# Patient Record
Sex: FEMALE | Race: BLACK OR AFRICAN AMERICAN | NOT HISPANIC OR LATINO | Employment: OTHER | URBAN - METROPOLITAN AREA
[De-identification: names, ages, dates, MRNs, and addresses within clinical notes are randomized per-mention and may not be internally consistent; named-entity substitution may affect disease eponyms.]

---

## 2017-01-09 ENCOUNTER — TELEPHONE (OUTPATIENT)
Dept: INTERNAL MEDICINE | Facility: CLINIC | Age: 82
End: 2017-01-09

## 2017-01-09 NOTE — TELEPHONE ENCOUNTER
----- Message from Maggie Oleg sent at 1/6/2017  4:51 PM CST -----  Contact: Self  X   1st Request  _  2nd Request  _  3rd Request        Who: PATY SOMMER [[id number]]    Why: Pt states she has gone three to four day without a bowel movement and when she takes a laxative she constantly has a bowel movement. Pt would like to know if this is normal. Please call pt, thanks!    What Number to Call Back: [phone]    When to Expect a call back: (Before the end of the day)   -- if call after 3:00 call back will be tomorrow.

## 2017-01-09 NOTE — TELEPHONE ENCOUNTER
This is not normal and patient does need follow up with dr. figueroa to further discuss, she is overdue for her follow up   Please schedule with colorectal     Please schedule patient also for follow up visit with me

## 2017-01-10 NOTE — TELEPHONE ENCOUNTER
Called pt in reference to message we received. Pt states that her bowels may not move for three days and then she will take a laxative and they will move. Asked pt about fiber and water intake. Pt states that she doesn't drink a lot of water because she is on medication that causes her to urinate all day. States that Dr. Robles gave her info about fiber on her last visit but she does not know where the paperwork is.    Went over current meds that are ordered for stool softening as well as how these meds work with water consumption. Advised the pt that she should consume at least 8 cups of water daily as well as eat a diet high in fiber. Pt states that she has been trying to eat some spinach here and there. Advised pt that spinach is a good veggie to eat, however there are other options that are higher in fiber like lentils and broccoli. Went over foods for breakfast, lunch and dinner that the pt could eat.     Pt verbalized understanding. States that she will try these out and give our office a cb if she has any questions or concern. Asked that she have time to try this before she gets further consult.    Please advise?

## 2017-01-10 NOTE — TELEPHONE ENCOUNTER
Would recommend scheduled follow up visit with Dr. Beard, with her neurologist and with me in the upcoming 3-4 weeks

## 2017-01-13 RX ORDER — GABAPENTIN 800 MG/1
800 TABLET ORAL 3 TIMES DAILY
Qty: 270 TABLET | Refills: 0 | Status: SHIPPED | OUTPATIENT
Start: 2017-01-13 | End: 2017-01-25 | Stop reason: SDUPTHER

## 2017-01-25 RX ORDER — GABAPENTIN 800 MG/1
800 TABLET ORAL 3 TIMES DAILY
Qty: 270 TABLET | Refills: 0 | Status: SHIPPED | OUTPATIENT
Start: 2017-01-25 | End: 2017-05-11 | Stop reason: SDUPTHER

## 2017-01-31 ENCOUNTER — PATIENT OUTREACH (OUTPATIENT)
Dept: ADMINISTRATIVE | Facility: HOSPITAL | Age: 82
End: 2017-01-31

## 2017-02-06 ENCOUNTER — NURSE TRIAGE (OUTPATIENT)
Dept: ADMINISTRATIVE | Facility: CLINIC | Age: 82
End: 2017-02-06

## 2017-02-07 NOTE — TELEPHONE ENCOUNTER
Reason for Disposition   Caller has medication question only, adult not sick, and triager answers question    Protocols used: ST MEDICATION QUESTION CALL-A-    Patient had a question about her medication (Glycolax).

## 2017-02-14 RX ORDER — ATORVASTATIN CALCIUM 10 MG/1
10 TABLET, FILM COATED ORAL DAILY
Qty: 90 TABLET | Refills: 3 | Status: SHIPPED | OUTPATIENT
Start: 2017-02-14 | End: 2017-02-16 | Stop reason: SDUPTHER

## 2017-02-16 RX ORDER — ATORVASTATIN CALCIUM 10 MG/1
10 TABLET, FILM COATED ORAL DAILY
Qty: 90 TABLET | Refills: 3 | Status: SHIPPED | OUTPATIENT
Start: 2017-02-16 | End: 2018-04-28 | Stop reason: SDUPTHER

## 2017-03-03 ENCOUNTER — OFFICE VISIT (OUTPATIENT)
Dept: INTERNAL MEDICINE | Facility: CLINIC | Age: 82
End: 2017-03-03
Attending: FAMILY MEDICINE
Payer: MEDICARE

## 2017-03-03 ENCOUNTER — LAB VISIT (OUTPATIENT)
Dept: LAB | Facility: OTHER | Age: 82
End: 2017-03-03
Attending: FAMILY MEDICINE
Payer: MEDICARE

## 2017-03-03 VITALS
DIASTOLIC BLOOD PRESSURE: 70 MMHG | BODY MASS INDEX: 24.38 KG/M2 | SYSTOLIC BLOOD PRESSURE: 112 MMHG | WEIGHT: 151.69 LBS | OXYGEN SATURATION: 98 % | HEIGHT: 66 IN | HEART RATE: 77 BPM

## 2017-03-03 DIAGNOSIS — E53.8 B12 DEFICIENCY: ICD-10-CM

## 2017-03-03 DIAGNOSIS — E11.8 TYPE 2 DIABETES MELLITUS WITH COMPLICATION, UNSPECIFIED LONG TERM INSULIN USE STATUS: ICD-10-CM

## 2017-03-03 DIAGNOSIS — R53.81 DEBILITY: ICD-10-CM

## 2017-03-03 DIAGNOSIS — N95.9 POSTMENOPAUSAL SYMPTOMS: ICD-10-CM

## 2017-03-03 DIAGNOSIS — R15.9 INCONTINENCE OF FECES, UNSPECIFIED FECAL INCONTINENCE TYPE: ICD-10-CM

## 2017-03-03 DIAGNOSIS — R93.7 ABNORMAL MAGNETIC RESONANCE IMAGING OF LUMBAR SPINE: ICD-10-CM

## 2017-03-03 DIAGNOSIS — E55.9 VITAMIN D DEFICIENCY: ICD-10-CM

## 2017-03-03 DIAGNOSIS — B35.1 ONYCHOMYCOSIS: ICD-10-CM

## 2017-03-03 DIAGNOSIS — E11.8 TYPE 2 DIABETES MELLITUS WITH COMPLICATION, UNSPECIFIED LONG TERM INSULIN USE STATUS: Primary | ICD-10-CM

## 2017-03-03 DIAGNOSIS — R93.7 ABNORMAL SPINAL DIAGNOSTIC IMAGING: ICD-10-CM

## 2017-03-03 LAB
25(OH)D3+25(OH)D2 SERPL-MCNC: 12 NG/ML
ALBUMIN SERPL BCP-MCNC: 3.4 G/DL
ALP SERPL-CCNC: 51 U/L
ALT SERPL W/O P-5'-P-CCNC: 14 U/L
ANION GAP SERPL CALC-SCNC: 12 MMOL/L
AST SERPL-CCNC: 22 U/L
BASOPHILS # BLD AUTO: 0.02 K/UL
BASOPHILS NFR BLD: 0.3 %
BILIRUB SERPL-MCNC: 0.2 MG/DL
BUN SERPL-MCNC: 22 MG/DL
CALCIUM SERPL-MCNC: 8.9 MG/DL
CHLORIDE SERPL-SCNC: 107 MMOL/L
CHOLEST/HDLC SERPL: 3.6 {RATIO}
CO2 SERPL-SCNC: 22 MMOL/L
CREAT SERPL-MCNC: 1.3 MG/DL
CREAT UR-MCNC: 32 MG/DL
DIFFERENTIAL METHOD: ABNORMAL
EOSINOPHIL # BLD AUTO: 0.3 K/UL
EOSINOPHIL NFR BLD: 3.9 %
ERYTHROCYTE [DISTWIDTH] IN BLOOD BY AUTOMATED COUNT: 14.9 %
EST. GFR  (AFRICAN AMERICAN): 45.1 ML/MIN/1.73 M^2
EST. GFR  (NON AFRICAN AMERICAN): 39.1 ML/MIN/1.73 M^2
GLUCOSE SERPL-MCNC: 79 MG/DL
HCT VFR BLD AUTO: 37.3 %
HDL/CHOLESTEROL RATIO: 27.8 %
HDLC SERPL-MCNC: 162 MG/DL
HDLC SERPL-MCNC: 45 MG/DL
HGB BLD-MCNC: 12.2 G/DL
LDLC SERPL CALC-MCNC: 103.6 MG/DL
LYMPHOCYTES # BLD AUTO: 2.1 K/UL
LYMPHOCYTES NFR BLD: 27.7 %
MCH RBC QN AUTO: 31.4 PG
MCHC RBC AUTO-ENTMCNC: 32.7 %
MCV RBC AUTO: 96 FL
MICROALBUMIN UR DL<=1MG/L-MCNC: <2.5 UG/ML
MICROALBUMIN/CREATININE RATIO: NORMAL UG/MG
MONOCYTES # BLD AUTO: 0.2 K/UL
MONOCYTES NFR BLD: 2.8 %
NEUTROPHILS # BLD AUTO: 4.9 K/UL
NEUTROPHILS NFR BLD: 65.2 %
NONHDLC SERPL-MCNC: 117 MG/DL
PLATELET # BLD AUTO: 255 K/UL
PMV BLD AUTO: 11.2 FL
POTASSIUM SERPL-SCNC: 4.6 MMOL/L
PROT SERPL-MCNC: 7.6 G/DL
RBC # BLD AUTO: 3.89 M/UL
SODIUM SERPL-SCNC: 141 MMOL/L
T4 FREE SERPL-MCNC: 1.13 NG/DL
TRIGL SERPL-MCNC: 67 MG/DL
TSH SERPL DL<=0.005 MIU/L-ACNC: 0.42 UIU/ML
VIT B12 SERPL-MCNC: 884 PG/ML
WBC # BLD AUTO: 7.51 K/UL

## 2017-03-03 PROCEDURE — 80053 COMPREHEN METABOLIC PANEL: CPT

## 2017-03-03 PROCEDURE — 85025 COMPLETE CBC W/AUTO DIFF WBC: CPT

## 2017-03-03 PROCEDURE — 82607 VITAMIN B-12: CPT

## 2017-03-03 PROCEDURE — 84443 ASSAY THYROID STIM HORMONE: CPT

## 2017-03-03 PROCEDURE — 84439 ASSAY OF FREE THYROXINE: CPT

## 2017-03-03 PROCEDURE — 99214 OFFICE O/P EST MOD 30 MIN: CPT | Mod: S$PBB,,, | Performed by: FAMILY MEDICINE

## 2017-03-03 PROCEDURE — 80061 LIPID PANEL: CPT

## 2017-03-03 PROCEDURE — 82306 VITAMIN D 25 HYDROXY: CPT

## 2017-03-03 PROCEDURE — 99999 PR PBB SHADOW E&M-EST. PATIENT-LVL IV: CPT | Mod: PBBFAC,,, | Performed by: FAMILY MEDICINE

## 2017-03-03 PROCEDURE — 36415 COLL VENOUS BLD VENIPUNCTURE: CPT

## 2017-03-03 PROCEDURE — 83036 HEMOGLOBIN GLYCOSYLATED A1C: CPT

## 2017-03-03 NOTE — MR AVS SNAPSHOT
[facility]  [address]  [facility] [address]  Phone: [phone]  Fax: [phone]                  Jojo Ayoub   3/3/2017 10:00 AM   Office Visit    Description:  Female : 1935   Provider:  Darya Robles MD   Department:  [facility]           Reason for Visit     Diabetes           Diagnoses this Visit        Comments    Type 2 diabetes mellitus with complication, unspecified long term insulin use status    -  Primary     Postmenopausal symptoms         Abnormal magnetic resonance imaging of lumbar spine         Incontinence of feces, unspecified fecal incontinence type         Vitamin D deficiency         B12 deficiency         Debility         Onychomycosis         Abnormal spinal diagnostic imaging                To Do List           Future Appointments        Provider Department Dept Phone    3/3/2017 11:00 AM LAB, [facility] [phone]    3/24/2017 8:30 AM MD Norris Richter [place] - Neurology [phone]    3/24/2017 9:00 AM IZA Lopez [place] - Neurosurgery [facility] [phone]      Goals (5 Years of Data)     None      [facility]     [facility] -  Assistance  Registered nurses in the [facility] provide clinical advisement, health education, appointment booking, and other advisory services.  Call for this free service at [phone].             Medications           Message regarding Medications     Verify the changes and/or additions to your medication regime listed below are the same as discussed with your clinician today.  If any of these changes or additions are incorrect, please notify your healthcare provider.             Verify that the below list of medications is an accurate representation of the medications you are currently taking.  If none reported, the list may be blank. If incorrect, please contact your healthcare provider. Carry  "this list with you in case of emergency.           Current Medications     aspirin (HECTOR CHILDRENS ASPIRIN) 81 MG chewable tablet Take 81 mg by mouth. 1 Tablet Oral Every day    atorvastatin (LIPITOR) 10 MG tablet Take 1 tablet (10 mg total) by mouth once daily.    blood sugar diagnostic Strp Blood sugar test strips.    carvedilol (COREG) 25 MG tablet TAKE 1 TABLET TWICE DAILY    cyclobenzaprine (FLEXERIL) 5 MG tablet Take one to two tablets at night as needed for shoulder pain    diclofenac sodium (VOLTAREN) 1 % Gel Apply 2 g topically daily as needed.    docusate sodium (COLACE) 100 MG capsule Take 1 capsule (100 mg total) by mouth 2 (two) times daily.    gabapentin (NEURONTIN) 800 MG tablet Take 1 tablet (800 mg total) by mouth 3 (three) times daily.    lancets (ACCU-CHEK SOFTCLIX LANCETS) Misc Blood sugar lancets    lidocaine-prilocaine (EMLA) cream Apply topically as needed.    losartan-hydrochlorothiazide 100-25 mg (HYZAAR) 100-25 mg per tablet Take 1 tablet by mouth once daily.    metformin (FORTAMET) 1,000 mg 24 hr tablet Take 1 tablet (1,000 mg total) by mouth daily with dinner or evening meal. Take with evening meal.Take 1 tablet by mouth Every PM.    polyethylene glycol (GLYCOLAX) 17 gram PwPk Take 17 g by mouth daily as needed.           Clinical Reference Information           Your Vitals Were     BP Pulse Height Weight SpO2 BMI    112/70 77 5' 6" (1.676 m) 68.8 kg (151 lb 10.8 oz) 98% 24.48 kg/m2      Blood Pressure          Most Recent Value    BP  112/70      Allergies as of 3/3/2017     No Known Drug Allergies      Immunizations Administered on Date of Encounter - 3/3/2017     None      Orders Placed During Today's Visit      Normal Orders This Visit    Ambulatory consult to Neurology     Ambulatory consult to Neurosurgery     Ambulatory consult to Podiatry     Ambulatory Referral to Physical/Occupational Therapy     Microalbumin/creatinine urine ratio     Future Labs/Procedures Expected by Expires "    CBC auto differential  3/3/2017 3/3/2018    Comprehensive metabolic panel  3/3/2017 3/3/2018    Hemoglobin A1c  3/3/2017 3/3/2018    Lipid panel  3/3/2017 3/3/2018    T4, free  3/3/2017 5/2/2018    TSH  3/3/2017 3/3/2018    Vitamin B12  3/3/2017 3/3/2018    Vitamin D  3/3/2017 3/3/2018      Language Assistance Services     ATTENTION: Language assistance services are available, free of charge. Please call [phone].      ATENCIÓN: Si habla español, tiene a ernst disposición servicios gratuitos de asistencia lingüística. Llame al [phone].     CHÚ Ý: N?u b?n nói Ti?ng Vi?t, có các d?ch v? h? tr? ngôn ng? mi?n phí dành cho b?n. G?i s? [phone].         [facility] complies with applicable Federal civil rights laws and does not discriminate on the basis of race, color, national origin, age, disability, or sex.

## 2017-03-03 NOTE — PROGRESS NOTES
"Subjective:      Patient ID: Jojo Ayoub is a 81 y.o. female.    Chief Complaint: Diabetes (f/u)    HPI Comments: She is here for her follow up. She has not followed up with neurology or neurosurgery for further work up of her weakness and abnormal imaging. She reports she is making it to the bathroom and is aware of when she has to go to the bathroom.     Review of Systems   Constitutional: Negative.    Respiratory: Negative.    Cardiovascular: Negative.    Gastrointestinal: Negative for abdominal distention.   Genitourinary: Negative for dysuria.     I personally reviewed Past Medical History, Past Surgical history,  Past Social History and Family History    Objective:   /70  Pulse 77  Ht 5' 6" (1.676 m)  Wt 68.8 kg (151 lb 10.8 oz)  SpO2 98%  BMI 24.48 kg/m2    Physical Exam   Constitutional: She is oriented to person, place, and time. She appears well-developed and well-nourished. No distress.   HENT:   Head: Normocephalic and atraumatic.   Right Ear: External ear normal.   Left Ear: External ear normal.   Mouth/Throat: Oropharynx is clear and moist.   Eyes: Conjunctivae and EOM are normal. Pupils are equal, round, and reactive to light. Right eye exhibits no discharge. Left eye exhibits no discharge. No scleral icterus.   Neck: Normal range of motion. Neck supple.   Cardiovascular: Normal rate, regular rhythm, normal heart sounds and intact distal pulses.  Exam reveals no gallop.    No murmur heard.  Pulses:       Dorsalis pedis pulses are 2+ on the right side, and 2+ on the left side.        Posterior tibial pulses are 2+ on the right side, and 2+ on the left side.   Pulmonary/Chest: Effort normal and breath sounds normal. No respiratory distress. She has no wheezes. She has no rales. She exhibits no tenderness.   Abdominal: Soft. Bowel sounds are normal. She exhibits no distension and no mass. There is no tenderness. There is no rebound and no guarding.   Musculoskeletal: Normal range of " motion.        Right foot: There is normal range of motion and no deformity.        Left foot: There is normal range of motion and no deformity.   Feet:   Right Foot:   Protective Sensation: 6 sites tested. 6 sites sensed.   Skin Integrity: Negative for ulcer or blister.   Left Foot:   Protective Sensation: 6 sites tested. 6 sites sensed.   Skin Integrity: Negative for ulcer or blister.   Neurological: She is alert and oriented to person, place, and time.   Vitals reviewed.      Jojo was seen today for diabetes.    Diagnoses and all orders for this visit:    Type 2 diabetes mellitus with complication, unspecified long term insulin use status  -     Cont metformin   -     Hemoglobin A1c; Future      Postmenopausal symptoms  - declined dxa scan     Abnormal magnetic resonance imaging of lumbar spine  -     Ambulatory consult to Neurology  -     Ambulatory consult to Neurosurgery    Incontinence of feces, unspecified fecal incontinence type   -declined colorectal follow up       Vitamin D deficiency  -     Vitamin D; Future      B12 deficiency  -     Vitamin B12; Future    Debility  -     Ambulatory Referral to Physical/Occupational Therapy  -     Ambulatory consult to Podiatry  -     Ambulatory consult to Neurology  -     Ambulatory consult to Neurosurgery    Onychomycosis  -     Ambulatory consult to Podiatry    Abnormal spinal diagnostic imaging  -     Ambulatory consult to Neurology  -     Ambulatory consult to Neurosurgery    Other orders  -     Cancel: DXA Bone Density Spine And Hip_Axial Skeleton; Future  -     Cancel: Hemoglobin A1c; Future

## 2017-03-06 LAB
ESTIMATED AVG GLUCOSE: 140 MG/DL
HBA1C MFR BLD HPLC: 6.5 %

## 2017-03-08 ENCOUNTER — TELEPHONE (OUTPATIENT)
Dept: INTERNAL MEDICINE | Facility: CLINIC | Age: 82
End: 2017-03-08

## 2017-03-08 RX ORDER — ERGOCALCIFEROL 1.25 MG/1
50000 CAPSULE ORAL
Qty: 12 CAPSULE | Refills: 0 | Status: SHIPPED | OUTPATIENT
Start: 2017-03-08 | End: 2017-04-07

## 2017-03-09 NOTE — TELEPHONE ENCOUNTER
Pt advised of Dr. horta's   advice, verbalized understanding and has no further questions or concerns at this time.

## 2017-03-09 NOTE — TELEPHONE ENCOUNTER
Please inform that her urine study is normal   Your complete blood count is normal. You are not anemic.   Your liver, electrolytes, and kidney function are normal.   Her diabetes remains controlled.   Your b12 and folate are normal.   Her cholesterol remains controlled on lipitor   Your vitamin D is low. I will send a prescription to the pharmacy for a weekly supplement you will take once weekly for 12 weeks, then after the 12 weeks you will need to take a daily supplement available over the counter of 1000 IU

## 2017-03-24 RX ORDER — CARVEDILOL 25 MG/1
25 TABLET ORAL 2 TIMES DAILY
Qty: 180 TABLET | Refills: 1 | Status: SHIPPED | OUTPATIENT
Start: 2017-03-24 | End: 2017-10-19 | Stop reason: SDUPTHER

## 2017-03-25 RX ORDER — CYCLOBENZAPRINE HCL 5 MG
TABLET ORAL
Qty: 40 TABLET | Refills: 2 | Status: SHIPPED | OUTPATIENT
Start: 2017-03-25 | End: 2018-06-15

## 2017-03-31 RX ORDER — DICLOFENAC SODIUM 10 MG/G
2 GEL TOPICAL DAILY PRN
Qty: 100 G | Refills: 0 | Status: SHIPPED | OUTPATIENT
Start: 2017-03-31 | End: 2018-06-15

## 2017-04-07 NOTE — TELEPHONE ENCOUNTER
----- Message from Hafsa Galindo sent at 4/7/2017  4:00 PM CDT -----  Contact: pt   X_  1st Request  _  2nd Request  _  3rd Request    Who:PATY SOMMER [[id number]]    Why: Patient states she would like to get a refill on her medications patient would like to get a 90 day supply  losartan-hydrochlorothiazide 100-25 mg (HYZAAR) 100-25 mg per tablet, Amlodipine Besylate 2.5mg tablet called into [id number] - [place], [place] - [address]    What Number to Call Back: [phone]    When to Expect a call back: (Before the end of the day)   -- if call after 3:00 call back will be tomorrow.

## 2017-04-08 RX ORDER — LOSARTAN POTASSIUM AND HYDROCHLOROTHIAZIDE 25; 100 MG/1; MG/1
1 TABLET ORAL DAILY
Qty: 90 TABLET | Refills: 2 | Status: SHIPPED | OUTPATIENT
Start: 2017-04-08 | End: 2018-01-13 | Stop reason: SDUPTHER

## 2017-04-17 RX ORDER — AMLODIPINE BESYLATE 2.5 MG/1
2.5 TABLET ORAL DAILY
Qty: 30 TABLET | Refills: 0 | Status: SHIPPED | OUTPATIENT
Start: 2017-04-17 | End: 2017-04-20 | Stop reason: SDUPTHER

## 2017-04-17 NOTE — TELEPHONE ENCOUNTER
----- Message from Megan Calero sent at 4/17/2017  1:56 PM CDT -----  Contact: pt  _  1st Request  _  2nd Request  _  3rd Request    Please refill the medication(s) listed below. Please call the patient when the prescription(s) is ready for  at the phone number (___)(___-_____) .[phone]    Medication #1amlodipine    Medication #2      Preferred [facility] [address]

## 2017-04-20 RX ORDER — AMLODIPINE BESYLATE 2.5 MG/1
2.5 TABLET ORAL DAILY
Qty: 90 TABLET | Refills: 1 | Status: SHIPPED | OUTPATIENT
Start: 2017-04-20 | End: 2017-05-11 | Stop reason: SDUPTHER

## 2017-04-20 RX ORDER — METFORMIN HYDROCHLORIDE EXTENDED-RELEASE TABLETS 1000 MG/1
1000 TABLET, FILM COATED, EXTENDED RELEASE ORAL
Qty: 180 TABLET | Refills: 1 | Status: SHIPPED | OUTPATIENT
Start: 2017-04-20 | End: 2018-04-12

## 2017-04-20 NOTE — TELEPHONE ENCOUNTER
----- Message from Nellie Mohr sent at 4/20/2017  3:43 PM CDT -----  x_  1st Request  _  2nd Request  _  3rd Request    Please refill the medication(s) listed below. Please call the patient when the prescription(s) is ready for  at the phone number [phone] . Pt was supposed to get a 90 day supply and only got a 30 day supply     Medication #1amlodipine (NORVASC) 2.5 MG tablet 30 tablet     Medication #2      Preferred Pharmacy:

## 2017-05-11 RX ORDER — AMLODIPINE BESYLATE 2.5 MG/1
2.5 TABLET ORAL DAILY
Qty: 90 TABLET | Refills: 1 | Status: SHIPPED | OUTPATIENT
Start: 2017-05-11 | End: 2018-06-19

## 2017-05-11 RX ORDER — GABAPENTIN 800 MG/1
800 TABLET ORAL 3 TIMES DAILY
Qty: 270 TABLET | Refills: 0 | Status: SHIPPED | OUTPATIENT
Start: 2017-05-11 | End: 2017-10-29 | Stop reason: SDUPTHER

## 2017-06-19 ENCOUNTER — TELEPHONE (OUTPATIENT)
Dept: INTERNAL MEDICINE | Facility: CLINIC | Age: 82
End: 2017-06-19

## 2017-06-19 NOTE — TELEPHONE ENCOUNTER
Received refill request via fax for vit D50,000 units. Pt needs to take otc supplement as this was just a 12 week course. Left voicemail for pt to call back in regards to medication advise.

## 2017-06-20 NOTE — TELEPHONE ENCOUNTER
Informed pt of vit d advise. Pt demonstrated verbal understanding of information and had no further questions or concerns at this time.

## 2017-07-26 ENCOUNTER — NURSE TRIAGE (OUTPATIENT)
Dept: ADMINISTRATIVE | Facility: CLINIC | Age: 82
End: 2017-07-26

## 2017-07-26 NOTE — TELEPHONE ENCOUNTER
Reason for Disposition   Health Information question, no triage required and triager able to answer question    Protocols used: ST INFORMATION ONLY CALL-A-AH    Jojo called to say she has radically changed her diet, to include fresh fruits, vegetables, and more water.  Her brother is helping her to introduce green smoothies blended in her home .  She is questioning the looser stools now. She had 2 today. Not watery, just mushy.  Explained that her stools will be more bulky now that she is adding more water and more fiber.  She has a lot of questions.  Encouraged her to ask Darya Robles MD for referral to diabetic education so she can get clarification and additional information. Message to Darya Robles MD .  Please contact caller directly with any additional care advice.

## 2017-07-27 ENCOUNTER — TELEPHONE (OUTPATIENT)
Dept: INTERNAL MEDICINE | Facility: CLINIC | Age: 82
End: 2017-07-27

## 2017-08-08 ENCOUNTER — TELEPHONE (OUTPATIENT)
Dept: INTERNAL MEDICINE | Facility: CLINIC | Age: 82
End: 2017-08-08

## 2017-08-08 NOTE — TELEPHONE ENCOUNTER
Pt states she is going through something personal right now and will have to call back to schedule at a later time.

## 2017-09-22 DIAGNOSIS — E11.9 TYPE 2 DIABETES MELLITUS WITHOUT COMPLICATION: ICD-10-CM

## 2017-10-03 ENCOUNTER — NURSE TRIAGE (OUTPATIENT)
Dept: ADMINISTRATIVE | Facility: CLINIC | Age: 82
End: 2017-10-03

## 2017-10-03 NOTE — TELEPHONE ENCOUNTER
Reason for Disposition   Diabetes    Protocols used: ST EARWAX-A-OH    Pt c/o right sticky ear wax. She tried drops OTC but no relief. Care advice given. appt made.

## 2017-10-05 ENCOUNTER — OFFICE VISIT (OUTPATIENT)
Dept: INTERNAL MEDICINE | Facility: CLINIC | Age: 82
End: 2017-10-05
Payer: MEDICARE

## 2017-10-05 VITALS
HEIGHT: 64 IN | WEIGHT: 134.69 LBS | SYSTOLIC BLOOD PRESSURE: 112 MMHG | HEART RATE: 78 BPM | DIASTOLIC BLOOD PRESSURE: 72 MMHG | BODY MASS INDEX: 22.99 KG/M2 | OXYGEN SATURATION: 98 %

## 2017-10-05 DIAGNOSIS — H61.21 RIGHT EAR IMPACTED CERUMEN: Primary | ICD-10-CM

## 2017-10-05 PROCEDURE — G0008 ADMIN INFLUENZA VIRUS VAC: HCPCS | Mod: PBBFAC

## 2017-10-05 PROCEDURE — 99213 OFFICE O/P EST LOW 20 MIN: CPT | Mod: S$PBB,,, | Performed by: INTERNAL MEDICINE

## 2017-10-05 PROCEDURE — 99214 OFFICE O/P EST MOD 30 MIN: CPT | Mod: PBBFAC | Performed by: INTERNAL MEDICINE

## 2017-10-05 PROCEDURE — 99999 PR PBB SHADOW E&M-EST. PATIENT-LVL IV: CPT | Mod: PBBFAC,,, | Performed by: INTERNAL MEDICINE

## 2017-10-05 NOTE — PROGRESS NOTES
Patient given HD Influenza IM in the LD. Patient tolerated well and Band-Aid was applied. Lot#[id number] Exp:4/18/2018. Patient advised to wait in the lobby for 15 min to make sure no adverse reactions occur. Patient states verbal understanding and has no further questions. Patient given vaccine information sheet.

## 2017-10-05 NOTE — PROGRESS NOTES
Subjective:       Patient ID: Jojo Ayoub is a 82 y.o. female.    Chief Complaint: Ear Fullness    Pt c/o R ear fullness/pressure for 1 week. She said it started after a cold she had last week. Cold symptoms have improved but fullness in ear has persisted. No drainage. Some decrease in hearing. No reynaldo pain. She has used ear wax removal drop for 2 days with limited relief. She had similar problem last year and the ear wax drop helped.       Review of Systems   Constitutional: Negative for fever.   HENT: Negative for ear discharge, ear pain, rhinorrhea and sore throat.    Respiratory: Negative for cough and shortness of breath.    Cardiovascular: Negative for chest pain.       Objective:      Physical Exam   Constitutional: She is oriented to person, place, and time. She appears well-developed and well-nourished.   HENT:   Right Ear: External ear normal.   Left Ear: Tympanic membrane, external ear and ear canal normal.   Nose: No mucosal edema or rhinorrhea.   Mouth/Throat: No oropharyngeal exudate or posterior oropharyngeal erythema.   Cerumen impaction R ear   Neck: Neck supple. No thyromegaly present.   Cardiovascular: Normal rate, regular rhythm and normal heart sounds.    Pulmonary/Chest: Effort normal and breath sounds normal.   Lymphadenopathy:     She has no cervical adenopathy.   Neurological: She is alert and oriented to person, place, and time.   Psychiatric: She has a normal mood and affect.       Assessment:       1. Right ear impacted cerumen        Plan:       1. Continue with otc wax removal gtt for 3 more days; if still no relief then ENT referral  2. Pt assisted in making f/u appt with PCP

## 2017-10-10 ENCOUNTER — TELEPHONE (OUTPATIENT)
Dept: INTERNAL MEDICINE | Facility: CLINIC | Age: 82
End: 2017-10-10

## 2017-10-10 NOTE — TELEPHONE ENCOUNTER
----- Message from Megan Calero sent at 10/10/2017  2:30 PM CDT -----  Contact: pt  _  1st Request  _  2nd Request  _  3rd Request        Who: pt    Why: Requesting a call back in regards to how to get to ent doctor. Please call the pt    What Number to Call Back:[phone]    When to Expect a call back: (Within 24 hours)    Please return the call at earliest convenience. Thanks!      4:43 PM   Confirmed to the patient the address and phone number of the external ENT doctor referred by Dr. Lopez

## 2017-10-10 NOTE — TELEPHONE ENCOUNTER
----- Message from Megan Calero sent at 10/10/2017  2:30 PM CDT -----  Contact: pt  _  1st Request  _  2nd Request  _  3rd Request        Who: pt    Why: Requesting a call back in regards to how to get to ent doctor. Please call the pt    What Number to Call Back:[phone]    When to Expect a call back: (Within 24 hours)    Please return the call at earliest convenience. Thanks!

## 2017-10-19 RX ORDER — CARVEDILOL 25 MG/1
25 TABLET ORAL 2 TIMES DAILY
Qty: 180 TABLET | Refills: 0 | Status: SHIPPED | OUTPATIENT
Start: 2017-10-19 | End: 2018-02-11 | Stop reason: SDUPTHER

## 2017-10-26 ENCOUNTER — PATIENT OUTREACH (OUTPATIENT)
Dept: ADMINISTRATIVE | Facility: HOSPITAL | Age: 82
End: 2017-10-26

## 2017-10-26 DIAGNOSIS — E11.9 DIABETES MELLITUS WITHOUT COMPLICATION: Primary | ICD-10-CM

## 2017-10-26 NOTE — PROGRESS NOTES
Pre chart for visit with Dr. Robles on 11/8/17. Health maintenance reminder letter mailed to pt. [facility] is committed to your overall health.  To help you get the most out of each of your visits, we will review your information to make sure you are up to date on all of your recommended tests and/or procedures.      Dr. Robles has found that your chart shows you may be due for:    Health Maintenance Due   Topic Date Due    TETANUS VACCINE  05/22/1953    Zoster Vaccine  05/22/1997    Eye Exam  07/20/2017    Hemoglobin A1c  09/03/2017      If you have not had a diabetic eye exam within the last year, you may now do a retinal scan right here in the [facility]. This will be scheduled for you immediately following the visit with your PCP. The scan will only take about 10 additional minutes, and your pictures will be sent to an [facility] Ophthalmologist for evaluation. The results will then be reported back to your PCP. We now offer this as a simple way to screen for Diabetic Retinopathy annually, without having to make a separate appointment.    People with diabetes or pre-diabetes can have an eye disease called diabetic retinopathy. This is when high blood sugar levels cause damage to blood vessels in the retina. These blood vessels can swell and leak. Or they can close, stopping blood from passing through. Sometimes abnormal new blood vessels grow on the retina. All of these changes can steal your vision.    This is a very important step in your care, as early detection is the spear to prevention!      If you have had any of the above done at another facility, please bring the records or information with you so that your record at [facility] will be complete.  If you would like to schedule any of these, please contact me.    Medicare does not cover all immunizations to be given in the clinic.  Check your benefits to ensure that you do not need to receive your immunizations at the  pharmacy.      Franny Montes LPN  Clinic Care Coordinator  Internal Medicine  [facility]  [address]  [place] [address]  [phone]

## 2017-10-30 RX ORDER — GABAPENTIN 800 MG/1
800 TABLET ORAL 3 TIMES DAILY
Qty: 270 TABLET | Refills: 0 | Status: SHIPPED | OUTPATIENT
Start: 2017-10-30 | End: 2017-11-07 | Stop reason: SDUPTHER

## 2017-11-07 RX ORDER — GABAPENTIN 800 MG/1
800 TABLET ORAL 3 TIMES DAILY
Qty: 270 TABLET | Refills: 0 | Status: SHIPPED | OUTPATIENT
Start: 2017-11-07 | End: 2018-04-30 | Stop reason: SDUPTHER

## 2018-01-15 RX ORDER — LOSARTAN POTASSIUM AND HYDROCHLOROTHIAZIDE 25; 100 MG/1; MG/1
1 TABLET ORAL DAILY
Qty: 90 TABLET | Refills: 0 | Status: SHIPPED | OUTPATIENT
Start: 2018-01-15 | End: 2018-04-12 | Stop reason: SDUPTHER

## 2018-02-12 RX ORDER — CARVEDILOL 25 MG/1
25 TABLET ORAL 2 TIMES DAILY
Qty: 180 TABLET | Refills: 0 | Status: SHIPPED | OUTPATIENT
Start: 2018-02-12 | End: 2018-06-10 | Stop reason: SDUPTHER

## 2018-02-27 ENCOUNTER — NURSE TRIAGE (OUTPATIENT)
Dept: ADMINISTRATIVE | Facility: CLINIC | Age: 83
End: 2018-02-27

## 2018-02-27 NOTE — TELEPHONE ENCOUNTER
Caller stated that ate prunes yesterday and had several bowel movements resulting in diarrhea. Resolved yesterday evening and diarrhea has started again this morning. Advised per protocol and she verbalized understanding. Informed her that I would send a message to the provider's office.     Reason for Disposition   Age > 70 years    Protocols used:  DIARRHEA-A-OH

## 2018-03-03 ENCOUNTER — NURSE TRIAGE (OUTPATIENT)
Dept: ADMINISTRATIVE | Facility: CLINIC | Age: 83
End: 2018-03-03

## 2018-03-03 NOTE — TELEPHONE ENCOUNTER
Reason for Disposition   Abdomen is more swollen than usual    Protocols used: ST CONSTIPATION-A-OH

## 2018-03-04 ENCOUNTER — OFFICE VISIT (OUTPATIENT)
Dept: URGENT CARE | Facility: CLINIC | Age: 83
End: 2018-03-04
Payer: MEDICARE

## 2018-03-04 VITALS
HEART RATE: 84 BPM | DIASTOLIC BLOOD PRESSURE: 86 MMHG | TEMPERATURE: 98 F | BODY MASS INDEX: 27.6 KG/M2 | OXYGEN SATURATION: 99 % | WEIGHT: 150 LBS | HEIGHT: 62 IN | SYSTOLIC BLOOD PRESSURE: 149 MMHG

## 2018-03-04 DIAGNOSIS — K52.9 ACUTE GASTROENTERITIS: Primary | ICD-10-CM

## 2018-03-04 DIAGNOSIS — R30.0 DYSURIA: ICD-10-CM

## 2018-03-04 DIAGNOSIS — R11.2 NAUSEA AND VOMITING, INTRACTABILITY OF VOMITING NOT SPECIFIED, UNSPECIFIED VOMITING TYPE: ICD-10-CM

## 2018-03-04 DIAGNOSIS — R19.7 DIARRHEA, UNSPECIFIED TYPE: ICD-10-CM

## 2018-03-04 LAB — GLUCOSE SERPL-MCNC: 143 MG/DL (ref 70–110)

## 2018-03-04 PROCEDURE — 96372 THER/PROPH/DIAG INJ SC/IM: CPT | Mod: S$GLB,,, | Performed by: EMERGENCY MEDICINE

## 2018-03-04 PROCEDURE — 99214 OFFICE O/P EST MOD 30 MIN: CPT | Mod: 25,S$GLB,, | Performed by: NURSE PRACTITIONER

## 2018-03-04 PROCEDURE — 82948 REAGENT STRIP/BLOOD GLUCOSE: CPT | Mod: GZ,S$GLB,, | Performed by: NURSE PRACTITIONER

## 2018-03-04 RX ORDER — ONDANSETRON 2 MG/ML
4 INJECTION INTRAMUSCULAR; INTRAVENOUS
Status: COMPLETED | OUTPATIENT
Start: 2018-03-04 | End: 2018-03-04

## 2018-03-04 RX ORDER — ONDANSETRON 4 MG/1
TABLET, ORALLY DISINTEGRATING ORAL
Qty: 10 TABLET | Refills: 0 | Status: SHIPPED | OUTPATIENT
Start: 2018-03-04 | End: 2018-06-21

## 2018-03-04 RX ADMIN — ONDANSETRON 4 MG: 2 INJECTION INTRAMUSCULAR; INTRAVENOUS at 06:03

## 2018-03-04 NOTE — PROGRESS NOTES
"Subjective:       Patient ID: Jojo Ayoub is a 82 y.o. female.    Vitals:  height is 5' 2" (1.575 m) and weight is 68 kg (150 lb). Her oral temperature is 98.2 °F (36.8 °C). Her blood pressure is 149/86 (abnormal) and her pulse is 84. Her oxygen saturation is 99%.     Chief Complaint: N/V/D    Pt c/o diarrhea since last night, watery.  Denies blood, black, or mucous to her stool.  Associates nausea all day today with 1 episode of emesis.  Denies abdominal pain.  She does report some dysuria but no frequency.  Here with  and daughter.  Daughter states that she does sometimes use laxatives and Imodium and suspects that this is occurring again.  Pt admits to take Imodium today, unsure of last time she used a laxative.  No fever.        Nausea   This is a new problem. The current episode started today. The problem occurs constantly. Associated symptoms include a change in bowel habit, fatigue, nausea, urinary symptoms and vomiting. Pertinent negatives include no abdominal pain, arthralgias, chest pain, chills, congestion, coughing, diaphoresis, fever, headaches, myalgias, neck pain, sore throat, swollen glands or vertigo.   Diarrhea    This is a recurrent problem. The current episode started yesterday. The problem occurs 5 to 10 times per day. The problem has been unchanged. The stool consistency is described as watery. Associated symptoms include vomiting. Pertinent negatives include no abdominal pain, arthralgias, bloating, chills, coughing, fever, headaches, increased  flatus, myalgias, sweats, URI or weight loss. Nothing aggravates the symptoms. There are no known risk factors. She has tried anti-motility drug (Imodium) for the symptoms.   Dysuria    This is a new problem. The current episode started today. The problem occurs intermittently. The problem has been waxing and waning. The quality of the pain is described as burning. The pain is at a severity of 0/10. The patient is experiencing no pain. " There has been no fever. She is not sexually active. There is no history of pyelonephritis. Associated symptoms include nausea and vomiting. Pertinent negatives include no behavior changes, chills, discharge, flank pain, frequency, hematuria, sweats, weight loss or constipation. She has tried nothing for the symptoms. There is no history of recurrent UTIs.     Review of Systems   Constitution: Positive for decreased appetite and fatigue. Negative for chills, diaphoresis, fever and weight loss.   HENT: Negative for congestion and sore throat.    Cardiovascular: Negative for chest pain.   Respiratory: Negative for cough and shortness of breath.    Musculoskeletal: Negative for arthralgias, back pain, myalgias and neck pain.   Gastrointestinal: Positive for change in bowel habit, diarrhea, nausea and vomiting. Negative for bloating, abdominal pain, constipation, flatus, hematochezia and melena.   Genitourinary: Positive for dysuria. Negative for flank pain, frequency and hematuria.   Neurological: Negative for headaches and vertigo.       Objective:      Physical Exam   Constitutional: She is oriented to person, place, and time. She appears well-developed and well-nourished.  Non-toxic appearance. She does not have a sickly appearance. She does not appear ill. No distress.   HENT:   Head: Normocephalic and atraumatic.   Right Ear: External ear normal.   Left Ear: External ear normal.   Nose: Nose normal.   Mouth/Throat: Uvula is midline, oropharynx is clear and moist and mucous membranes are normal.   Eyes: Conjunctivae and lids are normal.   Neck: Trachea normal and full passive range of motion without pain. Neck supple.   Cardiovascular: Normal rate, regular rhythm and normal heart sounds.    Pulmonary/Chest: Effort normal and breath sounds normal. No respiratory distress.   Abdominal: Soft. Normal appearance and bowel sounds are normal. She exhibits no distension, no abdominal bruit, no pulsatile midline mass and no  "mass. There is no tenderness. There is no rigidity, no rebound, no guarding and no CVA tenderness.   Musculoskeletal: Normal range of motion. She exhibits no edema.   Neurological: She is alert and oriented to person, place, and time. She has normal strength.   Skin: Skin is warm, dry and intact. She is not diaphoretic. No pallor.   Psychiatric: She has a normal mood and affect. Her speech is normal and behavior is normal. Judgment and thought content normal. Cognition and memory are normal.   Nursing note and vitals reviewed.      A Abdominal X-Ray was ordered. My reading of this film is no acute impaction or obstruction. (No comparison films available: pending review by Radiologist.)    Results for orders placed or performed in visit on 03/04/18   POCT glucose   Result Value Ref Range    POC Glucose 143 (A) 70 - 110 mg/dL     Assessment:       1. Acute gastroenteritis    2. Diarrhea, unspecified type    3. Nausea and vomiting, intractability of vomiting not specified, unspecified vomiting type    4. Dysuria        Plan:       Pt unable to urinate in clinic.  States that she has "urinated it all out."  No bladder pressure or distension.  Now stating that she isn't really having burning when she urinates.  Advised patient and family that she will need to see her PCP for continued dysuria to have testing.  Pt also unable to stand for prolonged times chronically, not s/t weakness and can only lay down for xray.  Walker use at home.  Here in wheelchair because walker is in car.    Acute gastroenteritis  -     POCT glucose  -     Ambulatory Referral to Gastroenterology    Diarrhea, unspecified type  -     Cancel: X-Ray Abdomen Flat And Erect; Future; Expected date: 03/04/2018  -     POCT glucose  -     Ambulatory Referral to Gastroenterology    Nausea and vomiting, intractability of vomiting not specified, unspecified vomiting type  -     POCT Urinalysis, Dipstick, Automated, W/O Scope  -     Cancel: X-Ray Abdomen Flat " And Erect; Future; Expected date: 03/04/2018  -     POCT glucose  -     ondansetron injection 4 mg; Inject 4 mg into the muscle one time.  -     ondansetron (ZOFRAN-ODT) 4 MG TbDL; One tablet sublingual tid prn nausea  Dispense: 10 tablet; Refill: 0  -     Ambulatory Referral to Gastroenterology    Dysuria

## 2018-03-05 NOTE — PATIENT INSTRUCTIONS
Follow up with PCP for continued dysuria.    If you begin to run fever, have uncontrolled pain in your abdomen, uncontrolled N/V, increasing weakness, syncope, chest pain, shortness of breath, blood in your urine, bladder distension without being able to urinate... GO TO THE EMERGENCY ROOM  Stop the use of Imodium and laxatives without further advisement from your PCP or GI   Call [phone] to confirm appointment for referral to GI    Noninfectious Gastroenteritis (Ages 6 Years to Adult)    Gastroenteritis can cause nausea, vomiting, diarrhea, and abdominal cramping. This may occur as a result of food sensitivity, inflammation of your gastrointestinal tract, medicines, stress, or other causes not related to infection. Your symptoms will usually last from 1 to 3 days, but can last longer. Antibiotics are not effective, but simple home treatment will be helpful.  Home care  Medicine  · You may use acetaminophen or NSAID medicines like ibuprofen or naproxen to control fever, unless another medicine is prescribed. (Note: If you have chronic liver or kidney disease, or ever had a stomach ulcer or gastrointestinalI bleeding, talk with your healthcare provider before using these medicines.) Aspirin should never be used in anyone under 18 years of age who is ill with a fever. It may cause severe liver damage. Don't increase your NSAID medicines if you are already taking these medicines for another condition (like arthritis). Don't use NSAIDS if you are on aspirin (such as for heart disease, or after a stroke).  · If medicines for diarrhea or vomiting are prescribed, take only as directed.  General care and preventing spread of the illness  · If symptoms are severe, rest at home for the next 24 hours or until you feel better.  · Hand washing with soap and water is the best way to prevent the spread of infection. Wash your hands after touching anyone who is sick.  · Wash your hands after using the toilet and before meals.  Clean the toilet after each use.  · Caffeine, tobacco, and alcohol can make your diarrhea, cramping, and pain worse.  Diet  · Water and clear liquids are important so you do not get dehydrated. Drink a small amount at a time.  · Do not force yourself to eat, especially if you have cramps, vomiting, or diarrhea. When you finally decide to start eating, do not eat large amounts at a time, even if you are hungry.  · If you eat, avoid fatty, greasy, spicy, or fried foods.  · Do not eat dairy products if you have diarrhea; they can make the diarrhea worse.  During the first 24 hours (the first full day), follow the diet below:  · Beverages: Water, clear liquids, soft drinks without caffeine, like ginger ale; mineral water (plain or flavored); decaffeinated tea and coffee.  · Soups: Clear broth, consommé, and bouillon Sports drinks aren't a good choice because they have too much sugar and not enough electrolytes. In this case, commercially available products called oral rehydration solutions are best.  · Desserts: Plain gelatin, popsicles, and fruit juice bars.  During the next 24 hours (the second day), you may add the following to the above if you have improved. If not, continue what you did the first day:  · Hot cereal, plain toast, bread, rolls, crackers  · Plain noodles, rice, mashed potatoes, chicken noodle or rice soup  · Unsweetened canned fruit (avoid pineapple), bananas  · Limit caffeine and chocolate. No spices or seasonings except salt.  During the next 24 hours  · Gradually resume a normal diet, as you feel better and your symptoms improve.  · If at any time your symptoms start getting worse, go back to clear liquids until you feel better.  Food preparation  · If you have diarrhea, you should not prepare food for others. When you  prepare food for yourself, wash your hands before and after.  · Wash your hands after using cutting boards, countertops, and knives that have been in contact with raw food.  · Keep  uncooked meats away from cooked and ready-to-eat foods.  Follow-up care  Follow up with your healthcare provider if you are not improving over the next 2 to 3 days, or as advised. If a stool (diarrhea) sample was taken, call for the results as directed.  When to seek medical care  Call your healthcare provider right away if any of these occur:   · Increasing abdominal pain or constant lower right abdominal pain  · Continued vomiting (unable to keep liquids down)  · Frequent diarrhea (more than 5 times a day)  · Blood in vomit or stool (black or red color)  · Inability to tolerate solid food after a few days.  · Dark urine, reduced urine output  · Weakness, dizziness  · Drowsiness  · Fever of 100.4ºF (38.0ºC) or higher, or as directed by your healthcare provider  · New rash  Call 911  Call 911 if any of these occur:  · Trouble breathing  · Chest pain  · Confusion  · Severe drowsiness or trouble awakening  · Seizure  · Stiff neck  Date Last Reviewed: 11/16/2015 © 2000-2017 [employer]. [address] [place] [address]. All rights reserved. This information is not intended as a substitute for professional medical care. Always follow your healthcare professional's instructions.        Dysuria with Uncertain Cause (Adult)    The urethra is the tube that allows urine to pass out of the body. In a woman, the urethra is the opening above the vagina. In men, the urethra is the opening on the tip of the penis. Dysuria is the feeling of pain or burning in the urethra when passing urine.  Dysuria can be caused by anything that irritates or inflames the urethra. An infection or chemical irritation can cause this reaction. A bladder infection is the most common cause of dysuria in adults. A urine test can diagnose this. A bladder infection needs antibiotic treatment.  Soaps, lotions, colognes and feminine hygiene products can cause dysuria. So can birth control jellies, creams, and foams. It will go away 1  to 3 days after using these irritants.  Sexually transmitted diseases (STDs) such as chlamydia or gonorrhea can cause dysuria. Your healthcare provider may take a culture sample. Your provider may start you on antibiotic medicine before the culture test returns.  In women who have gone through menopause, dysuria can be from dryness in the lining of the urethra. This can be treated with hormones. Dysuria becomes long-term (chronic) when it lasts for weeks or months. You may need to see a specialist (urologist) to diagnose and treat chronic dysuria.  Home care  These home care tips may help:  · Don't use any chemicals or products that you think may be causing your symptoms.  · If you were given a prescription medicine, take as directed. Be sure to take it until it is all used up.  · If a culture was taken, don't have sex until you have been told that it is negative. This means you don't have an infection. Then follow your healthcare provider's advice to treat your condition.  If a culture was done and it is positive:  · Both you and your sexual partner may need to be treated. This is true even if your partner has no symptoms.  · Contact your healthcare provider or go to an urgent care clinic or the public health department to be looked at and treated.  · Don't have sex until both you and your partner(s) have finished all antibiotics and your healthcare provider says you are no longer contagious.  · Learn about and use safe sex practices. The safest sex is with a partner who has tested negative and only has sex with you. Condoms can prevent STDs from spreading, but they aren't a guarantee.  Follow-up care  Follow up with your healthcare provider, or as advised. If a culture was taken, you may call as directed for the results. If you have an STD, follow up with your provider or the public health department for a complete STD screening, including HIV testing. For more information, contact CDC-INFO at 575-061-3313.  When  to seek medical advice  Call your healthcare provider right away if any of these occur:  · You aren't better after 3 days of treatment  · Fever of 100.4ºF (38ºC) or higher, or as directed by your healthcare provider  · Back or belly pain that gets worse  · You can't urinate because of pain  · New discharge from the urethra, vagina, or penis  · Painful sores on the penis  · Rash or joint pain  · Painful lumps (lymph nodes) in the groin  · Testicle pain or swelling of the scrotum  Date Last Reviewed: 11/1/2016  © 3221-0211 [employer]. [address] [place] [address]. All rights reserved. This information is not intended as a substitute for professional medical care. Always follow your healthcare professional's instructions.

## 2018-03-17 ENCOUNTER — NURSE TRIAGE (OUTPATIENT)
Dept: ADMINISTRATIVE | Facility: CLINIC | Age: 83
End: 2018-03-17

## 2018-03-17 NOTE — TELEPHONE ENCOUNTER
"  Reason for Disposition   Unable to have a bowel movement (BM) without laxative or enema    Answer Assessment - Initial Assessment Questions  1. STOOL PATTERN OR FREQUENCY: "How often do you pass bowel movements (BMs)?"  (Normal range: tid to q 3 days)  "When was the last BM passed?"        Usually every 2-3 days.  Thursday, 2 days   2. STRAINING: "Do you have to strain to have a BM?"       no  3. RECTAL PAIN: "Does your rectum hurt when the stool comes out?" If so, ask: "Do you have hemorrhoids? How bad is the pain?"  (Scale 1-10; or mild, moderate, severe)      no  4. STOOL COMPOSITION: "Are the stools hard?"       no  5. BLOOD ON STOOLS: "Has there been any blood on the toilet tissue or on the surface of the BM?" If so, ask: "When was the last time?"       no  6. CHRONIC CONSTIPATION: "Is this a new problem for you?"  If no, ask: How long have you had this problem?" (days, weeks, months)       intermittent  7. CHANGES IN DIET: "Have there been any recent changes in your diet?"       Have not been eating fresh vegetables and fruit like usually  8. MEDICATIONS: "Have you been taking any new medications?"      No ne  9. LAXATIVES: "Have you been using any laxatives or enemas?"  If yes, ask "What, how often, and when was the last time?"      Took castor oil to relieve, but no new medication   10. CAUSE: "What do you think is causing the constipation?"         I need to eat more fruits and vegetable  11. OTHER SYMPTOMS: "Do you have any other symptoms?" (e.g., abdominal pain, fever, vomiting)        no  12. PREGNANCY: "Is there any chance you are pregnant?" "When was your last menstrual period?"        N/a    [facility]    Patient called for care advice for constipation. She admits to not drinking enough water nor eating fruits and vegetables as she should. She verbalized understanding and will call back if she has other difficulty.   "

## 2018-03-26 ENCOUNTER — LAB VISIT (OUTPATIENT)
Dept: LAB | Facility: OTHER | Age: 83
End: 2018-03-26
Attending: FAMILY MEDICINE
Payer: MEDICARE

## 2018-03-26 ENCOUNTER — OFFICE VISIT (OUTPATIENT)
Dept: INTERNAL MEDICINE | Facility: CLINIC | Age: 83
End: 2018-03-26
Attending: FAMILY MEDICINE
Payer: MEDICARE

## 2018-03-26 VITALS
HEART RATE: 82 BPM | OXYGEN SATURATION: 98 % | DIASTOLIC BLOOD PRESSURE: 78 MMHG | WEIGHT: 145.06 LBS | BODY MASS INDEX: 26.69 KG/M2 | SYSTOLIC BLOOD PRESSURE: 122 MMHG | HEIGHT: 62 IN

## 2018-03-26 DIAGNOSIS — E78.5 HYPERLIPIDEMIA, UNSPECIFIED HYPERLIPIDEMIA TYPE: ICD-10-CM

## 2018-03-26 DIAGNOSIS — R93.7 ABNORMAL SPINAL DIAGNOSTIC IMAGING: ICD-10-CM

## 2018-03-26 DIAGNOSIS — N39.3 STRESS INCONTINENCE: ICD-10-CM

## 2018-03-26 DIAGNOSIS — Q67.5 CONGENITAL MUSCULOSKELETAL DEFORMITY OF SPINE: ICD-10-CM

## 2018-03-26 DIAGNOSIS — E55.9 VITAMIN D DEFICIENCY: ICD-10-CM

## 2018-03-26 LAB
25(OH)D3+25(OH)D2 SERPL-MCNC: 26 NG/ML
ALBUMIN SERPL BCP-MCNC: 3.6 G/DL
ALP SERPL-CCNC: 44 U/L
ALT SERPL W/O P-5'-P-CCNC: 53 U/L
ANION GAP SERPL CALC-SCNC: 11 MMOL/L
AST SERPL-CCNC: 46 U/L
BASOPHILS # BLD AUTO: 0.03 K/UL
BASOPHILS NFR BLD: 0.3 %
BILIRUB SERPL-MCNC: 0.3 MG/DL
BUN SERPL-MCNC: 23 MG/DL
CALCIUM SERPL-MCNC: 9.9 MG/DL
CHLORIDE SERPL-SCNC: 97 MMOL/L
CHOLEST SERPL-MCNC: 146 MG/DL
CHOLEST/HDLC SERPL: 3.3 {RATIO}
CO2 SERPL-SCNC: 25 MMOL/L
CREAT SERPL-MCNC: 1.3 MG/DL
CREAT UR-MCNC: 41 MG/DL
DIFFERENTIAL METHOD: ABNORMAL
EOSINOPHIL # BLD AUTO: 0.4 K/UL
EOSINOPHIL NFR BLD: 4.5 %
ERYTHROCYTE [DISTWIDTH] IN BLOOD BY AUTOMATED COUNT: 13.8 %
EST. GFR  (AFRICAN AMERICAN): 45 ML/MIN/1.73 M^2
EST. GFR  (NON AFRICAN AMERICAN): 39 ML/MIN/1.73 M^2
ESTIMATED AVG GLUCOSE: 114 MG/DL
GLUCOSE SERPL-MCNC: 85 MG/DL
HBA1C MFR BLD HPLC: 5.6 %
HCT VFR BLD AUTO: 35.3 %
HDLC SERPL-MCNC: 44 MG/DL
HDLC SERPL: 30.1 %
HGB BLD-MCNC: 11.7 G/DL
LDLC SERPL CALC-MCNC: 89.6 MG/DL
LYMPHOCYTES # BLD AUTO: 1.8 K/UL
LYMPHOCYTES NFR BLD: 21 %
MCH RBC QN AUTO: 31.6 PG
MCHC RBC AUTO-ENTMCNC: 33.1 G/DL
MCV RBC AUTO: 95 FL
MICROALBUMIN UR DL<=1MG/L-MCNC: 3 UG/ML
MICROALBUMIN/CREATININE RATIO: 7.3 UG/MG
MONOCYTES # BLD AUTO: 0.3 K/UL
MONOCYTES NFR BLD: 3.9 %
NEUTROPHILS # BLD AUTO: 6.1 K/UL
NEUTROPHILS NFR BLD: 70.1 %
NONHDLC SERPL-MCNC: 102 MG/DL
PLATELET # BLD AUTO: 311 K/UL
PMV BLD AUTO: 9.6 FL
POTASSIUM SERPL-SCNC: 5.1 MMOL/L
PROT SERPL-MCNC: 7.6 G/DL
RBC # BLD AUTO: 3.7 M/UL
SODIUM SERPL-SCNC: 133 MMOL/L
T4 FREE SERPL-MCNC: 1.04 NG/DL
TRIGL SERPL-MCNC: 62 MG/DL
TSH SERPL DL<=0.005 MIU/L-ACNC: 0.39 UIU/ML
WBC # BLD AUTO: 8.73 K/UL

## 2018-03-26 PROCEDURE — 84439 ASSAY OF FREE THYROXINE: CPT

## 2018-03-26 PROCEDURE — 82306 VITAMIN D 25 HYDROXY: CPT

## 2018-03-26 PROCEDURE — 80053 COMPREHEN METABOLIC PANEL: CPT

## 2018-03-26 PROCEDURE — 80061 LIPID PANEL: CPT

## 2018-03-26 PROCEDURE — 85025 COMPLETE CBC W/AUTO DIFF WBC: CPT

## 2018-03-26 PROCEDURE — 36415 COLL VENOUS BLD VENIPUNCTURE: CPT

## 2018-03-26 PROCEDURE — 99999 PR PBB SHADOW E&M-EST. PATIENT-LVL V: CPT | Mod: PBBFAC,,, | Performed by: FAMILY MEDICINE

## 2018-03-26 PROCEDURE — 83036 HEMOGLOBIN GLYCOSYLATED A1C: CPT

## 2018-03-26 PROCEDURE — 84443 ASSAY THYROID STIM HORMONE: CPT

## 2018-03-26 PROCEDURE — 82043 UR ALBUMIN QUANTITATIVE: CPT

## 2018-03-26 PROCEDURE — 99214 OFFICE O/P EST MOD 30 MIN: CPT | Mod: S$PBB,,, | Performed by: FAMILY MEDICINE

## 2018-03-26 PROCEDURE — 99215 OFFICE O/P EST HI 40 MIN: CPT | Mod: PBBFAC | Performed by: FAMILY MEDICINE

## 2018-03-26 NOTE — PROGRESS NOTES
"Subjective:      Patient ID: Jojo Ayoub is a 82 y.o. female.    Chief Complaint: Annual Exam    She is has been taking colace/metamucil daily and she is not straining. She denies any further stool leakage. She did not follow up with neurology. She continues to have urine leakage at times. She denies legs giving out and reports numbness has continued on the left more than right. They do not hurt her.       Review of Systems   Constitutional: Negative.    HENT: Negative.    Respiratory: Negative.    Cardiovascular: Negative.    Gastrointestinal: Negative.    Neurological: Negative for headaches.     I personally reviewed Past Medical History, Past Surgical history,  Past Social History and Family History    Objective:   /78   Pulse 82   Ht 5' 2" (1.575 m)   Wt 65.8 kg (145 lb 1 oz)   SpO2 98%   BMI 26.53 kg/m²     Physical Exam   Constitutional: She is oriented to person, place, and time. She appears well-developed and well-nourished. No distress.   HENT:   Head: Normocephalic and atraumatic.   Right Ear: External ear normal.   Left Ear: External ear normal.   Mouth/Throat: Oropharynx is clear and moist.   Eyes: Conjunctivae and EOM are normal. Pupils are equal, round, and reactive to light. Right eye exhibits no discharge. Left eye exhibits no discharge. No scleral icterus.   Neck: Normal range of motion. Neck supple.   Cardiovascular: Normal rate, regular rhythm, normal heart sounds and intact distal pulses.  Exam reveals no gallop.    No murmur heard.  Pulses:       Dorsalis pedis pulses are 2+ on the right side, and 2+ on the left side.        Posterior tibial pulses are 2+ on the right side, and 2+ on the left side.   Pulmonary/Chest: Effort normal and breath sounds normal. No respiratory distress. She has no wheezes. She has no rales. She exhibits no tenderness.   Abdominal: Soft. Bowel sounds are normal. She exhibits no distension and no mass. There is no tenderness. There is no rebound and no " guarding.   Musculoskeletal: Normal range of motion.        Right foot: There is normal range of motion and no deformity.        Left foot: There is normal range of motion and no deformity.   Feet:   Right Foot:   Protective Sensation: 5 sites tested. 5 sites sensed.   Skin Integrity: Negative for ulcer or blister.   Left Foot:   Protective Sensation: 5 sites tested. 2 sites sensed.   Skin Integrity: Negative for ulcer or blister.   Neurological: She is alert and oriented to person, place, and time.   Skin: Skin is warm and dry.   Psychiatric: She has a normal mood and affect. Her behavior is normal. Judgment and thought content normal.   Vitals reviewed.      Jojo was seen today for annual exam.    Diagnoses and all orders for this visit:    Uncontrolled type 2 diabetes mellitus with complication, without long-term current use of insulin  -cont current regimen  -     Ambulatory consult to Optometry  -     MICROALBUMIN / CREATININE RATIO URINE  -     Hemoglobin A1c; Future      Hyperlipidemia, unspecified hyperlipidemia type  - cont lipitor   -     Lipid panel; Future    Congenital musculoskeletal deformity of spine  Abnormal spinal diagnostic imaging  -     Ambulatory consult to Neurology  -     Ambulatory consult to Neurosurgery  Stress incontinence  -     Ambulatory consult to Urogynecology      Vitamin D deficiency  - will check level

## 2018-03-27 ENCOUNTER — NURSE TRIAGE (OUTPATIENT)
Dept: ADMINISTRATIVE | Facility: CLINIC | Age: 83
End: 2018-03-27

## 2018-03-27 NOTE — TELEPHONE ENCOUNTER
Pt is calling for herself and for her  who is having symptoms, she needs to reschedule her neurosurgery appt, gave her the number to the scheduling desk, so she could  Call to reschedule after talking with the cardiologists' office about her 's situation.    Reason for Disposition   Information only question and nurse able to answer    Protocols used: ST NO PROTOCOL CALL: INFORMATION ONLY-[facility]

## 2018-03-28 ENCOUNTER — TELEPHONE (OUTPATIENT)
Dept: INTERNAL MEDICINE | Facility: CLINIC | Age: 83
End: 2018-03-28

## 2018-03-28 DIAGNOSIS — M48.00 SPINAL STENOSIS, UNSPECIFIED SPINAL REGION: Primary | ICD-10-CM

## 2018-03-28 DIAGNOSIS — R93.7 ABNORMAL X-RAY OF THORACIC SPINE: ICD-10-CM

## 2018-03-28 DIAGNOSIS — R93.7 ABNORMAL X-RAY OF LUMBAR SPINE: ICD-10-CM

## 2018-04-07 ENCOUNTER — TELEPHONE (OUTPATIENT)
Dept: INTERNAL MEDICINE | Facility: CLINIC | Age: 83
End: 2018-04-07

## 2018-04-07 DIAGNOSIS — D64.9 NORMOCYTIC ANEMIA: Primary | ICD-10-CM

## 2018-04-07 DIAGNOSIS — E03.9 HYPOTHYROIDISM, UNSPECIFIED TYPE: ICD-10-CM

## 2018-04-07 DIAGNOSIS — R79.89 ELEVATED LFTS: ICD-10-CM

## 2018-04-07 NOTE — TELEPHONE ENCOUNTER
Your vitamin D is low.   you will need to take a daily supplement available over the counter of 1000 IU  She has anemia and we will need to check iron and stools studies   Thyroid studies are abnormal and we will repeat in 6 weeks  Liver function is abnormal and we will repeat in 6 weeks   Diabetes has improved we reduce metformin down to once a day   Cholesterol controlled

## 2018-04-09 ENCOUNTER — NURSE TRIAGE (OUTPATIENT)
Dept: ADMINISTRATIVE | Facility: CLINIC | Age: 83
End: 2018-04-09

## 2018-04-09 NOTE — TELEPHONE ENCOUNTER
Reason for Disposition   Nursing judgment    Protocols used: ST NO PROTOCOL AVAILABLE - INFORMATION ONLY-[facility]    Patient has some questions about repeating her labs. Please contact her to advise

## 2018-04-09 NOTE — TELEPHONE ENCOUNTER
Spoke with pt about Dr. Trimble and got her scheduled for labs. Pt verbalized and understood. Pt has no further questions or concerns.

## 2018-04-10 ENCOUNTER — TELEPHONE (OUTPATIENT)
Dept: INTERNAL MEDICINE | Facility: CLINIC | Age: 83
End: 2018-04-10

## 2018-04-10 NOTE — TELEPHONE ENCOUNTER
Spoke with pt. She wanted clarification on her lab results from 3/26. I reviewed recommendations from Dr. Robles, and there was some confusion. The pt is currently taking Metformin 1000 mg once daily. The pt states she took 500 mg twice daily in the past. Per your recommendation, do you want her to continue the 1000 mg once a day, or did you want to change her Rx to 500 mg once daily?

## 2018-04-10 NOTE — TELEPHONE ENCOUNTER
----- Message from Lenka Villanueva sent at 4/9/2018  4:47 PM CDT -----  Contact: PATY SOMMER [[id number]]            Name of Who is Calling: PATY SOMMER [[id number]]       What is the request in detail: pt requesting for staff to give her a call back in regards to her test results       Can the clinic reply by [facility]: no      What Number to Call Back if not in [facility]: [phone]

## 2018-04-12 RX ORDER — METFORMIN HYDROCHLORIDE 500 MG/1
500 TABLET, FILM COATED, EXTENDED RELEASE ORAL
Qty: 30 TABLET | Refills: 1 | Status: SHIPPED | OUTPATIENT
Start: 2018-04-12 | End: 2018-06-19 | Stop reason: SDUPTHER

## 2018-04-12 RX ORDER — METFORMIN HYDROCHLORIDE EXTENDED-RELEASE TABLETS 1000 MG/1
TABLET, FILM COATED, EXTENDED RELEASE ORAL
Qty: 180 TABLET | Refills: 1 | OUTPATIENT
Start: 2018-04-12

## 2018-04-13 RX ORDER — LOSARTAN POTASSIUM AND HYDROCHLOROTHIAZIDE 25; 100 MG/1; MG/1
1 TABLET ORAL DAILY
Qty: 90 TABLET | Refills: 3 | Status: SHIPPED | OUTPATIENT
Start: 2018-04-13 | End: 2018-06-19

## 2018-04-16 ENCOUNTER — NURSE TRIAGE (OUTPATIENT)
Dept: ADMINISTRATIVE | Facility: CLINIC | Age: 83
End: 2018-04-16

## 2018-04-16 NOTE — TELEPHONE ENCOUNTER
Pt called to cancel her MRI that is scheduled for Thursday. States that she doesn't feel like she wants to do it at this time. Pt did not really say why. I was able to cancel the MRIs please contact patient to further advise    Reason for Disposition   Nursing judgment    Protocols used: ST NO PROTOCOL CALL: INFORMATION ONLY-[facility]

## 2018-04-29 RX ORDER — ATORVASTATIN CALCIUM 10 MG/1
10 TABLET, FILM COATED ORAL DAILY
Qty: 90 TABLET | Refills: 3 | Status: SHIPPED | OUTPATIENT
Start: 2018-04-29 | End: 2019-02-26 | Stop reason: SDUPTHER

## 2018-04-29 RX ORDER — AMLODIPINE BESYLATE 2.5 MG/1
TABLET ORAL
Qty: 90 TABLET | Refills: 3 | Status: SHIPPED | OUTPATIENT
Start: 2018-04-29 | End: 2018-06-19

## 2018-04-30 RX ORDER — GABAPENTIN 800 MG/1
800 TABLET ORAL 3 TIMES DAILY
Qty: 270 TABLET | Refills: 3 | Status: SHIPPED | OUTPATIENT
Start: 2018-04-30 | End: 2019-06-21 | Stop reason: SDUPTHER

## 2018-05-28 DIAGNOSIS — Z13.1 ENCOUNTER FOR SCREENING FOR DIABETES MELLITUS: ICD-10-CM

## 2018-05-29 RX ORDER — LANCETS
EACH MISCELLANEOUS
Qty: 100 EACH | Refills: 0 | Status: SHIPPED | OUTPATIENT
Start: 2018-05-29 | End: 2018-09-07 | Stop reason: SDUPTHER

## 2018-06-11 RX ORDER — CARVEDILOL 25 MG/1
TABLET ORAL
Qty: 180 TABLET | Refills: 3 | Status: SHIPPED | OUTPATIENT
Start: 2018-06-11 | End: 2019-09-04 | Stop reason: SDUPTHER

## 2018-06-14 ENCOUNTER — NURSE TRIAGE (OUTPATIENT)
Dept: ADMINISTRATIVE | Facility: CLINIC | Age: 83
End: 2018-06-14

## 2018-06-14 ENCOUNTER — TELEPHONE (OUTPATIENT)
Dept: INTERNAL MEDICINE | Facility: CLINIC | Age: 83
End: 2018-06-14

## 2018-06-14 NOTE — TELEPHONE ENCOUNTER
Unable to reach pt by phone and unable to leave voicemail. Per review pt was made an appt for tomorrow. Will try to contact pt again later today to confirm appt.

## 2018-06-14 NOTE — TELEPHONE ENCOUNTER
Reason for Disposition   Urinating more frequently than usual (i.e., frequency)    Protocols used: ST URINARY SYMPTOMS-[facility]

## 2018-06-14 NOTE — TELEPHONE ENCOUNTER
Reason for Disposition   Blood glucose  mg/dl (3.5 -13 mmol/l)   Diabetes    Protocols used:  DIABETES - HIGH BLOOD SUGAR-[facility],  DIZZINESS-[facility]    Pt calling with questions of dizziness.  Pt states she checked her blood glucose and reading of 140s.  Dizziness not severe, Pt able to ambulate, wash dishes and complete other tasks.  Pt not sure if dizziness is due to change in medication.  Care advice given.  Will message MD and Staff.

## 2018-06-15 ENCOUNTER — OFFICE VISIT (OUTPATIENT)
Dept: INTERNAL MEDICINE | Facility: CLINIC | Age: 83
End: 2018-06-15
Attending: INTERNAL MEDICINE
Payer: MEDICARE

## 2018-06-15 ENCOUNTER — HOSPITAL ENCOUNTER (OUTPATIENT)
Dept: CARDIOLOGY | Facility: OTHER | Age: 83
Discharge: HOME | End: 2018-06-15
Attending: INTERNAL MEDICINE
Payer: MEDICARE

## 2018-06-15 VITALS
DIASTOLIC BLOOD PRESSURE: 68 MMHG | BODY MASS INDEX: 25.32 KG/M2 | TEMPERATURE: 98 F | HEART RATE: 66 BPM | SYSTOLIC BLOOD PRESSURE: 110 MMHG | WEIGHT: 142.88 LBS | OXYGEN SATURATION: 99 % | HEIGHT: 63 IN

## 2018-06-15 DIAGNOSIS — R35.0 URINARY FREQUENCY: ICD-10-CM

## 2018-06-15 DIAGNOSIS — R42 DIZZINESS: Primary | ICD-10-CM

## 2018-06-15 DIAGNOSIS — R26.0 ATAXIC GAIT: ICD-10-CM

## 2018-06-15 DIAGNOSIS — R42 DIZZINESS: ICD-10-CM

## 2018-06-15 PROCEDURE — 99999 PR PBB SHADOW E&M-EST. PATIENT-LVL IV: CPT | Mod: PBBFAC,,, | Performed by: INTERNAL MEDICINE

## 2018-06-15 PROCEDURE — 99214 OFFICE O/P EST MOD 30 MIN: CPT | Mod: PBBFAC | Performed by: INTERNAL MEDICINE

## 2018-06-15 PROCEDURE — 99214 OFFICE O/P EST MOD 30 MIN: CPT | Mod: S$PBB,,, | Performed by: INTERNAL MEDICINE

## 2018-06-15 NOTE — PROGRESS NOTES
"Subjective:       Patient ID: Jojo Ayoub is a 83 y.o. female.    Chief Complaint: Dizziness; Nausea; and Urinary Frequency    Here for urgent visit    Pt normally cared for by my partner Dr Robles. This is my first time seeing pt.    82 yo F presents with mulitple complaints. Pt reports 3 week hx of increased urinary frequency from baseline. She wakes 1-2 times nightly to void and this is unchanged. She reports constipation at baseline with BM every other day. She reports some diffuse abdominal cramping as of late, in multiple areas, 3-4/10 in intensity. No aggravating or alleviating factors    2 weeks ago developed nausea, dizzziness and increased urinary frequency from baseline. Denies dysuria, diffuse abdominal cramping that is mild. Nausea, + constipation at baseline. BM at baseline every 2-3 days. Dizziness that occurs when going from sitting to standing. Drinks 2-3 bottles of water daily.         Review of Systems    Objective:      Vitals:    06/15/18 1045   BP: 110/68   Pulse: 66   Temp: 97.5 °F (36.4 °C)   SpO2: 99%   Weight: 64.8 kg (142 lb 13.7 oz)   Height: 5' 3" (1.6 m)      Physical Exam   Constitutional: She is oriented to person, place, and time. She appears well-developed and well-nourished. No distress.   HENT:   Head: Normocephalic and atraumatic.   Mouth/Throat: Oropharynx is clear and moist. No oropharyngeal exudate.   Eyes: Conjunctivae and EOM are normal. Pupils are equal, round, and reactive to light. No scleral icterus.   Neck: No thyromegaly present.   Cardiovascular: Normal rate, regular rhythm and normal heart sounds.    No murmur heard.  Pulmonary/Chest: Effort normal and breath sounds normal. She has no wheezes. She has no rales.   Abdominal: Soft. She exhibits no distension. There is no tenderness.   Musculoskeletal: She exhibits no edema or tenderness.   Lymphadenopathy:     She has no cervical adenopathy.   Neurological: She is alert and oriented to person, place, and time. " She has normal strength. No cranial nerve deficit or sensory deficit. Gait abnormal.   FTN intact.    Skin: Skin is warm and dry.   Psychiatric: She has a normal mood and affect. Her behavior is normal.       Assessment:       1. Dizziness    2. Urinary frequency    3. Ataxic gait         Plan:       Jojo was seen today for dizziness, nausea and urinary frequency.    Diagnoses and all orders for this visit:    Dizziness  Hx of spinal lesions that have not been worked up further despite urging by PCP. Pt is non toxic and overall quite functional and for this reason will defer imaging to neuro. F/u with PCP and neuro within 2 weeks.  Increase hydration.  -     SCHEDULED EKG 12-LEAD (to Muse); Future  -     CBC auto differential; Future  -     Comprehensive metabolic panel; Future  -     URINALYSIS  -     US Carotid Bilateral; Future    Urinary frequency  -     CULTURE, URINE    Ataxic gait   -     US Carotid Bilateral; Future           Side effects of medication(s) were discussed in detail and patient voiced understanding.  Patient will call back for any issues or complications.

## 2018-06-16 ENCOUNTER — NURSE TRIAGE (OUTPATIENT)
Dept: ADMINISTRATIVE | Facility: CLINIC | Age: 83
End: 2018-06-16

## 2018-06-16 ENCOUNTER — DOCUMENTATION ONLY (OUTPATIENT)
Dept: INTERNAL MEDICINE | Facility: CLINIC | Age: 83
End: 2018-06-16

## 2018-06-16 ENCOUNTER — OFFICE VISIT (OUTPATIENT)
Dept: URGENT CARE | Facility: CLINIC | Age: 83
End: 2018-06-16
Payer: MEDICARE

## 2018-06-16 VITALS
RESPIRATION RATE: 18 BRPM | HEART RATE: 72 BPM | OXYGEN SATURATION: 96 % | TEMPERATURE: 97 F | SYSTOLIC BLOOD PRESSURE: 151 MMHG | DIASTOLIC BLOOD PRESSURE: 79 MMHG

## 2018-06-16 DIAGNOSIS — E87.1 HYPONATREMIA: Primary | ICD-10-CM

## 2018-06-16 LAB
GLUCOSE SERPL-MCNC: 119 MG/DL (ref 70–110)
POC ANION GAP: 18 MMOL/L (ref 10–20)
POC BUN: 16 MMOL/L (ref 8–26)
POC CHLORIDE: 85 MMOL/L (ref 98–109)
POC CREATININE: 0.8 MG/DL (ref 0.6–1.3)
POC HEMATOCRIT: 34 %PCV (ref 37–47)
POC HEMOGLOBIN: 11.6 G/DL (ref 12.5–16)
POC ICA: 1.07 MMOL/L (ref 1.12–1.32)
POC POTASSIUM: 4.4 MMOL/L (ref 3.5–4.9)
POC SODIUM: 123 MMOL/L (ref 138–146)
POC TCO2: 25 MMOL/L (ref 24–29)

## 2018-06-16 PROCEDURE — 99214 OFFICE O/P EST MOD 30 MIN: CPT | Mod: S$GLB,,, | Performed by: FAMILY MEDICINE

## 2018-06-16 PROCEDURE — 80047 BASIC METABLC PNL IONIZED CA: CPT | Mod: QW,S$GLB,, | Performed by: FAMILY MEDICINE

## 2018-06-16 NOTE — PATIENT INSTRUCTIONS
Hyponatremia  Hyponatremia means low sodium levels in the blood. This condition most often occurs after prolonged vomiting or diarrhea, which causes your body to lose too much water and sodium. It can also result from drinking excess amounts of water or the use of diuretics (water pills).  Mild hyponatremia causes no symptoms. It is only discovered with a blood test. As sodium levels in the blood decreases, symptoms begin to appear. This includes weakness, confusion, muscle cramping and seizures.  Home care  · Reduce your daily water intake until the problem is corrected.  · If you have been taking diuretics, you may be asked to stop taking them for a short time.  · If you are having symptoms of weakness or confusion, do not drive or operate dangerous machinery until symptoms resolve.  Follow-up care  Follow up with your healthcare provider for a repeat blood test within the next week or as advised by our staff.  When to seek medical advice  Call your healthcare provider if any of the following occur:  · Increasing weakness  · Dizziness  · Irregular heartbeat, extra beats or very fast heart rate  · Increasing confusion  · Fainting or loss of consciousness  Date Last Reviewed: 7/26/2015  © 3551-7403 [employer]. [address] [place] [address]. All rights reserved. This information is not intended as a substitute for professional medical care. Always follow your healthcare professional's instructions.    YOU NEED TO FOLLOW UP Monday OR Tuesday WITH YOUR REGULAR DOCTOR FOR REPEAT LAB AND RE-EVALUATION, AS WELL AS RECHECK OF YOUR BLOOD PRESSURE    TO ER WITH WORSENING SYMPTOMS SOONER.    YOU NEED TO LIMIT YOUR FLUID TO LESS THAN 1500 CC DAILY OR LESS THAT 50 OUNCES

## 2018-06-16 NOTE — PROGRESS NOTES
Spoke to patient this morning concerning her sodium of 125 from yesterday's labs. Review of chart show this is likley subacute due to intake of 1-2 bottles of water daily max in setting of HCTZ. Pt directed to stop hyzaar and go to urgent care for repeat labs. Explained she may require ED visit +/- admission pending repeat labs, discussed. acutte vs subacute, potential complications of hyponatremia. Pt was able to follow conversation, repeat back recs and follow recs.

## 2018-06-16 NOTE — PROGRESS NOTES
Subjective:       Patient ID: Jojo Ayoub is a 83 y.o. female.    Vitals:  oral temperature is 96.9 °F (36.1 °C). Her blood pressure is 151/79 (abnormal) and her pulse is 72. Her respiration is 18 and oxygen saturation is 96%.     Chief Complaint: Abnormal Lab    Patient presents with complaint of abnormal lab value. Patient was seen yesterday and pcp notified her of a low sodium level per phone. Patient instructed to go to urgent care to have chem 8 to recheck sodium. Patient also instructed to stop her hyzarr medication. She understood that she was supposed to drink more water rather than less, so had now been urinating more frequently. Patient denies any jaw or back or chest pain. Patient reports generalized weakness for a week or two.  NA yesterday was 125.      Other   This is a new problem. The current episode started yesterday. Associated symptoms include nausea and weakness. Pertinent negatives include no abdominal pain, chest pain, chills, fever, headaches, rash, sore throat or vomiting. Nothing aggravates the symptoms. She has tried nothing for the symptoms.     Review of Systems   Constitution: Positive for weakness and malaise/fatigue. Negative for chills and fever.   HENT: Negative for sore throat.    Eyes: Negative for blurred vision.   Cardiovascular: Negative for chest pain.   Respiratory: Negative for shortness of breath.    Skin: Negative for rash.   Musculoskeletal: Negative for back pain and joint pain.   Gastrointestinal: Positive for nausea. Negative for abdominal pain, diarrhea and vomiting.   Neurological: Negative for focal weakness, headaches, light-headedness and loss of balance.   Psychiatric/Behavioral: The patient is not nervous/anxious.        Objective:      Physical Exam   Constitutional: She is oriented to person, place, and time. She appears well-developed and well-nourished. She is cooperative.  Non-toxic appearance. She does not appear ill. No distress.   HENT:   Head:  Normocephalic and atraumatic.   Right Ear: Hearing, tympanic membrane, external ear and ear canal normal.   Left Ear: Hearing, tympanic membrane, external ear and ear canal normal.   Nose: Nose normal. No mucosal edema, rhinorrhea or nasal deformity. No epistaxis. Right sinus exhibits no maxillary sinus tenderness and no frontal sinus tenderness. Left sinus exhibits no maxillary sinus tenderness and no frontal sinus tenderness.   Mouth/Throat: Uvula is midline, oropharynx is clear and moist and mucous membranes are normal. No trismus in the jaw. Normal dentition. No uvula swelling. No posterior oropharyngeal erythema.   Eyes: Conjunctivae and lids are normal. Right eye exhibits no discharge. Left eye exhibits no discharge. No scleral icterus.   Sclera clear bilat   Neck: Normal range of motion, full passive range of motion without pain and phonation normal. Neck supple. No JVD present.   Cardiovascular: Normal rate, regular rhythm, normal heart sounds, intact distal pulses and normal pulses.  Exam reveals no friction rub.    No murmur heard.  Pulmonary/Chest: Effort normal and breath sounds normal. No stridor. No respiratory distress. She has no wheezes. She has no rales.   Abdominal: Soft. Normal appearance and bowel sounds are normal. She exhibits no distension, no pulsatile midline mass and no mass. There is no tenderness. There is no rebound and no guarding.   Musculoskeletal: Normal range of motion. She exhibits no edema or deformity.   Neurological: She is alert and oriented to person, place, and time. She displays normal reflexes. No cranial nerve deficit or sensory deficit. She exhibits normal muscle tone. Coordination normal.   Ambulates with walker for general unsteadiness, but this is unchanged from baseline, and not new.   Skin: Skin is warm, dry and intact. She is not diaphoretic. No pallor.   Psychiatric: She has a normal mood and affect. Her speech is normal and behavior is normal. Judgment and  thought content normal. Cognition and memory are normal.   Nursing note and vitals reviewed.      Results for orders placed or performed in visit on 06/16/18   POCT Chemistry Panel   Result Value Ref Range    POC Sodium 123 (A) 138 - 146 MMOL/L    POC Potassium 4.4 3.5 - 4.9 MMOL/L    POC Chloride 85 (A) 98 - 109 MMOL/L    POC BUN 16 (A) 8 - 26 MMOL/L    POC Glucose 119 (A) 70 - 110 MG/DL    POC Creatinine 0.8 0.6 - 1.3 mg/dL    POC iCA 1.07 (A) 1.12 - 1.32 MMOL/L    POC TCO2 25 24 - 29 MMOL/L    POC Hematocrit 34 (A) 37 - 47 %PCV    POC Hemoglobin 11.6 (A) 12.5 - 16 g/dL    POC Anion Gap 18 10.0 - 20 MMOL/L     Assessment:       1. Hyponatremia        Plan:         Hyponatremia  -     POCT Chemistry Panel    YOU NEED TO FOLLOW UP Monday OR Tuesday WITH YOUR REGULAR DOCTOR FOR REPEAT LAB AND RE-EVALUATION, AS WELL AS RECHECK OF YOUR BLOOD PRESSURE.    TO ER WITH WORSENING SYMPTOMS SOONER.    YOU NEED TO LIMIT YOUR FLUID TO LESS THAN 1500 CC DAILY OR LESS THAT 50 OUNCES.  YOU NEED TO DRINK LESS WATER INSTEAD OF MORE.    THIS WAS ALL REVIEWED IN DETAIL WITH PATIENT AND FAMILY.  SHE LIVES WITH SEVERAL FAMILY MEMBERS IN ATTENDANCE.

## 2018-06-17 NOTE — TELEPHONE ENCOUNTER
"Pt called re drank a lot of water (3 bottles of water) this am before visit. feeling a little lightheaded.  BP =     Reason for Disposition   Taking a medicine that could cause dizziness (e.g., blood pressure medications, diuretics)    Answer Assessment - Initial Assessment Questions  1. DESCRIPTION: "Describe your dizziness."    /92 HR= 85  2. LIGHTHEADED: "Do you feel lightheaded?" (e.g., somewhat faint, woozy, weak upon standing)     Able to walk around   3. VERTIGO: "Do you feel like either you or the room is spinning or tilting?" (i.e. vertigo)     No   4. SEVERITY: "How bad is it?"  "Do you feel like you are going to faint?" "Can you stand and walk?"    - MILD - walking normally    - MODERATE - interferes with normal activities (e.g., work, school)     - SEVERE - unable to stand, requires support to walk, feels like passing out now.      Mild when standing   5. ONSET:  "When did the dizziness begin?"    Over two weeks   6. AGGRAVATING FACTORS: "Does anything make it worse?" (e.g., standing, change in head position)     Standing   7. HEART RATE: "Can you tell me your heart rate?" "How many beats in 15 seconds?"  (Note: not all patients can do this)        85   8. CAUSE: "What do you think is causing the dizziness?"     Sl dizzy   9. RECURRENT SYMPTOM: "Have you had dizziness before?" If so, ask: "When was the last time?" "What happened that time?"    Yes , when went to [facility]   10. OTHER SYMPTOMS: "Do you have any other symptoms?" (e.g., fever, chest pain, vomiting, diarrhea, bleeding)     afeb    Protocols used: ST DIZZINESS-A-AH  good uop - clear yellow , BS= ate dinner at 6pm. rec UC within 24 hours to recheck BP in am. Call back with questions.      "

## 2018-06-18 ENCOUNTER — NURSE TRIAGE (OUTPATIENT)
Dept: ADMINISTRATIVE | Facility: CLINIC | Age: 83
End: 2018-06-18

## 2018-06-18 DIAGNOSIS — E87.1 HYPONATREMIA: Primary | ICD-10-CM

## 2018-06-18 RX ORDER — METFORMIN HYDROCHLORIDE EXTENDED-RELEASE TABLETS 1000 MG/1
TABLET, FILM COATED, EXTENDED RELEASE ORAL
Qty: 180 TABLET | Refills: 1 | Status: SHIPPED | OUTPATIENT
Start: 2018-06-18 | End: 2018-06-19

## 2018-06-18 NOTE — TELEPHONE ENCOUNTER
Reason for Disposition   Information only question and nurse able to answer    Protocols used: ST NO PROTOCOL AVAILABLE - INFORMATION ONLY-A-[place]  Mrs. Ayoub would like assistance with following up with her PCP regarding hyponatremia.

## 2018-06-18 NOTE — TELEPHONE ENCOUNTER
Called pt's preferred number. Phone just rang unable to leave vm. Called pt's mobile number on file and unable to leave voicemail.           Hello, please assist pt with scheduling f/u appt with pcp

## 2018-06-18 NOTE — TELEPHONE ENCOUNTER
Called pt's preferred number. Phone just rang unable to leave vm. Called pt's mobile number on file and unable to leave voicemail.         Hello, please assist pt in scheduling f/u with pcp

## 2018-06-18 NOTE — TELEPHONE ENCOUNTER
----- Message from Dom Mix sent at 6/18/2018 12:19 PM CDT -----  Contact: Pt  Pt would like to be called back regarding urine sample.    Pt can be reached at [phone].    Thank You.

## 2018-06-18 NOTE — TELEPHONE ENCOUNTER
"Note from Urgent Care states the following: "Hyponatremia  -     POCT Chemistry Panel   YOU NEED TO FOLLOW UP Monday OR Tuesday WITH YOUR REGULAR DOCTOR FOR REPEAT LAB AND RE-EVALUATION, AS WELL AS RECHECK OF YOUR BLOOD PRESSURE."    Please advise authorize patient to return to clinic for BP check and for POCT U/A with possible U/A sendoff to lab. Pended lab.  "

## 2018-06-19 ENCOUNTER — OFFICE VISIT (OUTPATIENT)
Dept: INTERNAL MEDICINE | Facility: CLINIC | Age: 83
End: 2018-06-19
Attending: FAMILY MEDICINE
Payer: MEDICARE

## 2018-06-19 ENCOUNTER — LAB VISIT (OUTPATIENT)
Dept: LAB | Facility: OTHER | Age: 83
End: 2018-06-19
Attending: FAMILY MEDICINE
Payer: MEDICARE

## 2018-06-19 VITALS
WEIGHT: 142.19 LBS | OXYGEN SATURATION: 96 % | HEART RATE: 72 BPM | HEIGHT: 69 IN | SYSTOLIC BLOOD PRESSURE: 120 MMHG | BODY MASS INDEX: 21.06 KG/M2 | DIASTOLIC BLOOD PRESSURE: 60 MMHG

## 2018-06-19 DIAGNOSIS — E55.9 VITAMIN D DEFICIENCY: ICD-10-CM

## 2018-06-19 DIAGNOSIS — D64.9 NORMOCYTIC ANEMIA: ICD-10-CM

## 2018-06-19 DIAGNOSIS — E87.1 HYPONATREMIA: ICD-10-CM

## 2018-06-19 DIAGNOSIS — R79.89 ELEVATED LFTS: ICD-10-CM

## 2018-06-19 DIAGNOSIS — R42 DIZZINESS: ICD-10-CM

## 2018-06-19 DIAGNOSIS — R35.0 URINARY FREQUENCY: Primary | ICD-10-CM

## 2018-06-19 DIAGNOSIS — G95.89 SPINAL CORD MASS: ICD-10-CM

## 2018-06-19 DIAGNOSIS — R35.0 URINARY FREQUENCY: ICD-10-CM

## 2018-06-19 DIAGNOSIS — E03.9 HYPOTHYROIDISM, UNSPECIFIED TYPE: ICD-10-CM

## 2018-06-19 LAB
25(OH)D3+25(OH)D2 SERPL-MCNC: 32 NG/ML
ALBUMIN SERPL BCP-MCNC: 3.7 G/DL
ALP SERPL-CCNC: 43 U/L
ALT SERPL W/O P-5'-P-CCNC: 32 U/L
ANION GAP SERPL CALC-SCNC: 10 MMOL/L
AST SERPL-CCNC: 36 U/L
BASOPHILS # BLD AUTO: 0.03 K/UL
BASOPHILS NFR BLD: 0.4 %
BILIRUB DIRECT SERPL-MCNC: 0.2 MG/DL
BILIRUB SERPL-MCNC: 0.5 MG/DL
BUN SERPL-MCNC: 14 MG/DL
CALCIUM SERPL-MCNC: 9.2 MG/DL
CHLORIDE SERPL-SCNC: 91 MMOL/L
CO2 SERPL-SCNC: 24 MMOL/L
CREAT SERPL-MCNC: 0.9 MG/DL
DIFFERENTIAL METHOD: ABNORMAL
EOSINOPHIL # BLD AUTO: 0.2 K/UL
EOSINOPHIL NFR BLD: 3.5 %
ERYTHROCYTE [DISTWIDTH] IN BLOOD BY AUTOMATED COUNT: 13 %
EST. GFR  (AFRICAN AMERICAN): >60 ML/MIN/1.73 M^2
EST. GFR  (NON AFRICAN AMERICAN): >60 ML/MIN/1.73 M^2
GLUCOSE SERPL-MCNC: 102 MG/DL
HAPTOGLOB SERPL-MCNC: 158 MG/DL
HCT VFR BLD AUTO: 30.2 %
HGB BLD-MCNC: 10.3 G/DL
LYMPHOCYTES # BLD AUTO: 2 K/UL
LYMPHOCYTES NFR BLD: 28.4 %
MCH RBC QN AUTO: 31.5 PG
MCHC RBC AUTO-ENTMCNC: 34.1 G/DL
MCV RBC AUTO: 92 FL
MONOCYTES # BLD AUTO: 0.4 K/UL
MONOCYTES NFR BLD: 5.6 %
NEUTROPHILS # BLD AUTO: 4.3 K/UL
NEUTROPHILS NFR BLD: 62 %
OSMOLALITY SERPL: 271 MOSM/KG
PLATELET # BLD AUTO: 293 K/UL
PMV BLD AUTO: 9.1 FL
POTASSIUM SERPL-SCNC: 4.7 MMOL/L
PROT SERPL-MCNC: 7.6 G/DL
RBC # BLD AUTO: 3.27 M/UL
SODIUM SERPL-SCNC: 125 MMOL/L
T4 FREE SERPL-MCNC: 1.24 NG/DL
TSH SERPL DL<=0.005 MIU/L-ACNC: 0.5 UIU/ML
WBC # BLD AUTO: 6.93 K/UL

## 2018-06-19 PROCEDURE — 84443 ASSAY THYROID STIM HORMONE: CPT

## 2018-06-19 PROCEDURE — 83930 ASSAY OF BLOOD OSMOLALITY: CPT

## 2018-06-19 PROCEDURE — 80053 COMPREHEN METABOLIC PANEL: CPT

## 2018-06-19 PROCEDURE — 99214 OFFICE O/P EST MOD 30 MIN: CPT | Mod: S$PBB,,, | Performed by: FAMILY MEDICINE

## 2018-06-19 PROCEDURE — 85025 COMPLETE CBC W/AUTO DIFF WBC: CPT

## 2018-06-19 PROCEDURE — 83010 ASSAY OF HAPTOGLOBIN QUANT: CPT

## 2018-06-19 PROCEDURE — 82248 BILIRUBIN DIRECT: CPT

## 2018-06-19 PROCEDURE — 82306 VITAMIN D 25 HYDROXY: CPT

## 2018-06-19 PROCEDURE — 99999 PR PBB SHADOW E&M-EST. PATIENT-LVL IV: CPT | Mod: PBBFAC,,, | Performed by: FAMILY MEDICINE

## 2018-06-19 PROCEDURE — 36415 COLL VENOUS BLD VENIPUNCTURE: CPT

## 2018-06-19 PROCEDURE — 99214 OFFICE O/P EST MOD 30 MIN: CPT | Mod: PBBFAC | Performed by: FAMILY MEDICINE

## 2018-06-19 PROCEDURE — 84439 ASSAY OF FREE THYROXINE: CPT

## 2018-06-19 RX ORDER — AMLODIPINE BESYLATE 10 MG/1
10 TABLET ORAL DAILY
Qty: 90 TABLET | Refills: 3 | Status: SHIPPED | OUTPATIENT
Start: 2018-06-19 | End: 2018-11-08

## 2018-06-19 RX ORDER — METFORMIN HYDROCHLORIDE 500 MG/1
500 TABLET, FILM COATED, EXTENDED RELEASE ORAL
Qty: 90 TABLET | Refills: 3 | Status: SHIPPED | OUTPATIENT
Start: 2018-06-19 | End: 2019-05-30 | Stop reason: SDUPTHER

## 2018-06-19 NOTE — PROGRESS NOTES
Orthostatic vital signs were performed.   Patient was assisted to a lying position with legs supported. First measurement was taken after five minutes of lying. Patient was sat on the the side of the exam table with legs dangling and the second measurement was taken after one minute. Patient stood up and the third measurement was taken after one minute.  The blood pressure and pulse values are:  Lying position with blood pressure 130/74 and pulse 64  Sitting position with blood pressure 124/66 and pulse 68  Standing position with blood pressure  120 / 60 and pulse 72  Hanna Bravo RN

## 2018-06-19 NOTE — PROGRESS NOTES
"Subjective:      Patient ID: Jojo Ayoub is a 83 y.o. female.    Chief Complaint: Follow-up    10 days ago started to notice lightheadedness, only from going from sitting to standing. It has improved in the last 4 days. She denies chest pain, sob, heart racing, headaches. No episodes of confusion, muscle weakness, nausea or vomiting. She denies any stomach pain or cramping. The last few months she has been taking metamucil daily and having a soft daily bowel movement. She has noticed urinary frequency in the last few years. She finds if she drinks gatorade she has to run to the bathroom.       Review of Systems   Constitutional: Negative.    HENT: Negative.    Respiratory: Negative.    Cardiovascular: Negative.    Gastrointestinal: Positive for constipation.   Genitourinary: Positive for urgency.   Neurological: Negative for headaches.     I personally reviewed Past Medical History, Past Surgical history,  Past Social History and Family History    Objective:   /60 (BP Location: Right arm, Patient Position: Standing, BP Method: Large (Manual))   Pulse 72   Ht 5' 9" (1.753 m)   Wt 64.5 kg (142 lb 3.2 oz)   SpO2 96%   BMI 21.00 kg/m²     Physical Exam   Constitutional: She is oriented to person, place, and time. She appears well-developed and well-nourished. No distress.   HENT:   Head: Normocephalic.   Right Ear: External ear normal.   Left Ear: External ear normal.   Mouth/Throat: Oropharynx is clear and moist.   Eyes: Conjunctivae and EOM are normal. Pupils are equal, round, and reactive to light. Right eye exhibits no discharge. Left eye exhibits no discharge. No scleral icterus.   Neck: Normal range of motion. No tracheal deviation present. No thyromegaly present.   Cardiovascular: Normal rate, regular rhythm, normal heart sounds and intact distal pulses.  Exam reveals no gallop.    No murmur heard.  Pulmonary/Chest: Effort normal and breath sounds normal. No respiratory distress. She has no " wheezes. She has no rales. She exhibits no tenderness.   Abdominal: Soft. Bowel sounds are normal. She exhibits no distension and no mass. There is no tenderness. There is no rebound and no guarding.   Musculoskeletal: Normal range of motion.   Neurological: She is alert and oriented to person, place, and time. No cranial nerve deficit or sensory deficit.   Skin: Skin is warm and dry.   Psychiatric: She has a normal mood and affect. Her behavior is normal. Judgment and thought content normal.   Vitals reviewed.      Jojo was seen today for follow-up.    Diagnoses and all orders for this visit:    Urinary frequency  -     Urinalysis; Future  -     Urine culture; Future  -     Ambulatory consult to Urogynecology    Hyponatremia  Dizziness  -per patient her symptoms have resolved, will check labs and patient to proceed to ED with any worsening or changes   -     Sodium, urine, random  -     OSMOLALITY, URINE RANDOM  -     OSMOLALITY; Future  -     Urinalysis; Future  -     Urine culture; Future  -     CBC auto differential; Future  -     Comprehensive metabolic panel; Future  -     TSH; Future  -     HAPTOGLOBIN; Future  -     T4, free; Future  -     Bilirubin, direct; Future      Spinal cord mass  -schedule MRI and patient to follow up with neurosurgery     Vitamin D deficiency  -     Vitamin D; Future    Other orders  -     amLODIPine (NORVASC) 10 MG tablet; Take 1 tablet (10 mg total) by mouth once daily.

## 2018-06-20 ENCOUNTER — LAB VISIT (OUTPATIENT)
Dept: LAB | Facility: OTHER | Age: 83
End: 2018-06-20
Attending: FAMILY MEDICINE
Payer: MEDICARE

## 2018-06-20 ENCOUNTER — TELEPHONE (OUTPATIENT)
Dept: INTERNAL MEDICINE | Facility: CLINIC | Age: 83
End: 2018-06-20

## 2018-06-20 DIAGNOSIS — R35.0 URINARY FREQUENCY: ICD-10-CM

## 2018-06-20 DIAGNOSIS — E87.1 HYPONATREMIA: Primary | ICD-10-CM

## 2018-06-20 DIAGNOSIS — D64.9 NORMOCYTIC ANEMIA: ICD-10-CM

## 2018-06-20 DIAGNOSIS — G95.89 SPINAL CORD MASS: ICD-10-CM

## 2018-06-20 DIAGNOSIS — R42 DIZZINESS: ICD-10-CM

## 2018-06-20 DIAGNOSIS — E87.1 HYPONATREMIA: ICD-10-CM

## 2018-06-20 LAB
BACTERIA #/AREA URNS HPF: ABNORMAL /HPF
BILIRUB UR QL STRIP: NEGATIVE
CLARITY UR: CLEAR
COLOR UR: YELLOW
GLUCOSE UR QL STRIP: NEGATIVE
HGB UR QL STRIP: NEGATIVE
KETONES UR QL STRIP: NEGATIVE
LEUKOCYTE ESTERASE UR QL STRIP: ABNORMAL
MICROSCOPIC COMMENT: ABNORMAL
NITRITE UR QL STRIP: NEGATIVE
PH UR STRIP: 7 [PH] (ref 5–8)
PROT UR QL STRIP: NEGATIVE
RBC #/AREA URNS HPF: 0 /HPF (ref 0–4)
SP GR UR STRIP: <=1.005 (ref 1–1.03)
URN SPEC COLLECT METH UR: ABNORMAL
UROBILINOGEN UR STRIP-ACNC: NEGATIVE EU/DL
WBC #/AREA URNS HPF: 1 /HPF (ref 0–5)

## 2018-06-20 PROCEDURE — 81000 URINALYSIS NONAUTO W/SCOPE: CPT

## 2018-06-20 PROCEDURE — 87086 URINE CULTURE/COLONY COUNT: CPT

## 2018-06-21 ENCOUNTER — OFFICE VISIT (OUTPATIENT)
Dept: UROGYNECOLOGY | Facility: CLINIC | Age: 83
End: 2018-06-21
Attending: FAMILY MEDICINE
Payer: MEDICARE

## 2018-06-21 VITALS
SYSTOLIC BLOOD PRESSURE: 114 MMHG | DIASTOLIC BLOOD PRESSURE: 70 MMHG | WEIGHT: 145.94 LBS | BODY MASS INDEX: 21.62 KG/M2 | HEIGHT: 69 IN

## 2018-06-21 DIAGNOSIS — R35.1 NOCTURIA MORE THAN TWICE PER NIGHT: ICD-10-CM

## 2018-06-21 DIAGNOSIS — N39.46 URINARY INCONTINENCE, MIXED: Primary | ICD-10-CM

## 2018-06-21 DIAGNOSIS — K59.09 CHRONIC CONSTIPATION: ICD-10-CM

## 2018-06-21 DIAGNOSIS — R33.9 INCOMPLETE EMPTYING OF BLADDER: ICD-10-CM

## 2018-06-21 DIAGNOSIS — Z12.31 ENCOUNTER FOR SCREENING MAMMOGRAM FOR BREAST CANCER: ICD-10-CM

## 2018-06-21 DIAGNOSIS — Z13.820 OSTEOPOROSIS SCREENING: ICD-10-CM

## 2018-06-21 DIAGNOSIS — N95.9 MENOPAUSAL AND PERIMENOPAUSAL DISORDER: ICD-10-CM

## 2018-06-21 LAB
BACTERIA UR CULT: NORMAL
BACTERIA UR CULT: NORMAL

## 2018-06-21 PROCEDURE — 57160 INSERT PESSARY/OTHER DEVICE: CPT | Mod: PBBFAC | Performed by: OBSTETRICS & GYNECOLOGY

## 2018-06-21 PROCEDURE — 87086 URINE CULTURE/COLONY COUNT: CPT

## 2018-06-21 PROCEDURE — 87186 SC STD MICRODIL/AGAR DIL: CPT

## 2018-06-21 PROCEDURE — 99999 PR PBB SHADOW E&M-EST. PATIENT-LVL III: CPT | Mod: PBBFAC,,, | Performed by: OBSTETRICS & GYNECOLOGY

## 2018-06-21 PROCEDURE — 51701 INSERT BLADDER CATHETER: CPT | Mod: PBBFAC | Performed by: OBSTETRICS & GYNECOLOGY

## 2018-06-21 PROCEDURE — 57160 INSERT PESSARY/OTHER DEVICE: CPT | Mod: S$PBB,,, | Performed by: OBSTETRICS & GYNECOLOGY

## 2018-06-21 PROCEDURE — 87088 URINE BACTERIA CULTURE: CPT

## 2018-06-21 PROCEDURE — 99205 OFFICE O/P NEW HI 60 MIN: CPT | Mod: S$PBB,25,, | Performed by: OBSTETRICS & GYNECOLOGY

## 2018-06-21 PROCEDURE — 99213 OFFICE O/P EST LOW 20 MIN: CPT | Mod: PBBFAC | Performed by: OBSTETRICS & GYNECOLOGY

## 2018-06-21 PROCEDURE — 87077 CULTURE AEROBIC IDENTIFY: CPT

## 2018-06-21 NOTE — TELEPHONE ENCOUNTER
Called and tried to leave message in regards to labs     Please advise patient to drink at least 70 fl oz water daily, we will need to repeat her sodium level on Monday and please schedule nephrology follow up  Will need hematology follow up to work up anemia that has been worsening

## 2018-06-21 NOTE — LETTER
June 21, 2018      Darya Robles MD  [address]  [facility] [address]           [facility] [address] [place] [address]  Phone: [phone]          Patient: Jojo Ayoub   MR Number: [id number]   YOB: 1935   Date of Visit: 6/21/2018       Dear Dr. Darya Robles:    Thank you for referring Jojo Ayoub to me for evaluation. Attached you will find relevant portions of my assessment and plan of care.    If you have questions, please do not hesitate to call me. I look forward to following Jojo Ayoub along with you.    Sincerely,    Danyell Lucero MD    Enclosure  CC:  No Recipients    If you would like to receive this communication electronically, please contact [email or web address] or [phone] to request more information on [employer] access.    For providers and/or their staff who would like to refer a patient to [facility], please contact us through our one-stop-shop provider referral line, [facility] [place], at [phone].    If you feel you have received this communication in error or would no longer like to receive these types of communications, please e-mail [email or web address]

## 2018-06-21 NOTE — PROGRESS NOTES
"[facility] [address] [place] [address]    Jojo Ayoub  [id number]  1935 June 21, 2018    Consulting Physician: Darya Robles MD   GYN: none  Primary M.D.: Darya Robles MD    Chief Complaint   Patient presents with    Consult     Urinary frequency/urgency/nocturia     CC: Urinary urgency/frequency mostly during the night  HPI: ~5yrs hx of a urinary frequency/urgency that has progressed somewhat over that time.  Mostly struggles with these urges at night "when shes not distracted" issue is not so prominant during the day, but says she thinks she has a slow consistent leakage during daytime. Drinking fluids exacerbates the issue.  Approx. 5yrs ago had trial of [facility], helped issue somewhat but constipated the patient.   --Hx of hyponatremia, which causes drowsiness and lethargy, advised to drink more water which exacerbates the issue.    --had fall post 2005    1)  UI:  (+) [place] < (+) UUI  X years (Since 2005).  (+) pads:2-3/day, usually severe wetness and 1/night usually severe wetness.  Daytime frequency: Q 3 hours.  Nocturia: Yes: 2/night.   (--) dysuria,  (+) hematuria,  (--) frequent UTIs.  (--) complete bladder emptying.    2)  POP:  Absent.  (--) vaginal bleeding. (--) vaginal discharge. (+) sexually active.  (--) dyspareunia.  (+)  Vaginal dryness.  (--) vaginal estrogen use.     3)  BM:  (+) constipation/straining--1 tbsp of metamusil/day has made her more normal still struggles with some constipation.  (--) chronic diarrhea. (--) hematochezia.  (--) fecal incontinence.  (--) fecal smearing/urgency.  (--) complete evacuation--when using metamusil feels less like this is an issue.    Past Medical History  Past Medical History:   Diagnosis Date    Arthritis     Diabetes mellitus with neurological manifestation     High blood pressure     Hyperlipidemia    DM:   Lab Results   Component Value Date    HGBA1C 5.6 03/26/2018     Past Surgical " History  Past Surgical History:   Procedure Laterality Date    left knee replacement       low back surgery           Hysterectomy: No  Cervix present: Yes   Ovaries present: Yes     Past Ob History     x4.  C/s x0.    Largest infant weight: 7#7oz  no FAVD. no episiotomy.      Gynecologic History  LMP: No LMP recorded. Patient is postmenopausal.  Age of menarche: 14  Age of menopause: 54  Menstrual history: Regular, 3 days, moderate bleeding  Pap test: Over a decade ago.  History of abnormal paps: No.  History of STIs:  No  Mammogram: Date of last: Couple years.  Result: Normal  Colonoscopy: Date of last: Never had.  DEXA:  Never had     Family History  Family History   Problem Relation Age of Onset    Diabetes Mother     Hypertension Mother     Diabetes Sister     Hypertension Sister     Diabetes Maternal Uncle     Diabetes Maternal Grandmother       Colon CA: No  Breast CA: No  GYN CA: No   CA: No    Social History  History   Smoking Status    Never Smoker   Smokeless Tobacco    Never Used   .  Smoking--N/a  History   Alcohol Use No   .    History   Drug Use No   .  The patient is .  Resides in [place] [address].  Employment status: retired.        Allergies  Review of patient's allergies indicates:   Allergen Reactions    No known drug allergies        Medications  Current Outpatient Prescriptions on File Prior to Visit   Medication Sig Dispense Refill    amLODIPine (NORVASC) 10 MG tablet Take 1 tablet (10 mg total) by mouth once daily. 90 tablet 3    aspirin (HECTOR CHILDRENS ASPIRIN) 81 MG chewable tablet Take 81 mg by mouth. 1 Tablet Oral Every day      atorvastatin (LIPITOR) 10 MG tablet TAKE 1 TABLET (10 MG TOTAL) BY MOUTH ONCE DAILY. 90 tablet 3    carvedilol (COREG) 25 MG tablet TAKE 1 TABLET BY MOUTH TWICE A  tablet 3    docusate sodium (COLACE) 100 MG capsule Take 1 capsule (100 mg total) by mouth 2 (two) times daily. 60 capsule 1    gabapentin (NEURONTIN) 800 MG  "tablet TAKE 1 TABLET (800 MG TOTAL) BY MOUTH 3 (THREE) TIMES DAILY. 270 tablet 3    metFORMIN (GLUMETZA) 500 MG (MOD) 24 hr tablet Take 1 tablet (500 mg total) by mouth daily with breakfast. 90 tablet 3    ONETOUCH ULTRA BLUE TEST STRIP Strp TEST ONCE DAILY 100 strip 0    ONETOUCH ULTRASOFT LANCETS lancets USE TO TEST BLOOD SUGAR DAILY 100 each 0    [DISCONTINUED] lidocaine-prilocaine (EMLA) cream Apply topically as needed. 30 g 0    [DISCONTINUED] ondansetron (ZOFRAN-ODT) 4 MG TbDL One tablet sublingual tid prn nausea 10 tablet 0     No current facility-administered medications on file prior to visit.        Review of Systems A 14 point ROS was reviewed with pertinent positives as noted above in the history of present illness.      Constitutional: Slightly drowsy this morning attributes to hx of hyponatremia  Eyes: negative  Endocrine: negative  Gastrointestinal: negative  Cardiovascular: negative  Respiratory: negative  Allergic/Immunologic: negative  Integumentary: negative  Psychiatric: negative  Musculoskeletal: Shoulder pain  Ear/Nose/Throat: negative  Neurologic: negative  Genitourinary: SEE HPI  Hematologic/Lymphatic: negative   Breast: negative    Urogynecologic Exam  /70 (BP Location: Right arm, Patient Position: Sitting, BP Method: Medium (Manual))   Ht 5' 9" (1.753 m)   Wt 66.2 kg (145 lb 15.1 oz)   BMI 21.55 kg/m²     GENERAL APPEARANCE:  The patient is well-developed, well-nourished.  Neck:  Supple with no thyromegaly, no carotid bruits.  Heart:  Regular rate and rhythm, no murmurs, rubs or gallops.  Lungs:  Clear.  No CVA tenderness.  Abdomen:  Soft, nontender, nondistended, no hepatosplenomegaly.  Incisions:  none    PELVIC:    External genitalia:  Normal Bartholins, Skenes and labia bilaterally.    Urethra:  No caruncle, diverticulum or masses.  (+) hypermobility.    Vagina:  Atrophy (+) , no bladder masses or tender, no discharge.    Cervix:  normal appearance  Uterus: normal size, " contour, position, consistency, mobility, non-tender  Adnexa: Not palpable.    POP-Q:  Aa 0; Ba 0; C -5; Ap -1; Bp -1; D -7.  Genital hiatus 4, perineal body 2, total vaginal length 10-11.    NEUROLOGIC:  Cranial nerves 2 through 12 intact.  Strength 5/5.  DTRs 2+ lower extremities.  S2 through 4 normal.  Sacral reflexes intact.    EXT: MELGOZA, 2+ pulses bilaterally, no C/C/E    COUGH STRESS TEST:  negative  KEGEL: unable to perform    RECTAL:    External:  Normal, (--) hemorrhoids, (--) dovetailing.   Internal:  deferred    PVR: 200 mL    The patient was fit with #5 UI + POP pessary.  She was able to tolerate the device comfortabley with bending, squatting, valsalva.  She was not able to demonstrate independent removal and placement.  She tolerated the procedure well.      Impression    1. Urinary incontinence, mixed    2. Encounter for screening mammogram for breast cancer    3. Osteoporosis screening    4. Menopausal and perimenopausal disorder     5. Chronic constipation    6. Nocturia more than twice per night    7. Incomplete emptying of bladder        Initial Plan  The patient was counseled regarding these issues. The patient was given a summary sheet containing each of these issues with possible options for evaluation and management. When appropriate, we also reviewed computer-generated diagrams specific to their diagnoses.  All questions were addressed to the patient's satisfaction.    1)  Mixed urinary incontinence, urge > stress:    --see if pessary helps  --control diabetes  --urine C&S  --Empty bladder every 3 hours.  Empty well: wait a minute, lean forward on toilet.    --Avoid dietary irritants (see sheet).  Keep diary x 3-5 days to determine your irritants.  --KEGELS: do 10 in AM and 10 in PM, holding each x 10 seconds.  When you feel urge to go, STOP, KEGEL, and when urge has passed, then go to bathroom.  Consider PT in future.    --URGE: consider medication in future if pessary not successful.  Takes  2-4 weeks to see if will have effect.  For dry mouth: get sour, sugar free lozenge or gum.    --STRESS:  Pessary vs. Sling.     2)  Constipation:  Controlling constipation may help bladder urgency/leakage and fiber may better control cholesterol and blood glucose.  Start daily fiber.  Take 1 tsp of fiber powder (psyllium or other sugar-free powder).  Mix in 8 oz of water.  Take x 3-5 days.  Then, increase fiber by 1 tsp every 3-5 days until stool is easy to pass.  Stop and continue at that dose.   Do not exceed 6 tsps/day.  May also use over the counter stool softener 1-2 x/day.  AVOID laxatives.    3)  Nocturia (nighttime urination): stop fluids 2 hours before bed/no water by the bed.  If have leg swelling:  Elevate feet above chest x 1 hour before bed to get excess fluid off.  Can also use support hose (knee highs).      4)  Incomplete bladder emptying:  --likely related to prolapse  --trial of pessary  --if does not improve, may need further testing    5)  Well-woman:  --needs mammogram, DEXA--ordered, please schedule  --will talk with PCP to see if need colonoscopy  --no further paps needed > 64 yo    6)  RTC 1 month.     Approximately 60 min were spent in consult, 75 % in discussion.     Thank you for requesting consultation of your patient.  I look forward to participating in their care.    Danyell Lucero  Female Pelvic Medicine and Reconstructive Surgery  [facility] [place], [place]

## 2018-06-21 NOTE — PATIENT INSTRUCTIONS
Bladder Irritants  Certain foods and drinks have been associated with worsening symptoms of urinary frequency, urgency, urge incontinence, or bladder pain. If you suffer from any of these conditions, you may wish to try eliminating one or more of these foods from your diet and see if your symptoms improve. If bladder symptoms are related to dietary factors, strict adherence to a diet thateliminates the food should bring marked relief in 10 days. Once you are feeling better, you can begin to add foods back into your diet, one at a time. If symptoms return, you will be able to identify the irritant. As you add foods back to your diet it is very important that you drink significant amounts of water.    -----------------------------------------------------------------------------------------------  List of Common Bladder Irritants*  Alcoholic beverages  Apples and apple juice  Cantaloupe  Carbonated beverages  Chili and spicy foods  Chocolate  Citrus fruit  Coffee (including decaffeinated)  Cranberries and cranberry juice  Grapes  Guava  Milk Products: milk, cheese, cottage cheese, yogurt, ice cream  Peaches  Pineapple  Plums  Strawberries  Sugar especially artificial sweeteners, saccharin, aspartame, corn sweeteners, honey, fructose, sucrose, lactose  Tea  Tomatoes and tomato juice  Vitamin B complex  Vinegar  *Most people are not sensitive to ALL of these products; your goal is to find the foods that make YOUR symptoms worse.  ---------------------------------------------------------------------------------------------------    Low-acid fruit substitutions include apricots, papaya, pears and watermelon. Coffee drinkers can drink Kava or other lowacid instant drinks. Tea drinkers can substitute non-citrus herbal and sun brewed teas. Calcium carbonate co-buffered with calcium ascorbate can be substituted for Vitamin C. Prelief is a dietary supplement that works as an acid blocker for the bladder.    Where to get more  information:        Overcoming Bladder Disorders by Jami Koehler and Rodo Hernandez, 1990        You Dont Have to Live with Cystitis! By Erica Biggs, 1988  · http://www.urologymanagement.org/oab    -------------------------------------------------------------------    Fiber Information Sheet  Your doctor has recommended that you follow a high fiber diet. The addition of fiber to your diet can make an enormous difference in your bowel control and regularity. Fiber helps people whether they lose stool or have trouble with constipation. Fiber works by bulking the stool and keeping it formed, yet making the movement soft and easy to pass. Fiber helps keep moisture within the stool so that neither diarrhea nor hard stool occurs. Fiber makes the bowels work more regularly, but it is not a laxative. An additional bonus from eating a high fiber diet is that your risk of cancer is reduced, too.    Most of us eat some high fiber foods already, but nearly all of us do not eat the necessary amount. For example, a slice of whole wheat bread contains only about 10% of the daily recommended amount of fiber. This means if you are relying on only whole wheat bread to meet the recommended fiber requirements, you would need to eat  between 10-18 slices every day! Please note that fiber is NOT in any meat or dairy product. It is only found in grains, vegetables and fruits. The recommended daily fiber intake is 20-25 grams. Foods having high fiber content include:     Fiber One Cereal, ½ cup 13.0 g   Duarte beans, ¾ cup 10.4 g   Wheat bran cereal, 1 oz 10.0 g   Kidney beans, ¾ cup 9.3 g   All Bran Cereal, ½ cup 6.0 g   Oat Bran Cereal, hot, 1 oz 4.0 g   Banana, 1medium 3.8 g   Canned pears, ½ cup 3.7 g   3 prunes or ¼ cup raisins 3.5 g   Whole Wheat Total, 1 cup 3.0 g   Carrots, ½ cup 3.2 g   Apple, small 2.8 g   Broccoli, ½ cup 2.8 g   Cauliflower, ½ cup 2.6 g   Oatmeal, 1 oz 2.5 g   Whole Wheat  Toast 2.0 g   Cheerios, 1 1/3 cup 2.0 g   Baked potato with skin 2.0 g   Corn, ½ cup 1.9 g   Popcorn, 3 cups 1.9 g   Orange, medium 1.9 g   Granola bar 1.0 g   Lettuce, ½ cup 0.9 g    If you dont think that you can get enough fiber through your everyday diet, there are many good fiber supplements you can take along with eating your high fiber diet. Some of these are: Metamucil (1 heaping teaspoon or 1-2 wafers), Citrucel (1 tablespoon), Fiberall (1-2 wafers or 1 teaspoon), Perdium (2 rounded teaspoons) and 1-2 teaspoons unprocessed bran (to mix with foods)    You may need to use the fiber supplement up to 3-4 times daily to produce normal elimination. Please follow specific package directions or call us for help in regulating the dose. You may notice some bloating and/or increased gas at first. These symptoms can be relieved by adding fiber to your diet slowly. Once your body gets used to this increased fiber, these symptoms will go away.   -----------------------------------------------------------------    1)  Mixed urinary incontinence, urge > stress:    --see if pessary helps  --control diabetes  --urine C&S  --Empty bladder every 3 hours.  Empty well: wait a minute, lean forward on toilet.    --Avoid dietary irritants (see sheet).  Keep diary x 3-5 days to determine your irritants.  --KEGELS: do 10 in AM and 10 in PM, holding each x 10 seconds.  When you feel urge to go, STOP, KEGEL, and when urge has passed, then go to bathroom.  Consider PT in future.    --URGE: consider medication in future if pessary not successful.  Takes 2-4 weeks to see if will have effect.  For dry mouth: get sour, sugar free lozenge or gum.    --STRESS:  Pessary vs. Sling.     2)  Constipation:  Controlling constipation may help bladder urgency/leakage and fiber may better control cholesterol and blood glucose.  Start daily fiber.  Take 1 tsp of fiber powder (psyllium or other sugar-free powder).  Mix in 8 oz of water.  Take x 3-5  days.  Then, increase fiber by 1 tsp every 3-5 days until stool is easy to pass.  Stop and continue at that dose.   Do not exceed 6 tsps/day.  May also use over the counter stool softener 1-2 x/day.  AVOID laxatives.    3)  Nocturia (nighttime urination): stop fluids 2 hours before bed/no water by the bed.  If have leg swelling:  Elevate feet above chest x 1 hour before bed to get excess fluid off.  Can also use support hose (knee highs).      4)  Incomplete bladder emptying:  --likely related to prolapse  --trial of pessary  --if does not improve, may need further testing    5)  Well-woman:  --needs mammogram, DEXA--ordered, please schedule  --will talk with PCP to see if need colonoscopy  --no further paps needed > 66 yo    6)  RTC 1 month.

## 2018-06-22 ENCOUNTER — NURSE TRIAGE (OUTPATIENT)
Dept: ADMINISTRATIVE | Facility: CLINIC | Age: 83
End: 2018-06-22

## 2018-06-22 ENCOUNTER — TELEPHONE (OUTPATIENT)
Dept: INTERNAL MEDICINE | Facility: CLINIC | Age: 83
End: 2018-06-22

## 2018-06-22 NOTE — TELEPHONE ENCOUNTER
----- Message from Dom Mix sent at 6/22/2018  1:29 PM CDT -----  Contact: Pt  Pt would like to be called back regarding medical report questions about medication for fluid pills.  Pt can be reached at [phone].    Thank You.  2:09 PM  Patient stated that she discontinued Lasix per Dr. Robles. Patient wanted to know if there was a medication that she replacing it with.

## 2018-06-22 NOTE — TELEPHONE ENCOUNTER
Reason for Disposition   Caller has NON-URGENT medication question about med that PCP prescribed and triager unable to answer question    Protocols used: ST MEDICATION QUESTION CALL-A-ARASELI    Jojo called again, wanting to hear from Darya Robles MD , pcp, regarding what replacement she should be on since the Lasix was discontinued.  Please call her at [phone], which is her preferred phone for this.  Message to Darya Robles MD, pcp.  Please contact caller directly with any additional care advice.

## 2018-06-23 LAB — BACTERIA UR CULT: NORMAL

## 2018-06-24 ENCOUNTER — TELEPHONE (OUTPATIENT)
Dept: UROGYNECOLOGY | Facility: CLINIC | Age: 83
End: 2018-06-24

## 2018-06-24 DIAGNOSIS — N39.0 ACUTE UTI: Primary | ICD-10-CM

## 2018-06-24 RX ORDER — SULFAMETHOXAZOLE AND TRIMETHOPRIM 800; 160 MG/1; MG/1
1 TABLET ORAL 2 TIMES DAILY
Qty: 10 TABLET | Refills: 0 | Status: SHIPPED | OUTPATIENT
Start: 2018-06-24 | End: 2018-06-29

## 2018-06-24 NOTE — TELEPHONE ENCOUNTER
Please let patient know that she has a UTI.  [facility] [address].  Please remind her to /start. Thanks!

## 2018-06-25 ENCOUNTER — TELEPHONE (OUTPATIENT)
Dept: INTERNAL MEDICINE | Facility: CLINIC | Age: 83
End: 2018-06-25

## 2018-06-25 ENCOUNTER — HOSPITAL ENCOUNTER (OUTPATIENT)
Dept: RADIOLOGY | Facility: OTHER | Age: 83
Discharge: HOME | End: 2018-06-25
Attending: FAMILY MEDICINE
Payer: MEDICARE

## 2018-06-25 DIAGNOSIS — R93.7 ABNORMAL X-RAY OF LUMBAR SPINE: ICD-10-CM

## 2018-06-25 DIAGNOSIS — E87.1 HYPONATREMIA: Primary | ICD-10-CM

## 2018-06-25 DIAGNOSIS — R93.7 ABNORMAL X-RAY OF THORACIC SPINE: ICD-10-CM

## 2018-06-25 DIAGNOSIS — M48.00 SPINAL STENOSIS, UNSPECIFIED SPINAL REGION: ICD-10-CM

## 2018-06-25 PROCEDURE — 25500020 PHARM REV CODE 255: Performed by: FAMILY MEDICINE

## 2018-06-25 PROCEDURE — A9585 GADOBUTROL INJECTION: HCPCS | Performed by: FAMILY MEDICINE

## 2018-06-25 PROCEDURE — 72157 MRI CHEST SPINE W/O & W/DYE: CPT | Mod: 26,,, | Performed by: RADIOLOGY

## 2018-06-25 PROCEDURE — 72157 MRI CHEST SPINE W/O & W/DYE: CPT | Mod: TC

## 2018-06-25 PROCEDURE — 72158 MRI LUMBAR SPINE W/O & W/DYE: CPT | Mod: TC

## 2018-06-25 PROCEDURE — 72158 MRI LUMBAR SPINE W/O & W/DYE: CPT | Mod: 26,,, | Performed by: RADIOLOGY

## 2018-06-25 RX ORDER — GADOBUTROL 604.72 MG/ML
7 INJECTION INTRAVENOUS
Status: COMPLETED | OUTPATIENT
Start: 2018-06-25 | End: 2018-06-25

## 2018-06-25 RX ADMIN — GADOBUTROL 7 ML: 604.72 INJECTION INTRAVENOUS at 01:06

## 2018-06-25 NOTE — TELEPHONE ENCOUNTER
Attempted to contact pt on every number in her chart, one number was unavailable, one was busy and the other was busy.

## 2018-06-25 NOTE — LETTER
June 27, 2018    Jojo Ayoub  [address] [facility] [address]             [facility]  [address] [facility] [address]  Phone: [phone]  Fax: [phone] Dear Ms. Ayoub:    Our office has attempted to reach you unsuccessfully by phone. Dr. Robles states not alternative for lasix needed as she increased your sodium. She wants you to come in as soon as possible to recheck your sodium levels at our lab. Dr. Robles also would like for you to follow up with nephrology. Please contact us so we can schedule these appointments for you, and also provide us with a good number to contact you with.       If you have any questions or concerns, please don't hesitate to call.    Sincerely,        Laquita Andrea LPN

## 2018-06-26 NOTE — TELEPHONE ENCOUNTER
Spoke to pt, she was informed that her urine culture was positive for UTI and Dr. Lucero has sent an RX for treatment to [facility] on [address] to  and start. Pt verbalizes understanding.

## 2018-06-27 NOTE — TELEPHONE ENCOUNTER
Unable to reach pt by either number on file. Pt not signed up on pt portal. Will create and send letter.

## 2018-07-02 ENCOUNTER — NURSE TRIAGE (OUTPATIENT)
Dept: ADMINISTRATIVE | Facility: CLINIC | Age: 83
End: 2018-07-02

## 2018-07-03 NOTE — TELEPHONE ENCOUNTER
Reason for Disposition   Caller has NON-URGENT medication question about med that PCP prescribed and triager unable to answer question    Answer Assessment - Initial Assessment Questions  Pt reported recent hx of low Na so the diuretic she was on was stopped and she was started on norvasc. Since then has noted an increase in b/p's and recently some mild swelling to ankles. B/p this pm was 133/80 which is the highest it has been since stopping the diuretic. Has concerns about meds not controlling her b/p.    Protocols used: ST MEDICATION QUESTION CALL-A-AH

## 2018-07-04 ENCOUNTER — TELEPHONE (OUTPATIENT)
Dept: INTERNAL MEDICINE | Facility: CLINIC | Age: 83
End: 2018-07-04

## 2018-07-04 NOTE — TELEPHONE ENCOUNTER
Thoracic MRI reveals arthritis and narrowing of the space around the nerve roots  Lumbar MRI with concerns for arachnoiditis and possible demelylinating disorder, please keep follow up with neurosurgery to discuss further treatment and work up  Urine culture is negative

## 2018-07-05 ENCOUNTER — TELEPHONE (OUTPATIENT)
Dept: INTERNAL MEDICINE | Facility: CLINIC | Age: 83
End: 2018-07-05

## 2018-07-05 NOTE — TELEPHONE ENCOUNTER
----- Message from Penelope Veloz sent at 7/5/2018 11:22 AM CDT -----  Contact: PATY SOMMER [[id number]]            Name of Who is Calling: PATY SOMMER [[id number]]      What is the request in detail: Pt is calling to inform Dr. Robles that the sulfamethoxazole-trimethoprim 800-160mg (BACTRIM DS) 800-160 mg Tab hasn't helped with the frequent urination.  Pt says that she's feels that she need something to help with the constant urination.  Please contact pt to further discuss and advise.    [id number] - [place], [place] - [address]      Can the clinic reply by [facility]: No      What Number to Call Back [facility] not [facility]: [phone]

## 2018-07-05 NOTE — TELEPHONE ENCOUNTER
Spoke with pt and discussed the results of the MRI. Explained the results and pt verbalized understanding. States she will keep appt

## 2018-07-11 ENCOUNTER — HOSPITAL ENCOUNTER (OUTPATIENT)
Dept: RADIOLOGY | Facility: OTHER | Age: 83
Discharge: HOME | End: 2018-07-11
Attending: OBSTETRICS & GYNECOLOGY
Payer: MEDICARE

## 2018-07-11 DIAGNOSIS — Z13.820 OSTEOPOROSIS SCREENING: ICD-10-CM

## 2018-07-11 DIAGNOSIS — Z12.31 ENCOUNTER FOR SCREENING MAMMOGRAM FOR BREAST CANCER: ICD-10-CM

## 2018-07-11 DIAGNOSIS — N95.9 MENOPAUSAL AND PERIMENOPAUSAL DISORDER: ICD-10-CM

## 2018-07-11 PROCEDURE — 77067 SCR MAMMO BI INCL CAD: CPT | Mod: 26,,, | Performed by: INTERNAL MEDICINE

## 2018-07-11 PROCEDURE — 77080 DXA BONE DENSITY AXIAL: CPT | Mod: TC

## 2018-07-11 PROCEDURE — 77080 DXA BONE DENSITY AXIAL: CPT | Mod: 26,,, | Performed by: INTERNAL MEDICINE

## 2018-07-11 PROCEDURE — 77067 SCR MAMMO BI INCL CAD: CPT | Mod: TC

## 2018-07-12 ENCOUNTER — TELEPHONE (OUTPATIENT)
Dept: UROGYNECOLOGY | Facility: CLINIC | Age: 83
End: 2018-07-12

## 2018-07-12 NOTE — TELEPHONE ENCOUNTER
Attempted to contact pt to inform her of her bone density results, LM on both number in pt's chart to call the office.

## 2018-07-12 NOTE — TELEPHONE ENCOUNTER
----- Message from Danyell Lucero MD sent at 7/11/2018  8:03 PM CDT -----  Please let patient know that her bone density shows a little weakening of the bones, but not reynaldo osteoporosis.  She should do the following to help keep things from progressing:  --Take 600 mg calcium every AM and 600 mg calcium every PM (for total of 1200 mg daily).  Take 400 IU vitamin D in AM and 400 IU vitamin D in the PM (for total of 800 IU daily).    --Start weight bearing exercises in improve bone density:  This type of exercise forces you to work against gravity. Some examples of weight-bearing exercises include weight training, walking, hiking, jogging, climbing stairs, tennis, and dancing. Examples of exercises that are not weight-bearing include swimming and bicycling. Although these activities help build and maintain strong muscles and have excellent cardiovascular benefits, they are not the best way to exercise your bones.  --Follow up for repeat DEXA (bone density testing) in 2-3 years.  Thanks!

## 2018-07-13 ENCOUNTER — TELEPHONE (OUTPATIENT)
Dept: INTERNAL MEDICINE | Facility: CLINIC | Age: 83
End: 2018-07-13

## 2018-07-13 NOTE — TELEPHONE ENCOUNTER
Her sodium is now normal, how much water is patient drinking daily, where have her blood pressure been and how is she feeling now?

## 2018-07-15 ENCOUNTER — NURSE TRIAGE (OUTPATIENT)
Dept: ADMINISTRATIVE | Facility: CLINIC | Age: 83
End: 2018-07-15

## 2018-07-15 NOTE — TELEPHONE ENCOUNTER
"    Reason for Disposition   Swollen ankle joint (all triage questions negative)  (Exception: area of localized swelling which is itchy)    Answer Assessment - Initial Assessment Questions  1. LOCATION: "Which joint is swollen?"      both  2. ONSET: "When did the swelling start?"   3-4 weeks ago  3. SIZE: "How large is the swelling?"     moderate  4. PAIN: "Is there any pain?" If so, ask: "How bad is it?" (Scale 1-10; or mild, moderate, severe)     no  5. CAUSE: "What do you think caused the swollen joint?"     Off lasix  6. OTHER SYMPTOMS: "Do you have any other symptoms?" (e.g., fever, chest pain, difficulty breathing, calf pain)    no  7. PREGNANCY: "Is there any chance you are pregnant?" "When was your last menstrual period?"  n/a    Protocols used: ST ANKLE SWELLING-A-      "

## 2018-07-16 ENCOUNTER — TELEPHONE (OUTPATIENT)
Dept: INTERNAL MEDICINE | Facility: CLINIC | Age: 83
End: 2018-07-16

## 2018-07-16 ENCOUNTER — TELEPHONE (OUTPATIENT)
Dept: UROGYNECOLOGY | Facility: CLINIC | Age: 83
End: 2018-07-16

## 2018-07-16 NOTE — TELEPHONE ENCOUNTER
----- Message from Jaden Grace MA sent at 7/12/2018  9:28 AM CDT -----  Please let patient know that her bone density shows a little weakening of the bones, but not reynaldo osteoporosis.  She should do the following to help keep things from progressing:   --Take 600 mg calcium every AM and 600 mg calcium every PM (for total of 1200 mg daily).  Take 400 IU vitamin D in AM and 400 IU vitamin D in the PM (for total of 800 IU daily).     --Start weight bearing exercises in improve bone density:  This type of exercise forces you to work against gravity. Some examples of weight-bearing exercises include weight training, walking, hiking, jogging, climbing stairs, tennis, and dancing. Examples of exercises that are not weight-bearing include swimming and bicycling. Although these activities help build and maintain strong muscles and have excellent cardiovascular benefits, they are not the best way to exercise your bones.   --Follow up for repeat DEXA (bone density testing) in 2-3 years.

## 2018-07-16 NOTE — TELEPHONE ENCOUNTER
Spoke with pt she states that she is starting to have swelling at the feet and ankles. She wants to know if she should start back on the fluid pills. Will ask for advise of Dr. Robles

## 2018-07-16 NOTE — TELEPHONE ENCOUNTER
patietn with hyponatremia secondary to diuretics, we will not restart, needs to be active, elevate feet when sitting above level of the heart and wear compression stockings

## 2018-07-16 NOTE — TELEPHONE ENCOUNTER
----- Message from Randi Alejandre sent at 7/16/2018  9:48 AM CDT -----  Contact: pt            Name of Who is Calling: PATY SOMMER [[id number]]      What is the request in detail: pt has questions in regards to fluid pill... Please advise      Can the clinic reply by [facility]: no      What Number to Call Back if not in [facility]: [phone]

## 2018-07-16 NOTE — TELEPHONE ENCOUNTER
Spoke to pt, she was informed that her bone density shows a little weakening of the bones, but not reynaldo osteoporosis.  She should do the following to help keep things from progressing:   --Take 600 mg calcium every AM and 600 mg calcium every PM (for total of 1200 mg daily).  Take 400 IU vitamin D in AM and 400 IU vitamin D in the PM (for total of 800 IU daily).     --Start weight bearing exercises in improve bone density:  This type of exercise forces you to work against gravity. Some examples of weight-bearing exercises include weight training, walking, hiking, jogging, climbing stairs, tennis, and dancing. Examples of exercises that are not weight-bearing include swimming and bicycling. Although these activities help build and maintain strong muscles and have excellent cardiovascular benefits, they are not the best way to exercise your bones.   --Follow up for repeat DEXA (bone density testing) in 2-3 years. Pt aware and verbalizes understanding.

## 2018-07-17 NOTE — TELEPHONE ENCOUNTER
I have reviewed the provider's instructions with the patient, answering all questions to her satisfaction.

## 2018-07-19 ENCOUNTER — OFFICE VISIT (OUTPATIENT)
Dept: UROLOGY | Facility: CLINIC | Age: 83
End: 2018-07-19
Attending: UROLOGY
Payer: MEDICARE

## 2018-07-19 VITALS
HEIGHT: 64 IN | HEART RATE: 68 BPM | DIASTOLIC BLOOD PRESSURE: 70 MMHG | SYSTOLIC BLOOD PRESSURE: 131 MMHG | WEIGHT: 151.44 LBS | BODY MASS INDEX: 25.85 KG/M2

## 2018-07-19 DIAGNOSIS — N39.41 URGE INCONTINENCE: ICD-10-CM

## 2018-07-19 DIAGNOSIS — R35.0 FREQUENCY OF URINATION: Primary | ICD-10-CM

## 2018-07-19 PROCEDURE — 87186 SC STD MICRODIL/AGAR DIL: CPT

## 2018-07-19 PROCEDURE — 87088 URINE BACTERIA CULTURE: CPT

## 2018-07-19 PROCEDURE — 87086 URINE CULTURE/COLONY COUNT: CPT

## 2018-07-19 PROCEDURE — 87077 CULTURE AEROBIC IDENTIFY: CPT

## 2018-07-19 PROCEDURE — 99214 OFFICE O/P EST MOD 30 MIN: CPT | Mod: S$GLB,,, | Performed by: UROLOGY

## 2018-07-19 RX ORDER — LOSARTAN POTASSIUM AND HYDROCHLOROTHIAZIDE 25; 100 MG/1; MG/1
TABLET ORAL
COMMUNITY
Start: 2018-07-11 | End: 2018-11-08

## 2018-07-19 NOTE — TELEPHONE ENCOUNTER
----- Message from Marian Charles sent at 7/19/2018  3:22 PM CDT -----  Contact: PATY SOMMER [[id number]]      Can the clinic reply in [facility]:  No      Please refill the medication(s) listed below. Please call the patient when the prescription(s) is ready for  at this phone number   PATY SOMMER  /  # [phone]      Medication #1 mirabegron (MYRBETRIQ) 50 mg Tb 24 / 90 day supply      Preferred Pharmacy: [facility]/pharmacy #[id number] - [place] - [address]     [phone] (Phone)  [phone] (Fax)

## 2018-07-19 NOTE — LETTER
July 19, 2018      Darya Roblse MD  [address]  [facility] [address]           [facility]  [address] [place] [address], [facility] [address]  [facility] [address]  Phone: [phone]  Fax: [phone]          Patient: Jojo Ayoub   MR Number: [id number]   YOB: 1935   Date of Visit: 7/19/2018       Dear Dr. Darya Robles:    Thank you for referring Jojo Ayoub to me for evaluation. Attached you will find relevant portions of my assessment and plan of care.    If you have questions, please do not hesitate to call me. I look forward to following Jojo Ayoub along with you.    Sincerely,    Jaleel Scott MD    Enclosure  CC:  No Recipients    If you would like to receive this communication electronically, please contact [email or web address] or [phone] to request more information on [employer] access.    For providers and/or their staff who would like to refer a patient to [facility], please contact us through our one-stop-shop provider referral line, [facility], at [phone].    If you feel you have received this communication in error or would no longer like to receive these types of communications, please e-mail [email or web address]

## 2018-07-19 NOTE — PROGRESS NOTES
"Subjective:      Jojo Ayoub is a 83 y.o. female who was referred by Dr. Darya Robles for evaluation of urinary frequency.      She reports urinary frequency. Started about 1 year ago and has gradually progressed. She has nocturia 2 times per night. She has urge urinary incontinence as well.    She thinks she tried vesicare in the past but stopped due to constipation.    She saw Dr. Lucero last month and opted for pessary trial, which she says has not provided relief.     The following portions of the patient's history were reviewed and updated as appropriate: allergies, current medications, past family history, past medical history, past social history, past surgical history and problem list.    Review of Systems  Constitutional: no fever or chills  ENT: no nasal congestion or sore throat  Respiratory: no cough or shortness of breath  Cardiovascular: no chest pain or palpitations  Gastrointestinal: no nausea or vomiting, tolerating diet  Genitourinary: as per HPI  Hematologic/Lymphatic: no easy bruising or lymphadenopathy  Musculoskeletal: no arthralgias or myalgias  Neurological: no seizures or tremors  Behavioral/Psych: no auditory or visual hallucinations     Objective:   Vital Signs:/70   Pulse 68   Ht 5' 4" (1.626 m)   Wt 68.7 kg (151 lb 7.3 oz)   BMI 26.00 kg/m²     Physical Exam   General: alert and oriented, no acute distress  Head: normocephalic, atraumatic  Neck: supple, no lymphadenopathy, normal ROM, no masses  Respiratory: Symmetric expansion, non-labored breathing  Skin: normal coloration and turgor, no rashes, no suspicious skin lesions noted  Neuro: alert and oriented x3, no gross deficits  Psych: normal judgment and insight, normal mood/affect and non-anxious    Bladder Scan PVR: 61cc      Lab Review   Urinalysis demonstrates positive for nitrites, leukocytes  Lab Results   Component Value Date    WBC 6.93 06/19/2018    HGB 10.3 (L) 06/19/2018    HCT 30.2 (L) 06/19/2018    MCV " 92 06/19/2018     06/19/2018     Lab Results   Component Value Date    CREATININE 1.3 07/13/2018    BUN 18 07/13/2018     Assessment:     1. Frequency of urination    2. Urge incontinence        Plan:   1. Trial myrbetriq (if covered)  2. JACOBO w/ Dr. Lucero as scheduled

## 2018-07-21 LAB — BACTERIA UR CULT: NORMAL

## 2018-07-23 ENCOUNTER — TELEPHONE (OUTPATIENT)
Dept: UROLOGY | Facility: CLINIC | Age: 83
End: 2018-07-23

## 2018-07-23 RX ORDER — CIPROFLOXACIN 500 MG/1
500 TABLET ORAL 2 TIMES DAILY
Qty: 10 TABLET | Refills: 0 | Status: SHIPPED | OUTPATIENT
Start: 2018-07-23 | End: 2018-07-28

## 2018-07-23 NOTE — TELEPHONE ENCOUNTER
Spoke with pt, informed her of the positive urine culture and an abx cipro was sent to her pharmacy. Pt stated that she will  meds and take as prescribed.

## 2018-07-23 NOTE — TELEPHONE ENCOUNTER
----- Message from Jaleel Scott MD sent at 7/23/2018  3:05 PM CDT -----  Pt has positive urine culture.  Prescription for cipro sent to listed preferred pharmacy.  Please call patient to notify.  Thanks.

## 2018-08-05 ENCOUNTER — NURSE TRIAGE (OUTPATIENT)
Dept: ADMINISTRATIVE | Facility: CLINIC | Age: 83
End: 2018-08-05

## 2018-08-06 ENCOUNTER — OFFICE VISIT (OUTPATIENT)
Dept: NEUROSURGERY | Facility: CLINIC | Age: 83
End: 2018-08-06
Payer: MEDICARE

## 2018-08-06 VITALS
DIASTOLIC BLOOD PRESSURE: 58 MMHG | TEMPERATURE: 98 F | SYSTOLIC BLOOD PRESSURE: 101 MMHG | BODY MASS INDEX: 24.49 KG/M2 | HEART RATE: 76 BPM | WEIGHT: 142.69 LBS

## 2018-08-06 DIAGNOSIS — M48.062 SPINAL STENOSIS OF LUMBAR REGION WITH NEUROGENIC CLAUDICATION: Primary | ICD-10-CM

## 2018-08-06 PROCEDURE — 99999 PR PBB SHADOW E&M-EST. PATIENT-LVL III: CPT | Mod: PBBFAC,,, | Performed by: PHYSICIAN ASSISTANT

## 2018-08-06 PROCEDURE — 99204 OFFICE O/P NEW MOD 45 MIN: CPT | Mod: S$PBB,,, | Performed by: PHYSICIAN ASSISTANT

## 2018-08-06 PROCEDURE — 99213 OFFICE O/P EST LOW 20 MIN: CPT | Mod: PBBFAC | Performed by: PHYSICIAN ASSISTANT

## 2018-08-06 NOTE — CONSULTS
CHIEF COMPLAINT:  Back pain and difficulty with ambulation.    HISTORY OF PRESENT ILLNESS:  Ms. Ayoub is an 83-year-old female with a history   of an L4-L5 TLIF that was done by Dr. Ivy in 2010 for grade I   spondylolisthesis.  The patient states that overall she has done well since the   procedure.  She states that over the past year, she has had a gradual decline in   her ability to stay on her feet.  She states that she has back pain as well as   intermittent leg paresthesias bilaterally that are worse when she is on her feet   and improved to a degree when she rests.  She states that her ability to stand   and walk has gradually decreased over the past year.  She is walking with a   walker now.  She reports occasional neck pain, but denies any pain, paresthesias   or weakness in her upper extremities.  She denies progressive clumsiness in the   hands.  She denies bowel or bladder dysfunction.    The patient does have multiple medical comorbidities including type 2 diabetes,   hyperlipidemia, high blood pressure.  She is on aspirin 81 mg.    REVIEW OF SYSTEMS:  Please refer to Spine intake for complete review of systems.    PAST MEDICAL, SURGICAL, FAMILY AND SOCIAL HISTORY:  Reviewed today.    MEDICATIONS AND ALLERGIES:  Reviewed today.    PHYSICAL EXAMINATION:  GENERAL:  The patient is awake, alert, oriented, in no apparent distress.  HEENT:  Pupils equal, round and reactive to light.  Extraocular movements are   intact.  Face is symmetric.  Tongue is midline.  NECK:  Supple.  SKIN:  On all four extremities is grossly intact without any obvious rashes or   lesions.  RESPIRATORY:  Normal respiratory effort.  ABDOMEN:  Soft and nontender.  PULSES:  2+ peripheral pulses.  No clubbing, cyanosis or edema in extremities.  MUSCULOSKELETAL:  Negative pronator drift.  Normal finger-to-nose.  Strength in   her upper extremities is 5/5 bilaterally in deltoids, biceps, triceps, wrist   flexion, wrist extension and  hand .  Lower extremities is decreased   bilaterally at 4-/5 in hip flexion and 4+/5 in knee flexion, extension   bilaterally and 5/5 distal to that.  She has negative Jacob sign.  She has   negative ankle clonus.  Toes are downgoing.  Deep tendon reflexes are 2+ and   symmetric.  She is able to stand from a seated position on her own.  She   ambulates very slowly and slightly hunched over.  She gets around fairly well   with a walker.    IMAGING:  MRI of the thoracic and lumbar spine that was done 06/25/2018   personally reviewed.  The thoracic spine MRI shows stable degenerative disease   without significant central canal stenosis or spinal cord compression.  Lumbar   MRI shows stable postoperative changes at L4-L5 with adjacent level disease at   L3-L4 with significant central canal stenosis at that level.    ASSESSMENT AND PLAN:  Ms. 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  At this point in time, the patient states that   her symptoms are progressive over the past year, she is now walking with a   walker and is having much more difficulty getting around.  She feels as though   this is affecting her quality of life.  I discussed with her options of   injections as well as possible minimally invasive laminectomy.  At this point in   time, she would like some time to think about her options, but is potentially   interested in surgery.  Addressed with her that she would need to be cleared by   her primary care doctor.  We will get her set up to one of our spine surgeons to   further discuss surgical options.  She was encouraged to call the clinic with   questions or concerns in the meantime.    DICTATED BY:  Balaji Velasquez Jr., BUD CHANEL/LATISHA  dd: 08/06/2018 09:56:49 (CDT)  td: 08/06/2018 14:47:46 (CDT)  Doc ID   #[id number]  Job ID #[id number]    CC:

## 2018-08-06 NOTE — LETTER
August 12, 2018      Darya Robles MD  [address]  [facility] [address]           [facility] - Neurosurgery 7th Fl  [address]  [facility] [address]  Phone: [phone]          Patient: Jojo Ayoub   MR Number: [id number]   YOB: 1935   Date of Visit: 8/6/2018       Dear Dr. Darya Robles:    Thank you for referring Jojo Ayoub to me for evaluation. Attached you will find relevant portions of my assessment and plan of care.    If you have questions, please do not hesitate to call me. I look forward to following Jojo Ayoub along with you.    Sincerely,    IZA Lopez    Enclosure  CC:  No Recipients    If you would like to receive this communication electronically, please contact [email or web address] or [phone] to request more information on [employer] access.    For providers and/or their staff who would like to refer a patient to [facility], please contact us through our one-stop-shop provider referral line, [facility], at [phone].    If you feel you have received this communication in error or would no longer like to receive these types of communications, please e-mail [email or web address]

## 2018-08-06 NOTE — TELEPHONE ENCOUNTER
Reason for Disposition   [1] MILD swelling of both ankles (i.e., pedal edema) AND [2] new onset or worsening    Protocols used: ST LEG SWELLING AND EDEMA-A-AH    Patient called to report that her ankles are swollen and she was taken off her fluid pills and this is what happened. Patient is not in pain, no chest pain, no difficulty breathing. Patient accepts care advice and was told a message is being sent to Dr. Robles and her staff to f/u with the patient. Patient verbalized understanding.

## 2018-08-08 ENCOUNTER — NURSE TRIAGE (OUTPATIENT)
Dept: ADMINISTRATIVE | Facility: CLINIC | Age: 83
End: 2018-08-08

## 2018-08-08 NOTE — TELEPHONE ENCOUNTER
"  Reason for Disposition   Health Information question, no triage required and triager able to answer question    Answer Assessment - Initial Assessment Questions  1. REASON FOR CALL or QUESTION: "What is your reason for calling today?" or "How can I best help you?" or "What question do you have that I can help answer?"      Pt was seen in clinic earlier this week- has questions about what the dr dx her with-needed to know the name. Wanted to discuss her options regarding surgery discussed by the PA    Protocols used: ST INFORMATION ONLY CALL-A-AH    "

## 2018-08-15 ENCOUNTER — NURSE TRIAGE (OUTPATIENT)
Dept: ADMINISTRATIVE | Facility: CLINIC | Age: 83
End: 2018-08-15

## 2018-08-15 NOTE — TELEPHONE ENCOUNTER
"    Reason for Disposition   [1] Follow-up call to recent contact AND [2] information only call, no triage required    Answer Assessment - Initial Assessment Questions  1. REASON FOR CALL or QUESTION: "What is your reason for calling today?" or "How can I best help you?" or "What question do you have that I can help answer?"      Wanted greater understanding of her recommended surgical procedure, los, and if I could recommend a neurosurgeon to her?  Pt has a surgical consult appt with neurosurgery tomorrow.    Protocols used: ST INFORMATION ONLY CALL-A-AH      "

## 2018-08-16 ENCOUNTER — OFFICE VISIT (OUTPATIENT)
Dept: NEUROSURGERY | Facility: CLINIC | Age: 83
End: 2018-08-16
Payer: MEDICARE

## 2018-08-16 VITALS
HEART RATE: 75 BPM | TEMPERATURE: 99 F | SYSTOLIC BLOOD PRESSURE: 113 MMHG | WEIGHT: 144 LBS | DIASTOLIC BLOOD PRESSURE: 64 MMHG | BODY MASS INDEX: 24.72 KG/M2

## 2018-08-16 DIAGNOSIS — G89.29 CHRONIC LOW BACK PAIN WITHOUT SCIATICA, UNSPECIFIED BACK PAIN LATERALITY: ICD-10-CM

## 2018-08-16 DIAGNOSIS — R26.9 ABNORMALITY OF GAIT: Primary | ICD-10-CM

## 2018-08-16 DIAGNOSIS — G56.21 ULNAR NEUROPATHY OF RIGHT UPPER EXTREMITY: ICD-10-CM

## 2018-08-16 DIAGNOSIS — M51.86 OTHER INTERVERTEBRAL DISC DISORDERS, LUMBAR REGION: ICD-10-CM

## 2018-08-16 DIAGNOSIS — M54.50 CHRONIC LOW BACK PAIN WITHOUT SCIATICA, UNSPECIFIED BACK PAIN LATERALITY: ICD-10-CM

## 2018-08-16 DIAGNOSIS — M48.02 CERVICAL STENOSIS OF SPINE: ICD-10-CM

## 2018-08-16 PROCEDURE — 99214 OFFICE O/P EST MOD 30 MIN: CPT | Mod: S$PBB,,, | Performed by: NEUROLOGICAL SURGERY

## 2018-08-16 PROCEDURE — 99213 OFFICE O/P EST LOW 20 MIN: CPT | Mod: PBBFAC | Performed by: NEUROLOGICAL SURGERY

## 2018-08-16 PROCEDURE — 99999 PR PBB SHADOW E&M-EST. PATIENT-LVL III: CPT | Mod: PBBFAC,,, | Performed by: NEUROLOGICAL SURGERY

## 2018-08-16 NOTE — PROGRESS NOTES
History & Physical    I, Obinna Harris, attest that this documentation has been prepared under the direction and in the presence of Houston Merritt MD.      08/17/2018    Chief Complaint   Patient presents with    Follow-up       History of Present Illness:  Jojo Ayoub is a 83 y.o. patient with history of L4-5 TLIF done by Dr. Ivy in 2010 for grade I spondylolisthesis. Patient was referred to me by my physician assistant, IZA Lopez, for evaluation of walking difficulty  and balance problems.     Patient complains mainly of back pain associated with bilateral leg and foot numbness. Patient describes the back pain as aching and stabbing 8-9/10 in intensity. Symptoms are worst in the morning. Exacerbating factors include standing up and walking. Alleviating factors include laying down. Patient reports intermittent bilateral calf pain that worsens with walking and improves with sitting. She denies any thigh pain. Patient does not feel that she can walk further than 1 block even with the aid of her walker mainly due to loss of balance.   Patient denies any neck pain; however, she reports frequently losing her balance and falling. She has fallen once within the last 6 months (in June 2018). She states sometimes dropping objects from her right hand, needing more help than in the past to button up her shirt, increasingly sloppy handwriting and loss of fine motor skill. Patient denies any neck pain.       Review of patient's allergies indicates:   Allergen Reactions    No known drug allergies        Current Outpatient Medications   Medication Sig Dispense Refill    amLODIPine (NORVASC) 10 MG tablet Take 1 tablet (10 mg total) by mouth once daily. 90 tablet 3    aspirin ([employer] ASPIRIN) 81 MG chewable tablet Take 81 mg by mouth. 1 Tablet Oral Every day      atorvastatin (LIPITOR) 10 MG tablet TAKE 1 TABLET (10 MG TOTAL) BY MOUTH ONCE DAILY. 90 tablet 3    carvedilol (COREG) 25 MG  tablet TAKE 1 TABLET BY MOUTH TWICE A  tablet 3    docusate sodium (COLACE) 100 MG capsule Take 1 capsule (100 mg total) by mouth 2 (two) times daily. 60 capsule 1    gabapentin (NEURONTIN) 800 MG tablet TAKE 1 TABLET (800 MG TOTAL) BY MOUTH 3 (THREE) TIMES DAILY. 270 tablet 3    losartan-hydrochlorothiazide 100-25 mg (HYZAAR) 100-25 mg per tablet       metFORMIN (GLUMETZA) 500 MG (MOD) 24 hr tablet Take 1 tablet (500 mg total) by mouth daily with breakfast. 90 tablet 3    mirabegron (MYRBETRIQ) 50 mg Tb24 Take 1 tablet (50 mg total) by mouth once daily. 30 tablet 11    ONETOUCH ULTRASOFT LANCETS lancets USE TO TEST BLOOD SUGAR DAILY 100 each 0     No current facility-administered medications for this visit.        Past Medical History:   Diagnosis Date    Arthritis     Diabetes mellitus with neurological manifestation     High blood pressure     Hyperlipidemia     Urinary urgency        Past Surgical History:   Procedure Laterality Date    left knee replacement       low back surgery          Family History   Problem Relation Age of Onset    Diabetes Mother     Hypertension Mother     Diabetes Sister     Hypertension Sister     Diabetes Maternal Uncle     Diabetes Maternal Grandmother        Social History     Tobacco Use    Smoking status: Never Smoker    Smokeless tobacco: Never Used   Substance Use Topics    Alcohol use: No    Drug use: No        Review of Systems:  Review of Systems   Constitutional: Negative for activity change.   HENT: Negative for congestion.    Eyes: Negative for discharge.   Respiratory: Negative for apnea.    Cardiovascular: Negative for chest pain.   Gastrointestinal: Negative for abdominal distention.   Endocrine: Negative for cold intolerance.   Genitourinary: Negative for difficulty urinating.   Musculoskeletal: Positive for back pain, gait problem and myalgias (bilateral calf pain). Negative for arthralgias.   Neurological: Positive for numbness.  Negative for dizziness, weakness and headaches.   Psychiatric/Behavioral: Negative for agitation.       Vital Signs (Most Recent)  Temp: 98.5 °F (36.9 °C) (08/16/18 1040)  Pulse: 75 (08/16/18 1040)  BP: 113/64 (08/16/18 1040)     65.3 kg (144 lb)       Physical Exam:  Physical Exam:    Constitutional: She appears well-developed.     Eyes: Pupils are equal, round, and reactive to light. EOM are normal. Right eye exhibits no discharge. Left eye exhibits no discharge.     Abdominal: Soft.     Skin: Skin displays no rash on trunk and no rash on extremities.     Psych/Behavior: She is alert. She is oriented to person, place, and time.     Musculoskeletal:        Neck: There is no tenderness.        Back: Range of motion is full.        Right Upper Extremities: Range of motion is full.        Left Upper Extremities: Range of motion is full. Muscle strength is 5/5. Tone is normal.       Right Lower Extremities: Range of motion is full. Muscle strength is 5/5. Tone is normal.        Left Lower Extremities: Range of motion is full. Muscle strength is 5/5. Tone is normal.     Neurological:        Coordination: She has an abnormal Romberg Test and abnormal tandem walking coordination.        Sensory: There is no sensory deficit in the trunk. There is no sensory deficit in the extremities.        DTRs: DTRs are DTRS NORMAL AND SYMMETRICnormal and symmetric. Tricep reflexes are 2+ on the right side and 2+ on the left side. Bicep reflexes are 2+ on the right side and 2+ on the left side. Brachioradialis reflexes are 2+ on the right side and 2+ on the left side. Patellar reflexes are 2+ on the right side and 2+ on the left side. Achilles reflexes are 2+ on the right side and 2+ on the left side. She displays no Babinski's sign on the right side. She displays no Babinski's sign on the left side.        Cranial nerves: Cranial nerve(s) II, III, IV, V, VI, VII, VIII, IX, X, XI and XII are intact.     Atrophy of the abductor pollicis  muscles and thenar eminence on the right hand. Ulnar Claw  Negative Cagle's bilaterally.   Negative Tinel's sign on wrists and elbows bilaterally.   Positive Romberg.   Abnormal tandem gait.   Negative clonus bilaterally.  Negative Babinski's bilaterally.     Laboratory  CBC: Reviewed  CMP: Reviewed    Diagnostic Results:  MRI: Reviewed   MRI Lumbar Spine W WO Contrast, dated 6/25/2018: Reviewed personally, and discussed them with the patient and her family.   1. No significant interval change in the appearance of the distal cord with increased signal and expansion.  This is likely related to demyelinating disease and neoplasm is considered much less likely given the stability and lack of enhancement.  2. Stable postsurgical changes at L4-L5.  3. Multilevel degenerative disc and joint disease resulting in severe central canal stenosis at L3-L4.  This finding needs to the clumping of the filum terminale nerve root which can be seen in arachnoiditis and is unchanged from previous exam.    MRI Cervical Spine Without Contrast, dated 9/10/2016: Reviewed.   1.  Abnormal T2 hyperintense signal and expansion of the distal spinal cord, probably sequela of demyelination.  Neoplasm is less likely.  Several additional punctate foci of T2 hyperintense signal noted in the cervical and thoracic spinal cord, likely myelomalacia.  Recommend reevaluation of cervical, thoracic, and lumbar spine with intravenous contrast.  2.  Clumping and tortuosity of nerve roots versus prominent pial vessels along the cauda equina, similar to prior study.  Arachnoiditis may have this appearance.  3.  Degenerative changes throughout the cervical, thoracic, and lumbar spine, as detailed above.    ASSESSMENT/PLAN:       ICD-10-CM ICD-9-CM   1. Abnormality of gait R26.9 781.2   2. Other intervertebral disc disorders, lumbar region M51.86 722.93   3. Cervical stenosis of spine M48.02 723.0   4. Chronic low back pain without sciatica, unspecified back  pain laterality M54.5 724.2    G89.29 338.29   5. Ulnar neuropathy of right upper extremity G56.21 354.2       PLAN:    1. Adjacent level disease L3-4, with severe lumbar stenosis , questionable neurogenic claudication signs and back pain. I will send her for an L3-4 ARNAUD and see how much her claudication symptoms improve.  I have explained to the patient and her family, that even though there is adjacent level disease and lumbar stenosis, I am not quite sure that all her problems are coming from the lumbar spine.  The injections will help us clarify how much improvement she will get from a possible decompression.    2. Cervical stenosis, with loss of cervical lordosis and severe balance problems  On an old MRI from 2016. I will update an MRI of her cervical spine and we will see her back in clinic in 6 weeks.     2. Right-sided  Ulnar hand, we will send her for EMG and Nerve conduction study of bilateral upper extremities.     3. RTC 6 weeks.     Jojo was seen today for follow-up.    Diagnoses and all orders for this visit:    Abnormality of gait    Other intervertebral disc disorders, lumbar region    Cervical stenosis of spine    Chronic low back pain without sciatica, unspecified back pain laterality    Ulnar neuropathy of right upper extremity        I, Dr. Houston Merritt, personally performed the services described in this documentation. All medical record entries made by the scribe were at my direction and in my presence.  I have reviewed the chart and agree that the record reflects my personal performance and is accurate and complete. Houston Merritt MD.  10:14 AM 08/17/2018

## 2018-08-17 PROBLEM — G56.21 ULNAR NEUROPATHY OF RIGHT UPPER EXTREMITY: Status: ACTIVE | Noted: 2018-08-17

## 2018-08-17 PROBLEM — G89.29 CHRONIC LOW BACK PAIN WITHOUT SCIATICA: Status: ACTIVE | Noted: 2018-08-17

## 2018-08-17 PROBLEM — M51.86 OTHER INTERVERTEBRAL DISC DISORDERS, LUMBAR REGION: Status: ACTIVE | Noted: 2018-08-17

## 2018-08-17 PROBLEM — M48.02 CERVICAL STENOSIS OF SPINE: Status: ACTIVE | Noted: 2018-08-17

## 2018-08-17 PROBLEM — M54.50 CHRONIC LOW BACK PAIN WITHOUT SCIATICA: Status: ACTIVE | Noted: 2018-08-17

## 2018-08-27 DIAGNOSIS — Z13.1 ENCOUNTER FOR SCREENING FOR DIABETES MELLITUS: ICD-10-CM

## 2018-08-29 ENCOUNTER — TELEPHONE (OUTPATIENT)
Dept: NEUROSURGERY | Facility: CLINIC | Age: 83
End: 2018-08-29

## 2018-08-29 DIAGNOSIS — M48.062 SPINAL STENOSIS OF LUMBAR REGION WITH NEUROGENIC CLAUDICATION: Primary | ICD-10-CM

## 2018-09-07 DIAGNOSIS — Z13.1 ENCOUNTER FOR SCREENING FOR DIABETES MELLITUS: ICD-10-CM

## 2018-09-07 RX ORDER — LANCETS
EACH MISCELLANEOUS
Qty: 100 EACH | Refills: 0 | Status: SHIPPED | OUTPATIENT
Start: 2018-09-07

## 2018-10-11 ENCOUNTER — NURSE TRIAGE (OUTPATIENT)
Dept: ADMINISTRATIVE | Facility: CLINIC | Age: 83
End: 2018-10-11

## 2018-10-11 ENCOUNTER — TELEPHONE (OUTPATIENT)
Dept: INTERNAL MEDICINE | Facility: CLINIC | Age: 83
End: 2018-10-11

## 2018-10-11 NOTE — TELEPHONE ENCOUNTER
Reason for Disposition   Blood glucose > 240 mg/dl (13 mmol/l)    Protocols used: ST DIABETES - HIGH BLOOD SUGAR-A-OH    Ms. Ayoub states her blood sugar was 264 this afternoon. She is has no symptoms. Patient is requesting dietary information to assist with lowering blood sugar.

## 2018-10-11 NOTE — TELEPHONE ENCOUNTER
----- Message from Erica Bernardo sent at 10/11/2018  2:11 PM CDT -----  Contact: patient on cell [phone]  Pt would like advice about her blood sugar readings and would like to speak with the nurse about it.   3:53 PM  Patient was scheduled for a follow up to discuss blood sugar numbers

## 2018-10-25 ENCOUNTER — TELEPHONE (OUTPATIENT)
Dept: ADMINISTRATIVE | Facility: HOSPITAL | Age: 83
End: 2018-10-25

## 2018-10-25 NOTE — TELEPHONE ENCOUNTER
The patient was phoned to schedule her Hga1c prior to her visit with her PCP and there was no answer. I was unable to leave a voice message.     Esperanza BAIN LPN  Clinical Care Coordinator  Internal Medicine  [facility]

## 2018-11-08 ENCOUNTER — OFFICE VISIT (OUTPATIENT)
Dept: INTERNAL MEDICINE | Facility: CLINIC | Age: 83
End: 2018-11-08
Attending: FAMILY MEDICINE
Payer: MEDICARE

## 2018-11-08 ENCOUNTER — LAB VISIT (OUTPATIENT)
Dept: LAB | Facility: OTHER | Age: 83
End: 2018-11-08
Payer: MEDICARE

## 2018-11-08 VITALS
HEIGHT: 64 IN | SYSTOLIC BLOOD PRESSURE: 122 MMHG | HEART RATE: 79 BPM | BODY MASS INDEX: 24.2 KG/M2 | DIASTOLIC BLOOD PRESSURE: 78 MMHG | OXYGEN SATURATION: 99 % | WEIGHT: 141.75 LBS

## 2018-11-08 DIAGNOSIS — M48.02 CERVICAL STENOSIS OF SPINE: ICD-10-CM

## 2018-11-08 DIAGNOSIS — G56.21 ULNAR NEUROPATHY OF RIGHT UPPER EXTREMITY: ICD-10-CM

## 2018-11-08 DIAGNOSIS — E11.65 UNCONTROLLED TYPE 2 DIABETES MELLITUS WITH HYPERGLYCEMIA: Primary | ICD-10-CM

## 2018-11-08 DIAGNOSIS — E78.5 HYPERLIPIDEMIA, UNSPECIFIED HYPERLIPIDEMIA TYPE: ICD-10-CM

## 2018-11-08 DIAGNOSIS — M51.86 OTHER INTERVERTEBRAL DISC DISORDERS, LUMBAR REGION: ICD-10-CM

## 2018-11-08 DIAGNOSIS — E11.65 UNCONTROLLED TYPE 2 DIABETES MELLITUS WITH HYPERGLYCEMIA: ICD-10-CM

## 2018-11-08 DIAGNOSIS — I10 HYPERTENSION, UNSPECIFIED TYPE: ICD-10-CM

## 2018-11-08 LAB
ALBUMIN SERPL BCP-MCNC: 3.5 G/DL
ALP SERPL-CCNC: 43 U/L
ALT SERPL W/O P-5'-P-CCNC: 22 U/L
ANION GAP SERPL CALC-SCNC: 9 MMOL/L
AST SERPL-CCNC: 31 U/L
BILIRUB SERPL-MCNC: 0.3 MG/DL
BUN SERPL-MCNC: 26 MG/DL
CALCIUM SERPL-MCNC: 9.3 MG/DL
CHLORIDE SERPL-SCNC: 103 MMOL/L
CO2 SERPL-SCNC: 26 MMOL/L
CREAT SERPL-MCNC: 1.2 MG/DL
EST. GFR  (AFRICAN AMERICAN): 49 ML/MIN/1.73 M^2
EST. GFR  (NON AFRICAN AMERICAN): 42 ML/MIN/1.73 M^2
ESTIMATED AVG GLUCOSE: 120 MG/DL
GLUCOSE SERPL-MCNC: 83 MG/DL
HBA1C MFR BLD HPLC: 5.8 %
POTASSIUM SERPL-SCNC: 4.6 MMOL/L
PROT SERPL-MCNC: 8.2 G/DL
SODIUM SERPL-SCNC: 138 MMOL/L
T4 FREE SERPL-MCNC: 1.03 NG/DL
TSH SERPL DL<=0.005 MIU/L-ACNC: 0.34 UIU/ML

## 2018-11-08 PROCEDURE — 99214 OFFICE O/P EST MOD 30 MIN: CPT | Mod: PBBFAC,25 | Performed by: FAMILY MEDICINE

## 2018-11-08 PROCEDURE — 83036 HEMOGLOBIN GLYCOSYLATED A1C: CPT

## 2018-11-08 PROCEDURE — 99214 OFFICE O/P EST MOD 30 MIN: CPT | Mod: S$PBB,,, | Performed by: FAMILY MEDICINE

## 2018-11-08 PROCEDURE — 99999 PR PBB SHADOW E&M-EST. PATIENT-LVL IV: CPT | Mod: PBBFAC,,, | Performed by: FAMILY MEDICINE

## 2018-11-08 PROCEDURE — 90662 IIV NO PRSV INCREASED AG IM: CPT | Mod: PBBFAC

## 2018-11-08 PROCEDURE — 84439 ASSAY OF FREE THYROXINE: CPT

## 2018-11-08 PROCEDURE — 84443 ASSAY THYROID STIM HORMONE: CPT

## 2018-11-08 PROCEDURE — 80053 COMPREHEN METABOLIC PANEL: CPT

## 2018-11-08 PROCEDURE — 36415 COLL VENOUS BLD VENIPUNCTURE: CPT

## 2018-11-08 RX ORDER — LOSARTAN POTASSIUM 50 MG/1
50 TABLET ORAL DAILY
Qty: 30 TABLET | Refills: 3 | Status: SHIPPED | OUTPATIENT
Start: 2018-11-08 | End: 2019-02-28

## 2018-11-08 NOTE — PROGRESS NOTES
"Subjective:      Patient ID: Jojo Ayoub is a 83 y.o. female.    Chief Complaint: Diabetes    HPI   She reports improvement of the urinary leakage.   She reports her sugars are in the 100s. She continues to have back pain but it is stable. She would like to start PT. She does have swelling in both her legs and no sob with it. She has noticed it after sitting all day.     Review of Systems   Constitutional: Negative for activity change, appetite change, chills, diaphoresis, fatigue, fever and unexpected weight change.   HENT: Negative for congestion, ear discharge, ear pain, hearing loss, postnasal drip, rhinorrhea, sinus pressure and sore throat.    Respiratory: Negative for cough, shortness of breath and wheezing.    Cardiovascular: Negative for chest pain.   Gastrointestinal: Negative for abdominal pain, constipation, diarrhea, nausea and vomiting.   Genitourinary: Negative for dysuria.   Musculoskeletal: Positive for arthralgias and back pain.     I personally reviewed Past Medical History, Past Surgical history,  Past Social History and Family History      Objective:   /78   Pulse 79   Ht 5' 4" (1.626 m)   Wt 64.3 kg (141 lb 12.1 oz)   SpO2 99%   BMI 24.33 kg/m²     Physical Exam   Constitutional: She is oriented to person, place, and time. She appears well-developed and well-nourished. No distress.   HENT:   Head: Normocephalic and atraumatic.   Right Ear: Hearing, tympanic membrane, external ear and ear canal normal.   Left Ear: Hearing, tympanic membrane, external ear and ear canal normal.   Nose: Nose normal.   Mouth/Throat: Uvula is midline and oropharynx is clear and moist. No oropharyngeal exudate.   Eyes: Conjunctivae and EOM are normal. Pupils are equal, round, and reactive to light. Right eye exhibits no discharge. Left eye exhibits no discharge. No scleral icterus.   Neck: Normal range of motion. Neck supple.   Cardiovascular: Normal rate, regular rhythm, normal heart sounds and intact " distal pulses. Exam reveals no gallop.   No murmur heard.  Pulmonary/Chest: Effort normal and breath sounds normal. No respiratory distress. She has no wheezes. She has no rales. She exhibits no tenderness.   Abdominal: Soft. Bowel sounds are normal. She exhibits no distension and no mass. There is no tenderness. There is no rebound and no guarding.   Neurological: She is alert and oriented to person, place, and time.   Vitals reviewed.      1. Uncontrolled type 2 diabetes mellitus with hyperglycemia    2. Ulnar neuropathy of right upper extremity    3. Cervical stenosis of spine    4. Other intervertebral disc disorders, lumbar region    5. Hyperlipidemia, unspecified hyperlipidemia type    6. Hypertension, unspecified type        1. stable, cont metformin  2-4. Follow up with neurosurgery for EMG/further work up, start PT   5. stable, cont lipitor   6. Will check sodium, restart losartan, stop norvasc due to swelling, return in 4 weeks, compression stockings, call with any worsening, cont coreg     Orders Placed This Encounter   Procedures    Influenza - High Dose (65+) (PF) (IM)    Hemoglobin A1c    TSH    T4, free    Comprehensive metabolic panel    Ambulatory Referral to Physical/Occupational Therapy     Medications Ordered This Encounter   Medications    losartan (COZAAR) 50 MG tablet     Sig: Take 1 tablet (50 mg total) by mouth once daily.     Dispense:  30 tablet     Refill:  3

## 2018-11-08 NOTE — PROGRESS NOTES
"Patient was given vaccine information sheet for the Flu Vaccine. The area of injection was palpated using the acromion process as a landmark. This area was cleaned with alcohol. Using a 25g 1" safety needle, 0.5mL of the vaccine was placed into the right muscle. The injection site was dressed with a bandage. Patient experienced no complications and was discharged in stable condition. Fluzone High Dose vaccine Lot: [id number] Exp: 04/14/2019.  Nargis Stein LPN      "

## 2018-11-09 ENCOUNTER — TELEPHONE (OUTPATIENT)
Dept: INTERNAL MEDICINE | Facility: CLINIC | Age: 83
End: 2018-11-09

## 2018-11-09 NOTE — TELEPHONE ENCOUNTER
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

## 2018-11-30 ENCOUNTER — NURSE TRIAGE (OUTPATIENT)
Dept: ADMINISTRATIVE | Facility: CLINIC | Age: 83
End: 2018-11-30

## 2018-11-30 NOTE — TELEPHONE ENCOUNTER
Reason for Disposition   Taking a medicine that could cause dizziness (e.g., blood pressure medications, diuretics)    Protocols used:  DIZZINESS-A-OH  MD changed HBP meds. Lightheaded. 110/65 usually. Today /90 rechecked then 125/72.  HR= 74 BS= 117 this am fasting. Lightheaded since change in meds. no palps, no CP, no numbness or weakness no HA, no SOB afeb , able to walk around on own, no change in vision. Pt transferred to speak with MD office re BP meds and dizzy. Call back with questions.

## 2018-12-05 ENCOUNTER — TELEPHONE (OUTPATIENT)
Dept: INTERNAL MEDICINE | Facility: CLINIC | Age: 83
End: 2018-12-05

## 2018-12-06 NOTE — TELEPHONE ENCOUNTER
----- Message from Jyoti Rubin sent at 12/6/2018  9:12 AM CST -----  Contact: pt  Name of Who is Calling:PATY SOMMER [[id number]]    What is the request in detail: patient returning a to the staff regarding medication advise Patient states that she have been getting dizzy and lightheaded since she's been on the new Medication Please contact to further discuss and advise    Can the clinic reply by [facility]: No    [facility] Back [facility] [phone]    Per Dr. Robles's instructions, the patient was advised.

## 2018-12-12 ENCOUNTER — NURSE TRIAGE (OUTPATIENT)
Dept: ADMINISTRATIVE | Facility: CLINIC | Age: 83
End: 2018-12-12

## 2018-12-12 NOTE — TELEPHONE ENCOUNTER
"    Reason for Disposition   General information question, no triage required and triager able to answer question    Answer Assessment - Initial Assessment Questions  1. REASON FOR CALL or QUESTION: "What is your reason for calling today?" or "How can I best help you?" or "What question do you have that I can help answer?"      Pt checking on status of nurse appt tomorrow and requesting it be changed.    Protocols used: ST INFORMATION ONLY CALL-A-AH      "

## 2018-12-13 ENCOUNTER — CLINICAL SUPPORT (OUTPATIENT)
Dept: INTERNAL MEDICINE | Facility: CLINIC | Age: 83
End: 2018-12-13
Payer: MEDICARE

## 2018-12-13 VITALS — SYSTOLIC BLOOD PRESSURE: 118 MMHG | HEART RATE: 79 BPM | DIASTOLIC BLOOD PRESSURE: 70 MMHG | OXYGEN SATURATION: 95 %

## 2018-12-13 PROCEDURE — 99999 PR PBB SHADOW E&M-EST. PATIENT-LVL II: CPT | Mod: PBBFAC,,,

## 2018-12-13 PROCEDURE — 99212 OFFICE O/P EST SF 10 MIN: CPT | Mod: PBBFAC

## 2018-12-13 NOTE — PROGRESS NOTES
Jojo Ayoub 83 y.o. female is here today for Blood Pressure check.   History of HTN yes.    Review of patient's allergies indicates:   Allergen Reactions    No known drug allergies      Creatinine   Date Value Ref Range Status   11/08/2018 1.2 0.5 - 1.4 mg/dL Final     Sodium   Date Value Ref Range Status   11/08/2018 138 136 - 145 mmol/L Final     Potassium   Date Value Ref Range Status   11/08/2018 4.6 3.5 - 5.1 mmol/L Final   ]  Patient verifies taking blood pressure medications on a regular bases at the same time of the day.     Current Outpatient Medications:     aspirin (HE[employer] ASPIRIN) 81 MG chewable tablet, Take 81 mg by mouth. 1 Tablet Oral Every day, Disp: , Rfl:     atorvastatin (LIPITOR) 10 MG tablet, TAKE 1 TABLET (10 MG TOTAL) BY MOUTH ONCE DAILY., Disp: 90 tablet, Rfl: 3    carvedilol (COREG) 25 MG tablet, TAKE 1 TABLET BY MOUTH TWICE A DAY, Disp: 180 tablet, Rfl: 3    docusate sodium (COLACE) 100 MG capsule, Take 1 capsule (100 mg total) by mouth 2 (two) times daily., Disp: 60 capsule, Rfl: 1    gabapentin (NEURONTIN) 800 MG tablet, TAKE 1 TABLET (800 MG TOTAL) BY MOUTH 3 (THREE) TIMES DAILY., Disp: 270 tablet, Rfl: 3    losartan (COZAAR) 50 MG tablet, Take 1 tablet (50 mg total) by mouth once daily., Disp: 30 tablet, Rfl: 3    metFORMIN (GLUMETZA) 500 MG (MOD) 24 hr tablet, Take 1 tablet (500 mg total) by mouth daily with breakfast., Disp: 90 tablet, Rfl: 3    mirabegron (MYRBETRIQ) 50 mg Tb24, Take 1 tablet (50 mg total) by mouth once daily., Disp: 30 tablet, Rfl: 11    ONETOUCH ULTRA BLUE TEST STRIP Strp, TEST ONCE DAILY, Disp: 100 strip, Rfl: 11    ONETOUCH ULTRASOFT LANCETS lancets, USE TO TEST BLOOD SUGAR DAILY, Disp: 100 each, Rfl: 0  Does patient have record of home blood pressure readings no.   Last dose of blood pressure medication was taken at 730 pm 12/12/18.  Patient is asymptomatic.   Complains of back pain.    BP: 118/70 , Pulse: 79.      Dr. Robles  notified.   Pt comes in for bp check and bp is within normal range

## 2018-12-26 NOTE — TELEPHONE ENCOUNTER
Physical Therapy Daily Treatment    Visit Count: 4  Plan of Care: 12/3/2018 Through: 1/29/2019  Insurance Information:   NUPUR/ANDREW/[id number]  ID#: [id number]     No authorization required  No copay  All visits based on medical necessity per calendar year  Availity # [id number]     Deductible - $ 750 / $ 0 remaining  Co-insurance pays at - 90 % after deductible is met  Max out-of-pocket - $ 4000 / $ 3249.50 remaining              Secondary Insurance? <NONE>     Referred by: [facility]; Next provider visit (if known/scheduled): none scheduled  Medical Diagnosis (from order):  V58.89, E880.9 (ICD-9-CM) - W10.8XXD (ICD-10-CM) - Fall down stairs, subsequent encounter  Treatment Diagnosis: right thoracic symptoms with increased pain/symptoms, impaired strength, impaired range of motion, impaired scapulohumeral rhythm, impaired activity tolerance     Date of onset/injury: acute exacerbation- started last April  Diagnosis Precautions: none  Chart reviewed at time of initial evaluation (relevant co-morbidities, allergies, tests and medications listed): sacroiliac joint dysfunction of right side  Radiographs 5/25/18 IMPRESSION:  Stable mild compression fractures T4, T5 and T6.      SUBJECTIVE   A little tired after last session.  Gets sore with leaning too far onto the Left arm.  Has a cold.  Has been sleeping on the cough due to illness; no pain to upper back.    Current Pain (0-10 scale): 0/10 to Right shoulder blade.     Functional Change: none currently     OBJECTIVE       Treatment   Therapeutic Exercise:   Exercise Repetitions Sets Position/Cues   Arm bike workload 3 6 minutes 1 Forward and retro   Seated levator scap stretch  30 seconds 1 Bilateral    Thoracic extension over towel horizontal in hooklying  10 arm flexion 1 5 second hold   Thoracic extension over towel vertical  15 arm flexion 1    Thoracic extension over foam roller at T6 -  5 second hold   Quadruped thoracic rotation reach under  Please schedule patient follow up with neurosurgery    and over 10 1 Bilateral    sidelying thoracic rotation circles -  Clockwise and counter   Prone scapular retraction -     Prone row + external rotation  -  Scapular retraction   Prone shoulder extension  -  Scapular retraction   Prone T's and W's  -  Scapular retraction   Prone over green swiss ball Y's, W's, T's and I's in plank 10 each 1 Reported fatigue   Lower trap setting Y at wall -      Child's pose -      Modified child's pose -      Rows vs orange tubing 15 1 Scapular retraction   Shoulder extension vs orange tubing 15 1 Scapular retraction   W's vs orange tubing 15 1 Scapular retraction   Wall push ups 10 1    Pull ups vs orange tubing 15 1    TRX band: pull ups -     Wall walks vs yellow theraband       Wall clocks vs yellow theraband  3 3 Bilateral                Comments: all exercises completed on the right unless otherwise noted. - denotes not completed this session.       Skilled input: guarding, instruction in form, progression of strength      Home Program:   * above=instructed home program    Exercise: Date issued Date DC Comments   Thoracic extension over towel hooklying, thoracic extension over foam roller, quadruped thoracic rotation, sidelying thoracic rotation circles, prone row + external rotation, prone I's on swiss ball, lower trap setting Y at wall eval                                              Patient instructed in home exercise program listed above. Patient was educated on and demonstrated repetitions of all of the exercises and was provided handout to assist with completion at home.  Encouraged patient to perform all exercises within a pain-free range of motion.         Writer verbally educated the patient and received verbal consent from the patient on hand placement, positioning of patient, and techniques to be performed today including stretching and strengthening and how they are pertinent to the patient's plan of care.      Suggestions for next session as indicated: progress  per plan of care, stretching, thoracic mobilizations, progress scapular strengthening, add neuro-val    ASSESSMENT   Short session due to patient getting over a cold and fatigued.  Continue stretching with progression of scapular strengthening.  Reported fatigue on the Left with resisted wall clocks.  Increased reps with resisted exercises.  Denies pain at the end of session.      Pain after treatment (patient reported, 0-10 scale): 0/10  Result of above outlined education: Verbalizes understanding and Needs reinforcement    THERAPY DAILY BILLING   Insurance: [employer] 2. N/A    Evaluation Procedures:  No evaluation codes were used on this date of service    Timed Procedures:  Therapeutic Exercise, 40 minutes    Untimed Procedures:  No untimed codes were used on this date of service    Total Treatment Time: 40 minutes

## 2019-01-02 ENCOUNTER — OFFICE VISIT (OUTPATIENT)
Dept: OPTOMETRY | Facility: CLINIC | Age: 84
End: 2019-01-02
Attending: FAMILY MEDICINE
Payer: MEDICARE

## 2019-01-02 DIAGNOSIS — H25.13 NUCLEAR SCLEROSIS OF BOTH EYES: ICD-10-CM

## 2019-01-02 DIAGNOSIS — H52.203 MYOPIA WITH ASTIGMATISM AND PRESBYOPIA, BILATERAL: ICD-10-CM

## 2019-01-02 DIAGNOSIS — H52.4 MYOPIA WITH ASTIGMATISM AND PRESBYOPIA, BILATERAL: ICD-10-CM

## 2019-01-02 DIAGNOSIS — H52.13 MYOPIA WITH ASTIGMATISM AND PRESBYOPIA, BILATERAL: ICD-10-CM

## 2019-01-02 DIAGNOSIS — E11.9 TYPE 2 DIABETES MELLITUS WITHOUT OPHTHALMIC MANIFESTATIONS: Primary | ICD-10-CM

## 2019-01-02 PROCEDURE — 99999 PR PBB SHADOW E&M-EST. PATIENT-LVL II: CPT | Mod: PBBFAC,,, | Performed by: OPTOMETRIST

## 2019-01-02 PROCEDURE — 99999 PR PBB SHADOW E&M-EST. PATIENT-LVL II: ICD-10-PCS | Mod: PBBFAC,,, | Performed by: OPTOMETRIST

## 2019-01-02 PROCEDURE — 92004 COMPRE OPH EXAM NEW PT 1/>: CPT | Mod: S$PBB,,, | Performed by: OPTOMETRIST

## 2019-01-02 PROCEDURE — 92015 DETERMINE REFRACTIVE STATE: CPT | Mod: ,,, | Performed by: OPTOMETRIST

## 2019-01-02 PROCEDURE — 99212 OFFICE O/P EST SF 10 MIN: CPT | Mod: PBBFAC | Performed by: OPTOMETRIST

## 2019-01-02 PROCEDURE — 92015 PR REFRACTION: ICD-10-PCS | Mod: ,,, | Performed by: OPTOMETRIST

## 2019-01-02 PROCEDURE — 92004 PR EYE EXAM, NEW PATIENT,COMPREHESV: ICD-10-PCS | Mod: S$PBB,,, | Performed by: OPTOMETRIST

## 2019-01-02 NOTE — LETTER
January 2, 2019      Darya Robles MD  [address]  [facility] [address]           [facility]  [address]  [facility] [address]  Phone: [phone]  Fax: [phone]          Patient: Jojo Ayoub   MR Number: [id number]   YOB: 1935   Date of Visit: 1/2/2019       Dear Dr. Darya Robles:    Thank you for referring Jojo Ayoub to me for evaluation. Attached you will find relevant portions of my assessment and plan of care.    If you have questions, please do not hesitate to call me. I look forward to following Jojo Ayoub along with you.    Sincerely,    Ashely Velasco, OD    Enclosure  CC:  No Recipients    If you would like to receive this communication electronically, please contact [email or web address] or [phone] to request more information on [employer] access.    For providers and/or their staff who would like to refer a patient to [facility], please contact us through our one-stop-shop provider referral line, [facility], at [phone].    If you feel you have received this communication in error or would no longer like to receive these types of communications, please e-mail [email or web address]

## 2019-01-02 NOTE — PROGRESS NOTES
"HPI     Last eye exam: 4 years ago  83yr old female present for Diabetic eye exam. Patient complain of not   being able to see well at distance and near when wearing current glasses   OU x 1 year. On occasion patient feel "pressure and sharp pain" on both   eyes. Patient seeing floaters on occasion when out in the sun, no flashes.   Denies headaches. Patient says last BSL 98 and Hemoglobin A1C       Date                     Value               Ref Range             Status                11/08/2018               5.8 (H)             4.0 - 5.6 %           Final                  Last edited by Andrew Montelongo MA on 1/2/2019  8:11 AM. (History)            Assessment /Plan     For exam results, see Encounter Report.    Type 2 diabetes mellitus without ophthalmic manifestations    Nuclear sclerosis of both eyes    Myopia with astigmatism and presbyopia, bilateral            1.  No retinopathy--monitor yearly.  BS control.  2.  Educated on cataracts and affects on vision.  Patient ok with vision for now.  Monitor.  3.  Bifocal rx given                         "

## 2019-01-10 ENCOUNTER — TELEPHONE (OUTPATIENT)
Dept: NEUROSURGERY | Facility: CLINIC | Age: 84
End: 2019-01-10

## 2019-01-10 DIAGNOSIS — M51.86 OTHER INTERVERTEBRAL DISC DISORDERS, LUMBAR REGION: Primary | ICD-10-CM

## 2019-01-30 ENCOUNTER — NURSE TRIAGE (OUTPATIENT)
Dept: ADMINISTRATIVE | Facility: CLINIC | Age: 84
End: 2019-01-30

## 2019-01-31 ENCOUNTER — HOSPITAL ENCOUNTER (OUTPATIENT)
Facility: OTHER | Age: 84
Discharge: HOME | End: 2019-01-31
Attending: ANESTHESIOLOGY | Admitting: ANESTHESIOLOGY
Payer: MEDICARE

## 2019-01-31 ENCOUNTER — TELEPHONE (OUTPATIENT)
Dept: PAIN MEDICINE | Facility: CLINIC | Age: 84
End: 2019-01-31

## 2019-01-31 VITALS
OXYGEN SATURATION: 98 % | WEIGHT: 142 LBS | RESPIRATION RATE: 18 BRPM | SYSTOLIC BLOOD PRESSURE: 162 MMHG | HEART RATE: 68 BPM | HEIGHT: 69 IN | DIASTOLIC BLOOD PRESSURE: 84 MMHG | BODY MASS INDEX: 21.03 KG/M2 | TEMPERATURE: 98 F

## 2019-01-31 DIAGNOSIS — M51.86 OTHER INTERVERTEBRAL DISC DISORDERS, LUMBAR REGION: Primary | ICD-10-CM

## 2019-01-31 LAB — POCT GLUCOSE: 97 MG/DL (ref 70–110)

## 2019-01-31 PROCEDURE — 62323 NJX INTERLAMINAR LMBR/SAC: CPT | Mod: ,,, | Performed by: ANESTHESIOLOGY

## 2019-01-31 PROCEDURE — 62323 PR INJ LUMBAR/SACRAL, W/IMAGING GUIDANCE: ICD-10-PCS | Mod: ,,, | Performed by: ANESTHESIOLOGY

## 2019-01-31 PROCEDURE — 62323 NJX INTERLAMINAR LMBR/SAC: CPT | Performed by: ANESTHESIOLOGY

## 2019-01-31 PROCEDURE — 63600175 PHARM REV CODE 636 W HCPCS: Performed by: ANESTHESIOLOGY

## 2019-01-31 PROCEDURE — 25000003 PHARM REV CODE 250: Performed by: ANESTHESIOLOGY

## 2019-01-31 PROCEDURE — 25500020 PHARM REV CODE 255: Performed by: ANESTHESIOLOGY

## 2019-01-31 RX ORDER — LIDOCAINE HYDROCHLORIDE 10 MG/ML
INJECTION INFILTRATION; PERINEURAL
Status: DISCONTINUED | OUTPATIENT
Start: 2019-01-31 | End: 2019-01-31 | Stop reason: HOSPADM

## 2019-01-31 RX ORDER — SODIUM CHLORIDE 9 MG/ML
500 INJECTION, SOLUTION INTRAVENOUS CONTINUOUS
Status: DISCONTINUED | OUTPATIENT
Start: 2019-01-31 | End: 2019-01-31 | Stop reason: HOSPADM

## 2019-01-31 RX ORDER — ALPRAZOLAM 0.5 MG/1
0.5 TABLET, ORALLY DISINTEGRATING ORAL ONCE
Status: COMPLETED | OUTPATIENT
Start: 2019-01-31 | End: 2019-01-31

## 2019-01-31 RX ORDER — DEXAMETHASONE SODIUM PHOSPHATE 100 MG/10ML
INJECTION INTRAMUSCULAR; INTRAVENOUS
Status: DISCONTINUED | OUTPATIENT
Start: 2019-01-31 | End: 2019-01-31 | Stop reason: HOSPADM

## 2019-01-31 RX ORDER — LIDOCAINE HYDROCHLORIDE 10 MG/ML
INJECTION, SOLUTION EPIDURAL; INFILTRATION; INTRACAUDAL; PERINEURAL
Status: DISCONTINUED | OUTPATIENT
Start: 2019-01-31 | End: 2019-01-31 | Stop reason: HOSPADM

## 2019-01-31 RX ADMIN — ALPRAZOLAM 0.5 MG: 0.5 TABLET, ORALLY DISINTEGRATING ORAL at 08:01

## 2019-01-31 NOTE — DISCHARGE INSTRUCTIONS

## 2019-01-31 NOTE — DISCHARGE SUMMARY
Discharge Diagnosis:Other intervertebral disc disorders, lumbar region [M51.86]  Condition on Discharge: Stable.  Diet on Discharge: Same as before.  Activity: as per instruction sheet.  Discharge to: Home with a responsible adult.  Follow up: 2-4 weeks &/or as per Discharge instructions

## 2019-01-31 NOTE — TELEPHONE ENCOUNTER
Reason for Disposition   Caller has medication question only, adult not sick, and triager answers question    Answer Assessment - Initial Assessment Questions  Pt having procedure tomorrow -has questions about taking her am medication. Information she has advised to take her am meds with small sip of water. Not to eat for 6 hrs prior to procedure. Wanted to know if ok to take her metformin.    Protocols used: ST MEDICATION QUESTION CALL-A-AH

## 2019-01-31 NOTE — OP NOTE
Lumbar Interlaminar Epidural Steroid Injection under Fluoroscopic Guidance.  Time-out taken to identify patient and procedure side prior to starting the procedure.   I attest that I have reviewed the patient's home medications prior to the procedure and no contraindication have been identified. I  re-evaluated the patient after the patient was positioned for the procedure in the procedure room immediately before the procedural time-out. The vital signs are current and represent the current state of the patient which has not significantly changed since the preprocedure assessment.                                                               Date of Service: 01/31/2019    PCP: Darya Robles MD    Referring Physician:    Procedure:  L3-4 Interlaminar epidural steroid injection under fluoroscopic guidance.    Reasons for procedure: Other intervertebral disc disorders, lumbar region [M51.86]   1. Other intervertebral disc disorders, lumbar region      POSTOP DIAGNOSIS:  Other intervertebral disc disorders, lumbar region [M51.86]     1. Other intervertebral disc disorders, lumbar region      Physician: Marlin Barber MD  ASSISTANTS: Bruna Talbot MD    resident    Medications injected: Preservative-free dexamethasone 10mg with 4mL of sterile Xylocaine-MPF 1% and 1ml of sterile preservative-free normal saline.    Local anesthetic injected:    Xylocaine 1% 9ml with Sodium Bicarbonate 1ml.   Sedation Medications: None    Estimated blood loss:  none.    Complications:  none.    Technique:  With the patient laying in a prone position, the area was prepped and draped in the usual sterile fashion using ChloraPrep and a fenestrated drape.  Local anesthetic was given using a 27-gauge needle by raising a wheal and going down to the hub of the needle.  A 3.5 inch 20-gauge Touhy needle was introduced under fluoroscopic guidance.  It met the lamina of the posterior element. The needle was then hinged above the lamina.  Loss of  resistance technique was employed while advancing the needle.  Once in the desired position, contrast dye Omnipaque was injected to confirm placement and there was no vascular runoff.  Digital subtraction was employed to confirm that there was no vascular runoff.  The medication was then injected slowly.  The patient tolerated the procedure well.      Pain before the procedure: 7/10    Pain after the procedure: 6/10    The patient was monitored after the procedure.   They were given post-procedure and discharge instructions to follow at home.  The patient was discharged in a stable condition.

## 2019-01-31 NOTE — TELEPHONE ENCOUNTER
Staff returned the patient's call regarding her concerns about weakness in her legs after TF ARNAUD today with Dr. Barber. Staff called phone number [phone] but it was out of service. Staff attempted to contact patient on her home/mobile number.    Patient did not answer therefore staff left a detailed voice message instructing the patient to give our office a call back at [phone].

## 2019-01-31 NOTE — TELEPHONE ENCOUNTER
----- Message from Giovanny Villa sent at 1/31/2019 10:17 AM CST -----  Contact: PATY SOMMER [[id number]]  Name of Who is Calling: PATY SOMMER [[id number]]      What is the request in detail: Patient would like to speak with staff in regards to not having any strength in her legs after injection. States she literally had to be put into the car and could barely stand. Please advise      Can the clinic reply by [facility]: no      What [facility] Back [facility]: [phone]

## 2019-02-01 ENCOUNTER — TELEPHONE (OUTPATIENT)
Dept: PAIN MEDICINE | Facility: CLINIC | Age: 84
End: 2019-02-01

## 2019-02-01 NOTE — TELEPHONE ENCOUNTER
----- Message from Tiffany De La Cruz sent at 2/1/2019  9:15 AM CST -----  Contact: pt  Name of Who is Calling: PATY SOMMER [[id number]]    What is the request in detail:Patient is requesting a call back in regards to her injection on yesterday. Patient states she feels a little better.....Please contact to further discuss and advise      Can the clinic reply by [facility]:     [facility]: [phone]

## 2019-02-01 NOTE — TELEPHONE ENCOUNTER
"Staff contacted the patient to further discuss her concerns.     Patient states, "I feel a little better than I did at first.Patient report a pain score of 0/10 at this time. I feel a little pressure. There is numbness in my legs but that that's been that way before the procedure because I am diabetic."     Staff verbalized understanding and expressed thanks.   "

## 2019-02-18 ENCOUNTER — OFFICE VISIT (OUTPATIENT)
Dept: PODIATRY | Facility: CLINIC | Age: 84
End: 2019-02-18
Payer: MEDICARE

## 2019-02-18 VITALS
SYSTOLIC BLOOD PRESSURE: 167 MMHG | DIASTOLIC BLOOD PRESSURE: 73 MMHG | BODY MASS INDEX: 21.03 KG/M2 | HEIGHT: 69 IN | HEART RATE: 87 BPM | WEIGHT: 142 LBS

## 2019-02-18 DIAGNOSIS — B35.1 DERMATOPHYTOSIS OF NAIL: ICD-10-CM

## 2019-02-18 DIAGNOSIS — E11.65 UNCONTROLLED TYPE 2 DIABETES MELLITUS WITH HYPERGLYCEMIA: Primary | ICD-10-CM

## 2019-02-18 DIAGNOSIS — L84 PRE-ULCERATIVE CORN OR CALLOUS: ICD-10-CM

## 2019-02-18 PROCEDURE — 11721 ROUTINE FOOT CARE: ICD-10-PCS | Mod: 59,Q9,S$PBB, | Performed by: PODIATRIST

## 2019-02-18 PROCEDURE — 99213 OFFICE O/P EST LOW 20 MIN: CPT | Mod: PBBFAC,PN,25 | Performed by: PODIATRIST

## 2019-02-18 PROCEDURE — 99204 PR OFFICE/OUTPT VISIT, NEW, LEVL IV, 45-59 MIN: ICD-10-PCS | Mod: 25,S$PBB,, | Performed by: PODIATRIST

## 2019-02-18 PROCEDURE — 11721 DEBRIDE NAIL 6 OR MORE: CPT | Mod: 59,Q9,S$PBB, | Performed by: PODIATRIST

## 2019-02-18 PROCEDURE — 99999 PR PBB SHADOW E&M-EST. PATIENT-LVL III: CPT | Mod: PBBFAC,,, | Performed by: PODIATRIST

## 2019-02-18 PROCEDURE — 99999 PR PBB SHADOW E&M-EST. PATIENT-LVL III: ICD-10-PCS | Mod: PBBFAC,,, | Performed by: PODIATRIST

## 2019-02-18 PROCEDURE — 99204 OFFICE O/P NEW MOD 45 MIN: CPT | Mod: 25,S$PBB,, | Performed by: PODIATRIST

## 2019-02-18 PROCEDURE — 11055 PARING/CUTG B9 HYPRKER LES 1: CPT | Performed by: PODIATRIST

## 2019-02-18 PROCEDURE — 11055 ROUTINE FOOT CARE: ICD-10-PCS | Mod: Q9,S$PBB,, | Performed by: PODIATRIST

## 2019-02-18 NOTE — PATIENT INSTRUCTIONS
Your a1c:      Hemoglobin A1C   Date Value Ref Range Status   11/08/2018 5.8 (H) 4.0 - 5.6 % Final     Comment:     ADA Screening Guidelines:  5.7-6.4%  Consistent with prediabetes  >or=6.5%  Consistent with diabetes  High levels of fetal hemoglobin interfere with the HbA1C  assay. Heterozygous hemoglobin variants (HbS, HgC, etc)do  not significantly interfere with this assay.   However, presence of multiple variants may affect accuracy.     03/26/2018 5.6 4.0 - 5.6 % Final     Comment:     According to ADA guidelines, hemoglobin A1c <7.0% represents  optimal control in non-pregnant diabetic patients. Different  metrics may apply to specific patient populations.   Standards of Medical Care in Diabetes-2016.  For the purpose of screening for the presence of diabetes:  <5.7%     Consistent with the absence of diabetes  5.7-6.4%  Consistent with increasing risk for diabetes   (prediabetes)  >or=6.5%  Consistent with diabetes  Currently, no consensus exists for use of hemoglobin A1c  for diagnosis of diabetes for children.  This Hemoglobin A1c assay has significant interference with fetal   hemoglobin   (HbF). The results are invalid for patients with abnormal amounts of   HbF,   including those with known Hereditary Persistence   of Fetal Hemoglobin. Heterozygous hemoglobin variants (HbAS, HbAC,   HbAD, HbAE, HbA2) do not significantly interfere with this assay;   however, presence of multiple variants in a sample may impact the %   interference.     03/03/2017 6.5 (H) 4.5 - 6.2 % Final     Comment:     According to ADA guidelines, hemoglobin A1C <7.0% represents  optimal control in non-pregnant diabetic patients.  Different  metrics may apply to specific populations.   Standards of Medical Care in Diabetes - 2016.  For the purpose of screening for the presence of diabetes:  <5.7%     Consistent with the absence of diabetes  5.7-6.4%  Consistent with increasing risk for diabetes   (prediabetes)  >or=6.5%  Consistent  with diabetes  Currently no consensus exists for use of hemoglobin A1C  for diagnosis of diabetes for children.       How to Check Your Feet    Below are tips to help you look for foot problems. Try to check your feet at the same time each day, such as when you get out of bed in the morning.    · Check the top of each foot. The tops of toes, back of the heel, and outer edge of the foot can get a lot of rubbing from poor-fitting shoes.    · Check the bottom of each foot. Daily wear and tear often leads to problems at pressure spots.    · Check the toes and nails. Fungal infections often occur between toes. Toenail problems can also be a sign of fungal infections or lead to breaks in the skin.    · Check your shoes, too. Loose objects inside a shoe can injure the foot. Use your hand to feel inside your shoes for things like sachin, loose stitching, or rough areas that could irritate your skin.        Diabetic Foot Care    Diabetes can lead to a number of different foot complications. Fortunately, most of these complications can be prevented with a little extra foot care. If diabetes is not well controlled, the high blood sugar can cause damage to blood vessels and result in poor circulation to the foot. When the skin does not get enough blood flow, it becomes prone to pressure sores and ulcers, which heal slowly.  High blood sugar can also damage nerves, interfering with the ability to feel pain and pressure. When you cant feel your foot normally, it is easy to injure your skin, bones and joints without knowing it. For these reasons diabetes increases the risk of fungal infections, bunions and ulcers. Deep ulcers can lead to bone infection. Gangrene is the most serious foot complication of diabetes. It usually occurs on the tips of the toes as blacked areas of skin. The black area is dead tissue. In severe cases, gangrene spreads to involve the entire toe, other toes and the entire foot. Foot or toe amputation may  be required. Good foot care and blood sugar control can prevent this.    Home Care  1. Wear comfortable, proper fitting shoes.  2. Wash your feet daily with warm water and mild soap.  3. After drying, apply a moisturizing cream or lotion.  4. Check your feet daily for skin breaks, blisters, swelling, or redness. Look between your toes also.  5. Wear cotton socks and change them every day.  6. Trim toe nails carefully and do not cut your cuticles.  7. Strive to keep your blood sugar under control with a combination of medicines, diet and activity.  8. If you smoke and have diabetes, it is very important that you stop. Smoking reduces blood flow to your foot.  9. Avoid activities that increase your risk of foot injury:  · Do not walk barefoot.  · Do not use heating pads or hot water bottles on your feet.  · Do not put your foot in a hot tub without first checking the temperature with your hand.  10) Schedule yearly foot exams.    Follow Up  with your doctor or as advised by our staff. Report any cut, puncture, scrape, other injury, blister, ingrown toenail or ulcer on your foot.    Get Prompt Medical Attention  if any of the following occur:  -- Open ulcer with pus draining from the wound  -- Increasing foot or leg pain  -- New areas of redness or swelling or tender areas of the foot    © 4923-9428 [employer] [address] [place] [address]. All rights reserved. This information is not intended as a substitute for professional medical care. Always follow your healthcare professional's instructions.

## 2019-02-18 NOTE — PROGRESS NOTES
Chief Complaint   Patient presents with    Diabetes Mellitus     A1C 11/08/18 5.8 PCP last seen on 11/08/18    Nail Care     Bilateral              HPI:   The patient is a 83 y.o.  female  who presents for a diabetic foot exam.     Patient reports occasional presence of abnormal sensation to the feet .    History of diabetic foot ulcers: none   History of foot surgery: none.     Shoes worn today:  Slip on shoes, slightly narrowed toe box.         Primary care doctor is: Darya Robles MD  Chief Complaint   Patient presents with    Diabetes Mellitus     A1C 11/08/18 5.8 PCP last seen on 11/08/18    Nail Care     Bilateral          Past Medical History:   Diagnosis Date    Arthritis     Diabetes mellitus with neurological manifestation     High blood pressure     Hyperlipidemia     Urinary urgency            Current Outpatient Medications on File Prior to Visit   Medication Sig Dispense Refill    aspirin (HECTOR CHILDRENS ASPIRIN) 81 MG chewable tablet Take 81 mg by mouth. 1 Tablet Oral Every day      atorvastatin (LIPITOR) 10 MG tablet TAKE 1 TABLET (10 MG TOTAL) BY MOUTH ONCE DAILY. 90 tablet 3    carvedilol (COREG) 25 MG tablet TAKE 1 TABLET BY MOUTH TWICE A  tablet 3    docusate sodium (COLACE) 100 MG capsule Take 1 capsule (100 mg total) by mouth 2 (two) times daily. 60 capsule 1    gabapentin (NEURONTIN) 800 MG tablet TAKE 1 TABLET (800 MG TOTAL) BY MOUTH 3 (THREE) TIMES DAILY. 270 tablet 3    losartan (COZAAR) 50 MG tablet Take 1 tablet (50 mg total) by mouth once daily. 30 tablet 3    metFORMIN (GLUMETZA) 500 MG (MOD) 24 hr tablet Take 1 tablet (500 mg total) by mouth daily with breakfast. 90 tablet 3    mirabegron (MYRBETRIQ) 50 mg Tb24 Take 1 tablet (50 mg total) by mouth once daily. 30 tablet 11    ONETOUCH ULTRA BLUE TEST STRIP Strp TEST ONCE DAILY 100 strip 11    ONETOUCH ULTRASOFT LANCETS lancets USE TO TEST BLOOD SUGAR DAILY 100 each 0     No current facility-administered  medications on file prior to visit.            Review of patient's allergies indicates:   Allergen Reactions    No known drug allergies            Social History     Socioeconomic History    Marital status:      Spouse name: Not on file    Number of children: Not on file    Years of education: Not on file    Highest education level: Not on file   Social Needs    Financial resource strain: Not on file    Food insecurity - worry: Not on file    Food insecurity - inability: Not on file    Transportation needs - medical: Not on file    Transportation needs - non-medical: Not on file   Occupational History    Not on file   Tobacco Use    Smoking status: Never Smoker    Smokeless tobacco: Never Used   Substance and Sexual Activity    Alcohol use: No    Drug use: No    Sexual activity: Yes     Partners: Male   Other Topics Concern    Not on file   Social History Narrative    Not on file           ROS:  General ROS: negative  Respiratory ROS: no cough, shortness of breath, or wheezing  Cardiovascular ROS: no chest pain or dyspnea on exertion  Musculoskeletal ROS: negative  Neurological ROS:   positive for - numbness/tingling  Dermatological ROS: negative      LAST HbA1c:   Hemoglobin A1C   Date Value Ref Range Status   11/08/2018 5.8 (H) 4.0 - 5.6 % Final     Comment:     ADA Screening Guidelines:  5.7-6.4%  Consistent with prediabetes  >or=6.5%  Consistent with diabetes  High levels of fetal hemoglobin interfere with the HbA1C  assay. Heterozygous hemoglobin variants (HbS, HgC, etc)do  not significantly interfere with this assay.   However, presence of multiple variants may affect accuracy.     03/26/2018 5.6 4.0 - 5.6 % Final     Comment:     According to ADA guidelines, hemoglobin A1c <7.0% represents  optimal control in non-pregnant diabetic patients. Different  metrics may apply to specific patient populations.   Standards of Medical Care in Diabetes-2016.  For the purpose of screening for the  "presence of diabetes:  <5.7%     Consistent with the absence of diabetes  5.7-6.4%  Consistent with increasing risk for diabetes   (prediabetes)  >or=6.5%  Consistent with diabetes  Currently, no consensus exists for use of hemoglobin A1c  for diagnosis of diabetes for children.  This Hemoglobin A1c assay has significant interference with fetal   hemoglobin   (HbF). The results are invalid for patients with abnormal amounts of   HbF,   including those with known Hereditary Persistence   of Fetal Hemoglobin. Heterozygous hemoglobin variants (HbAS, HbAC,   HbAD, HbAE, HbA2) do not significantly interfere with this assay;   however, presence of multiple variants in a sample may impact the %   interference.     03/03/2017 6.5 (H) 4.5 - 6.2 % Final     Comment:     According to ADA guidelines, hemoglobin A1C <7.0% represents  optimal control in non-pregnant diabetic patients.  Different  metrics may apply to specific populations.   Standards of Medical Care in Diabetes - 2016.  For the purpose of screening for the presence of diabetes:  <5.7%     Consistent with the absence of diabetes  5.7-6.4%  Consistent with increasing risk for diabetes   (prediabetes)  >or=6.5%  Consistent with diabetes  Currently no consensus exists for use of hemoglobin A1C  for diagnosis of diabetes for children.             EXAM:   Vitals:    02/18/19 1010   BP: (!) 167/73   Pulse: 87   Weight: 64.4 kg (142 lb)   Height: 5' 9" (1.753 m)       General: alert, no distress, cooperative    Vascular:   Dorsalis Pedis:  present   Posterior Tibial:  present  Capillary refill time:  3 seconds  Temperature of toes cool to touch  Edema:  1+ and pitting       Neurological:     Sharp touch:  normal  Light touch: normal  Tinels Sign:  Absent  Mulders Click:   Absent  +LOPS      Dermatological:   Skin: intact tone and turgor  Wounds/Ulcers:  Absent  Bruising:  Absent  Erythema:  Absent  Toenails:  Elongated by 1-4mm, thickened by 1-3mm and greenish in color,  " with subungual debris.   Absent paronychia  Pre-ulcer callus left lateral ankle.  No acute infection noted.       Musculoskeletal:   Metatarsophalangeal range of motion:   diminished range of motion  Subtalar joint range of motion: full range of motion  Ankle joint range of motion:  full range of motion  Bunions:  Present  Hammertoes: Present               ASSESSMENT/PLAN:          Problem List Items Addressed This Visit     DM (diabetes mellitus), type 2, uncontrolled - Primary    Relevant Orders    DIABETIC SHOES FOR HOME USE    Routine Foot Care      Other Visit Diagnoses     Dermatophytosis of nail        Relevant Orders    Routine Foot Care    Pre-ulcerative corn or callous        Relevant Orders    Routine Foot Care            I counseled the patient on the patient's conditions, their implications and medical management.       Shoe inspection. Diabetic Foot Education. Patient reminded of the importance of good nutrition and blood sugar control to help prevent podiatric complications of diabetes. Patient instructed on proper foot hygiene. We discussed wearing proper shoe gear, daily foot inspections, never walking without protective shoe gear, never putting sharp instruments to feet.        Routine Foot Care  Date/Time: 2/18/2019 10:37 AM  Performed by: Silvia Lares DPM  Authorized by: Silvia Lares DPM     Consent Done?:  Yes (Verbal)  Hyperkeratotic Skin Lesions?: Yes    Number of trimmed lesions:  1  Location(s):  Left Ankle    Nail Care Type:  Debride  Location(s): All  (Left 1st Toe, Left 3rd Toe, Left 2nd Toe, Left 4th Toe, Left 5th Toe, Right 1st Toe, Right 2nd Toe, Right 3rd Toe, Right 4th Toe and Right 5th Toe)  Patient tolerance:  Patient tolerated the procedure well with no immediate complications     With patient's permission, the toenails mentioned above were aggressively reduced and debrided using a nail nipper, removing all offending nail and debris. Utilizing a #15 scalpel, I trimmed the corns  and calluses at the above mentioned location.      The patient will continue to monitor the areas daily, inspect the feet, wear protective shoe gear when ambulatory, and moisturizer to maintain skin integrity.                Silvia Lares DPM  NPI: [id number]       [facility] - PODIATRY  [address] [place] [id number]  Dept: [phone]  Dept Fax: [phone]

## 2019-02-25 ENCOUNTER — TELEPHONE (OUTPATIENT)
Dept: INTERNAL MEDICINE | Facility: CLINIC | Age: 84
End: 2019-02-25

## 2019-02-25 ENCOUNTER — NURSE TRIAGE (OUTPATIENT)
Dept: ADMINISTRATIVE | Facility: CLINIC | Age: 84
End: 2019-02-25

## 2019-02-25 NOTE — TELEPHONE ENCOUNTER
Reason for Disposition   Caller has medication question only, adult not sick, and triager answers question    Answer Assessment - Initial Assessment Questions  Pt spoke with nurse in 's office today regarding elevated b/p's last pm. Was advised on increasing losartan - she looked up the side effects of the med and saw where dizziness was a side effect and has experienced dizziness in the past. Wants to know if this is normal.    Protocols used: ST MEDICATION QUESTION CALL-A-AH

## 2019-02-25 NOTE — TELEPHONE ENCOUNTER
Talk with pt and she explains that her bp is going up. Last night is was 160/88 today is 148/88 and she is concerned. Please advise

## 2019-02-25 NOTE — TELEPHONE ENCOUNTER
Spoke with pt and instructed her to take 2 Losartan of 50mg a pil. Scheduled her an appt 2/28/19 at 1140 am. Pt verbalized understanding

## 2019-02-26 RX ORDER — ATORVASTATIN CALCIUM 10 MG/1
10 TABLET, FILM COATED ORAL DAILY
Qty: 90 TABLET | Refills: 3 | Status: SHIPPED | OUTPATIENT
Start: 2019-02-26 | End: 2020-03-03

## 2019-02-28 ENCOUNTER — OFFICE VISIT (OUTPATIENT)
Dept: INTERNAL MEDICINE | Facility: CLINIC | Age: 84
End: 2019-02-28
Attending: FAMILY MEDICINE
Payer: MEDICARE

## 2019-02-28 VITALS
BODY MASS INDEX: 21.72 KG/M2 | SYSTOLIC BLOOD PRESSURE: 120 MMHG | HEART RATE: 83 BPM | WEIGHT: 143.31 LBS | DIASTOLIC BLOOD PRESSURE: 82 MMHG | OXYGEN SATURATION: 99 % | HEIGHT: 68 IN

## 2019-02-28 DIAGNOSIS — M25.561 CHRONIC PAIN OF BOTH KNEES: ICD-10-CM

## 2019-02-28 DIAGNOSIS — G89.29 CHRONIC PAIN OF BOTH KNEES: ICD-10-CM

## 2019-02-28 DIAGNOSIS — E05.90 SUBCLINICAL HYPERTHYROIDISM: ICD-10-CM

## 2019-02-28 DIAGNOSIS — I10 ESSENTIAL HYPERTENSION: ICD-10-CM

## 2019-02-28 DIAGNOSIS — M51.36 DDD (DEGENERATIVE DISC DISEASE), LUMBAR: Primary | ICD-10-CM

## 2019-02-28 DIAGNOSIS — M25.562 CHRONIC PAIN OF BOTH KNEES: ICD-10-CM

## 2019-02-28 PROCEDURE — 99214 OFFICE O/P EST MOD 30 MIN: CPT | Mod: PBBFAC | Performed by: FAMILY MEDICINE

## 2019-02-28 PROCEDURE — 99214 OFFICE O/P EST MOD 30 MIN: CPT | Mod: S$PBB,,, | Performed by: FAMILY MEDICINE

## 2019-02-28 PROCEDURE — 99999 PR PBB SHADOW E&M-EST. PATIENT-LVL IV: ICD-10-PCS | Mod: PBBFAC,,, | Performed by: FAMILY MEDICINE

## 2019-02-28 PROCEDURE — 99999 PR PBB SHADOW E&M-EST. PATIENT-LVL IV: CPT | Mod: PBBFAC,,, | Performed by: FAMILY MEDICINE

## 2019-02-28 PROCEDURE — 99214 PR OFFICE/OUTPT VISIT, EST, LEVL IV, 30-39 MIN: ICD-10-PCS | Mod: S$PBB,,, | Performed by: FAMILY MEDICINE

## 2019-02-28 RX ORDER — ACETAMINOPHEN 500 MG
1000 TABLET ORAL EVERY 8 HOURS PRN
Qty: 90 TABLET | Refills: 3 | Status: SHIPPED | OUTPATIENT
Start: 2019-02-28

## 2019-02-28 NOTE — PROGRESS NOTES
"Subjective:      Patient ID: Jojo Ayoub is a 83 y.o. female.    Chief Complaint: Hypertension    HPI   Patient here today for follow up. She has stopped losartan a few months ago. She has had some high bp readings. Home bp cuff 139/81 which is 20 points higher than the manual reading. She reports right arm has normal now no further neuropathy. Sugars are 80-100s. She did get an injection for her back and reports some relief but would like to start PT.     Review of Systems   Constitutional: Negative for activity change, appetite change, chills, diaphoresis, fatigue, fever and unexpected weight change.   HENT: Negative for congestion, ear discharge, ear pain, hearing loss, postnasal drip, rhinorrhea, sinus pressure and sore throat.    Respiratory: Negative for cough, shortness of breath and wheezing.    Cardiovascular: Negative for chest pain.   Gastrointestinal: Negative for abdominal pain, constipation, diarrhea, nausea and vomiting.   Genitourinary: Negative for dysuria and frequency.   Musculoskeletal: Positive for arthralgias and back pain.   Psychiatric/Behavioral: Negative for suicidal ideas.     I personally reviewed Past Medical History, Past Surgical history,  Past Social History and Family History      Objective:   /82 (BP Location: Left arm, Patient Position: Sitting)   Pulse 83   Ht 5' 8" (1.727 m)   Wt 65 kg (143 lb 4.8 oz)   SpO2 99%   BMI 21.79 kg/m²     Physical Exam   Constitutional: She appears well-developed and well-nourished. No distress.   HENT:   Head: Normocephalic and atraumatic.   Right Ear: Hearing, tympanic membrane, external ear and ear canal normal.   Left Ear: Hearing, tympanic membrane, external ear and ear canal normal.   Nose: Nose normal.   Mouth/Throat: Uvula is midline and oropharynx is clear and moist. No oropharyngeal exudate.   Eyes: Conjunctivae and EOM are normal. Pupils are equal, round, and reactive to light. Right eye exhibits no discharge. Left eye " exhibits no discharge. No scleral icterus.   Neck: Normal range of motion. Neck supple.   Cardiovascular: Normal rate, regular rhythm, normal heart sounds and intact distal pulses. Exam reveals no gallop.   No murmur heard.  Pulmonary/Chest: Effort normal and breath sounds normal. No respiratory distress. She has no wheezes. She has no rales. She exhibits no tenderness.   Abdominal: Soft. Bowel sounds are normal. She exhibits no distension and no mass. There is no tenderness. There is no rebound and no guarding.   Vitals reviewed.      1. DDD (degenerative disc disease), lumbar    2. Chronic pain of both knees    3. Essential hypertension    4. Subclinical hyperthyroidism        1-2. Referral placed and trial tylenol as needed   3. Patient to start digital htn program, if she does not she is going to keep a log of her blood pressures and call next week the log, reminded patient that her home bp machine is 20 points higher than the actual systolic, hold losartan for now, if her home bp cuff systolic is in the 160s we will restart losartan  -return in 6 weeks   4. Schedule endocrinology follow up    Orders Placed This Encounter   Procedures    Ambulatory consult to Physical Therapy    Ambulatory consult to Endocrinology    NURSING COMMUNICATION: Create [facility] Account    Hypertension Digital Medicine (HDMP) Enrollment Order     Medications Ordered This Encounter   Medications    acetaminophen (TYLENOL) 500 MG tablet     Sig: Take 2 tablets (1,000 mg total) by mouth every 8 (eight) hours as needed for Pain.     Dispense:  90 tablet     Refill:  3

## 2019-03-01 ENCOUNTER — TELEPHONE (OUTPATIENT)
Dept: INTERNAL MEDICINE | Facility: CLINIC | Age: 84
End: 2019-03-01

## 2019-03-01 NOTE — TELEPHONE ENCOUNTER
Spoke w/ pt and advised that she is to take the carvedilol and we will f/u up in a month    ----- Message from Yen Scott sent at 3/1/2019  9:29 AM CST -----  Contact: PATY SOMMER [[id number]]  Name of Who is Calling: PATY SOMMER [[id number]]      What is the request in detail: Patient would like a call back regarding blood pressure concerns. Patient states she would like medication that would keep her blood pressure normal. Please call     Can the clinic reply by [facility]: no    [facility]: [phone]

## 2019-03-08 RX ORDER — LOSARTAN POTASSIUM 50 MG/1
TABLET ORAL
Qty: 90 TABLET | Refills: 0 | Status: SHIPPED | OUTPATIENT
Start: 2019-03-08 | End: 2020-01-21

## 2019-03-08 NOTE — TELEPHONE ENCOUNTER
Please call patient need a log of her blood pressures and last time she was in clinic she had held her losartan,   Did she restart it?

## 2019-03-08 NOTE — TELEPHONE ENCOUNTER
Patient has been taking log, she said it has been ranging around 120/73 every day. She said it hasn't gone over 80.

## 2019-03-15 ENCOUNTER — CLINICAL SUPPORT (OUTPATIENT)
Dept: REHABILITATION | Facility: OTHER | Age: 84
End: 2019-03-15
Attending: FAMILY MEDICINE
Payer: MEDICARE

## 2019-03-15 DIAGNOSIS — M54.50 CHRONIC BILATERAL LOW BACK PAIN WITHOUT SCIATICA: ICD-10-CM

## 2019-03-15 DIAGNOSIS — R26.9 GAIT DIFFICULTY: ICD-10-CM

## 2019-03-15 DIAGNOSIS — R29.898 WEAKNESS OF BOTH LOWER EXTREMITIES: ICD-10-CM

## 2019-03-15 DIAGNOSIS — G89.29 CHRONIC BILATERAL LOW BACK PAIN WITHOUT SCIATICA: ICD-10-CM

## 2019-03-15 PROCEDURE — 97161 PT EVAL LOW COMPLEX 20 MIN: CPT | Mod: PN | Performed by: PHYSICAL THERAPIST

## 2019-03-15 PROCEDURE — 97110 THERAPEUTIC EXERCISES: CPT | Mod: PN | Performed by: PHYSICAL THERAPIST

## 2019-03-15 PROCEDURE — G8979 MOBILITY GOAL STATUS: HCPCS | Mod: CL,PN | Performed by: PHYSICAL THERAPIST

## 2019-03-15 NOTE — PLAN OF CARE
[facility]  Physical Therapy Initial Evaluation    Name: Jojo Ayoub  Clinic Number: [id number]    Therapy Diagnosis:   Encounter Diagnoses   Name Primary?    Gait difficulty     Weakness of both lower extremities     Chronic bilateral low back pain without sciatica      Physician: Darya Robles MD    Physician Orders: PT Eval and Treat   Medical Diagnosis from Referral:   M51.36 (ICD-10-CM) - DDD (degenerative disc disease), lumbar   M25.561,M25.562,G89.29 (ICD-10-CM) - Chronic pain of both knees       Evaluation Date: 3/15/2019  Authorization Period Expiration: 2/28/2020  Plan of Care Expiration: 6/7/2019  Visit # / Visits authorized: 1/ 1    Time In: 0900am  Time Out: 0950am  Total Billable Time: 50 minutes    Precautions: Standard and Fall    Subjective   Date of onset: years  History of current condition - Jojo reports: chronic low back and knee pain. Generalized pain in lower legs and feet due to neuropathy. Some improvement after ARNAUD's in january. Pain across low back is worse with standing and first thing in morning. Her left knee pain is with bending. Hx of TKA with Dr [facility] in 2012. Her main complaint is she feels unbalanced and has fallen. She can;t stand without holding onto something and uses her rollator for everything. Denies any B/B changes, saddle anesthesia     Medical History:   Past Medical History:   Diagnosis Date    Arthritis     Diabetes mellitus with neurological manifestation     Essential hypertension 2/28/2019    High blood pressure     Hyperlipidemia     Urinary urgency        Surgical History:   Jojo Ayoub  has a past surgical history that includes low back surgery ; left knee replacement ; and Epidural steroid injection (N/A, 1/31/2019).    Medications:   Jojo has a current medication list which includes the following prescription(s): acetaminophen, aspirin, atorvastatin, carvedilol, docusate sodium, gabapentin, losartan,  metformin, mirabegron, onetouch ultra blue test strip, and onetouch ultrasoft lancets.    Allergies:   Review of patient's allergies indicates:   Allergen Reactions    No known drug allergies         Imaging, MRI studies: 1. No significant interval change in the appearance of the distal cord with increased signal and expansion.  This is likely related to demyelinating disease and neoplasm is considered much less likely given the stability and lack of enhancement.  2. Stable postsurgical changes at L4-L5.  3. Multilevel degenerative disc and joint disease resulting in severe central canal stenosis at L3-L4.  This finding needs to the clumping of the filum terminale nerve root which can be seen in arachnoiditis and is unchanged from previous exam.    Prior Therapy: yes  Social History:  lives with their family; 5 steps to enter with railing  Prior Level of Function: walking with rollator community distances, independent with ADL's  Current Level of Function: walking with rollator household and community distances, Help with getting up stairs and rollator in<>out of car by , unable to stand too long to cook    Pain:  Current 7/10, worst 10/10, best 7/10   Location: bilateral back  and L knee, feet  Description: Aching and Burning  Aggravating Factors: Standing and Walking, AM  Easing Factors: massage, pain medication and injection    Pts goals: To help her with this pain and if possible her balance    Objective     Observation: calm and pleasant mood    Posture:  Forward flexed trunk over rollator in standing, increased thoracic kyphosis and slumped shoulders in sitting    Lumbar Range of Motion: (tested in sitting 2/2 poor dynamic standing balance)   percentage Pain   Flexion 75%   painless        Extension 0%   painful        Left Side Bending 50% painless        Right Side Bending 50% painless        Left rotation   50% painless        Right Rotation   50% painless           Knee  Right  Left  Pain/Dysfunction with Movement    AROM PROM AROM PROM    Flexion 85 95 80 90    Extension 8 5 0 0 Lacking extension R     Lower Extremity Strength  Right LE  Left LE    Knee extension: 4+/5 Knee extension: 4+/5   Knee flexion: 4/5 Knee flexion: 4/5   Hip flexion: 4-/5 Hip flexion: 4-/5   Hip extension:  3/5 Hip extension: 2/5   Hip abduction: 3-/5 Hip abduction: 3-/5   Hip adduction: 4/5 Hip adduction 4/5   Ankle dorsiflexion: 4/5 Ankle dorsiflexion: 3+/5   Ankle plantarflexion: 2/5 Ankle plantarflexion: 2/5     Special Tests:  -Repeated Flexion: decreased  -Repeated Ext: increased    Balance:  Static standing: poor  Sit to stand: mod I with AD    Neuro Dynamic Testing:    Sciatic nerve:      SLR: R = negative     L = negative        Femoral Nerve:    Femoral nerve test: negative    Joint Mobility: decreased medial patellar mobility; subluxation with knee flexion noted    Palpation: No TTP low back or buttocks    Sensation: BLE light touch sensation grossly intact all dermatomes    Flexibility:    Popliteal Angle: R = -10 degrees ; L = -15 degrees       CMS Impairment/Limitation/Restriction for FOTO Lumbar Survey    Therapist reviewed FOTO scores for Jojo Ayoub on 3/15/2019.   FOTO documents entered into [facility] - see Media section.    Limitation Score: 76%  Category: Mobility    Current : CL = least 60% but < 80% impaired, limited or restricted  Goal: CK = at least 40% but < 60% impaired, limited or restricted         TREATMENT   Treatment Time In: 9:30  Treatment Time Out: 9:40am  Total Treatment time separate from Evaluation: 10 minutes    Jojo received therapeutic exercises to develop strength, endurance and posture for 10 minutes including:  Bridging x 10  Seated heel raises x 10  SLR x 10  Standing hip ABD x 10  Quad sets: x 10      Home Exercises and Patient Education Provided    Education provided:   - HEP    Written Home Exercises Provided: yes.  Exercises were reviewed and Jojo was able to  demonstrate them prior to the end of the session.  Jojo demonstrated good  understanding of the education provided.     See EMR under Patient Instructions for exercises provided 3/15/2019.    Assessment   Jojo is a 83 y.o. female referred to outpatient Physical Therapy with a medical diagnosis of DDD lumbar spine and B knee pain with history of L knee replacemnet. Pt presents with gait instability, poor balance, BLE/ trunk weakness, decreased B knee flexion ROM, and pain    Pt prognosis is Good.   Pt will benefit from skilled outpatient Physical Therapy to address the deficits stated above and in the chart below, provide pt/family education, and to maximize pt's level of independence.     Plan of care discussed with patient: Yes  Pt's spiritual, cultural and educational needs considered and patient is agreeable to the plan of care and goals as stated below:     Anticipated Barriers for therapy: none    Medical Necessity is demonstrated by the following  History  Co-morbidities and personal factors that may impact the plan of care Co-morbidities:   prior lumbar surgery    Personal Factors:   no deficits     moderate   Examination  Body Structures and Functions, activity limitations and participation restrictions that may impact the plan of care Body Regions:   back  lower extremities    Body Systems:    ROM  strength  balance  gait  transfers    Participation Restrictions:   Walking, standing    Activity limitations:   Learning and applying knowledge  no deficits    General Tasks and Commands  no deficits    Communication  no deficits    Mobility  lifting and carrying objects  walking    Self care  no deficits    Domestic Life  doing house work (cleaning house, washing dishes, laundry)    Interactions/Relationships  no deficits    Life Areas  no deficits    Community and Social Life  recreation and leisure         high   Clinical Presentation stable and uncomplicated low   Decision Making/ Complexity Score: low      Goals:  Short Term Goals: 6 weeks   1. Patient to demonstrate independence with postural awareness for improved management of condition  2. Patient to report decreased pain in low back and knees by 30% or greater with ADL's  3. Patient to be increased gait speed as noted by TUG 20 sec or less  4. Patient to have improved static standing balance 30 seconds or greater without UE assist    Long Term Goals: 12 weeks   1. Patient to be independent with home exercise program for improved self management of condition  2. Patient to have decreased subjective report of disability as noted by a score of 60% or less on the FOTO Lumbar questionnaire   3. Patient to increased strength in BLE's by 1/2 muscle grade or greater for improved performance of ADL's   4. Patient to increased AROM in B knee flexion to 110 degrees or greater for improved performance of ADL's      Plan   Plan of care Certification: 3/15/2019 to 6/7/2019.    Outpatient Physical Therapy 2 times weekly for 10 weeks to include the following interventions: Gait Training, Manual Therapy, Moist Heat/ Ice, Neuromuscular Re-ed, Patient Education, Therapeutic Activites and Therapeutic Exercise.     Christine Tavera, PT

## 2019-03-26 ENCOUNTER — CLINICAL SUPPORT (OUTPATIENT)
Dept: REHABILITATION | Facility: OTHER | Age: 84
End: 2019-03-26
Payer: MEDICARE

## 2019-03-26 DIAGNOSIS — R29.898 WEAKNESS OF BOTH LOWER EXTREMITIES: ICD-10-CM

## 2019-03-26 DIAGNOSIS — G89.29 CHRONIC BILATERAL LOW BACK PAIN WITHOUT SCIATICA: ICD-10-CM

## 2019-03-26 DIAGNOSIS — M54.50 CHRONIC BILATERAL LOW BACK PAIN WITHOUT SCIATICA: ICD-10-CM

## 2019-03-26 DIAGNOSIS — R26.9 GAIT DIFFICULTY: ICD-10-CM

## 2019-03-26 PROCEDURE — 97112 NEUROMUSCULAR REEDUCATION: CPT | Mod: PN | Performed by: PHYSICAL THERAPIST

## 2019-03-26 PROCEDURE — 97110 THERAPEUTIC EXERCISES: CPT | Mod: PN | Performed by: PHYSICAL THERAPIST

## 2019-03-26 NOTE — PROGRESS NOTES
"  Physical Therapy Daily Treatment Note     Name: Jojo Ayoub  Clinic Number: [id number]    Therapy Diagnosis:   Encounter Diagnoses   Name Primary?    Gait difficulty     Weakness of both lower extremities     Chronic bilateral low back pain without sciatica      Physician: Darya Robles MD    Visit Date: 3/26/2019    Physician Orders: PT Eval and Treat   Medical Diagnosis from Referral:   M51.36 (ICD-10-CM) - DDD (degenerative disc disease), lumbar   M25.561,M25.562,G89.29 (ICD-10-CM) - Chronic pain of both knees         Evaluation Date: 3/15/2019  Authorization Period Expiration: 2/28/2020  Plan of Care Expiration: 6/7/2019  Visit # / Visits authorized: 2/1     Time In: 0800am  Time Out: 0845am  Total Billable Time: 45 minutes     Precautions: Standard and Fall      Subjective     Pt reports: stiffness in her R knee and L foot. Her grandchildren have been very supportive of her coming to therapy   She was compliant with home exercise program.  Response to previous treatment: sore  Functional change: none    Pain: 5/10  Location: bilateral knee      Objective     Jojo received therapeutic exercises to develop strength, endurance, ROM, flexibility, posture and core stabilization for 30 minutes including:    Bridging x 10  Seated heel raises x 10  SLR x 10  Standing hip ABD 2 x 10  Quad sets: x 10  Heel slides with strap x 10 ea  HL hip adduction x 20    Jojo participated in neuromuscular re-education activities to improve: Balance, Proprioception and Posture for 10 minutes. The following activities were included:  Gait training with quad cane 40 ft x 2 CGA to Min A  Static standing balance 30" x 2  Alternate arm swing with forward stepping x 10 ea  Side stepping // bars BUE support x 4 laps      Home Exercises Provided and Patient Education Provided     Education provided:   - HEP    Written Home Exercises Provided: Patient instructed to cont prior HEP.  Exercises were reviewed and Jojo was " able to demonstrate them prior to the end of the session.  Jojo demonstrated good  understanding of the education provided.     See EMR under Patient Instructions for exercises provided prior visit.    Assessment     Jojo with good tolerance to treatment session with minimal pain in L knee with flexion exercises. Decreased dynamic and static standing balance requiring SBA with all out of bed activities.   Jojo is progressing well towards her goals.   Pt prognosis is Good.     Pt will continue to benefit from skilled outpatient physical therapy to address the deficits listed in the problem list box on initial evaluation, provide pt/family education and to maximize pt's level of independence in the home and community environment.     Pt's spiritual, cultural and educational needs considered and pt agreeable to plan of care and goals.     Anticipated barriers to physical therapy: none    Goals:   Goals:  Short Term Goals: 6 weeks   1. Patient to demonstrate independence with postural awareness for improved management of condition  2. Patient to report decreased pain in low back and knees by 30% or greater with ADL's  3. Patient to be increased gait speed as noted by TUG 20 sec or less  4. Patient to have improved static standing balance 30 seconds or greater without UE assist     Long Term Goals: 12 weeks   1. Patient to be independent with home exercise program for improved self management of condition  2. Patient to have decreased subjective report of disability as noted by a score of 60% or less on the FOTO Lumbar questionnaire   3. Patient to increased strength in BLE's by 1/2 muscle grade or greater for improved performance of ADL's   4. Patient to increased AROM in B knee flexion to 110 degrees or greater for improved performance of ADL's      Plan     Plan to progress standing balance and gait with AD as tolerated     Christine Tavera, PT

## 2019-03-28 ENCOUNTER — CLINICAL SUPPORT (OUTPATIENT)
Dept: REHABILITATION | Facility: OTHER | Age: 84
End: 2019-03-28
Payer: MEDICARE

## 2019-03-28 DIAGNOSIS — M54.50 CHRONIC BILATERAL LOW BACK PAIN WITHOUT SCIATICA: ICD-10-CM

## 2019-03-28 DIAGNOSIS — R29.898 WEAKNESS OF BOTH LOWER EXTREMITIES: ICD-10-CM

## 2019-03-28 DIAGNOSIS — R26.9 GAIT DIFFICULTY: ICD-10-CM

## 2019-03-28 DIAGNOSIS — G89.29 CHRONIC BILATERAL LOW BACK PAIN WITHOUT SCIATICA: ICD-10-CM

## 2019-03-28 PROCEDURE — 97112 NEUROMUSCULAR REEDUCATION: CPT | Mod: PN

## 2019-03-28 PROCEDURE — 97110 THERAPEUTIC EXERCISES: CPT | Mod: PN

## 2019-03-28 NOTE — PROGRESS NOTES
"  Physical Therapy Daily Treatment Note     Name: Jjoo Ayoub  Clinic Number: [id number]    Therapy Diagnosis:   Encounter Diagnoses   Name Primary?    Gait difficulty     Weakness of both lower extremities     Chronic bilateral low back pain without sciatica      Physician: Darya Robles MD    Visit Date: 3/28/2019    Physician Orders: PT Eval and Treat   Medical Diagnosis from Referral:   M51.36 (ICD-10-CM) - DDD (degenerative disc disease), lumbar   M25.561,M25.562,G89.29 (ICD-10-CM) - Chronic pain of both knees         Evaluation Date: 3/15/2019  Authorization Period Expiration: 2/28/2020  Plan of Care Expiration: 6/7/2019  Visit # / Visits authorized: 3/20     Time In: 8:00 AM  Time Out: 9:00 AM  Total Billable Time: 55 minutes     Precautions: Standard and Fall      Subjective     Pt reports: "It was quite a bit of a problem this morning starting from my lower back to my knees." Pt states feeling very stiff  She was compliant with home exercise program.  Response to previous treatment: sore  Functional change: none    Pain: 5/10  Location: bilateral knee / lower back    Objective     Jojo received therapeutic exercises to develop strength, endurance, ROM, flexibility, posture and core stabilization for 40 minutes including:    Bridging x 10  Seated heel raises x 10  SLR x 10  Standing hip ABD 2 x 10  Quad sets: x 10  Heel slides with strap x 10 ea  HL hip adduction x 20  +SAQ x 20  +HL hip abduction w/ YTB 20x  +Sit to stand high mat 5x (PTA assistance)    Jojo participated in neuromuscular re-education activities to improve: Balance, Proprioception and Posture for 10 minutes. The following activities were included:  Gait training with quad cane 40 ft x 2 CGA to Min A  Static standing balance 30" x 2  Alternate arm swing with forward stepping x 10 ea  Side stepping // bars BUE support x 4 laps      Home Exercises Provided and Patient Education Provided     Education provided:   - " HEP    Written Home Exercises Provided: Patient instructed to cont prior HEP.  Exercises were reviewed and Jojo was able to demonstrate them prior to the end of the session.  Jojo demonstrated good  understanding of the education provided.     See EMR under Patient Instructions for exercises provided prior visit.    Assessment     Pt tolerated treatment today without any c/o of increased low back / knee pain. Pt display 2 episodes of LOB during static balance ex and required kory UE support for recovery.   Jojo is progressing well towards her goals.   Pt prognosis is Good.     Pt will continue to benefit from skilled outpatient physical therapy to address the deficits listed in the problem list box on initial evaluation, provide pt/family education and to maximize pt's level of independence in the home and community environment.     Pt's spiritual, cultural and educational needs considered and pt agreeable to plan of care and goals.     Anticipated barriers to physical therapy: none    Goals:   Goals:  Short Term Goals: 6 weeks   1. Patient to demonstrate independence with postural awareness for improved management of condition  2. Patient to report decreased pain in low back and knees by 30% or greater with ADL's  3. Patient to be increased gait speed as noted by TUG 20 sec or less  4. Patient to have improved static standing balance 30 seconds or greater without UE assist     Long Term Goals: 12 weeks   1. Patient to be independent with home exercise program for improved self management of condition  2. Patient to have decreased subjective report of disability as noted by a score of 60% or less on the FOTO Lumbar questionnaire   3. Patient to increased strength in BLE's by 1/2 muscle grade or greater for improved performance of ADL's   4. Patient to increased AROM in B knee flexion to 110 degrees or greater for improved performance of ADL's      Plan     Plan to progress standing balance and gait with AD  as tolerated     Frandy Lares, ROLLY

## 2019-04-01 ENCOUNTER — TELEPHONE (OUTPATIENT)
Dept: INTERNAL MEDICINE | Facility: CLINIC | Age: 84
End: 2019-04-01

## 2019-04-01 NOTE — TELEPHONE ENCOUNTER
lmovm  ----- Message from Keira Lott sent at 4/1/2019  2:17 PM CDT -----  Contact: pt  Name of Who is Calling: Jojo      What is the request in detail: requesting a call back in reference to her having more re occurring headaches. Pt states that since her medication has changed she notices her Bp going up a little.Please call and advise      Can the clinic reply by [facility]: no      What Number to Call Back if not in [facility]: [phone]

## 2019-04-02 ENCOUNTER — TELEPHONE (OUTPATIENT)
Dept: INTERNAL MEDICINE | Facility: CLINIC | Age: 84
End: 2019-04-02

## 2019-04-02 ENCOUNTER — CLINICAL SUPPORT (OUTPATIENT)
Dept: REHABILITATION | Facility: OTHER | Age: 84
End: 2019-04-02
Payer: MEDICARE

## 2019-04-02 DIAGNOSIS — G89.29 CHRONIC BILATERAL LOW BACK PAIN WITHOUT SCIATICA: ICD-10-CM

## 2019-04-02 DIAGNOSIS — M54.50 CHRONIC BILATERAL LOW BACK PAIN WITHOUT SCIATICA: ICD-10-CM

## 2019-04-02 DIAGNOSIS — R26.9 GAIT DIFFICULTY: ICD-10-CM

## 2019-04-02 DIAGNOSIS — R29.898 WEAKNESS OF BOTH LOWER EXTREMITIES: ICD-10-CM

## 2019-04-02 PROCEDURE — 97110 THERAPEUTIC EXERCISES: CPT | Mod: PN | Performed by: PHYSICAL THERAPIST

## 2019-04-02 PROCEDURE — 97530 THERAPEUTIC ACTIVITIES: CPT | Mod: PN | Performed by: PHYSICAL THERAPIST

## 2019-04-02 NOTE — PROGRESS NOTES
"  Physical Therapy Daily Treatment Note     Name: Jojo Ayoub  Clinic Number: [id number]    Therapy Diagnosis:   Encounter Diagnoses   Name Primary?    Gait difficulty     Weakness of both lower extremities     Chronic bilateral low back pain without sciatica      Physician: Darya Robles MD    Visit Date: 4/2/2019    Physician Orders: PT Eval and Treat   Medical Diagnosis from Referral:   M51.36 (ICD-10-CM) - DDD (degenerative disc disease), lumbar   M25.561,M25.562,G89.29 (ICD-10-CM) - Chronic pain of both knees         Evaluation Date: 3/15/2019  Authorization Period Expiration: 2/28/2020  Plan of Care Expiration: 6/7/2019  Visit # / Visits authorized: 4/20     Time In: 2:00 PM  Time Out: 2:50 PM  Total Billable Time: 30 minutes     Precautions: Standard and Fall      Subjective     Pt reports: From her waist down is "stiff". She is having more pain in her lower back while standing today  She was compliant with home exercise program.  Response to previous treatment: sore  Functional change: none    Pain: 5/10  Location: bilateral knee / lower back    Objective     Jojo received therapeutic exercises to develop strength, endurance, ROM, flexibility, posture and core stabilization for 40 minutes including:    Bridging x 10  Seated heel raises x 10  SLR x 10  Standing hip ABD 2 x 10  Quad sets: x 10  Heel slides with strap x 10 ea  HL hip adduction x 20  SAQ x 20  HL hip abduction w/ YTB 20x  Sit to stand high mat 5x   +Side lying hip abduction x 10 with sliding board    Jojo participated in neuromuscular re-education activities to improve: Balance, Proprioception and Posture for 5 minutes. The following activities were included:  Gait training with quad cane 40 ft x 2 CGA to Min A (NT)  Static standing balance 30" x 2  Alternate arm swing with forward stepping x 10 ea (NT)  Side stepping // bars BUE support x 4 laps      Home Exercises Provided and Patient Education Provided     Education " provided:   - HEP    Written Home Exercises Provided: Patient instructed to cont prior HEP.  Exercises were reviewed and Jojo was able to demonstrate them prior to the end of the session.  Jojo demonstrated good  understanding of the education provided.     See EMR under Patient Instructions for exercises provided prior visit.    Assessment     Pt demonstrating sway back, anterior pelvic tilt, and increased thoracic kyphosis in standing. Pt with fair tolerance to standing exercises with exacerbation of low back pain. Good tolerance to mat exercises and progression of hip strengthening.   Jojo is progressing well towards her goals.   Pt prognosis is Good.     Pt will continue to benefit from skilled outpatient physical therapy to address the deficits listed in the problem list box on initial evaluation, provide pt/family education and to maximize pt's level of independence in the home and community environment.     Pt's spiritual, cultural and educational needs considered and pt agreeable to plan of care and goals.     Anticipated barriers to physical therapy: none    Goals:   Goals:  Short Term Goals: 6 weeks   1. Patient to demonstrate independence with postural awareness for improved management of condition  2. Patient to report decreased pain in low back and knees by 30% or greater with ADL's  3. Patient to be increased gait speed as noted by TUG 20 sec or less  4. Patient to have improved static standing balance 30 seconds or greater without UE assist     Long Term Goals: 12 weeks   1. Patient to be independent with home exercise program for improved self management of condition  2. Patient to have decreased subjective report of disability as noted by a score of 60% or less on the FOTO Lumbar questionnaire   3. Patient to increased strength in BLE's by 1/2 muscle grade or greater for improved performance of ADL's   4. Patient to increased AROM in B knee flexion to 110 degrees or greater for improved  performance of ADL's      Plan     Plan to progress standing balance and gait with AD as tolerated     Christine Tavera, PT

## 2019-04-02 NOTE — TELEPHONE ENCOUNTER
lmovm    ----- Message from Sonam Stein sent at 4/2/2019  9:49 AM CDT -----  Contact: Pt   Who Called: PATY SOMMER [[id number]]    Who Left Message for Patient: Imelda     Does the patient know what this is regarding? Re occurring headaches and b/p changes     Best Call Back Number:[phone]    Additional Information:

## 2019-04-04 NOTE — PROGRESS NOTES
"  Physical Therapy Daily Treatment Note     Name: Jojo Ayoub  Clinic Number: [id number]    Therapy Diagnosis:   Encounter Diagnoses   Name Primary?    Gait difficulty     Weakness of both lower extremities     Chronic bilateral low back pain without sciatica      Physician: Darya Robles MD    Visit Date: 4/5/2019    Physician Orders: PT Eval and Treat   Medical Diagnosis from Referral:   M51.36 (ICD-10-CM) - DDD (degenerative disc disease), lumbar   M25.561,M25.562,G89.29 (ICD-10-CM) - Chronic pain of both knees         Evaluation Date: 3/15/2019  Authorization Period Expiration: 2/28/2020  Plan of Care Expiration: 6/7/2019  Visit # / Visits authorized: 5/20     Time In: 9:00 AM  Time Out: 10:00 AM  Total Billable Time: 60 minutes     Precautions: Standard and Fall      Subjective     Pt reports:that she had some pain last night with the rainy weather, feeling a little better this morning.  She was compliant with home exercise program.  Response to previous treatment: sore  Functional change: none    Pain: 5/10  Location: bilateral knee / lower back    Objective     Jojo received therapeutic exercises to develop strength, endurance, ROM, flexibility, posture and core stabilization for 55 minutes including:    Bridging x 10  Seated heel raises x 10  SLR 2 x 10  Standing hip ABD 2 x 10  Quad sets: 5" x 2 min  Heel slides with strap x 10 ea  HL hip adduction x 20  SAQ x 20  HL hip abduction w/ YTB 20x  Sit to stand high mat 5x - NP  +Supine hip abduction x 20 with sliding board  +mini squats in // bars x 10    Jojo participated in neuromuscular re-education activities to improve: Balance, Proprioception and Posture for 5 minutes. The following activities were included:  Gait training with quad cane 40 ft x 2 CGA to Min A (NT)  Static standing balance 30" x 2  Alternate arm swing with forward stepping x 10 ea (NT)  Side stepping // bars BUE support x 4 laps      Home Exercises Provided and Patient " Education Provided     Education provided:   - HEP    Written Home Exercises Provided: Patient instructed to cont prior HEP.  Exercises were reviewed and Jojo was able to demonstrate them prior to the end of the session.  Jojo demonstrated good  understanding of the education provided.     See EMR under Patient Instructions for exercises provided prior visit.    Assessment     Good tolerance to therex today. Verbal and tactile cuing for technique with mat and standing exercises. Good tolerance to addition of mini squats today.   Jojo is progressing well towards her goals.   Pt prognosis is Good.     Pt will continue to benefit from skilled outpatient physical therapy to address the deficits listed in the problem list box on initial evaluation, provide pt/family education and to maximize pt's level of independence in the home and community environment.     Pt's spiritual, cultural and educational needs considered and pt agreeable to plan of care and goals.     Anticipated barriers to physical therapy: none    Goals:   Goals:  Short Term Goals: 6 weeks   1. Patient to demonstrate independence with postural awareness for improved management of condition  2. Patient to report decreased pain in low back and knees by 30% or greater with ADL's  3. Patient to be increased gait speed as noted by TUG 20 sec or less  4. Patient to have improved static standing balance 30 seconds or greater without UE assist     Long Term Goals: 12 weeks   1. Patient to be independent with home exercise program for improved self management of condition  2. Patient to have decreased subjective report of disability as noted by a score of 60% or less on the FOTO Lumbar questionnaire   3. Patient to increased strength in BLE's by 1/2 muscle grade or greater for improved performance of ADL's   4. Patient to increased AROM in B knee flexion to 110 degrees or greater for improved performance of ADL's      Plan     Plan to progress standing  balance and gait with AD as tolerated     Hafsa Judd, PT

## 2019-04-05 ENCOUNTER — CLINICAL SUPPORT (OUTPATIENT)
Dept: REHABILITATION | Facility: OTHER | Age: 84
End: 2019-04-05
Payer: MEDICARE

## 2019-04-05 DIAGNOSIS — R26.9 GAIT DIFFICULTY: ICD-10-CM

## 2019-04-05 DIAGNOSIS — M54.50 CHRONIC BILATERAL LOW BACK PAIN WITHOUT SCIATICA: ICD-10-CM

## 2019-04-05 DIAGNOSIS — R29.898 WEAKNESS OF BOTH LOWER EXTREMITIES: ICD-10-CM

## 2019-04-05 DIAGNOSIS — G89.29 CHRONIC BILATERAL LOW BACK PAIN WITHOUT SCIATICA: ICD-10-CM

## 2019-04-05 PROCEDURE — 97110 THERAPEUTIC EXERCISES: CPT | Mod: PN | Performed by: PHYSICAL THERAPIST

## 2019-04-09 ENCOUNTER — CLINICAL SUPPORT (OUTPATIENT)
Dept: REHABILITATION | Facility: OTHER | Age: 84
End: 2019-04-09
Payer: MEDICARE

## 2019-04-09 DIAGNOSIS — R26.9 GAIT DIFFICULTY: ICD-10-CM

## 2019-04-09 DIAGNOSIS — R29.898 WEAKNESS OF BOTH LOWER EXTREMITIES: ICD-10-CM

## 2019-04-09 DIAGNOSIS — G89.29 CHRONIC BILATERAL LOW BACK PAIN WITHOUT SCIATICA: ICD-10-CM

## 2019-04-09 DIAGNOSIS — M54.50 CHRONIC BILATERAL LOW BACK PAIN WITHOUT SCIATICA: ICD-10-CM

## 2019-04-09 PROCEDURE — 97110 THERAPEUTIC EXERCISES: CPT | Mod: PN | Performed by: PHYSICAL THERAPIST

## 2019-04-09 PROCEDURE — 97112 NEUROMUSCULAR REEDUCATION: CPT | Mod: PN | Performed by: PHYSICAL THERAPIST

## 2019-04-09 NOTE — PROGRESS NOTES
"  Physical Therapy Daily Treatment Note     Name: Jojo Ayoub  Clinic Number: [id number]    Therapy Diagnosis:   Encounter Diagnoses   Name Primary?    Gait difficulty     Weakness of both lower extremities     Chronic bilateral low back pain without sciatica      Physician: Darya Robles MD    Visit Date: 4/9/2019    Physician Orders: PT Eval and Treat   Medical Diagnosis from Referral:   M51.36 (ICD-10-CM) - DDD (degenerative disc disease), lumbar   M25.561,M25.562,G89.29 (ICD-10-CM) - Chronic pain of both knees         Evaluation Date: 3/15/2019  Authorization Period Expiration: 2/28/2020  Plan of Care Expiration: 6/7/2019  Visit # / Visits authorized: 6/20     Time In: 9:00 AM  Time Out: 10:00 AM  Total Billable Time: 50 minutes     Precautions: Standard and Fall      Subjective     Pt reports:The therapy has been helping a lot. She is able to take a few steps without the walker and get up from a chair quicker. She continues to have stiffness in the morning  She was compliant with home exercise program.  Response to previous treatment: sore  Functional change: improvements in sit to stand transfers and walking without AD    Pain: 5/10  Location: bilateral knee / lower back    Objective     Jojo received therapeutic exercises to develop strength, endurance, ROM, flexibility, posture and core stabilization for 40 minutes including:    Bridging x 10  Seated heel raises x 10- NT  SLR 2 x 10  Standing hip ABD 2 x 10  Quad sets: 5" x 2 min  Heel slides with strap x 10 ea  HL hip adduction x 20  SAQ x 20 (manual medial glide L patella)  HL hip abduction w/ YTB 20x  Sit to stand high mat 5x - NP  Supine hip abduction x 20 with sliding board  mini squats in // bars x 10    Jojo participated in neuromuscular re-education activities to improve: Balance, Proprioception and Posture for 10 minutes. The following activities were included:  Gait training with quad cane 40 ft x 2 CGA to Min A   Static standing " "balance 30" x 2  Alternate arm swing with forward stepping x 10 ea   Side stepping // bars BUE support x 4 laps      Home Exercises Provided and Patient Education Provided     Education provided:   - HEP    Written Home Exercises Provided: Patient instructed to cont prior HEP.  Exercises were reviewed and Jojo was able to demonstrate them prior to the end of the session.  Jojo demonstrated good  understanding of the education provided.     See EMR under Patient Instructions for exercises provided prior visit.    Assessment     Progressing with improved static standing balance and able to stand 20 sec independently without UE support. Continued instability and subluxation L patella requiring medial patella glide during open chain exercises.   Jojo is progressing well towards her goals.   Pt prognosis is Good.     Pt will continue to benefit from skilled outpatient physical therapy to address the deficits listed in the problem list box on initial evaluation, provide pt/family education and to maximize pt's level of independence in the home and community environment.     Pt's spiritual, cultural and educational needs considered and pt agreeable to plan of care and goals.     Anticipated barriers to physical therapy: none    Goals:  Short Term Goals: 6 weeks   1. Patient to demonstrate independence with postural awareness for improved management of condition (progressing, not met)  2. Patient to report decreased pain in low back and knees by 30% or greater with ADL's(progressing, not met)  3. Patient to be increased gait speed as noted by TUG 20 sec or less(progressing, not met)  4. Patient to have improved static standing balance 30 seconds or greater without UE assist(progressing, not met)     Long Term Goals: 12 weeks   1. Patient to be independent with home exercise program for improved self management of condition(progressing, not met)  2. Patient to have decreased subjective report of disability as noted " by a score of 60% or less on the FOTO Lumbar questionnaire (progressing, not met)  3. Patient to increased strength in BLE's by 1/2 muscle grade or greater for improved performance of ADL's (progressing, not met)  4. Patient to increased AROM in B knee flexion to 110 degrees or greater for improved performance of ADL's(progressing, not met)      Plan     Plan to progress standing balance and gait with AD as tolerated     Christine Tavera, PT

## 2019-04-16 ENCOUNTER — CLINICAL SUPPORT (OUTPATIENT)
Dept: REHABILITATION | Facility: OTHER | Age: 84
End: 2019-04-16
Payer: MEDICARE

## 2019-04-16 DIAGNOSIS — M54.50 CHRONIC BILATERAL LOW BACK PAIN WITHOUT SCIATICA: ICD-10-CM

## 2019-04-16 DIAGNOSIS — G89.29 CHRONIC BILATERAL LOW BACK PAIN WITHOUT SCIATICA: ICD-10-CM

## 2019-04-16 DIAGNOSIS — R26.9 GAIT DIFFICULTY: ICD-10-CM

## 2019-04-16 DIAGNOSIS — R29.898 WEAKNESS OF BOTH LOWER EXTREMITIES: ICD-10-CM

## 2019-04-16 PROCEDURE — 97110 THERAPEUTIC EXERCISES: CPT | Mod: PN | Performed by: PHYSICAL THERAPIST

## 2019-04-16 PROCEDURE — 97112 NEUROMUSCULAR REEDUCATION: CPT | Mod: PN | Performed by: PHYSICAL THERAPIST

## 2019-04-16 NOTE — PROGRESS NOTES
"  Physical Therapy Daily Treatment Note     Name: Jojo Ayoub  Clinic Number: [id number]    Therapy Diagnosis:   Encounter Diagnoses   Name Primary?    Gait difficulty     Weakness of both lower extremities     Chronic bilateral low back pain without sciatica      Physician: Darya Robles MD    Visit Date: 4/16/2019    Physician Orders: PT Eval and Treat   Medical Diagnosis from Referral:   M51.36 (ICD-10-CM) - DDD (degenerative disc disease), lumbar   M25.561,M25.562,G89.29 (ICD-10-CM) - Chronic pain of both knees         Evaluation Date: 3/15/2019  Authorization Period Expiration: 2/28/2020  Plan of Care Expiration: 6/7/2019  Visit # / Visits authorized: 6/20     Time In: 9:00 AM  Time Out: 10:00 AM  Total Billable Time: 50 minutes     Precautions: Standard and Fall      Subjective     Pt reports:Walking in the house without her walker some  She was compliant with home exercise program.  Response to previous treatment: sore  Functional change: improvements in sit to stand transfers and walking without AD    Pain: 5/10  Location: bilateral knee / lower back    Objective     Jojo received therapeutic exercises to develop strength, endurance, ROM, flexibility, posture and core stabilization for 40 minutes including:    Bridging x 10  Seated heel raises x 10  SLR x 15  Standing hip ABD 2 x 10  Quad sets: 5" x 2 min  Heel slides with strap x 10 ea  HL hip adduction x 20  SAQ x 20- NT  HL hip abduction w/ YTB 20x  Sit to stand high mat 5x - NP  Supine hip abduction x 20 with sliding board  mini squats in // bars x 10    Jojo participated in neuromuscular re-education activities to improve: Balance, Proprioception and Posture for 10 minutes. The following activities were included:  Gait training with quad cane 40 ft x 2 CGA to Min A   Static standing balance 30" x 2  Alternate arm swing with forward stepping x 10 ea   Side stepping // bars BUE support x 4 laps      Home Exercises Provided and Patient " Education Provided     Education provided:   - HEP    Written Home Exercises Provided: Patient instructed to cont prior HEP.  Exercises were reviewed and Jojo was able to demonstrate them prior to the end of the session.  Jojo demonstrated good  understanding of the education provided.     See EMR under Patient Instructions for exercises provided prior visit.    Assessment     PT recommending RW for community and household ambulation at this time due to decreased dynamic standing balance and fall risk. Progress gait with QC in therapy. Demonstrating improved independence with transfers and able to transfer on<>off high mat without step stool or assist.   Jojo is progressing well towards her goals.   Pt prognosis is Good.     Pt will continue to benefit from skilled outpatient physical therapy to address the deficits listed in the problem list box on initial evaluation, provide pt/family education and to maximize pt's level of independence in the home and community environment.     Pt's spiritual, cultural and educational needs considered and pt agreeable to plan of care and goals.     Anticipated barriers to physical therapy: none    Goals:  Short Term Goals: 6 weeks   1. Patient to demonstrate independence with postural awareness for improved management of condition (progressing, not met)  2. Patient to report decreased pain in low back and knees by 30% or greater with ADL's(progressing, not met)  3. Patient to be increased gait speed as noted by TUG 20 sec or less(progressing, not met)  4. Patient to have improved static standing balance 30 seconds or greater without UE assist(progressing, not met)     Long Term Goals: 12 weeks   1. Patient to be independent with home exercise program for improved self management of condition(progressing, not met)  2. Patient to have decreased subjective report of disability as noted by a score of 60% or less on the FOTO Lumbar questionnaire (progressing, not met)  3.  Patient to increased strength in BLE's by 1/2 muscle grade or greater for improved performance of ADL's (progressing, not met)  4. Patient to increased AROM in B knee flexion to 110 degrees or greater for improved performance of ADL's(progressing, not met)      Plan     Plan to progress standing balance and gait with AD as tolerated     Christine Tavera, PT

## 2019-04-23 ENCOUNTER — CLINICAL SUPPORT (OUTPATIENT)
Dept: REHABILITATION | Facility: OTHER | Age: 84
End: 2019-04-23
Payer: MEDICARE

## 2019-04-23 DIAGNOSIS — M54.50 CHRONIC BILATERAL LOW BACK PAIN WITHOUT SCIATICA: ICD-10-CM

## 2019-04-23 DIAGNOSIS — G89.29 CHRONIC BILATERAL LOW BACK PAIN WITHOUT SCIATICA: ICD-10-CM

## 2019-04-23 DIAGNOSIS — R29.898 WEAKNESS OF BOTH LOWER EXTREMITIES: ICD-10-CM

## 2019-04-23 DIAGNOSIS — R26.9 GAIT DIFFICULTY: ICD-10-CM

## 2019-04-23 PROCEDURE — 97110 THERAPEUTIC EXERCISES: CPT | Mod: PN | Performed by: PHYSICAL THERAPIST

## 2019-04-23 PROCEDURE — 97112 NEUROMUSCULAR REEDUCATION: CPT | Mod: PN | Performed by: PHYSICAL THERAPIST

## 2019-04-25 ENCOUNTER — PES CALL (OUTPATIENT)
Dept: ADMINISTRATIVE | Facility: CLINIC | Age: 84
End: 2019-04-25

## 2019-04-26 ENCOUNTER — CLINICAL SUPPORT (OUTPATIENT)
Dept: REHABILITATION | Facility: OTHER | Age: 84
End: 2019-04-26
Payer: MEDICARE

## 2019-04-26 DIAGNOSIS — R29.898 WEAKNESS OF BOTH LOWER EXTREMITIES: ICD-10-CM

## 2019-04-26 DIAGNOSIS — G89.29 CHRONIC BILATERAL LOW BACK PAIN WITHOUT SCIATICA: ICD-10-CM

## 2019-04-26 DIAGNOSIS — M54.50 CHRONIC BILATERAL LOW BACK PAIN WITHOUT SCIATICA: ICD-10-CM

## 2019-04-26 DIAGNOSIS — R26.9 GAIT DIFFICULTY: ICD-10-CM

## 2019-04-26 PROCEDURE — 97110 THERAPEUTIC EXERCISES: CPT | Mod: PN

## 2019-04-26 NOTE — PROGRESS NOTES
"  Physical Therapy Daily Treatment Note     Name: Jojo Ayoub  Clinic Number: [id number]    Therapy Diagnosis:   Encounter Diagnoses   Name Primary?    Gait difficulty     Weakness of both lower extremities     Chronic bilateral low back pain without sciatica      Physician: Darya Robles MD    Visit Date: 4/26/2019    Physician Orders: PT Eval and Treat   Medical Diagnosis from Referral:   M51.36 (ICD-10-CM) - DDD (degenerative disc disease), lumbar   M25.561,M25.562,G89.29 (ICD-10-CM) - Chronic pain of both knees         Evaluation Date: 3/15/2019  Authorization Period Expiration: 2/28/2020  Plan of Care Expiration: 6/7/2019  Visit # / Visits authorized: 9/20     Time In: 9:00 AM  Time Out: 10:00 AM  Total Billable Time: 55 minutes / 1:1 with PTA 30 mins     Precautions: Standard and Fall      Subjective     Pt reports:"My lower back still hurts but I'm here!." No other new complaints.   She was compliant with home exercise program.  Response to previous treatment: sore  Functional change: improvements in sit to stand transfers and walking without AD    Pain: 9/10  Location: bilateral knee / lower back    Objective     Jojo received therapeutic exercises to develop strength, endurance, ROM, flexibility, posture and core stabilization for 45 minutes including:    +DKTC x 20 on PB  +LTR 2 min on PB  Bridging x 10  Seated heel raises x 10  SLR x 15  Standing hip ABD 2 x 10  Quad sets: 5" x 2 min  Heel slides with strap x 10 ea  HL hip adduction x 20  SAQ x 20- NT  HL hip abduction w/ YTB 20x  Sit to stand high mat 5x  Supine hip abduction x 20 with sliding board  mini squats in // bars x 10    Jojo participated in neuromuscular re-education activities to improve: Balance, Proprioception and Posture for 10 minutes. The following activities were included:  Gait training with quad cane 40 ft x 2 CGA to Min A   Static standing balance 30" x 2  Alternate arm swing with forward stepping x 10 ea   Side " stepping // bars BUE support x 4 laps      Home Exercises Provided and Patient Education Provided     Education provided:   - HEP    Written Home Exercises Provided: Patient instructed to cont prior HEP.  Exercises were reviewed and Jojo was able to demonstrate them prior to the end of the session.  Jojo demonstrated good  understanding of the education provided.     See EMR under Patient Instructions for exercises provided prior visit.    Assessment     Good tolerance to treatment today without any reports of increased pain. Pt required VC / TC for proper technique with sit to stand and was able to perform 5 reps from a low-mid mat without bilateral UE support.    Jojo is progressing well towards her goals.   Pt prognosis is Good.     Pt will continue to benefit from skilled outpatient physical therapy to address the deficits listed in the problem list box on initial evaluation, provide pt/family education and to maximize pt's level of independence in the home and community environment.     Pt's spiritual, cultural and educational needs considered and pt agreeable to plan of care and goals.     Anticipated barriers to physical therapy: none    Goals:  Short Term Goals: 6 weeks   1. Patient to demonstrate independence with postural awareness for improved management of condition (progressing, not met)  2. Patient to report decreased pain in low back and knees by 30% or greater with ADL's(progressing, not met)  3. Patient to be increased gait speed as noted by TUG 20 sec or less(progressing, not met)  4. Patient to have improved static standing balance 30 seconds or greater without UE assist(progressing, not met)     Long Term Goals: 12 weeks   1. Patient to be independent with home exercise program for improved self management of condition(progressing, not met)  2. Patient to have decreased subjective report of disability as noted by a score of 60% or less on the FOTO Lumbar questionnaire (progressing, not  met)  3. Patient to increased strength in BLE's by 1/2 muscle grade or greater for improved performance of ADL's (progressing, not met)  4. Patient to increased AROM in B knee flexion to 110 degrees or greater for improved performance of ADL's(progressing, not met)      Plan     Plan to progress standing balance and gait with AD as tolerated     Frandy Lares, PTA

## 2019-04-30 ENCOUNTER — CLINICAL SUPPORT (OUTPATIENT)
Dept: REHABILITATION | Facility: OTHER | Age: 84
End: 2019-04-30
Payer: MEDICARE

## 2019-04-30 DIAGNOSIS — G89.29 CHRONIC BILATERAL LOW BACK PAIN WITHOUT SCIATICA: ICD-10-CM

## 2019-04-30 DIAGNOSIS — M54.50 CHRONIC BILATERAL LOW BACK PAIN WITHOUT SCIATICA: ICD-10-CM

## 2019-04-30 DIAGNOSIS — R29.898 WEAKNESS OF BOTH LOWER EXTREMITIES: ICD-10-CM

## 2019-04-30 DIAGNOSIS — R26.9 GAIT DIFFICULTY: ICD-10-CM

## 2019-04-30 PROCEDURE — 97110 THERAPEUTIC EXERCISES: CPT | Mod: PN

## 2019-04-30 PROCEDURE — 97112 NEUROMUSCULAR REEDUCATION: CPT | Mod: PN

## 2019-04-30 NOTE — PROGRESS NOTES
"  Physical Therapy Daily Treatment Note     Name: Jooj Ayoub  Clinic Number: [id number]    Therapy Diagnosis:   Encounter Diagnoses   Name Primary?    Gait difficulty     Weakness of both lower extremities     Chronic bilateral low back pain without sciatica      Physician: Darya Robles MD    Visit Date: 4/30/2019    Physician Orders: PT Eval and Treat   Medical Diagnosis from Referral:   M51.36 (ICD-10-CM) - DDD (degenerative disc disease), lumbar   M25.561,M25.562,G89.29 (ICD-10-CM) - Chronic pain of both knees         Evaluation Date: 3/15/2019  Authorization Period Expiration: 2/28/2020  Plan of Care Expiration: 6/7/2019  Visit # / Visits authorized: 10/20     Time In: 2:00 PM  Time Out: 3:00 PM  Total Billable Time: 55 minutes / 1:1 with PTA total time      Precautions: Standard and Fall      Subjective     Pt reports:Stated she has less pain with performing chores at home.  She was compliant with home exercise program.  Response to previous treatment: sore  Functional change: improvements in sit to stand transfers and walking without AD    Pain: 6/10  Location: bilateral knee / lower back    Objective     Jojo received therapeutic exercises to develop strength, endurance, ROM, flexibility, posture and core stabilization for 45 minutes including:    +DKTC x 20 on PB  +LTR 2 min on PB  Bridging x 10  Seated heel raises x 10  SLR x 15  Standing hip ABD 2 x 10  Quad sets: 5" x 2 min  Heel slides with strap x 10 ea  HL hip adduction x 20  SAQ x 20- NT  HL hip abduction w/ YTB 20x  Sit to stand high mat 5x  Supine hip abduction x 20 with sliding board  mini squats in // bars x 10    Jojo participated in neuromuscular re-education activities to improve: Balance, Proprioception and Posture for 10 minutes. The following activities were included:  Gait training with quad cane 40 ft x 2 CGA to Min A   Static standing balance 30" x 2  Alternate arm swing with forward stepping x 10 ea   Side stepping " // bars BUE support x 4 laps      Home Exercises Provided and Patient Education Provided     Education provided:   - HEP    Written Home Exercises Provided: Patient instructed to cont prior HEP.  Exercises were reviewed and Jojo was able to demonstrate them prior to the end of the session.  Jojo demonstrated good  understanding of the education provided.     See EMR under Patient Instructions for exercises provided prior visit.    Assessment     Good tolerance to treatment today without any reports of increased pain. Continues to have limited bilateral knee flexion. Pt able to perform static NBOS with 1 episode of LOB and no bilateral UE support in // bars.    Jojo is progressing well towards her goals.   Pt prognosis is Good.     Pt will continue to benefit from skilled outpatient physical therapy to address the deficits listed in the problem list box on initial evaluation, provide pt/family education and to maximize pt's level of independence in the home and community environment.     Pt's spiritual, cultural and educational needs considered and pt agreeable to plan of care and goals.     Anticipated barriers to physical therapy: none    Goals:  Short Term Goals: 6 weeks   1. Patient to demonstrate independence with postural awareness for improved management of condition (progressing, not met)  2. Patient to report decreased pain in low back and knees by 30% or greater with ADL's(progressing, not met)  3. Patient to be increased gait speed as noted by TUG 20 sec or less(progressing, not met)  4. Patient to have improved static standing balance 30 seconds or greater without UE assist(progressing, not met)     Long Term Goals: 12 weeks   1. Patient to be independent with home exercise program for improved self management of condition(progressing, not met)  2. Patient to have decreased subjective report of disability as noted by a score of 60% or less on the FOTO Lumbar questionnaire (progressing, not  met)  3. Patient to increased strength in BLE's by 1/2 muscle grade or greater for improved performance of ADL's (progressing, not met)  4. Patient to increased AROM in B knee flexion to 110 degrees or greater for improved performance of ADL's(progressing, not met)      Plan     Plan to progress standing balance and gait with AD as tolerated     Frandy Lares, PTA

## 2019-05-03 ENCOUNTER — CLINICAL SUPPORT (OUTPATIENT)
Dept: REHABILITATION | Facility: OTHER | Age: 84
End: 2019-05-03
Payer: MEDICARE

## 2019-05-03 DIAGNOSIS — R26.9 GAIT DIFFICULTY: ICD-10-CM

## 2019-05-03 DIAGNOSIS — G89.29 CHRONIC BILATERAL LOW BACK PAIN WITHOUT SCIATICA: ICD-10-CM

## 2019-05-03 DIAGNOSIS — M54.50 CHRONIC BILATERAL LOW BACK PAIN WITHOUT SCIATICA: ICD-10-CM

## 2019-05-03 DIAGNOSIS — R29.898 WEAKNESS OF BOTH LOWER EXTREMITIES: ICD-10-CM

## 2019-05-03 PROCEDURE — 97110 THERAPEUTIC EXERCISES: CPT | Mod: PN | Performed by: PHYSICAL THERAPIST

## 2019-05-03 PROCEDURE — 97112 NEUROMUSCULAR REEDUCATION: CPT | Mod: PN | Performed by: PHYSICAL THERAPIST

## 2019-05-03 NOTE — PROGRESS NOTES
"  Physical Therapy Daily Treatment Note     Name: Jojo Ayoub  Clinic Number: [id number]    Therapy Diagnosis:   Encounter Diagnoses   Name Primary?    Gait difficulty     Weakness of both lower extremities     Chronic bilateral low back pain without sciatica      Physician: Darya Robles MD    Visit Date: 5/3/2019    Physician Orders: PT Eval and Treat   Medical Diagnosis from Referral:   M51.36 (ICD-10-CM) - DDD (degenerative disc disease), lumbar   M25.561,M25.562,G89.29 (ICD-10-CM) - Chronic pain of both knees         Evaluation Date: 3/15/2019  Authorization Period Expiration: 2/28/2020  Plan of Care Expiration: 6/7/2019  Visit # / Visits authorized: 11/20     Time In: 9:40 AM  Time Out: 1040 AM  Total Billable Time: 50 minutes / 1:1 with PT 40' time      Precautions: Standard and Fall      Subjective     Pt reports:Not feeling confident with putting weight on her L leg when walking without the walker. She feels stronger getting out of a chair since beginning therapy  She was compliant with home exercise program.  Response to previous treatment: sore  Functional change: improvements in sit to stand transfers and walking without AD    Pain: 6/10  Location: bilateral knee / lower back    Objective     Jojo received therapeutic exercises to develop strength, endurance, ROM, flexibility, posture and core stabilization for 40 minutes including:    +DKTC x 20 on PB  +LTR 2 min on PB  Bridging x 10  Seated heel raises x 10  SLR x 15  Standing hip ABD 2 x 10  Quad sets: 5" x 2 min  Heel slides with strap x 10 ea  HL hip adduction x 20  SAQ x 20- NT  HL hip abduction w/ YTB 20x  Sit to stand high mat 5x  Supine hip abduction x 20 with sliding board  mini squats in // bars x 10    Jojo participated in neuromuscular re-education activities to improve: Balance, Proprioception and Posture for 10 minutes. The following activities were included:  Gait training with quad cane 80 ft CGA to Min A   Static " "standing balance 30" x 2  Alternate arm swing with forward stepping x 10 ea   Side stepping // bars BUE support x 4 laps      Home Exercises Provided and Patient Education Provided     Education provided:   - HEP    Written Home Exercises Provided: Patient instructed to cont prior HEP.  Exercises were reviewed and Jojo was able to demonstrate them prior to the end of the session.  Jojo demonstrated good  understanding of the education provided.     See EMR under Patient Instructions for exercises provided prior visit.    Assessment     Good tolerance to treatment today without any reports of increased pain. Pt progressing with increased distance ambulated with quad cane this session.     Jojo is progressing well towards her goals.   Pt prognosis is Good.     Pt will continue to benefit from skilled outpatient physical therapy to address the deficits listed in the problem list box on initial evaluation, provide pt/family education and to maximize pt's level of independence in the home and community environment.     Pt's spiritual, cultural and educational needs considered and pt agreeable to plan of care and goals.     Anticipated barriers to physical therapy: none    Goals:  Short Term Goals: 6 weeks   1. Patient to demonstrate independence with postural awareness for improved management of condition (progressing, not met)  2. Patient to report decreased pain in low back and knees by 30% or greater with ADL's(progressing, not met)  3. Patient to be increased gait speed as noted by TUG 20 sec or less(progressing, not met)  4. Patient to have improved static standing balance 30 seconds or greater without UE assist(progressing, not met)     Long Term Goals: 12 weeks   1. Patient to be independent with home exercise program for improved self management of condition(progressing, not met)  2. Patient to have decreased subjective report of disability as noted by a score of 60% or less on the FOTO Lumbar " questionnaire (progressing, not met)  3. Patient to increased strength in BLE's by 1/2 muscle grade or greater for improved performance of ADL's (progressing, not met)  4. Patient to increased AROM in B knee flexion to 110 degrees or greater for improved performance of ADL's(progressing, not met)      Plan     Plan to progress standing balance and gait with AD as tolerated     Christine Tavera, PT

## 2019-05-07 ENCOUNTER — CLINICAL SUPPORT (OUTPATIENT)
Dept: REHABILITATION | Facility: OTHER | Age: 84
End: 2019-05-07
Payer: MEDICARE

## 2019-05-07 DIAGNOSIS — R26.9 GAIT DIFFICULTY: ICD-10-CM

## 2019-05-07 DIAGNOSIS — M54.50 CHRONIC BILATERAL LOW BACK PAIN WITHOUT SCIATICA: ICD-10-CM

## 2019-05-07 DIAGNOSIS — G89.29 CHRONIC BILATERAL LOW BACK PAIN WITHOUT SCIATICA: ICD-10-CM

## 2019-05-07 DIAGNOSIS — R29.898 WEAKNESS OF BOTH LOWER EXTREMITIES: ICD-10-CM

## 2019-05-07 PROCEDURE — 97110 THERAPEUTIC EXERCISES: CPT | Mod: PN

## 2019-05-07 PROCEDURE — 97112 NEUROMUSCULAR REEDUCATION: CPT | Mod: PN

## 2019-05-07 NOTE — PROGRESS NOTES
"  Physical Therapy Daily Treatment Note     Name: Jojo Ayoub  Clinic Number: [id number]    Therapy Diagnosis:   Encounter Diagnoses   Name Primary?    Gait difficulty     Weakness of both lower extremities     Chronic bilateral low back pain without sciatica      Physician: Darya Robles MD    Visit Date: 5/7/2019    Physician Orders: PT Eval and Treat   Medical Diagnosis from Referral:   M51.36 (ICD-10-CM) - DDD (degenerative disc disease), lumbar   M25.561,M25.562,G89.29 (ICD-10-CM) - Chronic pain of both knees         Evaluation Date: 3/15/2019  Authorization Period Expiration: 2/28/2020  Plan of Care Expiration: 6/7/2019  Visit # / Visits authorized: 12/20     Time In: 9:00 AM  Time Out: 10:00 AM  Total Billable Time: 55 minutes / 1:1 with PTA 30' time      Precautions: Standard and Fall      Subjective     Pt reports:noticed some improvements. Stated she has less pain with doing chores around the house.   She was compliant with home exercise program.  Response to previous treatment: sore  Functional change: improvements in sit to stand transfers and walking without AD    Pain: 6/10  Location: bilateral knee / lower back    Objective     Jojo received therapeutic exercises to develop strength, endurance, ROM, flexibility, posture and core stabilization for 40 minutes including:    +DKTC x 20 on PB  +LTR 2 min on PB  Bridging x 10  Seated heel raises x 10  SLR x 15  Standing hip ABD 2 x 10  Quad sets: 5" x 2 min  Heel slides with strap x 10 ea  HL hip adduction x 20  SAQ x 20- NT  HL hip abduction w/ YTB 20x  Sit to stand high mat 5x  Supine hip abduction x 20 with sliding board  mini squats in // bars x 10    Jojo participated in neuromuscular re-education activities to improve: Balance, Proprioception and Posture for 10 minutes. The following activities were included:  Gait training with quad cane 90 ft CGA to Min A   Static standing balance 30" x 2  Alternate arm swing with forward stepping " x 10 ea   Side stepping // bars BUE support x 4 laps      Home Exercises Provided and Patient Education Provided     Education provided:   - HEP    Written Home Exercises Provided: Patient instructed to cont prior HEP.  Exercises were reviewed and Jojo was able to demonstrate them prior to the end of the session.  Jojo demonstrated good  understanding of the education provided.     See EMR under Patient Instructions for exercises provided prior visit.    Assessment     Good tolerance to treatment today without any reports of increased pain. Pt progressing with increased distance ambulated with quad cane this session (60 ft). Pt required VC for proper gait mechanics (increase heel strike and toe clearance).      Jojo is progressing well towards her goals.   Pt prognosis is Good.     Pt will continue to benefit from skilled outpatient physical therapy to address the deficits listed in the problem list box on initial evaluation, provide pt/family education and to maximize pt's level of independence in the home and community environment.     Pt's spiritual, cultural and educational needs considered and pt agreeable to plan of care and goals.     Anticipated barriers to physical therapy: none    Goals:  Short Term Goals: 6 weeks   1. Patient to demonstrate independence with postural awareness for improved management of condition (progressing, not met)  2. Patient to report decreased pain in low back and knees by 30% or greater with ADL's(progressing, not met)  3. Patient to be increased gait speed as noted by TUG 20 sec or less(progressing, not met)  4. Patient to have improved static standing balance 30 seconds or greater without UE assist(progressing, not met)     Long Term Goals: 12 weeks   1. Patient to be independent with home exercise program for improved self management of condition(progressing, not met)  2. Patient to have decreased subjective report of disability as noted by a score of 60% or less on  the FOTO Lumbar questionnaire (progressing, not met)  3. Patient to increased strength in BLE's by 1/2 muscle grade or greater for improved performance of ADL's (progressing, not met)  4. Patient to increased AROM in B knee flexion to 110 degrees or greater for improved performance of ADL's(progressing, not met)      Plan     Plan to progress standing balance and gait with AD as tolerated     Frandy Lares, PTA

## 2019-05-10 ENCOUNTER — CLINICAL SUPPORT (OUTPATIENT)
Dept: REHABILITATION | Facility: OTHER | Age: 84
End: 2019-05-10
Payer: MEDICARE

## 2019-05-10 DIAGNOSIS — M54.50 CHRONIC BILATERAL LOW BACK PAIN WITHOUT SCIATICA: ICD-10-CM

## 2019-05-10 DIAGNOSIS — G89.29 CHRONIC BILATERAL LOW BACK PAIN WITHOUT SCIATICA: ICD-10-CM

## 2019-05-10 DIAGNOSIS — R29.898 WEAKNESS OF BOTH LOWER EXTREMITIES: ICD-10-CM

## 2019-05-10 DIAGNOSIS — R26.9 GAIT DIFFICULTY: ICD-10-CM

## 2019-05-10 PROCEDURE — 97110 THERAPEUTIC EXERCISES: CPT | Mod: PN | Performed by: PHYSICAL THERAPIST

## 2019-05-10 PROCEDURE — 97112 NEUROMUSCULAR REEDUCATION: CPT | Mod: PN | Performed by: PHYSICAL THERAPIST

## 2019-05-10 NOTE — PROGRESS NOTES
"  Physical Therapy Daily Treatment Note     Name: Jojo Ayoub  Clinic Number: [id number]    Therapy Diagnosis:   Encounter Diagnoses   Name Primary?    Gait difficulty     Weakness of both lower extremities     Chronic bilateral low back pain without sciatica      Physician: Darya Robles MD    Visit Date: 5/10/2019    Physician Orders: PT Eval and Treat   Medical Diagnosis from Referral:   M51.36 (ICD-10-CM) - DDD (degenerative disc disease), lumbar   M25.561,M25.562,G89.29 (ICD-10-CM) - Chronic pain of both knees         Evaluation Date: 3/15/2019  Authorization Period Expiration: 2/28/2020  Plan of Care Expiration: 6/7/2019  Visit # / Visits authorized: 13/20     Time In: 9:10 AM  Time Out: 10:10 AM  Total Billable Time: 50 minutes / 1:1 with PT entire time      Precautions: Standard and Fall      Subjective     Pt reports:having more energy after last treatment session and did grocery shopping at [facility]. She also was able to stand to do some cooking.   She was compliant with home exercise program.  Response to previous treatment: sore  Functional change: improvements in sit to stand transfers and walking without AD    Pain: 6/10  Location: bilateral knee / lower back    Objective     Jojo received therapeutic exercises to develop strength, endurance, ROM, flexibility, posture and core stabilization for 40 minutes including:    DKTC x 20 on PB  LTR 2 min on PB  Bridging x 10  Seated heel raises x 10  SLR x 15  Standing hip ABD 2 x 10  Quad sets: 5" x 2 min  Heel slides with strap x 10 ea  HL hip adduction x 20  SAQ x 20- NT  HL hip abduction w/ YTB 20x  Sit to stand high mat 5x  Supine hip abduction x 20 with sliding board  mini squats in // bars x 10    Jojo participated in neuromuscular re-education activities to improve: Balance, Proprioception and Posture for 10 minutes. The following activities were included:  Gait training with quad cane 40 ft x 2 CGA to Min A   +Static standing balance " "30" x 2 on airex  Alternate arm swing with forward stepping x 10 ea   Side stepping // bars BUE support x 4 laps    Received 10 min moist heat to knees and low back x 10 min    Home Exercises Provided and Patient Education Provided     Education provided:   - HEP    Written Home Exercises Provided: Patient instructed to cont prior HEP.  Exercises were reviewed and Jojo was able to demonstrate them prior to the end of the session.  Jojo demonstrated good  understanding of the education provided.     See EMR under Patient Instructions for exercises provided prior visit.    Assessment     Good tolerance to treatment today without exacerbation of pain. Pt progressing with improved balance on uneven surfaces    Jojo is progressing well towards her goals.   Pt prognosis is Good.     Pt will continue to benefit from skilled outpatient physical therapy to address the deficits listed in the problem list box on initial evaluation, provide pt/family education and to maximize pt's level of independence in the home and community environment.     Pt's spiritual, cultural and educational needs considered and pt agreeable to plan of care and goals.     Anticipated barriers to physical therapy: none    Goals:  Short Term Goals: 6 weeks   1. Patient to demonstrate independence with postural awareness for improved management of condition (progressing, not met)  2. Patient to report decreased pain in low back and knees by 30% or greater with ADL's(progressing, not met)  3. Patient to be increased gait speed as noted by TUG 20 sec or less(progressing, not met)  4. Patient to have improved static standing balance 30 seconds or greater without UE assist(met 5/10/2019)     Long Term Goals: 12 weeks   1. Patient to be independent with home exercise program for improved self management of condition(progressing, not met)  2. Patient to have decreased subjective report of disability as noted by a score of 60% or less on the FOTO " Lumbar questionnaire (progressing, not met)  3. Patient to increased strength in BLE's by 1/2 muscle grade or greater for improved performance of ADL's (progressing, not met)  4. Patient to increased AROM in B knee flexion to 110 degrees or greater for improved performance of ADL's(progressing, not met)      Plan     Plan to progress standing balance and gait with AD as tolerated     Christine Tavera, PT

## 2019-05-14 ENCOUNTER — CLINICAL SUPPORT (OUTPATIENT)
Dept: REHABILITATION | Facility: OTHER | Age: 84
End: 2019-05-14
Payer: MEDICARE

## 2019-05-14 DIAGNOSIS — M54.50 CHRONIC BILATERAL LOW BACK PAIN WITHOUT SCIATICA: ICD-10-CM

## 2019-05-14 DIAGNOSIS — R29.898 WEAKNESS OF BOTH LOWER EXTREMITIES: ICD-10-CM

## 2019-05-14 DIAGNOSIS — G89.29 CHRONIC BILATERAL LOW BACK PAIN WITHOUT SCIATICA: ICD-10-CM

## 2019-05-14 DIAGNOSIS — R26.9 GAIT DIFFICULTY: ICD-10-CM

## 2019-05-14 PROCEDURE — 97112 NEUROMUSCULAR REEDUCATION: CPT | Mod: PN

## 2019-05-14 PROCEDURE — 97110 THERAPEUTIC EXERCISES: CPT | Mod: PN

## 2019-05-14 NOTE — PROGRESS NOTES
"  Physical Therapy Daily Treatment Note     Name: Jojo Ayoub  Clinic Number: [id number]    Therapy Diagnosis:   Encounter Diagnoses   Name Primary?    Gait difficulty     Weakness of both lower extremities     Chronic bilateral low back pain without sciatica      Physician: Darya Robles MD    Visit Date: 5/14/2019    Physician Orders: PT Eval and Treat   Medical Diagnosis from Referral:   M51.36 (ICD-10-CM) - DDD (degenerative disc disease), lumbar   M25.561,M25.562,G89.29 (ICD-10-CM) - Chronic pain of both knees         Evaluation Date: 3/15/2019  Authorization Period Expiration: 2/28/2020  Plan of Care Expiration: 6/7/2019  Visit # / Visits authorized: 14/20     Time In: 9:00 AM  Time Out: 10:00 AM  Total Billable Time: 50 minutes / 1:1 with PTA 3 mins      Precautions: Standard and Fall      Subjective     Pt reports:feeling fine today. Stated that she is able to do more activities at home.   She was compliant with home exercise program.  Response to previous treatment: sore  Functional change: improvements in sit to stand transfers and walking without AD    Pain: 7/10  Location: bilateral knee / lower back    Objective     Jojo received therapeutic exercises to develop strength, endurance, ROM, flexibility, posture and core stabilization for 40 minutes including:    DKTC x 20 on PB  LTR 2 min on PB  Bridging x 20  Seated heel raises x 10  SLR x 20  Standing hip ABD 2 x 10  Quad sets: 5" x 2 min  Heel slides with strap x 10 ea  HL hip adduction x 20  SAQ x 20- NT  HL hip abduction w/ YTB 20x  Sit to stand high mat 5x  Supine hip abduction x 20 with sliding board  mini squats in // bars x 10    Jojo participated in neuromuscular re-education activities to improve: Balance, Proprioception and Posture for 10 minutes. The following activities were included:  Gait training with quad cane 40 ft x 2 CGA to Min A   +Static standing balance 30" x 2 on airex  Alternate arm swing with forward stepping x " 10 ea   Side stepping // bars BUE support x 4 laps    Received 10 min moist heat to knees and low back x 10 min    Home Exercises Provided and Patient Education Provided     Education provided:   - HEP    Written Home Exercises Provided: Patient instructed to cont prior HEP.  Exercises were reviewed and Jojo was able to demonstrate them prior to the end of the session.  Jojo demonstrated good  understanding of the education provided.     See EMR under Patient Instructions for exercises provided prior visit.    Assessment     Good tolerance to standing therex today without exacerbation of low back pain. Pt display multiple episodes of LOB during airex pad and required intermittent bilateral UE support in // bars for recovery.     Jojo is progressing well towards her goals.   Pt prognosis is Good.     Pt will continue to benefit from skilled outpatient physical therapy to address the deficits listed in the problem list box on initial evaluation, provide pt/family education and to maximize pt's level of independence in the home and community environment.     Pt's spiritual, cultural and educational needs considered and pt agreeable to plan of care and goals.     Anticipated barriers to physical therapy: none    Goals:  Short Term Goals: 6 weeks   1. Patient to demonstrate independence with postural awareness for improved management of condition (progressing, not met)  2. Patient to report decreased pain in low back and knees by 30% or greater with ADL's(progressing, not met)  3. Patient to be increased gait speed as noted by TUG 20 sec or less(progressing, not met)  4. Patient to have improved static standing balance 30 seconds or greater without UE assist(met 5/10/2019)     Long Term Goals: 12 weeks   1. Patient to be independent with home exercise program for improved self management of condition(progressing, not met)  2. Patient to have decreased subjective report of disability as noted by a score of 60% or  less on the FOTO Lumbar questionnaire (progressing, not met)  3. Patient to increased strength in BLE's by 1/2 muscle grade or greater for improved performance of ADL's (progressing, not met)  4. Patient to increased AROM in B knee flexion to 110 degrees or greater for improved performance of ADL's(progressing, not met)      Plan     Plan to progress standing balance and gait with AD as tolerated     Frandy Lares, PTA

## 2019-05-30 RX ORDER — METFORMIN HYDROCHLORIDE 500 MG/1
TABLET, FILM COATED, EXTENDED RELEASE ORAL
Qty: 90 TABLET | Refills: 3 | Status: SHIPPED | OUTPATIENT
Start: 2019-05-30 | End: 2019-12-20 | Stop reason: SDUPTHER

## 2019-06-04 ENCOUNTER — CLINICAL SUPPORT (OUTPATIENT)
Dept: REHABILITATION | Facility: OTHER | Age: 84
End: 2019-06-04
Payer: MEDICARE

## 2019-06-04 DIAGNOSIS — G89.29 CHRONIC BILATERAL LOW BACK PAIN WITHOUT SCIATICA: ICD-10-CM

## 2019-06-04 DIAGNOSIS — R29.898 WEAKNESS OF BOTH LOWER EXTREMITIES: ICD-10-CM

## 2019-06-04 DIAGNOSIS — R26.9 GAIT DIFFICULTY: ICD-10-CM

## 2019-06-04 DIAGNOSIS — M54.50 CHRONIC BILATERAL LOW BACK PAIN WITHOUT SCIATICA: ICD-10-CM

## 2019-06-04 PROCEDURE — 97110 THERAPEUTIC EXERCISES: CPT | Mod: PN

## 2019-06-04 NOTE — PROGRESS NOTES
"  Physical Therapy Daily Treatment Note     Name: Jojo Ayoub  Clinic Number: [id number]    Therapy Diagnosis:   Encounter Diagnoses   Name Primary?    Gait difficulty     Weakness of both lower extremities     Chronic bilateral low back pain without sciatica      Physician: Darya Robles MD    Visit Date: 6/4/2019    Physician Orders: PT Eval and Treat   Medical Diagnosis from Referral:   M51.36 (ICD-10-CM) - DDD (degenerative disc disease), lumbar   M25.561,M25.562,G89.29 (ICD-10-CM) - Chronic pain of both knees         Evaluation Date: 3/15/2019  Authorization Period Expiration: 2/28/2020  Plan of Care Expiration: 6/7/2019  Visit # / Visits authorized: 15/20     Time In: 9:00 AM  Time Out: 10:00 AM  Total Billable Time: 50 minutes / 1:1 with PTA 30 mins      Precautions: Standard and Fall      Subjective     Pt reports: "When I come here, I feel enlightened." Stated that her pain is primarily in the lower back today.  She was compliant with home exercise program.  Response to previous treatment: sore  Functional change: improvements in sit to stand transfers and walking without AD    Pain: 7/10  Location: bilateral knee / lower back    Objective     Jojo received therapeutic exercises to develop strength, endurance, ROM, flexibility, posture and core stabilization for 40 minutes including:    DKTC x 20 on PB  LTR 2 min on PB  Bridging x 20  Seated heel raises x 10  SLR x 20  Standing hip ABD 2 x 10  Quad sets: 5" x 2 min  Heel slides with strap x 10 ea  HL hip adduction x 20  SAQ x 20- NT  HL hip abduction w/ YTB 20x  Sit to stand high mat 5x  Supine hip abduction x 20 with sliding board  mini squats in // bars x 10    Jojo participated in neuromuscular re-education activities to improve: Balance, Proprioception and Posture for 10 minutes. The following activities were included:  Gait training with quad cane 40 ft x 2 CGA to Min A   +Static standing balance 30" x 2 on airex  Alternate arm swing " with forward stepping x 10 ea   Side stepping // bars BUE support x 4 laps    Received 00 min x moist heat to knees and low back     Home Exercises Provided and Patient Education Provided     Education provided:   - HEP    Written Home Exercises Provided: Patient instructed to cont prior HEP.  Exercises were reviewed and Jojo was able to demonstrate them prior to the end of the session.  Jojo demonstrated good  understanding of the education provided.     See EMR under Patient Instructions for exercises provided prior visit.    Assessment     Pt is progressing with improved ex ability and requires less cues for correction. No reports of lower back pain with standing therex and continues to require bilateral UE support in // bars.     Jojo is progressing well towards her goals.   Pt prognosis is Good.     Pt will continue to benefit from skilled outpatient physical therapy to address the deficits listed in the problem list box on initial evaluation, provide pt/family education and to maximize pt's level of independence in the home and community environment.     Pt's spiritual, cultural and educational needs considered and pt agreeable to plan of care and goals.     Anticipated barriers to physical therapy: none    Goals:  Short Term Goals: 6 weeks   1. Patient to demonstrate independence with postural awareness for improved management of condition (progressing, not met)  2. Patient to report decreased pain in low back and knees by 30% or greater with ADL's(progressing, not met)  3. Patient to be increased gait speed as noted by TUG 20 sec or less(progressing, not met)  4. Patient to have improved static standing balance 30 seconds or greater without UE assist(met 5/10/2019)     Long Term Goals: 12 weeks   1. Patient to be independent with home exercise program for improved self management of condition(progressing, not met)  2. Patient to have decreased subjective report of disability as noted by a score of  60% or less on the FOTO Lumbar questionnaire (progressing, not met)  3. Patient to increased strength in BLE's by 1/2 muscle grade or greater for improved performance of ADL's (progressing, not met)  4. Patient to increased AROM in B knee flexion to 110 degrees or greater for improved performance of ADL's(progressing, not met)      Plan     Plan to progress standing balance and gait with AD as tolerated     Frandy Lares, PTA

## 2019-06-07 ENCOUNTER — CLINICAL SUPPORT (OUTPATIENT)
Dept: REHABILITATION | Facility: OTHER | Age: 84
End: 2019-06-07
Payer: MEDICARE

## 2019-06-07 DIAGNOSIS — R26.9 GAIT DIFFICULTY: ICD-10-CM

## 2019-06-07 DIAGNOSIS — R29.898 WEAKNESS OF BOTH LOWER EXTREMITIES: ICD-10-CM

## 2019-06-07 DIAGNOSIS — G89.29 CHRONIC BILATERAL LOW BACK PAIN WITHOUT SCIATICA: ICD-10-CM

## 2019-06-07 DIAGNOSIS — M54.50 CHRONIC BILATERAL LOW BACK PAIN WITHOUT SCIATICA: ICD-10-CM

## 2019-06-07 PROCEDURE — 97110 THERAPEUTIC EXERCISES: CPT | Mod: PN

## 2019-06-07 NOTE — PROGRESS NOTES
"  Physical Therapy Daily Treatment Note     Name: Jojo Ayoub  Clinic Number: [id number]    Therapy Diagnosis:   Encounter Diagnoses   Name Primary?    Gait difficulty     Weakness of both lower extremities     Chronic bilateral low back pain without sciatica      Physician: Darya Robles MD    Visit Date: 6/7/2019    Physician Orders: PT Eval and Treat   Medical Diagnosis from Referral:   M51.36 (ICD-10-CM) - DDD (degenerative disc disease), lumbar   M25.561,M25.562,G89.29 (ICD-10-CM) - Chronic pain of both knees         Evaluation Date: 3/15/2019  Authorization Period Expiration: 2/28/2020  Plan of Care Expiration: 6/7/2019  Visit # / Visits authorized: 16/20     Time In: 9:15 AM  Time Out: 10:00 AM  Total Billable Time: 45 minutes / 1:1 with PT 45 mins      Precautions: Standard and Fall      Subjective     Pt reports: feeling good today without c/o soreness from the previous visit. She says that she is able to stand better while making breakfast or cooking dinner.    She was compliant with home exercise program.  Response to previous treatment: sore  Functional change: improvements in sit to stand transfers and walking without AD    Pain: 7/10  Location: bilateral knee / lower back    Objective     Jojo received therapeutic exercises to develop strength, endurance, ROM, flexibility, posture and core stabilization for 45 minutes including:    DKTC x 20 on PB  LTR 2 min on PB  Bridging x 20  SLR x 20  Quad sets: 5" x 2 min  Heel slides with strap x 10 ea  HL hip adduction x 20  SAQ x 20- NP today  HL hip abduction w/ YTB 20x - NP today  Supine hip abduction x 20 with sliding board - NP today    Seated heel raises x 10  Sit to stand high mat 5x  mini squats in // bars x 15  Standing hip ABD 2 x 10    Jojo participated in neuromuscular re-education activities to improve: Balance, Proprioception and Posture for 00 minutes. The following activities were included:  Gait training with quad cane 40 ft " "x 2 CGA to Min A   Static standing balance 30" x 2 on airex  Alternate arm swing with forward stepping x 10 ea   Side stepping // bars BUE support x 4 laps    Received 00 min x moist heat to knees and low back     Home Exercises Provided and Patient Education Provided     Education provided:   - HEP    Written Home Exercises Provided: Patient instructed to cont prior HEP.  Exercises were reviewed and Jojo was able to demonstrate them prior to the end of the session.  Jojo demonstrated good  understanding of the education provided.     See EMR under Patient Instructions for exercises provided prior visit.    Assessment     Good tolerance with therex today with good training effect. Limited progression of therex today due to time constraints.  Jojo is progressing well towards her goals.   Pt prognosis is Good.     Pt will continue to benefit from skilled outpatient physical therapy to address the deficits listed in the problem list box on initial evaluation, provide pt/family education and to maximize pt's level of independence in the home and community environment.     Pt's spiritual, cultural and educational needs considered and pt agreeable to plan of care and goals.     Anticipated barriers to physical therapy: none    Goals:  Short Term Goals: 6 weeks   1. Patient to demonstrate independence with postural awareness for improved management of condition (progressing, not met)  2. Patient to report decreased pain in low back and knees by 30% or greater with ADL's(progressing, not met)  3. Patient to be increased gait speed as noted by TUG 20 sec or less(progressing, not met)  4. Patient to have improved static standing balance 30 seconds or greater without UE assist(met 5/10/2019)     Long Term Goals: 12 weeks   1. Patient to be independent with home exercise program for improved self management of condition(progressing, not met)  2. Patient to have decreased subjective report of disability as noted by a " score of 60% or less on the FOTO Lumbar questionnaire (progressing, not met)  3. Patient to increased strength in BLE's by 1/2 muscle grade or greater for improved performance of ADL's (progressing, not met)  4. Patient to increased AROM in B knee flexion to 110 degrees or greater for improved performance of ADL's(progressing, not met)      Plan     Plan to progress standing balance and gait with AD as tolerated     Jessica Hutson, PT

## 2019-06-11 ENCOUNTER — CLINICAL SUPPORT (OUTPATIENT)
Dept: REHABILITATION | Facility: OTHER | Age: 84
End: 2019-06-11
Payer: MEDICARE

## 2019-06-11 DIAGNOSIS — R26.9 GAIT DIFFICULTY: ICD-10-CM

## 2019-06-11 DIAGNOSIS — R29.898 WEAKNESS OF BOTH LOWER EXTREMITIES: ICD-10-CM

## 2019-06-11 DIAGNOSIS — M54.50 CHRONIC BILATERAL LOW BACK PAIN WITHOUT SCIATICA: ICD-10-CM

## 2019-06-11 DIAGNOSIS — G89.29 CHRONIC BILATERAL LOW BACK PAIN WITHOUT SCIATICA: ICD-10-CM

## 2019-06-11 PROCEDURE — 97112 NEUROMUSCULAR REEDUCATION: CPT | Mod: PN

## 2019-06-11 PROCEDURE — 97110 THERAPEUTIC EXERCISES: CPT | Mod: PN

## 2019-06-11 NOTE — PROGRESS NOTES
"  Physical Therapy Daily Treatment Note     Name: Jojo Ayoub  Clinic Number: [id number]    Therapy Diagnosis:   Encounter Diagnoses   Name Primary?    Gait difficulty     Weakness of both lower extremities     Chronic bilateral low back pain without sciatica      Physician: Darya Robles MD    Visit Date: 6/11/2019    Physician Orders: PT Eval and Treat   Medical Diagnosis from Referral:   M51.36 (ICD-10-CM) - DDD (degenerative disc disease), lumbar   M25.561,M25.562,G89.29 (ICD-10-CM) - Chronic pain of both knees         Evaluation Date: 3/15/2019  Authorization Period Expiration: 2/28/2020  Plan of Care Expiration: 6/7/2019  Visit # / Visits authorized: 17/20     Time In: 9:05 AM  Time Out: 10:00 AM  Total Billable Time: 55 minutes / 1:1 with PTA 55 mins      Precautions: Standard and Fall      Subjective     Pt reports: that she is aware to do more things at home now. No other new complaints.    She was compliant with home exercise program.  Response to previous treatment: sore  Functional change: improvements in sit to stand transfers and walking without AD    Pain: 7/10  Location: bilateral knee / lower back    Objective     Jojo received therapeutic exercises to develop strength, endurance, ROM, flexibility, posture and core stabilization for 45 minutes including:    DKTC x 20 on PB  LTR 2 min on PB  Bridging x 20  SLR x 20  Quad sets: 5" x 2 min  Heel slides with strap x 10 ea  HL hip adduction x 20  SAQ x 20- NP today  HL hip abduction w/ YTB 20x - NP today  Supine hip abduction x 20 with sliding board - NP today    Seated heel raises x 10  Sit to stand high mat 5x  mini squats in // bars x 15  Standing hip ABD 2 x 10    Jojo participated in neuromuscular re-education activities to improve: Balance, Proprioception and Posture for 15 minutes. The following activities were included:  Gait training with quad cane 40 ft x 2 CGA to Min A   Static standing balance 30" x 2 on airex  Alternate " arm swing with forward stepping x 10 ea   Side stepping // bars no BUE support and CGA x 4 laps    Received 00 min x moist heat to knees and low back     Home Exercises Provided and Patient Education Provided     Education provided:   - HEP    Written Home Exercises Provided: Patient instructed to cont prior HEP.  Exercises were reviewed and Jojo was able to demonstrate them prior to the end of the session.  Jojo demonstrated good  understanding of the education provided.     See EMR under Patient Instructions for exercises provided prior visit.    Assessment     Good tolerance to therex today. Pt able to perform static balance with 3 episodes of LOB today. Pt performed side stepping with no bilateral UE support in // bars and min A.  Jojo is progressing well towards her goals.   Pt prognosis is Good.     Pt will continue to benefit from skilled outpatient physical therapy to address the deficits listed in the problem list box on initial evaluation, provide pt/family education and to maximize pt's level of independence in the home and community environment.     Pt's spiritual, cultural and educational needs considered and pt agreeable to plan of care and goals.     Anticipated barriers to physical therapy: none    Goals:  Short Term Goals: 6 weeks   1. Patient to demonstrate independence with postural awareness for improved management of condition (progressing, not met)  2. Patient to report decreased pain in low back and knees by 30% or greater with ADL's(progressing, not met)  3. Patient to be increased gait speed as noted by TUG 20 sec or less(progressing, not met)  4. Patient to have improved static standing balance 30 seconds or greater without UE assist(met 5/10/2019)     Long Term Goals: 12 weeks   1. Patient to be independent with home exercise program for improved self management of condition(progressing, not met)  2. Patient to have decreased subjective report of disability as noted by a score of  60% or less on the FOTO Lumbar questionnaire (progressing, not met)  3. Patient to increased strength in BLE's by 1/2 muscle grade or greater for improved performance of ADL's (progressing, not met)  4. Patient to increased AROM in B knee flexion to 110 degrees or greater for improved performance of ADL's(progressing, not met)      Plan     Plan to progress standing balance and gait with AD as tolerated     Frandy Lares, PTA

## 2019-06-14 ENCOUNTER — CLINICAL SUPPORT (OUTPATIENT)
Dept: REHABILITATION | Facility: OTHER | Age: 84
End: 2019-06-14
Payer: MEDICARE

## 2019-06-14 DIAGNOSIS — M54.50 CHRONIC BILATERAL LOW BACK PAIN WITHOUT SCIATICA: ICD-10-CM

## 2019-06-14 DIAGNOSIS — R26.9 GAIT DIFFICULTY: ICD-10-CM

## 2019-06-14 DIAGNOSIS — G89.29 CHRONIC BILATERAL LOW BACK PAIN WITHOUT SCIATICA: ICD-10-CM

## 2019-06-14 DIAGNOSIS — R29.898 WEAKNESS OF BOTH LOWER EXTREMITIES: ICD-10-CM

## 2019-06-14 PROCEDURE — 97112 NEUROMUSCULAR REEDUCATION: CPT | Mod: PN | Performed by: PHYSICAL THERAPIST

## 2019-06-14 PROCEDURE — 97110 THERAPEUTIC EXERCISES: CPT | Mod: PN | Performed by: PHYSICAL THERAPIST

## 2019-06-14 NOTE — PLAN OF CARE
Physical Therapy Daily Treatment Note/ Progress Note     Name: Jojo Ayoub  Clinic Number: [id number]    Therapy Diagnosis:   Encounter Diagnoses   Name Primary?    Gait difficulty     Weakness of both lower extremities     Chronic bilateral low back pain without sciatica      Physician: Darya Robles MD    Visit Date: 2019    Physician Orders: PT Eval and Treat   Medical Diagnosis from Referral:   M51.36 (ICD-10-CM) - DDD (degenerative disc disease), lumbar   M25.561,M25.562,G89.29 (ICD-10-CM) - Chronic pain of both knees         Evaluation Date: 3/15/2019  Authorization Period Expiration: 2020  Plan of Care Expiration: 2019  Visit # / Visits authorized:      Time In: 8:55 AM  Time Out: 0950 AM  Total Billable Time: 55 minutes / 1:1 with PT 55 mins      Precautions: Standard and Fall      Subjective     Pt reports: Pt has had more energy to get out of the house since beginning therapy. She is going to the grocery store and bible study again. She continues to have low back pain but doesn't want that to stop her from doing things. She would like to continue therapy if the insurance will allow.     She was compliant with home exercise program.  Response to previous treatment: sore  Functional change: improvements in sit to stand transfers and walking without AD in the house. Going to the grocery store    Pain: 7/10  Location: bilateral knee / lower back    Objective     2019  TU sec with rollator  Static standin sec NBOS    Lower Extremity Strength  Right LE   Left LE     Knee extension: 4+/5 Knee extension: 4+/5   Knee flexion: 4/5 Knee flexion: 4/5   Hip flexion: 4-/5 Hip flexion: 4-/5   Hip extension:  3/5 Hip extension: 2/5   Hip abduction: 3-/5 Hip abduction: 3-/5   Hip adduction: 4/5 Hip adduction 4/5   Ankle dorsiflexion: 4+/5 Ankle dorsiflexion: 4-/5   Ankle plantarflexion: 3/5 Ankle plantarflexion: 3-/5     Jojo received therapeutic exercises to develop  "strength, endurance, ROM, flexibility, posture and core stabilization for 45 minutes including:    DKTC x 20 on PB  LTR 2 min on PB  Bridging x 20  SLR x 20  Quad sets: 5" x 2 min  Heel slides with strap x 10 ea  HL hip adduction x 20  SAQ x 20- NP today  HL hip abduction w/ YTB 20x - NP today  Supine hip abduction x 20 with sliding board - NP today    Seated heel raises x 10  Sit to stand high mat 5x  mini squats in // bars x 15  Standing hip ABD 2 x 10    Jojo participated in neuromuscular re-education activities to improve: Balance, Proprioception and Posture for 10 minutes. The following activities were included:  Gait training with quad cane 40 ft x 2 CGA to SBA  Static standing balance 30" x 2 on airex  Alternate arm swing with forward stepping x 10 ea - nt  Side stepping // bars no BUE support and CGA x 4 laps  Staggered stance 20" x 2 ea    Home Exercises Provided and Patient Education Provided     Education provided:   - Current HEPissued    Written Home Exercises Provided: Patient issued updated handout  Exercises were reviewed and Jojo was able to demonstrate them prior to the end of the session.  Jojo demonstrated good  understanding of the education provided.     See EMR under Patient Instructions for exercises provided 6/14/2019    Assessment     Jojo was evaluated on 3/15/2019 for lumbar and B knee pain. Pt has made progress with improved static standing balance, LE strength, and independence with performing sit<>stand transfers. Pt continues with poor dynamic standing balance and is at a high risk for falls as noted by TUG score. Pt also with significant B hip and plantar flexor weakness. Recommending continued skilled care to progress gait and balance for improved safety with ADL's and transition to independence with HEP for self management of condition. Goals and POC updated this visit per patients progress.     Pt prognosis is Good.     Pt will continue to benefit from skilled " outpatient physical therapy to address the deficits listed in the problem list box on initial evaluation, provide pt/family education and to maximize pt's level of independence in the home and community environment.     Pt's spiritual, cultural and educational needs considered and pt agreeable to plan of care and goals.     Anticipated barriers to physical therapy: none    Updated Goals:  Short Term Goals: 6 weeks   1. Patient to demonstrate independence with postural awareness for improved management of condition (met 6/14/2019)  2. Patient to report decreased pain in low back and knees by 30% or greater with ADL's(met 6/14/2019)  3. Patient to be increased gait speed as noted by TUG 20 sec or less(progressing, not met)  4. Patient to have improved static standing balance 30 seconds or greater without UE assist(met 5/10/2019)     Long Term Goals: 12 weeks   1. Patient to be independent with home exercise program for improved self management of condition(progressing, not met)  2. Patient to have decreased subjective report of disability as noted by a score of 60% or less on the FOTO Lumbar questionnaire (progressing, not met)  3. Patient to increased strength in BLE's by 1/2 muscle grade or greater for improved performance of ADL's (editied 6/14/2019)  4. Patient to increased AROM in B knee flexion to 100 degrees or greater for improved performance of ADL's (editied 6/14/2019)  5. Patient to be able to ambulate with quad cane 40 ft Supervision (added 6/14/2019)      Plan     Plan of care Certification: 3/15/2019 to 7/31/2019.    Outpatient Physical Therapy 1 times weekly for 4-6 more weeks to include the following interventions: Gait Training, Manual Therapy, Moist Heat/ Ice, Neuromuscular Re-ed, Patient Education, Therapeutic Activites and Therapeutic Exercise.       Christine Tavera, PT

## 2019-06-21 ENCOUNTER — CLINICAL SUPPORT (OUTPATIENT)
Dept: REHABILITATION | Facility: OTHER | Age: 84
End: 2019-06-21
Payer: MEDICARE

## 2019-06-21 DIAGNOSIS — M54.50 CHRONIC BILATERAL LOW BACK PAIN WITHOUT SCIATICA: ICD-10-CM

## 2019-06-21 DIAGNOSIS — R29.898 WEAKNESS OF BOTH LOWER EXTREMITIES: ICD-10-CM

## 2019-06-21 DIAGNOSIS — G89.29 CHRONIC BILATERAL LOW BACK PAIN WITHOUT SCIATICA: ICD-10-CM

## 2019-06-21 DIAGNOSIS — R26.9 GAIT DIFFICULTY: ICD-10-CM

## 2019-06-21 PROCEDURE — 97112 NEUROMUSCULAR REEDUCATION: CPT | Mod: PN | Performed by: PHYSICAL THERAPIST

## 2019-06-21 PROCEDURE — 97110 THERAPEUTIC EXERCISES: CPT | Mod: PN | Performed by: PHYSICAL THERAPIST

## 2019-06-21 RX ORDER — GABAPENTIN 800 MG/1
800 TABLET ORAL 3 TIMES DAILY
Qty: 270 TABLET | Refills: 3 | Status: SHIPPED | OUTPATIENT
Start: 2019-06-21 | End: 2020-05-12
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
## 2019-07-01 PROBLEM — E05.90 SUBCLINICAL HYPERTHYROIDISM: Status: ACTIVE | Noted: 2019-07-01

## 2019-09-04 RX ORDER — CARVEDILOL 25 MG/1
TABLET ORAL
Qty: 180 TABLET | Refills: 3 | Status: SHIPPED | OUTPATIENT
Start: 2019-09-04 | End: 2020-09-09 | Stop reason: SDUPTHER

## 2019-12-20 DIAGNOSIS — E11.65 UNCONTROLLED TYPE 2 DIABETES MELLITUS WITH HYPERGLYCEMIA: Primary | ICD-10-CM

## 2019-12-20 RX ORDER — METFORMIN HYDROCHLORIDE 500 MG/1
500 TABLET, FILM COATED, EXTENDED RELEASE ORAL DAILY
Qty: 90 TABLET | Refills: 0 | Status: SHIPPED | OUTPATIENT
Start: 2019-12-20 | End: 2020-01-21

## 2019-12-20 NOTE — TELEPHONE ENCOUNTER
Spoke to [facility] pharmacy and states that Ms. Ayoub medication MetFormin(Glumetza) 500MG ER is not covered under patient insurance.  Only the Plain metformin and ER metformin.

## 2019-12-20 NOTE — TELEPHONE ENCOUNTER
----- Message from Hafsa Yuen sent at 12/20/2019  9:30 AM CST -----  Contact: PATY SOMMER   Type: RX Refill Request    Who Called: PATY SOMMER     RX Name and Strength: metFORMIN (GLUMETZA) 500 MG (MOD) 24 hr tablet    Preferred Pharmacy with phone number: [facility] #[id number] - [facility] [address] [place] [address] Call Back Number: [phone]    Additional Information: N/A

## 2019-12-20 NOTE — TELEPHONE ENCOUNTER
----- Message from Brad Gallardo sent at 12/20/2019 10:10 AM CST -----  Contact: [facility]  Name of Who is Calling: [facility] #[id number] - [place], [place] [address]      What is the request in detail: [facility] is requesting to change the metFORMIN (GLUMETZA) 500 MG (MOD) 24 hr tablet since patient's insurance doesn't cover the other. Please contact to further advise.      Can the clinic reply by [facility]: NO      What Number to Call Back if not in [facility]: [phone]

## 2019-12-23 DIAGNOSIS — E11.65 UNCONTROLLED TYPE 2 DIABETES MELLITUS WITH HYPERGLYCEMIA: Primary | ICD-10-CM

## 2019-12-23 RX ORDER — METFORMIN HYDROCHLORIDE 500 MG/1
500 TABLET, EXTENDED RELEASE ORAL
Qty: 90 TABLET | Refills: 3 | Status: SHIPPED | OUTPATIENT
Start: 2019-12-23 | End: 2020-01-21

## 2019-12-23 RX ORDER — METFORMIN HYDROCHLORIDE 500 MG/1
500 TABLET, EXTENDED RELEASE ORAL
Qty: 90 TABLET | Refills: 3 | Status: SHIPPED | OUTPATIENT
Start: 2019-12-23 | End: 2020-09-09 | Stop reason: SDUPTHER

## 2019-12-23 NOTE — TELEPHONE ENCOUNTER
Spoke to pt and informed her Rx was sent in.    Imelda   ----- Message from Jud Trevino sent at 12/23/2019  9:07 AM CST -----  Contact: pt  Pt would like to be called back regarding her rx    Pt can be reached at [phone]

## 2019-12-23 NOTE — TELEPHONE ENCOUNTER
----- Message from Giovanny Villa sent at 12/23/2019  9:01 AM CST -----  Contact: Christine ([facility])  Name of Who is Calling: Christine ([facility])      What is the request in detail:  Patient insurance is not covering metFORMIN (GLUMETZA) 500 MG (MOD) 24 hr tablet. But will covered metformin (Glucophage) extended release 500mg. Please send over new prescription, patient is out of medication.      Can the clinic reply by [facility]: no      What Number to Call Back if not in [facility]: [phone]

## 2019-12-30 ENCOUNTER — PATIENT OUTREACH (OUTPATIENT)
Dept: ADMINISTRATIVE | Facility: HOSPITAL | Age: 84
End: 2019-12-30

## 2019-12-30 DIAGNOSIS — E11.9 TYPE 2 DIABETES MELLITUS WITHOUT COMPLICATION, WITHOUT LONG-TERM CURRENT USE OF INSULIN: Primary | ICD-10-CM

## 2020-01-19 ENCOUNTER — NURSE TRIAGE (OUTPATIENT)
Dept: ADMINISTRATIVE | Facility: CLINIC | Age: 85
End: 2020-01-19

## 2020-01-20 NOTE — TELEPHONE ENCOUNTER
Reason for Disposition   Question about upcoming scheduled test, no triage required and triager able to answer question    Additional Information   Negative: [1] Caller is not with the adult (patient) AND [2] reporting urgent symptoms   Negative: Lab result questions   Negative: Medication questions   Negative: Caller can't be reached by phone   Negative: Caller has already spoken to PCP or another triager   Negative: RN needs further essential information from caller in order to complete triage   Negative: Requesting regular office appointment   Negative: [1] Caller requesting NON-URGENT health information AND [2] PCP's office is the best resource   Negative: General information question, no triage required and triager able to answer question   Negative: Health Information question, no triage required and triager able to answer question    Protocols used: INFORMATION ONLY CALL-A-  pt called re appt time. appt 1/21 lab 0997 then OV.

## 2020-01-21 ENCOUNTER — TELEPHONE (OUTPATIENT)
Dept: INTERNAL MEDICINE | Facility: CLINIC | Age: 85
End: 2020-01-21

## 2020-01-21 ENCOUNTER — LAB VISIT (OUTPATIENT)
Dept: LAB | Facility: HOSPITAL | Age: 85
End: 2020-01-21
Attending: FAMILY MEDICINE
Payer: MEDICARE

## 2020-01-21 ENCOUNTER — OFFICE VISIT (OUTPATIENT)
Dept: PRIMARY CARE CLINIC | Facility: CLINIC | Age: 85
End: 2020-01-21
Attending: FAMILY MEDICINE
Payer: MEDICARE

## 2020-01-21 VITALS
HEIGHT: 68 IN | BODY MASS INDEX: 21.83 KG/M2 | HEART RATE: 72 BPM | DIASTOLIC BLOOD PRESSURE: 80 MMHG | SYSTOLIC BLOOD PRESSURE: 130 MMHG | WEIGHT: 144.06 LBS | OXYGEN SATURATION: 99 %

## 2020-01-21 DIAGNOSIS — E78.5 HYPERLIPIDEMIA, UNSPECIFIED HYPERLIPIDEMIA TYPE: ICD-10-CM

## 2020-01-21 DIAGNOSIS — I10 ESSENTIAL HYPERTENSION: ICD-10-CM

## 2020-01-21 DIAGNOSIS — E05.90 SUBCLINICAL HYPERTHYROIDISM: ICD-10-CM

## 2020-01-21 DIAGNOSIS — L75.0 URINARY BODY ODOR: ICD-10-CM

## 2020-01-21 DIAGNOSIS — E11.9 TYPE 2 DIABETES MELLITUS WITHOUT COMPLICATION, WITHOUT LONG-TERM CURRENT USE OF INSULIN: ICD-10-CM

## 2020-01-21 DIAGNOSIS — E11.65 UNCONTROLLED TYPE 2 DIABETES MELLITUS WITH HYPERGLYCEMIA: Primary | ICD-10-CM

## 2020-01-21 LAB
BACTERIA #/AREA URNS AUTO: ABNORMAL /HPF
BILIRUB UR QL STRIP: NEGATIVE
CHOLEST SERPL-MCNC: 150 MG/DL (ref 120–199)
CHOLEST/HDLC SERPL: 3.1 {RATIO} (ref 2–5)
CLARITY UR REFRACT.AUTO: ABNORMAL
COLOR UR AUTO: YELLOW
ESTIMATED AVG GLUCOSE: 126 MG/DL (ref 68–131)
GLUCOSE UR QL STRIP: NEGATIVE
HBA1C MFR BLD HPLC: 6 % (ref 4–5.6)
HDLC SERPL-MCNC: 48 MG/DL (ref 40–75)
HDLC SERPL: 32 % (ref 20–50)
HGB UR QL STRIP: NEGATIVE
KETONES UR QL STRIP: NEGATIVE
LDLC SERPL CALC-MCNC: 83.8 MG/DL (ref 63–159)
LEUKOCYTE ESTERASE UR QL STRIP: ABNORMAL
MICROSCOPIC COMMENT: ABNORMAL
NITRITE UR QL STRIP: POSITIVE
NONHDLC SERPL-MCNC: 102 MG/DL
PH UR STRIP: 7 [PH] (ref 5–8)
PROT UR QL STRIP: NEGATIVE
SP GR UR STRIP: 1 (ref 1–1.03)
SQUAMOUS #/AREA URNS AUTO: 0 /HPF
TRIGL SERPL-MCNC: 91 MG/DL (ref 30–150)
URN SPEC COLLECT METH UR: ABNORMAL
WBC #/AREA URNS AUTO: 45 /HPF (ref 0–5)

## 2020-01-21 PROCEDURE — 87088 URINE BACTERIA CULTURE: CPT

## 2020-01-21 PROCEDURE — 99214 PR OFFICE/OUTPT VISIT, EST, LEVL IV, 30-39 MIN: ICD-10-PCS | Mod: S$PBB,,, | Performed by: FAMILY MEDICINE

## 2020-01-21 PROCEDURE — 99214 OFFICE O/P EST MOD 30 MIN: CPT | Mod: S$PBB,,, | Performed by: FAMILY MEDICINE

## 2020-01-21 PROCEDURE — 83036 HEMOGLOBIN GLYCOSYLATED A1C: CPT

## 2020-01-21 PROCEDURE — 87086 URINE CULTURE/COLONY COUNT: CPT

## 2020-01-21 PROCEDURE — 99999 PR PBB SHADOW E&M-EST. PATIENT-LVL III: ICD-10-PCS | Mod: PBBFAC,,, | Performed by: FAMILY MEDICINE

## 2020-01-21 PROCEDURE — 1159F PR MEDICATION LIST DOCUMENTED IN MEDICAL RECORD: ICD-10-PCS | Mod: ,,, | Performed by: FAMILY MEDICINE

## 2020-01-21 PROCEDURE — 81001 URINALYSIS AUTO W/SCOPE: CPT

## 2020-01-21 PROCEDURE — 1159F MED LIST DOCD IN RCRD: CPT | Mod: ,,, | Performed by: FAMILY MEDICINE

## 2020-01-21 PROCEDURE — 87077 CULTURE AEROBIC IDENTIFY: CPT

## 2020-01-21 PROCEDURE — 36415 COLL VENOUS BLD VENIPUNCTURE: CPT | Mod: PN

## 2020-01-21 PROCEDURE — 87186 SC STD MICRODIL/AGAR DIL: CPT

## 2020-01-21 PROCEDURE — 80061 LIPID PANEL: CPT

## 2020-01-21 PROCEDURE — 1125F PR PAIN SEVERITY QUANTIFIED, PAIN PRESENT: ICD-10-PCS | Mod: ,,, | Performed by: FAMILY MEDICINE

## 2020-01-21 PROCEDURE — 1125F AMNT PAIN NOTED PAIN PRSNT: CPT | Mod: ,,, | Performed by: FAMILY MEDICINE

## 2020-01-21 PROCEDURE — 99999 PR PBB SHADOW E&M-EST. PATIENT-LVL III: CPT | Mod: PBBFAC,,, | Performed by: FAMILY MEDICINE

## 2020-01-21 PROCEDURE — 99213 OFFICE O/P EST LOW 20 MIN: CPT | Mod: PBBFAC,PN | Performed by: FAMILY MEDICINE

## 2020-01-21 NOTE — PROGRESS NOTES
"Subjective:      Patient ID: Jojo Ayoub is a 84 y.o. female.    Chief Complaint: Follow-up (check up) and Urinary Frequency    HPI   Patient here today for follow up. She denies pain with urination, she has noticed an odor in the last month. She denies fevers. She drinks about 30-40 fl oz water daily.      Review of Systems   Constitutional: Negative for activity change, appetite change, chills, diaphoresis, fatigue, fever and unexpected weight change.   HENT: Negative for congestion, ear discharge, ear pain, hearing loss, postnasal drip, rhinorrhea, sinus pressure and sore throat.    Respiratory: Negative for cough, shortness of breath and wheezing.    Cardiovascular: Negative for chest pain.   Gastrointestinal: Negative for abdominal pain, constipation, diarrhea, nausea and vomiting.   Genitourinary: Negative for dysuria and frequency.     I personally reviewed Past Medical History, Past Surgical history,  Past Social History and Family History      Objective:   /80   Pulse 72   Ht 5' 8" (1.727 m)   Wt 65.4 kg (144 lb 1.1 oz)   SpO2 99%   BMI 21.91 kg/m²     Physical Exam   Constitutional: She is oriented to person, place, and time. She appears well-developed and well-nourished. No distress.   HENT:   Head: Normocephalic and atraumatic.   Right Ear: Hearing, tympanic membrane, external ear and ear canal normal.   Left Ear: Hearing, tympanic membrane, external ear and ear canal normal.   Nose: Nose normal.   Mouth/Throat: Uvula is midline and oropharynx is clear and moist. No oropharyngeal exudate.   Eyes: Pupils are equal, round, and reactive to light. Conjunctivae and EOM are normal. Right eye exhibits no discharge. Left eye exhibits no discharge. No scleral icterus.   Neck: Normal range of motion. Neck supple.   Cardiovascular: Normal rate, regular rhythm, normal heart sounds and intact distal pulses. Exam reveals no gallop.   No murmur heard.  Pulmonary/Chest: Effort normal and breath sounds " normal. No respiratory distress. She has no wheezes. She has no rales. She exhibits no tenderness.   Abdominal: Soft. Bowel sounds are normal. She exhibits no distension and no mass. There is no tenderness. There is no rebound and no guarding.   Neurological: She is alert and oriented to person, place, and time.   Skin: Skin is warm and dry.   Vitals reviewed.      1. Uncontrolled type 2 diabetes mellitus with hyperglycemia    2. Subclinical hyperthyroidism    3. Essential hypertension    4. Hyperlipidemia, unspecified hyperlipidemia type    5. Urinary body odor        1. Check hga1c cont metformin  2. Labs and endocrinology follow up   3. stable, cont current regimen   4. stable, cont current regimen   5. F/u UA/UC  -schedule neurosurgery follow up visit        Orders Placed This Encounter   Procedures    Comprehensive metabolic panel    TSH    T4, free    Urinalysis, Reflex to Urine Culture Urine, Clean Catch    Microalbumin/creatinine urine ratio

## 2020-01-23 ENCOUNTER — TELEPHONE (OUTPATIENT)
Dept: INTERNAL MEDICINE | Facility: CLINIC | Age: 85
End: 2020-01-23

## 2020-01-23 LAB — BACTERIA UR CULT: ABNORMAL

## 2020-01-24 ENCOUNTER — TELEPHONE (OUTPATIENT)
Dept: INTERNAL MEDICINE | Facility: CLINIC | Age: 85
End: 2020-01-24

## 2020-01-24 RX ORDER — AMOXICILLIN 500 MG/1
500 TABLET, FILM COATED ORAL EVERY 12 HOURS
Qty: 20 TABLET | Refills: 0 | Status: SHIPPED | OUTPATIENT
Start: 2020-01-24 | End: 2020-02-03

## 2020-03-03 RX ORDER — ATORVASTATIN CALCIUM 10 MG/1
10 TABLET, FILM COATED ORAL DAILY
Qty: 90 TABLET | Refills: 3 | Status: SHIPPED | OUTPATIENT
Start: 2020-03-03

## 2020-03-11 ENCOUNTER — TELEPHONE (OUTPATIENT)
Dept: NEUROSURGERY | Facility: CLINIC | Age: 85
End: 2020-03-11

## 2020-03-11 DIAGNOSIS — M54.50 CHRONIC LOW BACK PAIN, UNSPECIFIED BACK PAIN LATERALITY, UNSPECIFIED WHETHER SCIATICA PRESENT: Primary | ICD-10-CM

## 2020-03-11 DIAGNOSIS — G89.29 CHRONIC LOW BACK PAIN, UNSPECIFIED BACK PAIN LATERALITY, UNSPECIFIED WHETHER SCIATICA PRESENT: Primary | ICD-10-CM

## 2020-03-18 ENCOUNTER — NURSE TRIAGE (OUTPATIENT)
Dept: ADMINISTRATIVE | Facility: CLINIC | Age: 85
End: 2020-03-18

## 2020-03-18 NOTE — TELEPHONE ENCOUNTER
Pt stated that she had already resolved her complaint of bleeding finger and no longer need to speak with a nurse. Pt given general nursing advice and advised to call back as needed  Reason for Disposition   Caller has cancelled the call before the first contact    Protocols used: NO CONTACT OR DUPLICATE CONTACT CALL-A-OH

## 2020-05-12 RX ORDER — GABAPENTIN 800 MG/1
800 TABLET ORAL 3 TIMES DAILY
Qty: 270 TABLET | Refills: 3 | Status: SHIPPED | OUTPATIENT
Start: 2020-05-12 | End: 2021-05-12

## 2020-06-30 ENCOUNTER — NURSE TRIAGE (OUTPATIENT)
Dept: ADMINISTRATIVE | Facility: CLINIC | Age: 85
End: 2020-06-30

## 2020-06-30 NOTE — TELEPHONE ENCOUNTER
"Pt states she had an episode of incontinence while sleeping today, denies pain, states she has felt a little "lightheaded", advised her to see a provider within 24 hour, caller states she will go to an  right down the street from her house, advised her to call back with any other needs or concerns, caller agreed     Reason for Disposition   [1] Can't control passage of urine (i.e., urinary incontinence) AND [2] new onset (< 2 weeks) or worsening    Additional Information   Negative: Shock suspected (e.g., cold/pale/clammy skin, too weak to stand, low BP, rapid pulse)   Negative: Sounds like a life-threatening emergency to the triager   Negative: Followed a genital area injury   Negative: Followed a genital area injury (penis, scrotum)   Negative: Vaginal discharge   Negative: Pus (white, yellow) or bloody discharge from end of penis   Negative: [1] Taking antibiotic for urinary tract infection (UTI) AND [2] female   Negative: [1] Taking antibiotic for urinary tract infection (UTI) AND [2] male   Negative: [1] Discomfort (pain, burning or stinging) when passing urine AND [2] pregnant   Negative: [1] Discomfort (pain, burning or stinging) when passing urine AND [2] postpartum < 1 month   Negative: [1] Discomfort (pain, burning or stinging) when passing urine AND [2] female   Negative: [1] Discomfort (pain, burning or stinging) when passing urine AND [2] male   Negative: Pain or itching in the vulvar area   Negative: Pain in scrotum is main symptom   Negative: Blood in the urine is main symptom   Negative: Symptoms arising from use of a urinary catheter (Alexander or Coude)   Negative: [1] Unable to urinate (or only a few drops) > 4 hours AND     [2] bladder feels very full (e.g., palpable bladder or strong urge to urinate)   Negative: [1] Decreased urination and [2] drinking very little AND [2] dehydration suspected (e.g., dark urine, no urine > 12 hours, very dry mouth, very lightheaded)   Negative: " Patient sounds very sick or weak to the triager   Negative: Fever > 100.5 F (38.1 C)   Negative: Side (flank) or lower back pain present    Protocols used: ST URINARY SYMPTOMS-A-AH

## 2020-07-01 ENCOUNTER — OFFICE VISIT (OUTPATIENT)
Dept: INTERNAL MEDICINE | Facility: CLINIC | Age: 85
End: 2020-07-01
Attending: FAMILY MEDICINE
Payer: MEDICARE

## 2020-07-01 ENCOUNTER — TELEPHONE (OUTPATIENT)
Dept: INTERNAL MEDICINE | Facility: CLINIC | Age: 85
End: 2020-07-01

## 2020-07-01 DIAGNOSIS — R39.9 LOWER URINARY TRACT SYMPTOMS: Primary | ICD-10-CM

## 2020-07-01 PROCEDURE — 99442 PR PHYSICIAN TELEPHONE EVALUATION 11-20 MIN: CPT | Mod: 95,,, | Performed by: INTERNAL MEDICINE

## 2020-07-01 PROCEDURE — 99442 PR PHYSICIAN TELEPHONE EVALUATION 11-20 MIN: ICD-10-PCS | Mod: 95,,, | Performed by: INTERNAL MEDICINE

## 2020-07-01 NOTE — PROGRESS NOTES
Established Patient - Audio Only Telehealth Visit     The patient location is: home  The chief complaint leading to consultation is: problems she was told would be good to discuss with a provider  Visit type: Virtual visit with audio only (telephone)  Total time spent with patient:        The reason for the audio only service rather than synchronous audio and video virtual visit was related to technical difficulties or patient preference/necessity.     Each patient to whom I provide medical services by telemedicine is:  (1) informed of the relationship between the physician and patient and the respective role of any other health care provider with respect to management of the patient; and (2) notified that they may decline to receive medical services by telemedicine and may withdraw from such care at any time. Patient verbally consented to receive this service via voice-only telephone call.       HPI:   1. Sugar was a little high , patient on metformin: this morning it was 193. But her A1C was in 1/2020 6.0  2. Hypertension:at the last appt it was 130/80, and today 127/71. She is on 25 mg carvedilol twice a day.  3. I had to remind her about her urinary symptoms. She is having increased urination at bedtime. This was a continued problem maybe a little worse.      Assessment and plan:    I believe after speaking with the patient that a UA and culture should be checked. She told me her  could  a specimen jar at [facility] and return it.                        This service was not originating from a related E/M service provided within the previous 7 days nor will  to an E/M service or procedure within the next 24 hours or my soonest available appointment.  Prevailing standard of care was able to be met in this audio-only visit.

## 2020-07-01 NOTE — TELEPHONE ENCOUNTER
Please call her and explain where her  would go at [facility] to get a urine container to take home for a UA and culture.  She lives on [address].    She cannot leave the house. Orders are in. L

## 2020-07-15 ENCOUNTER — LAB VISIT (OUTPATIENT)
Dept: LAB | Facility: OTHER | Age: 85
End: 2020-07-15
Attending: INTERNAL MEDICINE
Payer: MEDICARE

## 2020-07-15 DIAGNOSIS — R39.9 LOWER URINARY TRACT SYMPTOMS: ICD-10-CM

## 2020-07-15 LAB
BACTERIA #/AREA URNS HPF: ABNORMAL /HPF
BILIRUB UR QL STRIP: NEGATIVE
CLARITY UR: ABNORMAL
COLOR UR: YELLOW
GLUCOSE UR QL STRIP: NEGATIVE
HGB UR QL STRIP: NEGATIVE
KETONES UR QL STRIP: NEGATIVE
LEUKOCYTE ESTERASE UR QL STRIP: ABNORMAL
MICROSCOPIC COMMENT: ABNORMAL
NITRITE UR QL STRIP: POSITIVE
PH UR STRIP: 6 [PH] (ref 5–8)
PROT UR QL STRIP: NEGATIVE
RBC #/AREA URNS HPF: 1 /HPF (ref 0–4)
SP GR UR STRIP: <=1.005 (ref 1–1.03)
SQUAMOUS #/AREA URNS HPF: 15 /HPF
URN SPEC COLLECT METH UR: ABNORMAL
UROBILINOGEN UR STRIP-ACNC: NEGATIVE EU/DL
WBC #/AREA URNS HPF: 30 /HPF (ref 0–5)
WBC CLUMPS URNS QL MICRO: ABNORMAL

## 2020-07-15 PROCEDURE — 81000 URINALYSIS NONAUTO W/SCOPE: CPT

## 2020-07-15 PROCEDURE — 87086 URINE CULTURE/COLONY COUNT: CPT

## 2020-07-16 ENCOUNTER — TELEPHONE (OUTPATIENT)
Dept: INTERNAL MEDICINE | Facility: CLINIC | Age: 85
End: 2020-07-16

## 2020-07-16 LAB
BACTERIA UR CULT: NORMAL
BACTERIA UR CULT: NORMAL

## 2020-07-16 RX ORDER — CEPHALEXIN 500 MG/1
500 CAPSULE ORAL EVERY 12 HOURS
Qty: 14 CAPSULE | Refills: 0 | Status: ON HOLD | OUTPATIENT
Start: 2020-07-16 | End: 2023-01-01

## 2020-07-16 NOTE — TELEPHONE ENCOUNTER
Please tell Ms. Ayoub that I was the doctor on call on 7/1/2020 that ordered the urine and culture.    The urine looks like a uti and I have sent keflex to her pharmacy.

## 2020-08-12 ENCOUNTER — TELEPHONE (OUTPATIENT)
Dept: INTERNAL MEDICINE | Facility: CLINIC | Age: 85
End: 2020-08-12

## 2020-08-12 NOTE — TELEPHONE ENCOUNTER
brittanym for pt to call office back in regards of     Sudha Ayoub    This is Loki from [facility] department. I am reaching out because our records shows that you haven't establish care with a new primary care provider since Dr. Darya Robles departure from [facility]. We are reaching out to see if you would like to schedule an appointment with one of our other primary care providers to establish care. If so please call at [phone] to schedule a new patient appointment

## 2020-09-09 DIAGNOSIS — E05.90 SUBCLINICAL HYPERTHYROIDISM: Primary | ICD-10-CM

## 2020-09-09 DIAGNOSIS — I10 ESSENTIAL HYPERTENSION: ICD-10-CM

## 2020-09-09 DIAGNOSIS — E11.65 UNCONTROLLED TYPE 2 DIABETES MELLITUS WITH HYPERGLYCEMIA: ICD-10-CM

## 2020-09-09 NOTE — TELEPHONE ENCOUNTER
----- Message from Giovanny Villa sent at 9/9/2020  1:36 PM CDT -----  Regarding: Appointment/Refill  Contact: PATY SOMMER [[id number]]  Name of Who is Calling: PATY SOMMER [[id number]]      1) What is the request in detail: Would like to speak with staff in regards to a new patient appointment. Wants to be seen at the [facility] location due to convenience.     2) Refill:  Medication #1 carvedilol (COREG) 25 MG tablet    Medication #2 metFORMIN (GLUCOPHAGE-XR) 500 MG 24 hr tablet    Pharmacy: [facility]/PHARMACY #[id number] - [place] [address]      Can the clinic reply by [facility]: no      What Number to Call Back if not in [facility]: [phone]

## 2020-09-09 NOTE — TELEPHONE ENCOUNTER
----- Message from Giovanny Villa sent at 9/9/2020  1:36 PM CDT -----  Regarding: Appointment/Refill  Contact: PATY SOMMER [[id number]]  Name of Who is Calling: PATY SOMMER [[id number]]      1) What is the request in detail: Would like to speak with staff in regards to a new patient appointment. Wants to be seen at the [facility] location due to convenience.     2) Refill:  Medication #1 carvedilol (COREG) 25 MG tablet    Medication #2 metFORMIN (GLUCOPHAGE-XR) 500 MG 24 hr tablet    Pharmacy: [facility]/PHARMACY #[id number] - [facility] [address]      Can the clinic reply by [facility]: no      What Number to Call Back if not in [facility]: [phone]

## 2020-09-10 RX ORDER — CARVEDILOL 25 MG/1
25 TABLET ORAL 2 TIMES DAILY
Qty: 180 TABLET | Refills: 0 | Status: SHIPPED | OUTPATIENT
Start: 2020-09-10 | End: 2020-12-03

## 2020-09-10 RX ORDER — METFORMIN HYDROCHLORIDE 500 MG/1
500 TABLET, EXTENDED RELEASE ORAL
Qty: 90 TABLET | Refills: 0 | Status: SHIPPED | OUTPATIENT
Start: 2020-09-10 | End: 2021-09-10

## 2020-09-10 NOTE — TELEPHONE ENCOUNTER
Schedule with Dr. Villa on 12/14/20 to [facility]. Pt verbalized understanding. Informed pt that rx was sent in.

## 2020-11-17 ENCOUNTER — PES CALL (OUTPATIENT)
Dept: ADMINISTRATIVE | Facility: CLINIC | Age: 85
End: 2020-11-17

## 2020-11-27 ENCOUNTER — PATIENT OUTREACH (OUTPATIENT)
Dept: INTERNAL MEDICINE | Facility: CLINIC | Age: 85
End: 2020-11-27

## 2020-11-27 DIAGNOSIS — R39.9 LOWER URINARY TRACT SYMPTOMS: Primary | ICD-10-CM

## 2020-11-27 DIAGNOSIS — E11.9 TYPE 2 DIABETES MELLITUS WITHOUT COMPLICATION, WITHOUT LONG-TERM CURRENT USE OF INSULIN: ICD-10-CM

## 2020-12-02 ENCOUNTER — TELEPHONE (OUTPATIENT)
Dept: INTERNAL MEDICINE | Facility: CLINIC | Age: 85
End: 2020-12-02

## 2020-12-04 ENCOUNTER — PES CALL (OUTPATIENT)
Dept: ADMINISTRATIVE | Facility: CLINIC | Age: 85
End: 2020-12-04

## 2021-01-21 ENCOUNTER — NURSE TRIAGE (OUTPATIENT)
Dept: ADMINISTRATIVE | Facility: CLINIC | Age: 86
End: 2021-01-21

## 2021-02-17 DIAGNOSIS — R06.02 SOB (SHORTNESS OF BREATH): Primary | ICD-10-CM

## 2021-02-23 ENCOUNTER — PES CALL (OUTPATIENT)
Dept: ADMINISTRATIVE | Facility: CLINIC | Age: 86
End: 2021-02-23

## 2021-02-26 NOTE — PATIENT INSTRUCTIONS
Spoke to Pt, advised current recommendations are to wait 90 days from covid symptoms before getting Covid vaccine.  Pt verbalized his complete understanding.     Tumeric capsules 400mg twice a day   If your home bp cuff cuff is over 160/80 please call us and start the losartan  Please call next week by 3/8    Stop on the 2nd floor at the O bar for a blood pressure cuff

## 2021-06-09 ENCOUNTER — TELEPHONE (OUTPATIENT)
Dept: INTERNAL MEDICINE | Facility: CLINIC | Age: 86
End: 2021-06-09

## 2021-07-01 ENCOUNTER — PATIENT MESSAGE (OUTPATIENT)
Dept: ADMINISTRATIVE | Facility: OTHER | Age: 86
End: 2021-07-01

## 2021-10-25 ENCOUNTER — HOSPITAL ENCOUNTER (OUTPATIENT)
Dept: RADIOLOGY | Facility: OTHER | Age: 86
Discharge: HOME | End: 2021-10-25
Attending: NURSE PRACTITIONER
Payer: MEDICARE

## 2021-10-25 DIAGNOSIS — R10.84 GENERALIZED ABDOMINAL PAIN: ICD-10-CM

## 2021-10-25 PROCEDURE — 74018 RADEX ABDOMEN 1 VIEW: CPT | Mod: TC,FY

## 2021-10-25 PROCEDURE — 74018 RADEX ABDOMEN 1 VIEW: CPT | Mod: 26,,, | Performed by: RADIOLOGY

## 2021-10-25 PROCEDURE — 74018 XR KUB: ICD-10-PCS | Mod: 26,,, | Performed by: RADIOLOGY

## 2022-03-04 ENCOUNTER — CLINICAL SUPPORT (OUTPATIENT)
Dept: REHABILITATION | Facility: OTHER | Age: 87
End: 2022-03-04
Payer: MEDICARE

## 2022-03-04 DIAGNOSIS — M25.562 CHRONIC PAIN OF LEFT KNEE: ICD-10-CM

## 2022-03-04 DIAGNOSIS — M25.661 DECREASED RANGE OF MOTION OF BOTH KNEES: ICD-10-CM

## 2022-03-04 DIAGNOSIS — G89.29 CHRONIC PAIN OF LEFT KNEE: ICD-10-CM

## 2022-03-04 DIAGNOSIS — Z74.09 IMPAIRED FUNCTIONAL MOBILITY, BALANCE, GAIT, AND ENDURANCE: ICD-10-CM

## 2022-03-04 DIAGNOSIS — M25.662 DECREASED RANGE OF MOTION OF BOTH KNEES: ICD-10-CM

## 2022-03-04 PROCEDURE — 97162 PT EVAL MOD COMPLEX 30 MIN: CPT | Mod: PN

## 2022-03-04 PROCEDURE — 97110 THERAPEUTIC EXERCISES: CPT | Mod: PN

## 2022-03-04 NOTE — PLAN OF CARE
"[facility]  Physical Therapy Initial Evaluation    Name: Jojo Ayoub  Clinic Number: [id number]    Therapy Diagnosis:   Encounter Diagnoses   Name Primary?   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  She ambulates with rollator and notes that she has been sitting more since the COVID. She also notes that she has a wound between her buttocks that is being medically treated - she was told this was from sitting for too long.     Denies falling in the last 12 months.     Burning, tingling, numbness: in feet and legs up to thighs; and occasionally into hands (attributed to diabetic neuropathy)    Patient denies dizziness, headaches, blurry vision, nausea, vomiting, impaired sensations in groin and saddle region and changes in bowel/bladder.      Medical History:   Past Medical History:   Diagnosis Date    Arthritis     Diabetes mellitus with neurological manifestation     Essential hypertension 2/28/2019    High blood pressure     Hyperlipidemia     Urinary urgency        Surgical History:   Jojo Ayoub  has a " past surgical history that includes low back surgery ; left knee replacement ; and Epidural steroid injection (N/A, 1/31/2019).    Medications:   Jojo has a current medication list which includes the following prescription(s): acetaminophen, aspirin, atorvastatin, carvedilol, cephalexin, gabapentin, metformin, onetouch ultra blue test strip, and onetouch ultrasoft lancets.    Allergies:   Review of patient's allergies indicates:   Allergen Reactions    No known drug allergies         Imaging, none recently for the knee    Prior Therapy: Yes for similar complaints  Social History: single story with 5 steps to enter with railings on both sides lives with their spouse   Occupation: not currently working  Prior Level of Function: Independent and ambulating with rollator  Current Level of Function: Independent with rollator     Pain:  Current 5/10, worst 8/10, best 3/10   Location: left knee   Description: Aching, Tight, Tingling and Numb  Aggravating Factors: Sitting, Standing, Bending, Walking and Getting out of bed/chair  Easing Factors: rest    Pts goals: to improve walking tolerance and distance    Objective     Observation:   Stands with slight anterior trunk lean; difficulty maintaining upright posture in sitting   Unable to place feel flat on mat, used heels to perform DL Bridge; unable to lift legs to perform bridge test  Unable to perform DL heel raise with use of hands on mat  Extended time required for bed mobility   Patellar instability noted with sit<>stand and with MMT testing    Range of Motion:    Knee Right AROM Left AROM   Flexion 95 80   Extension -5 0       MMT/Strength:    Hip Right Left   Flexion 3/5 3/5   Extension 3-/5 3-/5   Adduction 3/5 3/5   Abduction 3-/5 3-/5   Knee     Flexion 4-/5 4-/5   Extension 5/5 5/5   Ankle     Dorsiflexion 4/5 3+/5   Plantarflexion 2/5 2/5       Other:     Gait: with rollator, decreased step height on L more than R with reduced DF and PF evident during  ambulation; slowed gait speed reduced knee flexion    NBOS EO: <3 seconds    5x sit<>stand: 35 seconds with use of B UE throughout the entire sequence, including upright standing position    Normal:   60-69: 11.4 seconds  70-79: 12.6 seconds  80-89: 12.7 seconds    30 seconds sit<>stand: 4 reps  with use of B UE throughout the entire sequence, including upright standing position    Normal:   Age Male Female   60-64 17 15   65-69 16 15   70-74 15 14   75-79 14 13   80-84 13 12   85-89 11 11   90-94 9 9        CMS Impairment/Limitation/Restriction for FOTO Knee Survey    Therapist reviewed FOTO scores for Jojo Ayoub on 3/4/2022.   FOTO documents entered into [employer] - see Media section.    Limitation Score: 64%  Category: Other    Current : CL = least 60% but < 80% impaired, limited or restricted  Goal: CK = at least 40% but < 60% impaired, limited or restricted             TREATMENT   Treatment Time In: 11:00 am  Treatment Time Out: 11:15 am  Total Treatment time separate from Evaluation: 15 minutes    Jojo received therapeutic exercises to develop strength, endurance, ROM, flexibility, posture and core stabilization for 15 minutes including:  HEP review, performance and education  Seated heel/toe raises  Seated ball squeeze  Seated hip abd (with OTB provided today)  Sit<>stand  HS stretch in chair  Glute set into bridge     Home Exercises and Patient Education Provided    Education provided:   Patient was educated on initial evaluation findings and expectations as well as future PT services, procedures, and expectations for optimal compliance with therapy.     Written Home Exercises Provided: Yes, see patient instructions.     Assessment   Jojo is a 86 y.o. female referred to outpatient Physical Therapy with a medical diagnosis of Polyneuropathy in other diseases classified elsewhere; Failed total left knee replacement, sequela; Uses roller walker. Pt presents with decreased ROM, strength, flexibility, poor  posture and decreased functional mobility evident by gait mechanics, balance and bed mobility causing increased pain and decreased participation with recreational and household duties. Patient required PT assistance with FOTO questionnaire today. TUG and 6MWT to be assessed in next visit.        Pt prognosis is Fair.   Pt will benefit from skilled outpatient Physical Therapy to address the deficits stated above and in the chart below, provide pt/family education, and to maximize pt's level of independence to return to OF.     Plan of care discussed with patient: Yes  Pt's spiritual, cultural and educational needs considered and patient is agreeable to the plan of care and goals as stated below:     Anticipated Barriers for therapy: comorbidities, chronicity of condition    Medical Necessity is demonstrated by the following  History  Co-morbidities and personal factors that may impact the plan of care Co-morbidities:   advanced age, diabetes, difficulty sleeping, HTN, poor medication/medical compliance, prior lumbar surgery and prior knee replacement  Patient self reporting wound requiring wound care on sacrum    Personal Factors:   age  lifestyle     moderate   Examination  Body Structures and Functions, activity limitations and participation restrictions that may impact the plan of care Body Regions:   back  lower extremities  trunk    Body Systems:    gross symmetry  ROM  strength  gross coordinated movement  balance  gait  transfers  transitions  motor control  blood pressure  wound care    Participation Restrictions:   Difficulty walking long distances, picking things up, sitting and bed mobility; difficulty with sit<>Stand    Activity limitations:   Learning and applying knowledge  no deficits    General Tasks and Commands  no deficits    Communication  no deficits    Mobility  lifting and carrying objects  walking    Self care  no deficits    Domestic Life  shopping  cooking  doing house work (cleaning  house, washing dishes, laundry)    Interactions/Relationships  no deficits    Life Areas  no deficits    Community and Social Life  no deficits         moderate   Clinical Presentation evolving clinical presentation with changing clinical characteristics moderate   Decision Making/ Complexity Score: moderate     Goals:      In 6 weeks,   Goal Status   1. Patient will be independent with HEP to promote improved therapy outcomes.  STG    2. Patient will increase hip MMT to 3+/5 to improve ambulation and stair navigation. STG    3. Patient will increase knne ROM to 0-95 degrees to improve functional mobility. STG        In 12 weeks,   Goal Status   4. Patient will be independent with progressed HEP to allow for self management of symptoms.  LTG    5. Patient will increase hip MMT to 4/5 for improved ambulation and stair navigation.  LTG    6. Patient will report navigating 5 steps at home with UE assist as needed and minimal difficulty to improve household mobility.  LTG    7. Patient will improve 5 time sit to stand to 15 seconds in order to improve safety, transfers, and functional independence. LTG    8. Patient will improve TUG score to 12 in order to decrease fall risk.  LTG    9. Patient will improve EO NBOS balance to 15 seconds to decrease fall risk and improve ambulation. LTG        Plan   Plan of care Certification: 3/4/2022 to 6/3/2022.    Outpatient Physical Therapy 2 times weekly for 24 visits to include the following interventions: Gait Training, Manual Therapy, Moist Heat/ Ice, Neuromuscular Re-ed, Patient Education, Therapeutic Activities and Therapeutic Exercise.     Ernestine Schafer, PT

## 2022-03-09 NOTE — PROGRESS NOTES
Physical Therapy Treatment Note     Name: Jojo Ayoub  Clinic Number: [id number]    Therapy Diagnosis:   Encounter Diagnoses   Name Primary?   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  She was compliant with home exercise program.  Response to previous treatment: soreness  Functional change: n/a    Pain: 5/10  Location: left knee      Objective     Jojo received therapeutic exercises to develop strength, ROM, flexibility and posture for 60 minutes including:    Seated heel/toe raises x20  Seated DF/PF hold with 1/2 foam 5 seconds x3 minutes  HS stretch in chair 3x 30 seconds each  Glute set into bridge with GTB 2x 10  Supine ball squeeze x30  Supine clamshell with GTB x30  SLR with pilates ring x15 each  Sit<>stand 2x 10 with PT assist      Home Exercises Provided and Patient Education Provided     Education provided:   - new exercises  - exercise form and purpose    Written Home Exercises Provided: Patient instructed to cont prior HEP.  Exercises were reviewed and Jojo was able to demonstrate them prior to the end of the session.   Jojo demonstrated good  understanding of the education provided.     See EMR under Patient Instructions for exercises provided prior visit.    Assessment     Patient tolerated initial treatment session well today.  Instability with supine mat activities today requiring PT assist during SLR and bridging.  Cued to increase weight shifting and WB on L during sit<>stand activity.  Continue to address strengthening, ROM and functional mobility.     Jojo is progressing well towards her goals.   Pt prognosis is Fair.     Pt will continue to benefit from skilled outpatient physical therapy to address the deficits listed in the problem list box on initial evaluation, provide pt/family education and to maximize pt's level of independence in the home and community environment.     Pt's spiritual, cultural and educational needs considered and pt agreeable to plan of care and goals.     Anticipated barriers to physical therapy: comorbidities, chronicity of condition    Goals:   In 6 weeks,    Goal Status   1. Patient will be independent with HEP to promote improved therapy outcomes.  STG     2. Patient will increase hip MMT to 3+/5 to improve ambulation and stair navigation. STG     3. Patient will increase knne ROM to 0-95 degrees to improve functional mobility. STG           In 12 weeks,    Goal Status   4. Patient will be independent with progressed HEP to allow for self management of symptoms.  LTG     5. Patient will increase hip MMT to 4/5 for improved ambulation and stair navigation.  LTG     6. Patient will report navigating 5 steps at home with UE assist as needed and minimal difficulty to improve household mobility.  LTG     7. Patient will improve 5 time sit to stand to 15 seconds in order to improve safety, transfers, and functional independence. LTG     8. Patient will improve TUG score to 12 in order to decrease fall risk.  LTG     9. Patient will improve EO NBOS balance to 15 seconds to decrease fall risk  and improve ambulation. LTG            Plan     Continue with established PT POC and progress per pt tolerance.      Ernestine Schafer, PT

## 2022-03-10 ENCOUNTER — CLINICAL SUPPORT (OUTPATIENT)
Dept: REHABILITATION | Facility: OTHER | Age: 87
End: 2022-03-10
Payer: MEDICARE

## 2022-03-10 DIAGNOSIS — Z74.09 IMPAIRED FUNCTIONAL MOBILITY, BALANCE, GAIT, AND ENDURANCE: ICD-10-CM

## 2022-03-10 DIAGNOSIS — G89.29 CHRONIC PAIN OF LEFT KNEE: Primary | ICD-10-CM

## 2022-03-10 DIAGNOSIS — M25.562 CHRONIC PAIN OF LEFT KNEE: Primary | ICD-10-CM

## 2022-03-10 DIAGNOSIS — M25.661 DECREASED RANGE OF MOTION OF BOTH KNEES: ICD-10-CM

## 2022-03-10 DIAGNOSIS — M25.662 DECREASED RANGE OF MOTION OF BOTH KNEES: ICD-10-CM

## 2022-03-10 PROCEDURE — 97110 THERAPEUTIC EXERCISES: CPT | Mod: PN

## 2022-03-16 ENCOUNTER — DOCUMENTATION ONLY (OUTPATIENT)
Dept: REHABILITATION | Facility: OTHER | Age: 87
End: 2022-03-16
Payer: MEDICARE

## 2022-03-16 NOTE — PROGRESS NOTES
Patient was scheduled for a follow up physical therapy appointment at [facility] location on 3/16/2022. Patient failed to appear for the appointment without prior notification today.         Alexx Espino, PTA  3/16/2022

## 2022-03-18 ENCOUNTER — PATIENT MESSAGE (OUTPATIENT)
Dept: REHABILITATION | Facility: OTHER | Age: 87
End: 2022-03-18

## 2022-04-13 ENCOUNTER — CLINICAL SUPPORT (OUTPATIENT)
Dept: REHABILITATION | Facility: OTHER | Age: 87
End: 2022-04-13
Payer: MEDICARE

## 2022-04-13 DIAGNOSIS — M25.661 DECREASED RANGE OF MOTION OF BOTH KNEES: ICD-10-CM

## 2022-04-13 DIAGNOSIS — M25.662 DECREASED RANGE OF MOTION OF BOTH KNEES: ICD-10-CM

## 2022-04-13 DIAGNOSIS — Z74.09 IMPAIRED FUNCTIONAL MOBILITY, BALANCE, GAIT, AND ENDURANCE: ICD-10-CM

## 2022-04-13 DIAGNOSIS — G89.29 CHRONIC PAIN OF LEFT KNEE: Primary | ICD-10-CM

## 2022-04-13 DIAGNOSIS — M25.562 CHRONIC PAIN OF LEFT KNEE: Primary | ICD-10-CM

## 2022-04-13 PROCEDURE — 97110 THERAPEUTIC EXERCISES: CPT | Mod: PN

## 2022-04-13 NOTE — PROGRESS NOTES
Physical Therapy Treatment Note     Name: Jojo Ayoub  Clinic Number: [id number]    Therapy Diagnosis:   Encounter Diagnoses   Name Primary?   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  Response to previous treatment: soreness  Functional change: n/a    Pain: 5/10  Location: left knee      Objective   Observation:   Stands with slight anterior trunk lean; difficulty maintaining upright posture in sitting   Unable to place feel flat on mat, used heels to perform DL Bridge; unable to lift legs to perform bridge test  Unable to perform DL heel raise with use of hands on mat  Extended time required for bed mobility   Patellar instability noted with sit<>stand and with MMT testing     Range of Motion:     Knee Right AROM Left AROM   Flexion 90 95   Extension -5 0         MMT/Strength:     Hip Right Left   Flexion 3/5 3/5   Extension 3-/5 3-/5   Adduction 3/5 3/5   Abduction 3-/5 3-/5   Knee       Flexion 4-/5 4-/5   Extension 5/5 5/5   Ankle       Dorsiflexion 4/5 3+/5   Plantarflexion 2/5 2/5         5x sit<>stand: 35 seconds with use of B UE throughout the entire sequence, including upright standing position     Normal:   60-69: 11.4 seconds  70-79: 12.6 seconds  80-89: 12.7 seconds    Re-assessment   Jojo received therapeutic exercises to develop strength, ROM, flexibility and posture for 45 minutes including:    Seated heel/toe raises x20  Seated DF/PF hold with 1/2 foam 5 seconds x3 minutes  HS stretch in chair 3x 30 seconds each  Glute set into bridge with GTB 2x 10  Supine ball squeeze x30  Supine clamshell with GTB x30    SLR with pilates ring x15 each  Sit<>stand 2x 10 with PT assist      Home Exercises Provided and Patient Education Provided     Education provided:   - new exercises  - exercise form and purpose    Written Home Exercises Provided: Patient instructed to cont prior HEP.  Exercises were reviewed and Jojo was able to demonstrate them prior to the end of the session.  Jojo demonstrated good  understanding of the education provided.     See EMR under Patient Instructions for exercises provided prior visit.    Assessment   Re-assessment completed this visit. Mrs. Ayoub is demoing good improvements in left knee flexion range of motion from 80 to 95 degrees. Pt reports being pleased with her progress thus far as she noticed that she is able to do more with less stiffness. Pt will continue to benefit from skilled PT interventions to address remaining strength, balance and pain deficits in an effort to improve pts overall quality of life. Pt agreeable to POC.     Jojo is progressing well towards her goals.   Pt prognosis is Fair.     Pt will continue to benefit from skilled outpatient physical therapy to address the deficits listed in the problem list box on initial evaluation, provide pt/family education and to maximize pt's level of independence in the home and community environment.     Pt's spiritual, cultural and educational needs considered and pt agreeable to plan of care  and goals.     Anticipated barriers to physical therapy: comorbidities, chronicity of condition    Goals:   In 6 weeks,    Goal Status   1. Patient will be independent with HEP to promote improved therapy outcomes.  STG  (progressing, not met)   2. Patient will increase hip MMT to 3+/5 to improve ambulation and stair navigation. STG  (progressing, not met)   3. Patient will increase knne ROM to 0-95 degrees to improve functional mobility. STG  (progressing, not met)         In 12 weeks,    Goal Status   4. Patient will be independent with progressed HEP to allow for self management of symptoms.  LTG  (progressing, not met)   5. Patient will increase hip MMT to 4/5 for improved ambulation and stair navigation.  LTG  (progressing, not met)   6. Patient will report navigating 5 steps at home with UE assist as needed and minimal difficulty to improve household mobility.  LTG  (progressing, not met)   7. Patient will improve 5 time sit to stand to 15 seconds in order to improve safety, transfers, and functional independence. LTG  (progressing, not met)   8. Patient will improve TUG score to 12 in order to decrease fall risk.  LTG  (progressing, not met)   9. Patient will improve EO NBOS balance to 15 seconds to decrease fall risk and improve ambulation. LTG  (progressing, not met)      Plan     Continue with established PT POC and progress per pt tolerance.      Bob Schrader, PT

## 2022-04-19 ENCOUNTER — CLINICAL SUPPORT (OUTPATIENT)
Dept: REHABILITATION | Facility: OTHER | Age: 87
End: 2022-04-19
Payer: MEDICARE

## 2022-04-19 DIAGNOSIS — M25.662 DECREASED RANGE OF MOTION OF BOTH KNEES: ICD-10-CM

## 2022-04-19 DIAGNOSIS — Z74.09 IMPAIRED FUNCTIONAL MOBILITY, BALANCE, GAIT, AND ENDURANCE: ICD-10-CM

## 2022-04-19 DIAGNOSIS — M25.661 DECREASED RANGE OF MOTION OF BOTH KNEES: ICD-10-CM

## 2022-04-19 DIAGNOSIS — M25.562 CHRONIC PAIN OF LEFT KNEE: Primary | ICD-10-CM

## 2022-04-19 DIAGNOSIS — G89.29 CHRONIC PAIN OF LEFT KNEE: Primary | ICD-10-CM

## 2022-04-19 PROCEDURE — 97110 THERAPEUTIC EXERCISES: CPT | Mod: PN

## 2022-04-19 NOTE — PROGRESS NOTES
Physical Therapy Treatment Note     Name: Jojo Ayoub  Clinic Number: [id number]    Therapy Diagnosis:   Encounter Diagnoses   Name Primary?   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  Notes that pain can get up to a 7-8/10 at times but not always. She also notes L shoulder and low back pain that she would like to work on too.  Denies any specific problems with the knee at this time.   She was compliant with home exercise program.  Response to previous treatment: soreness  Functional change: n/a    Pain: 1-2/10  Location: left knee      Objective        Jojo received therapeutic exercises to develop strength, ROM, flexibility and posture for 45 minutes including:    Glute set into bridge 2x 10  Supine iso clamshell with pilates ring and TrA activation 5 second holds x3 minutes  Supine add ball squeeze with TrA activation 5 second holds x3 minutes  Quad set into SLR with pilates ring 3x 10 each  Supine clamshell with GTB x30  Heel slides with strap 10 second holds x3 minutes  LAQ x20 each   Sit<>stand x5 with PT assist and cueing    Not  performed today:  Seated heel/toe raises x20  Seated DF/PF hold with 1/2 foam 5 seconds x3 minutes  HS stretch in chair 3x 30 seconds each  Supine ball squeeze x30  Supine clamshell with GTB x30  Sit<>stand 2x 10 with PT assist      Home Exercises Provided and Patient Education Provided     Education provided:   - new exercises  - exercise form and purpose    Written Home Exercises Provided: Patient instructed to cont prior HEP.  Exercises were reviewed and Jojo was able to demonstrate them prior to the end of the session.  Jojo demonstrated good  understanding of the education provided.     See EMR under Patient Instructions for exercises provided prior visit.    Assessment     Patient has notable weakness in hips, particularly on L evident by inability to independently perform full ROM with hip ABD and ER/clamshell.  Patient is observed to keep weight posteriorly with sit<>stand requiring cueing and physical assist for anterior weight shifting over feet.  Patient displayed improved sit<>stand with less use of legs against mat for stabilization and easier performance noted with weight shifting.  Will continue to improve general LE strengthening with emphasis on lateral hip musculature to improve functional mobility.     Jojo is progressing well towards her goals.   Pt prognosis is Fair.     Pt will continue to benefit from skilled outpatient physical therapy to address the deficits listed in the problem list box on initial evaluation, provide pt/family education and to maximize pt's level of independence in the home and community environment.     Pt's spiritual, cultural and educational needs considered and pt agreeable to plan of care and goals.     Anticipated barriers to physical therapy: comorbidities, chronicity of condition    Goals:   In 6 weeks,    Goal Status   1. Patient will be independent with HEP to promote improved therapy outcomes.  STG  (progressing, not met)   2. Patient will increase hip  MMT to 3+/5 to improve ambulation and stair navigation. STG  (progressing, not met)   3. Patient will increase knne ROM to 0-95 degrees to improve functional mobility. STG  (progressing, not met)         In 12 weeks,    Goal Status   4. Patient will be independent with progressed HEP to allow for self management of symptoms.  LTG  (progressing, not met)   5. Patient will increase hip MMT to 4/5 for improved ambulation and stair navigation.  LTG  (progressing, not met)   6. Patient will report navigating 5 steps at home with UE assist as needed and minimal difficulty to improve household mobility.  LTG  (progressing, not met)   7. Patient will improve 5 time sit to stand to 15 seconds in order to improve safety, transfers, and functional independence. LTG  (progressing, not met)   8. Patient will improve TUG score to 12 in order to decrease fall risk.  LTG  (progressing, not met)   9. Patient will improve EO NBOS balance to 15 seconds to decrease fall risk and improve ambulation. LTG  (progressing, not met)      Plan     Continue with established PT POC and progress per pt tolerance.      Ernestine Schafer, PT

## 2022-04-20 NOTE — PROGRESS NOTES
Physical Therapy Treatment Note     Name: Jojo Ayoub  Clinic Number: [id number]    Therapy Diagnosis:   Encounter Diagnoses   Name Primary?   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   She was compliant with home exercise program.  Response to previous treatment: soreness  Functional change: n/a    Pain: 1-2/10  Location: left knee      Objective        Jojo received therapeutic exercises to develop strength, ROM, flexibility and posture for 32 minutes including:  Recumbent bike: attempted but unable to place feet in pedals with or without assistance.   Glute set into bridge x30  Heel slides with strap 10 second holds x2 minutes each  Supine ankle pump with 5 second hold into PF x30  SAQ with 5 second holds x20 B  Seated ankle PF/DF on 1/2 foam x3 minutes with 5-10 second holds into PF  Sit<>stand x5 with PT assist and cueing        Not performed today:  Supine iso clamshell with pilates ring and TrA activation 5 second holds x3 minutes  Supine add ball squeeze with TrA activation 5 second holds x3  minutes  Quad set into SLR with pilates ring 3x 10 each  Supine clamshell with GTB x30  LAQ x20 each   Seated heel/toe raises x20  Seated DF/PF hold with 1/2 foam 5 seconds x3 minutes  HS stretch in chair 3x 30 seconds each  Supine ball squeeze x30  Supine clamshell with GTB x30    Jojo received the following manual therapy techniques: taping were applied to the: L knee for 8 minutes, including:  Preparation and application of C-strip for lateral inhibition and decompression I-strip to assist with patellar tracking with education provided on skin reactions and removal of tape       Home Exercises Provided and Patient Education Provided     Education provided:   - new exercises  - exercise form and purpose  - taping purpose, removal in shower with skin and tape soaked    Written Home Exercises Provided: Patient instructed to cont prior HEP.  Exercises were reviewed and Jojo was able to demonstrate them prior to the end of the session.  Jojo demonstrated good  understanding of the education provided.     See EMR under Patient Instructions for exercises provided prior visit.    Assessment     Patient continues to have lateral tracking of patella that improves with PT application of Lateral to medial force during sit<>Stands.  Trial of taping with some improvement and patient noting feels of more support.  Improved weigh shifting with sit<>stands noted today.  Patient also has limited PF with supine ankle pumps. Added 1/2 foam assisted ankle ROM in seated to improve plantarflexion and associated gait deviations to reduce fall risk.  Continue with strengthening and stretching as tolerated and consider more VMO/ADD strengthening as well as ITB/lateral thigh stretching at next visit.     Jojo is progressing well towards her goals.   Pt prognosis is Fair.     Pt will continue to benefit from skilled outpatient physical therapy to address the deficits listed in the problem list box on initial evaluation, provide  pt/family education and to maximize pt's level of independence in the home and community environment.     Pt's spiritual, cultural and educational needs considered and pt agreeable to plan of care and goals.     Anticipated barriers to physical therapy: comorbidities, chronicity of condition    Goals:   In 6 weeks,    Goal Status   1. Patient will be independent with HEP to promote improved therapy outcomes.  STG  (progressing, not met)   2. Patient will increase hip MMT to 3+/5 to improve ambulation and stair navigation. STG  (progressing, not met)   3. Patient will increase knne ROM to 0-95 degrees to improve functional mobility. STG  (progressing, not met)         In 12 weeks,    Goal Status   4. Patient will be independent with progressed HEP to allow for self management of symptoms.  LTG  (progressing, not met)   5. Patient will increase hip MMT to 4/5 for improved ambulation and stair navigation.  LTG  (progressing, not met)   6. Patient will report navigating 5 steps at home with UE assist as needed and minimal difficulty to improve household mobility.  LTG  (progressing, not met)   7. Patient will improve 5 time sit to stand to 15 seconds in order to improve safety, transfers, and functional independence. LTG  (progressing, not met)   8. Patient will improve TUG score to 12 in order to decrease fall risk.  LTG  (progressing, not met)   9. Patient will improve EO NBOS balance to 15 seconds to decrease fall risk and improve ambulation. LTG  (progressing, not met)      Plan     Continue with established PT POC and progress per pt tolerance.      Ernestine Schafer, PT

## 2022-04-21 ENCOUNTER — CLINICAL SUPPORT (OUTPATIENT)
Dept: REHABILITATION | Facility: OTHER | Age: 87
End: 2022-04-21
Payer: MEDICARE

## 2022-04-21 DIAGNOSIS — G89.29 CHRONIC PAIN OF LEFT KNEE: Primary | ICD-10-CM

## 2022-04-21 DIAGNOSIS — M25.661 DECREASED RANGE OF MOTION OF BOTH KNEES: ICD-10-CM

## 2022-04-21 DIAGNOSIS — M25.562 CHRONIC PAIN OF LEFT KNEE: Primary | ICD-10-CM

## 2022-04-21 DIAGNOSIS — Z74.09 IMPAIRED FUNCTIONAL MOBILITY, BALANCE, GAIT, AND ENDURANCE: ICD-10-CM

## 2022-04-21 DIAGNOSIS — M25.662 DECREASED RANGE OF MOTION OF BOTH KNEES: ICD-10-CM

## 2022-04-21 PROCEDURE — 97110 THERAPEUTIC EXERCISES: CPT | Mod: PN

## 2022-04-21 PROCEDURE — 97140 MANUAL THERAPY 1/> REGIONS: CPT | Mod: PN

## 2022-05-05 ENCOUNTER — CLINICAL SUPPORT (OUTPATIENT)
Dept: REHABILITATION | Facility: OTHER | Age: 87
End: 2022-05-05
Payer: MEDICARE

## 2022-05-05 DIAGNOSIS — M25.662 DECREASED RANGE OF MOTION OF BOTH KNEES: ICD-10-CM

## 2022-05-05 DIAGNOSIS — M25.661 DECREASED RANGE OF MOTION OF BOTH KNEES: ICD-10-CM

## 2022-05-05 DIAGNOSIS — G89.29 CHRONIC PAIN OF LEFT KNEE: Primary | ICD-10-CM

## 2022-05-05 DIAGNOSIS — M25.562 CHRONIC PAIN OF LEFT KNEE: Primary | ICD-10-CM

## 2022-05-05 DIAGNOSIS — Z74.09 IMPAIRED FUNCTIONAL MOBILITY, BALANCE, GAIT, AND ENDURANCE: ICD-10-CM

## 2022-05-05 PROCEDURE — 97112 NEUROMUSCULAR REEDUCATION: CPT | Mod: PN

## 2022-05-05 PROCEDURE — 97110 THERAPEUTIC EXERCISES: CPT | Mod: PN

## 2022-05-05 NOTE — PROGRESS NOTES
"  Physical Therapy Treatment Note     Name: Jojo Ayoub  Clinic Number: [id number]    Therapy Diagnosis:   Encounter Diagnoses   Name Primary?   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  Noted some "strange ankle pain" when she woke up the other day but states that after she walked around, it went away and has not bothered her since.    She was compliant with home exercise program.  Response to previous treatment: soreness  Functional change: n/a    Pain: 1-2/10  Location: left knee      Objective        Jojo received therapeutic exercises to develop strength, ROM, flexibility and posture for 30 minutes including:   ITB stretch 3x 30 seconds B  ADD stretch with strap 3x 30 seconds B  Glute set into bridge x30  Heel slides with strap 10 second holds x2 minutes each  Ball squeeze 3 minutes x5 second holds  Quad sets 3 minutes x5 second holds    Not performed today:  Supine ankle pump with 5 second hold into PF x30  SAQ with 5 second holds x20 B  Seated ankle PF/DF on 1/2 foam x3 minutes " with 5-10 second holds into PF  Sit<>stand x5 with PT assist and cueing  Recumbent bike: attempted but unable to place feet in pedals with or without assistance.  Supine iso clamshell with pilates ring and TrA activation 5 second holds x3 minutes  Supine add ball squeeze with TrA activation 5 second holds x3 minutes  Quad set into SLR with pilates ring 3x 10 each  Supine clamshell with GTB x30  LAQ x20 each   Seated heel/toe raises x20  Seated DF/PF hold with 1/2 foam 5 seconds x3 minutes  HS stretch in chair 3x 30 seconds each  Supine ball squeeze x30  Supine clamshell with GTB x30    Jojo participated in neuromuscular re-education activities to improve: Balance, Coordination and Proprioception for 10 minutes. The following activities were included:  Ambulation around clinic with cueing for heel strike and toe off on L  Weight shifting A/P x2 minutes each with PT assist for L foot back     Jojo received the following manual therapy techniques: taping were applied to the: L knee for 00 minutes, including:  Preparation and application of C-strip for lateral inhibition and decompression I-strip to assist with patellar tracking with education provided on skin reactions and removal of tape       Home Exercises Provided and Patient Education Provided     Education provided:   - new exercises  - exercise form and purpose  - taping purpose, removal in shower with skin and tape soaked    Written Home Exercises Provided: Patient instructed to cont prior HEP.  Exercises were reviewed and Jojo was able to demonstrate them prior to the end of the session.  Jojo demonstrated good  understanding of the education provided.     See EMR under Patient Instructions for exercises provided prior visit.    Assessment     Patient tolerated treatment well today.  Held off with taping today due to clothing and brace today.  Continues to have patellar subluxation with knee flexion/extension that is intermittently improved with PT  applied medial patellar force.  Continue to emphasize quad control and lateral leg mobility and flexibility to decrease discomfort, improve ambulation and functional mobility.     Jojo is progressing well towards her goals.   Pt prognosis is Fair.     Pt will continue to benefit from skilled outpatient physical therapy to address the deficits listed in the problem list box on initial evaluation, provide pt/family education and to maximize pt's level of independence in the home and community environment.     Pt's spiritual, cultural and educational needs considered and pt agreeable to plan of care and goals.     Anticipated barriers to physical therapy: comorbidities, chronicity of condition    Goals:   In 6 weeks,    Goal Status   1. Patient will be independent with HEP to promote improved therapy outcomes.  STG  (progressing, not met)   2. Patient will increase hip MMT to 3+/5 to improve ambulation and stair navigation. STG  (progressing, not met)   3. Patient will increase knne ROM to 0-95 degrees to improve functional mobility. STG  (progressing, not met)         In 12 weeks,    Goal Status   4. Patient will be independent with progressed HEP to allow for self management of symptoms.  LTG  (progressing, not met)   5. Patient will increase hip MMT to 4/5 for improved ambulation and stair navigation.  LTG  (progressing, not met)   6. Patient will report navigating 5 steps at home with UE assist as needed and minimal difficulty to improve household mobility.  LTG  (progressing, not met)   7. Patient will improve 5 time sit to stand to 15 seconds in order to improve safety, transfers, and functional independence. LTG  (progressing, not met)   8. Patient will improve TUG score to 12 in order to decrease fall risk.  LTG  (progressing, not met)   9. Patient will improve EO NBOS balance to 15 seconds to decrease fall risk and improve ambulation. LTG  (progressing, not met)      Plan     Continue with established PT  POC and progress per pt tolerance.      Ernestine Schafer, PT

## 2022-05-05 NOTE — PROGRESS NOTES
Physical Therapy Treatment Note     Name: Jojo Ayoub  Clinic Number: [id number]    Therapy Diagnosis:   Encounter Diagnoses   Name Primary?   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  Response to previous treatment: soreness  Functional change: n/a    Pain: 1-2/10  Location: left knee      Objective        Jojo received therapeutic exercises to develop strength, ROM, flexibility and posture for 55 minutes including:  Recumbent bike: attempted but unable to place feet in pedals with or without assistance.   Glute set into bridge x30 (YTB around knees)  Heel slides with strap 10 second holds x2 minutes each  Supine ankle pump with 5 second hold into PF x30  +LAQ 1# 2x10   SAQ with 5 second holds x20 B  Seated ankle PF/DF on 1/2 foam x3 minutes with 5-10 second holds into PF  Sit<>stand x5 with PT assist and cueing  +Marching in place 1# 2x10  +Hip abduction 1# 2x10     Not performed today:  Supine iso clamshell with pilates ring and TrA activation 5  second holds x3 minutes  Supine add ball squeeze with TrA activation 5 second holds x3 minutes  Quad set into SLR with pilates ring 3x 10 each  Supine clamshell with GTB x30  LAQ x20 each    Seated heel/toe raises x20  Seated DF/PF hold with 1/2 foam 5 seconds x3 minutes  HS stretch in chair 3x 30 seconds each  Supine ball squeeze x30  Supine clamshell with GTB x30    Jojo received the following manual therapy techniques: taping were applied to the: L knee for 0 minutes, including:  Preparation and application of C-strip for lateral inhibition and decompression I-strip to assist with patellar tracking with education provided on skin reactions and removal of tape       Home Exercises Provided and Patient Education Provided     Education provided:   - new exercises  - exercise form and purpose  - taping purpose, removal in shower with skin and tape soaked    Written Home Exercises Provided: Patient instructed to cont prior HEP.  Exercises were reviewed and Jojo was able to demonstrate them prior to the end of the session.  Jojo demonstrated good  understanding of the education provided.     See EMR under Patient Instructions for exercises provided prior visit.    Assessment   Continuation of hip and knee stabilization exercises provided this visit. Pt required frequent verbal and tactile cues for correct technique and avoidence of compensations. Pt with notable right patella tracking with sit<>stand transfers. Will resume taping next visit and continue to progress exercises per pts tolerance.     Jojo is progressing well towards her goals.   Pt prognosis is Fair.     Pt will continue to benefit from skilled outpatient physical therapy to address the deficits listed in the problem list box on initial evaluation, provide pt/family education and to maximize pt's level of independence in the home and community environment.     Pt's spiritual, cultural and educational needs considered and pt agreeable to plan  of care and goals.     Anticipated barriers to physical therapy: comorbidities, chronicity of condition    Goals:   In 6 weeks,    Goal Status   1. Patient will be independent with HEP to promote improved therapy outcomes.  STG  (progressing, not met)   2. Patient will increase hip MMT to 3+/5 to improve ambulation and stair navigation. STG  (progressing, not met)   3. Patient will increase knne ROM to 0-95 degrees to improve functional mobility. STG  (progressing, not met)         In 12 weeks,    Goal Status   4. Patient will be independent with progressed HEP to allow for self management of symptoms.  LTG  (progressing, not met)   5. Patient will increase hip MMT to 4/5 for improved ambulation and stair navigation.  LTG  (progressing, not met)   6. Patient will report navigating 5 steps at home with UE assist as needed and minimal difficulty to improve household mobility.  LTG  (progressing, not met)   7. Patient will improve 5 time sit to stand to 15 seconds in order to improve safety, transfers, and functional independence. LTG  (progressing, not met)   8. Patient will improve TUG score to 12 in order to decrease fall risk.  LTG  (progressing, not met)   9. Patient will improve EO NBOS balance to 15 seconds to decrease fall risk and improve ambulation. LTG  (progressing, not met)      Plan     Continue with established PT POC and progress per pt tolerance.      Bob Schrader, PT

## 2022-05-06 ENCOUNTER — CLINICAL SUPPORT (OUTPATIENT)
Dept: REHABILITATION | Facility: OTHER | Age: 87
End: 2022-05-06
Payer: MEDICARE

## 2022-05-06 DIAGNOSIS — G89.29 CHRONIC PAIN OF LEFT KNEE: Primary | ICD-10-CM

## 2022-05-06 DIAGNOSIS — M25.661 DECREASED RANGE OF MOTION OF BOTH KNEES: ICD-10-CM

## 2022-05-06 DIAGNOSIS — Z74.09 IMPAIRED FUNCTIONAL MOBILITY, BALANCE, GAIT, AND ENDURANCE: ICD-10-CM

## 2022-05-06 DIAGNOSIS — M25.562 CHRONIC PAIN OF LEFT KNEE: Primary | ICD-10-CM

## 2022-05-06 DIAGNOSIS — M25.662 DECREASED RANGE OF MOTION OF BOTH KNEES: ICD-10-CM

## 2022-05-06 PROCEDURE — 97110 THERAPEUTIC EXERCISES: CPT | Mod: PN

## 2022-05-13 ENCOUNTER — DOCUMENTATION ONLY (OUTPATIENT)
Dept: REHABILITATION | Facility: OTHER | Age: 87
End: 2022-05-13
Payer: MEDICARE

## 2022-05-13 NOTE — PROGRESS NOTES
Patient failed to appear for scheduled PT appointment today at 9:45 am without prior notification or cancellation.    Ernestine Schafer, PT, DPT  5/13/2022

## 2022-05-18 ENCOUNTER — CLINICAL SUPPORT (OUTPATIENT)
Dept: REHABILITATION | Facility: OTHER | Age: 87
End: 2022-05-18
Payer: MEDICARE

## 2022-05-18 DIAGNOSIS — Z74.09 IMPAIRED FUNCTIONAL MOBILITY, BALANCE, GAIT, AND ENDURANCE: ICD-10-CM

## 2022-05-18 DIAGNOSIS — M25.662 DECREASED RANGE OF MOTION OF BOTH KNEES: ICD-10-CM

## 2022-05-18 DIAGNOSIS — M25.661 DECREASED RANGE OF MOTION OF BOTH KNEES: ICD-10-CM

## 2022-05-18 DIAGNOSIS — G89.29 CHRONIC PAIN OF LEFT KNEE: Primary | ICD-10-CM

## 2022-05-18 DIAGNOSIS — M25.562 CHRONIC PAIN OF LEFT KNEE: Primary | ICD-10-CM

## 2022-05-18 PROCEDURE — 97164 PT RE-EVAL EST PLAN CARE: CPT | Mod: PN

## 2022-05-18 PROCEDURE — 97110 THERAPEUTIC EXERCISES: CPT | Mod: PN

## 2022-05-18 NOTE — PROGRESS NOTES
Physical Therapy Treatment Note     Name: Jojo Ayoub  Clinic Number: [id number]    Therapy Diagnosis:   Encounter Diagnoses   Name Primary?   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  She has been coming to therapy to help her get up and moving better. She feels like therapy has been helping her move better.   Patient would like to work more on the stiffness in the knees, feet, legs and back.  She feels like she can get up better and quicker and move around the house easier.     She was compliant with home exercise program.  Response to previous treatment: soreness  Functional change: n/a    Pain: 1-2/10  Location: left knee      Objective        Jojo received therapeutic exercises to develop strength, ROM, flexibility and posture for 55 minutes including:  Re-assessment  Seated TrA activation 5 second holds x3 minutes  UE flexion with dowel and TrA activation x3 minutes  Seated marching with TrA activation x20      Not performed today:  Glute set into bridge x30 (YTB around knees)  Heel slides with strap 10  second holds x2 minutes each  Supine ankle pump with 5 second hold into PF x30  LAQ 1# 2x10   SAQ with 5 second holds x20 B  Seated ankle PF/DF on 1/2 foam x3 minutes with 5-10 second holds into PF  Sit<>stand x5 with PT assist and cueing  Marching in place 1# 2x10  Hip abduction 1# 2x10   Recumbent bike: attempted but unable to place feet in pedals with or without assistance.   Supine iso clamshell with pilates ring and TrA activation 5 second holds x3 minutes  Supine add ball squeeze with TrA activation 5 second holds x3 minutes  Quad set into SLR with pilates ring 3x 10 each  Supine clamshell with GTB x30  LAQ x20 each    Seated heel/toe raises x20  Seated DF/PF hold with 1/2 foam 5 seconds x3 minutes  HS stretch in chair 3x 30 seconds each  Supine ball squeeze x30  Supine clamshell with GTB x30    Jojo received the following manual therapy techniques: taping were applied to the: L knee for 0 minutes, including:  Preparation and application of C-strip for lateral inhibition and decompression I-strip to assist with patellar tracking with education provided on skin reactions and removal of tape       Home Exercises Provided and Patient Education Provided     Education provided:   - new exercises  - exercise form and purpose  - taping purpose, removal in shower with skin and tape soaked    Written Home Exercises Provided: Patient instructed to cont prior HEP.  Exercises were reviewed and Jojo was able to demonstrate them prior to the end of the session.  Jojo demonstrated good  understanding of the education provided.     See EMR under Patient Instructions for exercises provided prior visit.    Assessment     See UPOC    Jojo is progressing well towards her goals.   Pt prognosis is Fair.     Pt will continue to benefit from skilled outpatient physical therapy to address the deficits listed in the problem list box on initial evaluation, provide pt/family education and to maximize pt's level of  independence in the home and community environment.     Pt's spiritual, cultural and educational needs considered and pt agreeable to plan of care and goals.     Anticipated barriers to physical therapy: comorbidities, chronicity of condition    Goals:      In 6 weeks,    Goal Status   1. Patient will be independent with HEP to promote improved therapy outcomes.  STG  Some progress 5/18/2022    2. Patient will increase hip MMT to 3+/5 to improve ambulation and stair navigation. STG  Some progress 5/18/2022    3. Patient will increase knne ROM to 0-95 degrees to improve functional mobility. STG  In progress 5/18/2022          In 12 weeks,    Goal Status   4. Patient will be independent with progressed HEP to allow for self management of symptoms.  LTG  IN progress 5/18/2022    5. Patient will increase hip MMT to 4-/5 for improved ambulation and stair navigation.  LTG  Updated 5/18/2022    6. Patient will report navigating 5 steps at home with UE assist as needed and minimal difficulty to improve household mobility.  LTG  IN progress 5/18/2022    7. Patient will improve 5 time sit to stand to 25 seconds in order to improve safety, transfers, and functional independence. LTG Updated  5/18/2022    8. Patient will improve TUG score to 18 seconds in order to decrease fall risk.  LTG  Updated 5/18/2022    9. Patient will improve EO NBOS balance to 15 seconds to decrease fall risk and improve ambulation. LTG  (progressing, not met)              Plan     Updated Certification Period: 5/18/2022 to 07/08/2022  Recommended Treatment Plan: 2 times per week for 6 weeks: Gait Training, Manual Therapy, Moist Heat/ Ice, Neuromuscular Re-ed, Patient Education, Therapeutic Activities and Therapeutic Exercise     Continue with established PT POC and progress per pt tolerance.       Ernestine Schafer, PT

## 2022-05-18 NOTE — PLAN OF CARE
Outpatient Therapy Updated Plan of Care     Visit Date: 5/18/2022  Name: Jojo Ayoub  Clinic Number: [id number]    Therapy Diagnosis:   Encounter Diagnoses   Name Primary?    Chronic pain of left knee Yes    Impaired functional mobility, balance, gait, and endurance     Decreased range of motion of both knees      Physician: Isabelle Mahoney MD    Physician Orders: PT Eval and Treat - gait training, muscle strengthening, balance, left knee instability  Medical Diagnosis from Referral: Polyneuropathy in other diseases classified elsewhere (G63); Failed total left knee replacement, sequela (T84.093S); Uses roller walker (Z99.89)  Evaluation Date: 3/4/2022    Prior Certification Period: 3/4/2022 to 6/3/2022  Current Certification Period:  5/18/2022 to 07/08/2022    Visits from Evaluation Date:  8  Total Visits Received: 7  Cancelled Visits: 6  No Show Visits: 3    Precautions:  Standard, Diabetes, Fall and HTN    Functional Level Prior to Evaluation:   Independent and ambulating with rollator    Subjective     Update:   Pt reports: she has been having more back pain lately.  She has been coming to therapy to help her get up and moving better. She feels like therapy has been helping her move better.   Patient would like to work more on the stiffness in the knees, feet, legs and back.  She feels like she can get up better and quicker and move around the house easier.     She was compliant with home exercise program.  Response to previous treatment: soreness  Functional change: n/a    Pain: 1-2/10; 4/10 in low back  Location: left knee      Objective     Update:     MMT/Strength:     Hip Right Left   Flexion 3+/5 3+/5   Extension 3-/5 3-/5   Adduction 3/5 3/5   Abduction 3-/5 3-/5   Knee       Flexion 4-/5 3+/5   Extension 5/5 5/5   Ankle       Dorsiflexion 5/5 4-/5   Plantarflexion 2/5 2/5         5x sit<>stand: 35 seconds with use of B UE throughout the entire sequence, including upright standing  position     Normal:   60-69: 11.4 seconds  70-79: 12.6 seconds  80-89: 12.7 seconds      TU.3 seconds (3x average) with rollator    Normal:  Community dwelling adult: >13.5 seconds  Older stroke patients: >14 seconds  Older adults already attending falls clinic: >15 seconds  Frail Elderly: >32.6 seconds  LE amputees >19 seconds  Parkinson's disease: >11.5 seconds  Hip OA: >10 seconds  Vestibular disorders: >11.1 seconds    (Reference: https://www.sralab.org/rehabilitation-measures/timed-and-go#older-adults-and-geriatric-care)      CMS Impairment/Limitation/Restriction for FOTO Knee Survey     Therapist reviewed FOTO scores for Jojo Ayoub on 2022.   FOTO documents entered into [employer] - see Media section.     Limitation Score: 64%  Category: Other     Current : CL = least 60% but < 80% impaired, limited or restricted  Goal: CK = at least 40% but < 60% impaired, limited or restricted               Assessment     Update: Patient has made some progress subjectively noting it is easier for her to get up and go although the L knee continues to pop causing her some uncertainty with movements.  She notes overall feeling better since starting therapy but would like to continue addressing stiffness and low back pains.  Overall patient is able to perform bed mobility and sit<>stand quicker than at IE.  Continues to have anterior trunk lean in standing as well as decreased plantar flexion ROM in both WB and NWB positions. Patient requires cueing to improve weight shifting for sit<>stand with better performance and more stability noted.  Continue to address limitations to improve gait, bed mobility, functional mobility, stair navigation, and safety.        In 6 weeks,    Goal Status   1. Patient will be independent with HEP to promote improved therapy outcomes.  STG  Some progress 2022    2. Patient will increase hip MMT to 3+/5 to improve ambulation and stair navigation. STG  Some progress 2022    3.  Patient will increase knne ROM to 0-95 degrees to improve functional mobility. STG  In progress 5/18/2022          In 12 weeks,    Goal Status   4. Patient will be independent with progressed HEP to allow for self management of symptoms.  LTG  IN progress 5/18/2022    5. Patient will increase hip MMT to 4-/5 for improved ambulation and stair navigation.  LTG  Updated 5/18/2022    6. Patient will report navigating 5 steps at home with UE assist as needed and minimal difficulty to improve household mobility.  LTG  IN progress 5/18/2022    7. Patient will improve 5 time sit to stand to 25 seconds in order to improve safety, transfers, and functional independence. LTG Updated  5/18/2022    8. Patient will improve TUG score to 18 seconds in order to decrease fall risk.  LTG  Updated 5/18/2022    9. Patient will improve EO NBOS balance to 15 seconds to decrease fall risk and improve ambulation. LTG  (progressing, not met)       Previous Short Term Goals Status:   In progress  New Short Term Goals Status:   In progress  Long Term Goal Status:   modified:  Updated LTGs as noted above   Reasons for Recertification of Therapy:   Patient will continue to benefit from skilled physical therapy to address remaining deficits affecting functional mobility.      Plan     Updated Certification Period: 5/18/2022 to 07/08/2022  Recommended Treatment Plan: 2 times per week for 6 weeks: Gait Training, Manual Therapy, Moist Heat/ Ice, Neuromuscular Re-ed, Patient Education, Therapeutic Activities and Therapeutic Exercise      Ernestine Schafer, PT  5/18/2022      I CERTIFY THE NEED FOR THESE SERVICES FURNISHED UNDER THIS PLAN OF TREATMENT AND WHILE UNDER MY CARE    Physician's comments:        Physician's Signature: ___________________________________________________

## 2022-05-20 ENCOUNTER — CLINICAL SUPPORT (OUTPATIENT)
Dept: REHABILITATION | Facility: OTHER | Age: 87
End: 2022-05-20
Payer: MEDICARE

## 2022-05-20 DIAGNOSIS — Z74.09 IMPAIRED FUNCTIONAL MOBILITY, BALANCE, GAIT, AND ENDURANCE: ICD-10-CM

## 2022-05-20 DIAGNOSIS — M25.662 DECREASED RANGE OF MOTION OF BOTH KNEES: ICD-10-CM

## 2022-05-20 DIAGNOSIS — M25.661 DECREASED RANGE OF MOTION OF BOTH KNEES: ICD-10-CM

## 2022-05-20 DIAGNOSIS — G89.29 CHRONIC PAIN OF LEFT KNEE: Primary | ICD-10-CM

## 2022-05-20 DIAGNOSIS — M25.562 CHRONIC PAIN OF LEFT KNEE: Primary | ICD-10-CM

## 2022-05-20 PROCEDURE — 97110 THERAPEUTIC EXERCISES: CPT | Mod: PN

## 2022-05-20 NOTE — PROGRESS NOTES
"  Physical Therapy Treatment Note     Name: Jojo Ayoub  Clinic Number: [id number]    Therapy Diagnosis:   Encounter Diagnoses   Name Primary?   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  "Whatever we did really made my back feel better".  She did get a booster yesterday and notes her arm is a little more sore today.     She was compliant with home exercise program.  Response to previous treatment: soreness  Functional change: n/a    Pain: 1-2/10  Location: left knee      Objective        Jojo received therapeutic exercises to develop strength, ROM, flexibility and posture for 40 minutes including:  SLR with 1# weight 2x 10  LAQ 1# 2x10   Standing hip ABD with 1# weight 2x 10  Standing hip extension with 1# weight (forearms on mat) 2x 10  Seated TrA activation 5 second holds x3 minutes  UE flexion with dowel and TrA activation x3 minutes  Seated marching with TrA activation x20  Seated PF/DF with 1/2 foam 5 second holds x3 minutes  Standing HS curl with 1# weight      Not performed today:  Glute set into bridge x30 (YTB around knees)  Heel slides with " strap 10 second holds x2 minutes each  Supine ankle pump with 5 second hold into PF x30  Supine ball squeeze x30  Supine clamshell with GTB x30  SAQ with 5 second holds x20 B  Seated ankle PF/DF on 1/2 foam x3 minutes with 5-10 second holds into PF  Sit<>stand x5 with PT assist and cueing  Marching in place 1# 2x10  Seated heel/toe raises x20  Seated DF/PF hold with 1/2 foam 5 seconds x3 minutes  HS stretch in chair 3x 30 seconds each      Jojo participated in neuromuscular re-education activities to improve: Balance, Kinesthetic, Proprioception and Posture for 5 minutes. The following activities were included:  Ambulation around clinic with 1# weights on ankles and rollator        Home Exercises Provided and Patient Education Provided     Education provided:   - new exercises  - exercise form and purpose  - taping purpose, removal in shower with skin and tape soaked    Written Home Exercises Provided: Patient instructed to cont prior HEP.  Exercises were reviewed and Jojo was able to demonstrate them prior to the end of the session.  Jojo demonstrated good  understanding of the education provided.     See EMR under Patient Instructions for exercises provided prior visit.    Assessment     Patient continues to have lateral tracking of R patella with activities.  Continued with hip and core strengthening to improve mobility and safety with activities. Added standing exercises through available ranges for hip and knee.  Limited ROM for hamstring curl on L noted.  Continue with strengthening as tolerated.     Jojo is progressing well towards her goals.   Pt prognosis is Fair.     Pt will continue to benefit from skilled outpatient physical therapy to address the deficits listed in the problem list box on initial evaluation, provide pt/family education and to maximize pt's level of independence in the home and community environment.     Pt's spiritual, cultural and educational needs considered and pt  agreeable to plan of care and goals.     Anticipated barriers to physical therapy: comorbidities, chronicity of condition    Goals:      In 6 weeks,    Goal Status   1. Patient will be independent with HEP to promote improved therapy outcomes.  STG  Some progress 5/18/2022    2. Patient will increase hip MMT to 3+/5 to improve ambulation and stair navigation. STG  Some progress 5/18/2022    3. Patient will increase knne ROM to 0-95 degrees to improve functional mobility. STG  In progress 5/18/2022          In 12 weeks,    Goal Status   4. Patient will be independent with progressed HEP to allow for self management of symptoms.  LTG  IN progress 5/18/2022    5. Patient will increase hip MMT to 4-/5 for improved ambulation and stair navigation.  LTG  Updated 5/18/2022    6. Patient will report navigating 5 steps at home with UE assist as needed and minimal difficulty to improve household mobility.  LTG  IN progress 5/18/2022    7. Patient will improve 5 time sit to stand to 25 seconds in order to improve safety, transfers, and functional independence. LTG Updated  5/18/2022    8. Patient will improve TUG score to 18 seconds in order to decrease fall risk.  LTG  Updated 5/18/2022    9. Patient will improve EO NBOS balance to 15 seconds to decrease fall risk and improve ambulation. LTG  (progressing, not met)              Plan      Continue with established PT POC and progress per pt tolerance.       Ernestine Schafer, PT

## 2022-05-27 ENCOUNTER — CLINICAL SUPPORT (OUTPATIENT)
Dept: REHABILITATION | Facility: OTHER | Age: 87
End: 2022-05-27
Payer: MEDICARE

## 2022-05-27 DIAGNOSIS — Z74.09 IMPAIRED FUNCTIONAL MOBILITY, BALANCE, GAIT, AND ENDURANCE: ICD-10-CM

## 2022-05-27 DIAGNOSIS — M25.562 CHRONIC PAIN OF LEFT KNEE: Primary | ICD-10-CM

## 2022-05-27 DIAGNOSIS — G89.29 CHRONIC PAIN OF LEFT KNEE: Primary | ICD-10-CM

## 2022-05-27 DIAGNOSIS — M25.661 DECREASED RANGE OF MOTION OF BOTH KNEES: ICD-10-CM

## 2022-05-27 DIAGNOSIS — M25.662 DECREASED RANGE OF MOTION OF BOTH KNEES: ICD-10-CM

## 2022-05-27 PROCEDURE — 97110 THERAPEUTIC EXERCISES: CPT | Mod: PN

## 2022-05-27 NOTE — PROGRESS NOTES
Physical Therapy Treatment Note     Name: Jojo Ayoub  Clinic Number: [id number]    Therapy Diagnosis:   Encounter Diagnoses   Name Primary?   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  She had her birthday celebration this weekend and really enjoyed it.     She was compliant with home exercise program.  Response to previous treatment: soreness  Functional change: n/a    Pain: 1-2/10  Location: left knee      Objective        Jojo received therapeutic exercises to develop strength, ROM, flexibility and posture for 45 minutes including:  LTR x3 minutes  DKTC with green swiss ball x3 minutes  SLR with 1# weight 3x 10  SL ABD with 1# weight 2x 10  SaQ with 1# weight x30 B  SAQ with 1# weight and ball squeeze x30   LAQ 1# 3x10   Seated PF/DF with 1/2 foam 5 second holds x3 minutes    Not performed today:  Standing hip ABD with 1# weight 2x 10  Standing hip extension with 1# weight (forearms on mat) 2x 10  Seated TrA activation 5 second holds x3 minutes  UE flexion with dowel and TrA activation x3 minutes  Seated marching with TrA activation  x20  Standing HS curl with 1# weight  Glute set into bridge x30 (YTB around knees)  Heel slides with strap 10 second holds x2 minutes each  Supine ankle pump with 5 second hold into PF x30  Supine ball squeeze x30  Supine clamshell with GTB x30  SAQ with 5 second holds x20 B  Seated ankle PF/DF on 1/2 foam x3 minutes with 5-10 second holds into PF  Sit<>stand x5 with PT assist and cueing  Marching in place 1# 2x10  Seated heel/toe raises x20  Seated DF/PF hold with 1/2 foam 5 seconds x3 minutes  HS stretch in chair 3x 30 seconds each      Jojo participated in neuromuscular re-education activities to improve: Balance, Kinesthetic, Proprioception and Posture for 00 minutes. The following activities were included:  Ambulation around clinic with 1# weights on ankles and rollator        Home Exercises Provided and Patient Education Provided     Education provided:   - new exercises  - exercise form and purpose      Written Home Exercises Provided: Patient instructed to cont prior HEP.  Exercises were reviewed and Jojo was able to demonstrate them prior to the end of the session.  Jojo demonstrated good  understanding of the education provided.     See EMR under Patient Instructions for exercises provided prior visit.    Assessment     Progressed sets today with patient noting difficulty but able to complete. Continues to have knee popping with flexion/extension motions.  Added VMO activation with ball squeeze during SAQ and notable fatigue today. Continue with strength and functional mobility, resume walking with weights at next visit.     Jojo is progressing well towards her goals.   Pt prognosis is Fair.     Pt will continue to benefit from skilled outpatient physical therapy to address the deficits listed in the problem list box on initial evaluation, provide pt/family education and to maximize pt's level of independence in the home and community environment.     Pt's spiritual, cultural and educational  needs considered and pt agreeable to plan of care and goals.     Anticipated barriers to physical therapy: comorbidities, chronicity of condition    Goals:      In 6 weeks,    Goal Status   1. Patient will be independent with HEP to promote improved therapy outcomes.  STG  Some progress 5/18/2022    2. Patient will increase hip MMT to 3+/5 to improve ambulation and stair navigation. STG  Some progress 5/18/2022    3. Patient will increase knne ROM to 0-95 degrees to improve functional mobility. STG  In progress 5/18/2022          In 12 weeks,    Goal Status   4. Patient will be independent with progressed HEP to allow for self management of symptoms.  LTG  IN progress 5/18/2022    5. Patient will increase hip MMT to 4-/5 for improved ambulation and stair navigation.  LTG  Updated 5/18/2022    6. Patient will report navigating 5 steps at home with UE assist as needed and minimal difficulty to improve household mobility.  LTG  IN progress 5/18/2022    7. Patient will improve 5 time sit to stand to 25 seconds in order to improve safety, transfers, and functional independence. LTG Updated  5/18/2022    8. Patient will improve TUG score to 18 seconds in order to decrease fall risk.  LTG  Updated 5/18/2022    9. Patient will improve EO NBOS balance to 15 seconds to decrease fall risk and improve ambulation. LTG  (progressing, not met)              Plan      Continue with established PT POC and progress per pt tolerance.       Ernestine Schafer, PT

## 2022-06-02 NOTE — PROGRESS NOTES
Physical Therapy Treatment Note     Name: Jojo Ayoub  Clinic Number: [id number]    Therapy Diagnosis:   Encounter Diagnoses   Name Primary?   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  No new changes from last visit     She was compliant with home exercise program.  Response to previous treatment: soreness  Functional change: walking better    Pain: 1-2/10  Location: left knee      Objective        Jojo received therapeutic exercises to develop strength, ROM, flexibility and posture for 40 minutes including:  Seated PF/DF with prostretch x2 minutes each  Seated AROM PF/DF x3 minutes  Seated ADD with ball squeeze 30x 5 second holds  Seated clamshells with GTB 30x 5 second holds  Ankle circles CW and CCW x30 each B  Ankle Alphabet upper and lower x1 each B  Quad set 10x 10 second holds B  Quad set with VMO activation 10x 10 second holds     Not performed today:  LTR x3 minutes  DKTC with green swiss ball x3 minutes  SLR with 1# weight 3x 10  SL ABD with 1# weight 2x 10  SAQ with 1# weight x30 B  SAQ with 1# weight and ball squeeze x30   LAQ 1# 3x10    Standing hip ABD with 1# weight 2x 10  Standing hip extension with 1# weight (forearms on mat) 2x 10  Seated TrA activation 5 second holds x3 minutes  UE flexion with dowel and TrA activation x3 minutes  Seated marching with TrA activation x20  Standing HS curl with 1# weight  Glute set into bridge x30 (YTB around knees)  Heel slides with strap 10 second holds x2 minutes each  Supine ball squeeze x30  Supine clamshell with GTB x30  Sit<>stand x5 with PT assist and cueing  Marching in place 1# 2x10  HS stretch in chair 3x 30 seconds each    Jojo participated in neuromuscular re-education activities to improve: Balance, Kinesthetic, Proprioception and Posture for 5 minutes. The following activities were included:  Ambulation around clinic with 2# weights on ankles and rollator        Home Exercises Provided and Patient Education Provided     Education provided:   - new exercises  - exercise form and purpose  - updated HEP    Written Home Exercises Provided: yes.  Exercises were reviewed and Jojo was able to demonstrate them prior to the end of the session.  Jojo demonstrated good  understanding of the education provided.     See EMR under Patient Instructions for exercises provided 6/3/2022.    Assessment     Patient ambulates into clinic with improving gait pattern - more upright posture and heel/toe pattern, less dragging of LEs and quicker speed.  Patient also demonstrated easier functional mobility including bed mobility and sit<>stand with less hesitation or difficulty.  Updated HEP with ankle ROM and standing hip exercises to continue with ankle mobility for gait and balance as well as hip and core strengthening.  Continue with hip strengthening at next visit.     Jojo is progressing well towards her goals.   Pt prognosis is Fair.     Pt will continue to benefit from skilled outpatient physical therapy to address the deficits listed in the problem list box on initial evaluation, provide pt/family  education and to maximize pt's level of independence in the home and community environment.     Pt's spiritual, cultural and educational needs considered and pt agreeable to plan of care and goals.     Anticipated barriers to physical therapy: comorbidities, chronicity of condition    Goals:      In 6 weeks,    Goal Status   1. Patient will be independent with HEP to promote improved therapy outcomes.  STG  Some progress 5/18/2022    2. Patient will increase hip MMT to 3+/5 to improve ambulation and stair navigation. STG  Some progress 5/18/2022    3. Patient will increase knne ROM to 0-95 degrees to improve functional mobility. STG  In progress 5/18/2022          In 12 weeks,    Goal Status   4. Patient will be independent with progressed HEP to allow for self management of symptoms.  LTG  IN progress 5/18/2022    5. Patient will increase hip MMT to 4-/5 for improved ambulation and stair navigation.  LTG  Updated 5/18/2022    6. Patient will report navigating 5 steps at home with UE assist as needed and minimal difficulty to improve household mobility.  LTG  IN progress 5/18/2022    7. Patient will improve 5 time sit to stand to 25 seconds in order to improve safety, transfers, and functional independence. LTG Updated  5/18/2022    8. Patient will improve TUG score to 18 seconds in order to decrease fall risk.  LTG  Updated 5/18/2022    9. Patient will improve EO NBOS balance to 15 seconds to decrease fall risk and improve ambulation. LTG  (progressing, not met)              Plan      Continue with established PT POC and progress per pt tolerance.       Ernestine Schafer, PT

## 2022-06-03 ENCOUNTER — CLINICAL SUPPORT (OUTPATIENT)
Dept: REHABILITATION | Facility: OTHER | Age: 87
End: 2022-06-03
Payer: MEDICARE

## 2022-06-03 DIAGNOSIS — G89.29 CHRONIC PAIN OF LEFT KNEE: Primary | ICD-10-CM

## 2022-06-03 DIAGNOSIS — Z74.09 IMPAIRED FUNCTIONAL MOBILITY, BALANCE, GAIT, AND ENDURANCE: ICD-10-CM

## 2022-06-03 DIAGNOSIS — M25.562 CHRONIC PAIN OF LEFT KNEE: Primary | ICD-10-CM

## 2022-06-03 DIAGNOSIS — M25.661 DECREASED RANGE OF MOTION OF BOTH KNEES: ICD-10-CM

## 2022-06-03 DIAGNOSIS — M25.662 DECREASED RANGE OF MOTION OF BOTH KNEES: ICD-10-CM

## 2022-06-03 PROCEDURE — 97110 THERAPEUTIC EXERCISES: CPT | Mod: PN

## 2022-06-03 NOTE — PATIENT INSTRUCTIONS
Access Code: [email or web address]  URL: [email or web address]  Date: 06/03/2022  Prepared by: Ernestine Schafer    Exercises  Seated Heel Toe Raises - 1-3 x daily - 1 sets - 20 reps - 3 hold  Seated Hip Abduction - 1 x daily - 1 sets - 20 reps - 3 hold  Seated Hip Adduction Isometrics with Ball - 1 x daily - 1 sets - 20 reps - 3 second hold  Sit to Stand with Armchair - 1 x daily - 2 sets - 10 reps - 3 hold  Seated Hamstring Stretch - 1 sets - 3 reps - 30 seconds hold  Supine Bridge with Gluteal Set and Spinal Articulation - 1 x daily - 2 sets - 10 reps  Supine Ankle Circles - 1 x daily  Seated Ankle Alphabet - 1 x daily  Supine Quad Set - 1 x daily - 20 reps - 1 sets - 5 hold  Active Straight Leg Raise with Quad Set - 1 x daily - 10 reps - 2 sets  Standing Hip Extension - 1 x daily - 10 reps - 3 sets  Standing Hip Abduction - 1 x daily - 3 sets - 10 reps

## 2022-06-08 ENCOUNTER — CLINICAL SUPPORT (OUTPATIENT)
Dept: REHABILITATION | Facility: OTHER | Age: 87
End: 2022-06-08
Payer: MEDICARE

## 2022-06-08 DIAGNOSIS — M25.661 DECREASED RANGE OF MOTION OF BOTH KNEES: ICD-10-CM

## 2022-06-08 DIAGNOSIS — Z74.09 IMPAIRED FUNCTIONAL MOBILITY, BALANCE, GAIT, AND ENDURANCE: ICD-10-CM

## 2022-06-08 DIAGNOSIS — M25.562 CHRONIC PAIN OF LEFT KNEE: Primary | ICD-10-CM

## 2022-06-08 DIAGNOSIS — G89.29 CHRONIC PAIN OF LEFT KNEE: Primary | ICD-10-CM

## 2022-06-08 DIAGNOSIS — M25.662 DECREASED RANGE OF MOTION OF BOTH KNEES: ICD-10-CM

## 2022-06-08 PROCEDURE — 97110 THERAPEUTIC EXERCISES: CPT | Mod: PN,CQ

## 2022-06-08 NOTE — PROGRESS NOTES
Physical Therapy Treatment Note     Name: Jojo Ayoub  Clinic Number: [id number]    Therapy Diagnosis:   Encounter Diagnoses   Name Primary?   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  Response to previous treatment: soreness  Functional change: walking better    Pain: 1-2/10  Location: left knee      Objective        Jojo received therapeutic exercises to develop strength, ROM, flexibility and posture for 43 minutes including:  Seated PF/DF with prostretch x2 minutes each  Seated AROM PF/DF x3 minutes  Seated ADD with ball squeeze 30x 5 second holds  Seated clamshells with GTB 30x 5 second holds  Ankle circles CW and CCW x30 each B  Ankle Alphabet upper and lower x1 each B  Quad set 10x 10 second holds B  Quad set with VMO activation 10x 10 second holds     Not performed today:  LTR x3 minutes  DKTC with green swiss ball x3 minutes  SLR with 1# weight 3x 10  SL ABD with 1# weight 2x 10  SAQ with 1# weight x30 B  SAQ with 1# weight and ball squeeze x30   LAQ 1# 3x10   Standing hip ABD with 1#  weight 2x 10  Standing hip extension with 1# weight (forearms on mat) 2x 10  Seated TrA activation 5 second holds x3 minutes  UE flexion with dowel and TrA activation x3 minutes  Seated marching with TrA activation x20  Standing HS curl with 1# weight  Glute set into bridge x30 (YTB around knees)  Heel slides with strap 10 second holds x2 minutes each  Supine ball squeeze x30  Supine clamshell with GTB x30  Sit<>stand x5 with PT assist and cueing  Marching in place 1# 2x10  HS stretch in chair 3x 30 seconds each    Jojo participated in neuromuscular re-education activities to improve: Balance, Kinesthetic, Proprioception and Posture for 0 minutes. The following activities were included:  Ambulation around clinic with 2# weights on ankles and rollator        Home Exercises Provided and Patient Education Provided     Education provided:   - new exercises  - exercise form and purpose  - updated HEP    Written Home Exercises Provided: yes.  Exercises were reviewed and Jojo was able to demonstrate them prior to the end of the session.  Jojo demonstrated good  understanding of the education provided.     See EMR under Patient Instructions for exercises provided 6/3/2022.    Assessment     Patient ambulates into clinic today with slowed leandro and reports of increased stiffness in ankles and hips. She presents with good safety with rollator with transfers and ambulation. Patient requires tactile cues for correct VMO activation. Improved mobility and technique noted with gait upon exit from clinic. Continue with hip strengthening at next visit.     Jojo is progressing well towards her goals.   Pt prognosis is Fair.     Pt will continue to benefit from skilled outpatient physical therapy to address the deficits listed in the problem list box on initial evaluation, provide pt/family education and to maximize pt's level of independence in the home and community environment.     Pt's spiritual, cultural and  educational needs considered and pt agreeable to plan of care and goals.     Anticipated barriers to physical therapy: comorbidities, chronicity of condition    Goals:      In 6 weeks,    Goal Status   1. Patient will be independent with HEP to promote improved therapy outcomes.  STG  Some progress 5/18/2022    2. Patient will increase hip MMT to 3+/5 to improve ambulation and stair navigation. STG  Some progress 5/18/2022    3. Patient will increase knne ROM to 0-95 degrees to improve functional mobility. STG  In progress 5/18/2022          In 12 weeks,    Goal Status   4. Patient will be independent with progressed HEP to allow for self management of symptoms.  LTG  IN progress 5/18/2022    5. Patient will increase hip MMT to 4-/5 for improved ambulation and stair navigation.  LTG  Updated 5/18/2022    6. Patient will report navigating 5 steps at home with UE assist as needed and minimal difficulty to improve household mobility.  LTG  IN progress 5/18/2022    7. Patient will improve 5 time sit to stand to 25 seconds in order to improve safety, transfers, and functional independence. LTG Updated  5/18/2022    8. Patient will improve TUG score to 18 seconds in order to decrease fall risk.  LTG  Updated 5/18/2022    9. Patient will improve EO NBOS balance to 15 seconds to decrease fall risk and improve ambulation. LTG  (progressing, not met)              Plan      Continue with established PT POC and progress per pt tolerance.       RITESH DIETZ, PTA

## 2022-06-23 NOTE — PROGRESS NOTES
Physical Therapy Treatment Note     Name: Jojo Ayoub  Clinic Number: [id number]    Therapy Diagnosis:   Encounter Diagnoses   Name Primary?   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     She was compliant with home exercise program.  Response to previous treatment: soreness  Functional change: walking better    Pain: 1-2/10  Location: left knee      Objective      MMT/Strength:     Hip Right Left   Flexion 3+/5 3+/5   Extension 3-/5 3-/5   Adduction 3/5 3/5   Abduction 3-/5 3-/5   Knee       Flexion 4-/5 3+/5   Extension 5/5 5/5   Ankle       Dorsiflexion 5/5 4-/5   Plantarflexion 2/5 2/5          5x sit<>stand: 51 seconds    Normal:   60-69: 11.4 seconds  70-79: 12.6 seconds  80-89: 12.7 seconds    30 seconds sit<>stand: 3 reps    Normal:   Age Male Female   60-64 17 15   65-69 16 15   70-74 15 14   75-79 14 13   80-84 13 12   85-89 11 11   90-94 9 9      TU seconds (3x average - 26, 24, 29 seconds)    Normal:  Community dwelling adult: >13.5 seconds  Older  stroke patients: >14 seconds  Older adults already attending falls clinic: >15 seconds  Frail Elderly: >32.6 seconds  LE amputees >19 seconds  Parkinson's disease: >11.5 seconds  Hip OA: >10 seconds  Vestibular disorders: >11.1 seconds    (Reference: https://www.sralab.org/rehabilitation-measures/timed-and-go#older-adults-and-geriatric-care)     CMS Impairment/Limitation/Restriction for FOTO Knee Survey    Therapist reviewed FOTO scores for Jojo Ayoub on 6/24/2022.   FOTO documents entered into [employer] - see Media section.    Limitation Score: 65%  Category: Other    Current : CL = least 60% but < 80% impaired, limited or restricted  Goal: CK = at least 40% but < 60% impaired, limited or restricted   Discharge: CL = least 60% but < 80% impaired, limited or restricted            Jojo received therapeutic exercises to develop strength, ROM, flexibility and posture for 40 minutes including:  Re-assessment  LTR x3 minutes  Bridge 2x 10  Supine ABD with YTB around ankles x30 each  Seated heel/toe raises x30        Not performed today:  Seated PF/DF with prostretch x2 minutes each  Seated AROM PF/DF x3 minutes  Seated ADD with ball squeeze 30x 5 second holds  Seated clamshells with GTB 30x 5 second holds  Ankle circles CW and CCW x30 each B  Ankle Alphabet upper and lower x1 each B  Quad set 10x 10 second holds B  Quad set with VMO activation 10x 10 second holds   DKTC with green swiss ball x3 minutes  SLR with 1# weight 3x 10  SL ABD with 1# weight 2x 10  SAQ with 1# weight x30 B  SAQ with 1# weight and ball squeeze x30   LAQ 1# 3x10   Standing hip ABD with 1# weight 2x 10  Standing hip extension with 1# weight (forearms on mat) 2x 10  Seated TrA activation 5 second holds x3 minutes  UE flexion with dowel and TrA activation x3 minutes  Seated marching with TrA activation x20  Standing HS curl with 1# weight  Glute set into bridge x30 (YTB around knees)  Heel slides with strap 10 second holds x2 minutes each  Supine  ball squeeze x30  Supine clamshell with GTB x30  Sit<>stand x5 with PT assist and cueing  Marching in place 1# 2x10  HS stretch in chair 3x 30 seconds each    Jojo participated in neuromuscular re-education activities to improve: Balance, Kinesthetic, Proprioception and Posture for 0 minutes. The following activities were included:  Ambulation around clinic with 2# weights on ankles and rollator        Home Exercises Provided and Patient Education Provided     Education provided:   - new exercises  - exercise form and purpose  - updated HEP    Written Home Exercises Provided: yes.  Exercises were reviewed and Jjoo was able to demonstrate them prior to the end of the session.  Jojo demonstrated good  understanding of the education provided.     See EMR under Patient Instructions for exercises provided 6/3/2022.    Assessment     Patient has made minimal progress since last re-assessment although she demonstrates improvements with functional mobility with improved gait pattern, quicker bed mobility and sit<>stand with exercise.  Patient also displays more confidence with ambulation and exercises although continues to lack strength in general on L, particularly with hip ABD.  Continued education on progress since IE with patient remaining agreeable to d/c at last scheduled visit with HEP.     Jojo is progressing well towards her goals.   Pt prognosis is Fair.     Pt will continue to benefit from skilled outpatient physical therapy to address the deficits listed in the problem list box on initial evaluation, provide pt/family education and to maximize pt's level of independence in the home and community environment.     Pt's spiritual, cultural and educational needs considered and pt agreeable to plan of care and goals.     Anticipated barriers to physical therapy: comorbidities, chronicity of condition    Goals:      In 6 weeks,    Goal Status   1. Patient will be independent with HEP to promote improved  therapy outcomes.  STG  MET 6/24/2022    2. Patient will increase hip MMT to 3+/5 to improve ambulation and stair navigation. STG  on going 6/24/2022     3. Patient will increase knne ROM to 0-95 degrees to improve functional mobility. STG  ongoing 6/24/2022          In 12 weeks,    Goal Status   4. Patient will be independent with progressed HEP to allow for self management of symptoms.  LTG  IN progress 5/18/2022    5. Patient will increase hip MMT to 4-/5 for improved ambulation and stair navigation.  LTG  Ongoing 6/24/2022    6. Patient will report navigating 5 steps at home with UE assist as needed and minimal difficulty to improve household mobility.  LTG  Ongoing 6/24/2022    7. Patient will improve 5 time sit to stand to 25 seconds in order to improve safety, transfers, and functional independence. LTG  Ongoing 6/24/2022    8. Patient will improve TUG score to 18 seconds in order to decrease fall risk.  LTG  Some progress 6/24/2022    9. Patient will improve EO NBOS balance to 15 seconds to decrease fall risk and improve ambulation. LTG  Ongoing 6/24/2022               Plan      Continue with established PT POC and progress per pt tolerance.   D/c currently planned for last scheduled visit.     Ernestine Schafer, PT

## 2022-06-24 ENCOUNTER — CLINICAL SUPPORT (OUTPATIENT)
Dept: REHABILITATION | Facility: OTHER | Age: 87
End: 2022-06-24
Payer: MEDICARE

## 2022-06-24 DIAGNOSIS — Z74.09 IMPAIRED FUNCTIONAL MOBILITY, BALANCE, GAIT, AND ENDURANCE: ICD-10-CM

## 2022-06-24 DIAGNOSIS — M25.562 CHRONIC PAIN OF LEFT KNEE: Primary | ICD-10-CM

## 2022-06-24 DIAGNOSIS — G89.29 CHRONIC PAIN OF LEFT KNEE: Primary | ICD-10-CM

## 2022-06-24 DIAGNOSIS — M25.662 DECREASED RANGE OF MOTION OF BOTH KNEES: ICD-10-CM

## 2022-06-24 DIAGNOSIS — M25.661 DECREASED RANGE OF MOTION OF BOTH KNEES: ICD-10-CM

## 2022-06-24 PROCEDURE — 97110 THERAPEUTIC EXERCISES: CPT | Mod: PN

## 2022-07-06 ENCOUNTER — DOCUMENTATION ONLY (OUTPATIENT)
Dept: REHABILITATION | Facility: OTHER | Age: 87
End: 2022-07-06
Payer: MEDICARE

## 2022-07-06 NOTE — PROGRESS NOTES
Patient failed to appear for scheduled PT appointment today at 9:15 am without prior notification or cancellation.    Ernestine Schafer, PT, DPT  7/6/2022

## 2022-11-30 ENCOUNTER — OFFICE VISIT (OUTPATIENT)
Dept: URGENT CARE | Facility: CLINIC | Age: 87
End: 2022-11-30
Payer: MEDICARE

## 2022-11-30 VITALS
BODY MASS INDEX: 25.16 KG/M2 | OXYGEN SATURATION: 96 % | TEMPERATURE: 99 F | SYSTOLIC BLOOD PRESSURE: 105 MMHG | HEART RATE: 77 BPM | DIASTOLIC BLOOD PRESSURE: 70 MMHG | HEIGHT: 63 IN | WEIGHT: 142 LBS | RESPIRATION RATE: 14 BRPM

## 2022-11-30 DIAGNOSIS — U07.1 COVID-19: Primary | ICD-10-CM

## 2022-11-30 DIAGNOSIS — R05.9 COUGH, UNSPECIFIED TYPE: ICD-10-CM

## 2022-11-30 DIAGNOSIS — U07.1 COVID-19 VIRUS DETECTED: ICD-10-CM

## 2022-11-30 LAB
CTP QC/QA: YES
SARS-COV-2 AG RESP QL IA.RAPID: POSITIVE

## 2022-11-30 PROCEDURE — 99203 PR OFFICE/OUTPT VISIT, NEW, LEVL III, 30-44 MIN: ICD-10-PCS | Mod: CS,S$GLB,, | Performed by: NURSE PRACTITIONER

## 2022-11-30 PROCEDURE — 87811 SARS-COV-2 COVID19 W/OPTIC: CPT | Mod: QW,CR,S$GLB, | Performed by: NURSE PRACTITIONER

## 2022-11-30 PROCEDURE — 87811 SARS CORONAVIRUS 2 ANTIGEN POCT, MANUAL READ: ICD-10-PCS | Mod: QW,CR,S$GLB, | Performed by: NURSE PRACTITIONER

## 2022-11-30 PROCEDURE — 99203 OFFICE O/P NEW LOW 30 MIN: CPT | Mod: CS,S$GLB,, | Performed by: NURSE PRACTITIONER

## 2022-11-30 RX ORDER — BENZONATATE 200 MG/1
200 CAPSULE ORAL 3 TIMES DAILY PRN
Qty: 30 CAPSULE | Refills: 0 | Status: SHIPPED | OUTPATIENT
Start: 2022-11-30 | End: 2022-12-10

## 2022-11-30 NOTE — PATIENT INSTRUCTIONS
- You must understand that you have received an Urgent Care treatment only and that you may be released before all of your medical problems are known or treated.   - You, the patient, will arrange for follow up care as instructed.   - If your condition worsens or fails to improve we recommend that you receive another evaluation at the ER immediately or contact your PCP to discuss your concerns.   - You can call [phone] or [phone] to help schedule an appointment with the appropriate provider.    Drink plenty of fluids   Get lots of rest  Tylenol or ibuprofen for pain/fever  Mucinex DM for cough  Saline nasal rinses to irrigate sinus cavities  Warm salt water gargles for sore throat    Isolation:   Stay home for 5 days.  If you have no symptoms or your symptoms are resolving after 5 days, you can leave your house.  Continue to wear a mask around others for 5 additional days.  If you have a fever, continue to stay home until your fever resolves.

## 2022-11-30 NOTE — PROGRESS NOTES
"Subjective:       Patient ID: Jojo Ayoub is a 87 y.o. female.    Vitals:  height is 5' 3" (1.6 m) and weight is 64.4 kg (142 lb). Her temporal temperature is 98.8 °F (37.1 °C). Her blood pressure is 105/70 and her pulse is 77. Her respiration is 14 and oxygen saturation is 96%.     Chief Complaint: Cough    Patient is an 88 yo female w/ c/o cough x 1 week. Patient states that she took an at home Covid test which was positive. Patient is coughing up mucus. NO fever reported. She has been taking mucinex with some relief. Patient states that she been having lose stools the past few days. Denies any blood in stool.    Cough  This is a new problem. The current episode started 1 to 4 weeks ago. The problem has been unchanged. The problem occurs constantly. The cough is Productive of sputum. Nothing aggravates the symptoms. She has tried OTC cough suppressant for the symptoms. The treatment provided mild relief.     Respiratory:  Positive for cough.      Objective:      Physical Exam   Constitutional: She is oriented to person, place, and time. She appears well-developed. She is cooperative.  Non-toxic appearance. She does not appear ill. No distress.   HENT:   Head: Normocephalic and atraumatic.   Ears:   Right Ear: Hearing, tympanic membrane, external ear and ear canal normal.   Left Ear: Hearing, tympanic membrane, external ear and ear canal normal.   Nose: Nose normal. No mucosal edema, rhinorrhea or nasal deformity. No epistaxis. Right sinus exhibits no maxillary sinus tenderness and no frontal sinus tenderness. Left sinus exhibits no maxillary sinus tenderness and no frontal sinus tenderness.   Mouth/Throat: Uvula is midline, oropharynx is clear and moist and mucous membranes are normal. No trismus in the jaw. Normal dentition. No uvula swelling. No oropharyngeal exudate, posterior oropharyngeal edema or posterior oropharyngeal erythema.   Eyes: Conjunctivae and lids are normal. No scleral icterus.   Neck: " Trachea normal and phonation normal. Neck supple. No edema present. No erythema present. No neck rigidity present.   Cardiovascular: Normal rate, regular rhythm, normal heart sounds and normal pulses.   Pulmonary/Chest: Effort normal and breath sounds normal. No respiratory distress. She has no decreased breath sounds. She has no wheezes. She has no rhonchi.   Productive cough         Comments: Productive cough    Abdominal: Normal appearance.   Musculoskeletal: Normal range of motion.         General: No deformity. Normal range of motion.   Neurological: She is alert and oriented to person, place, and time. She exhibits normal muscle tone. Coordination normal.   Skin: Skin is warm, dry, intact, not diaphoretic and not pale.   Psychiatric: Her speech is normal and behavior is normal. Judgment and thought content normal.   Nursing note and vitals reviewed.    Results for orders placed or performed in visit on 11/30/22   SARS Coronavirus 2 Antigen, POCT Manual Read   Result Value Ref Range    SARS Coronavirus 2 Antigen Positive (A) Negative     Acceptable Yes            Assessment:       1. COVID-19    2. Cough, unspecified type          Plan:         COVID-19    Cough, unspecified type  -     SARS Coronavirus 2 Antigen, POCT Manual Read  -     benzonatate (TESSALON) 200 MG capsule; Take 1 capsule (200 mg total) by mouth 3 (three) times daily as needed for Cough.  Dispense: 30 capsule; Refill: 0       Patient Instructions   - You must understand that you have received an Urgent Care treatment only and that you may be released before all of your medical problems are known or treated.   - You, the patient, will arrange for follow up care as instructed.   - If your condition worsens or fails to improve we recommend that you receive another evaluation at the ER immediately or contact your PCP to discuss your concerns.   - You can call [phone] or [phone] to help schedule an appointment with the  appropriate provider.    Drink plenty of fluids   Get lots of rest  Tylenol or ibuprofen for pain/fever  Mucinex DM for cough  Saline nasal rinses to irrigate sinus cavities  Warm salt water gargles for sore throat    Isolation:   Stay home for 5 days.  If you have no symptoms or your symptoms are resolving after 5 days, you can leave your house.  Continue to wear a mask around others for 5 additional days.  If you have a fever, continue to stay home until your fever resolves.

## 2023-01-01 ENCOUNTER — HOSPITAL ENCOUNTER (OUTPATIENT)
Facility: HOSPITAL | Age: 88
Discharge: HOME | End: 2023-01-03
Attending: EMERGENCY MEDICINE | Admitting: INTERNAL MEDICINE
Payer: MEDICARE

## 2023-01-01 DIAGNOSIS — N30.00 ACUTE CYSTITIS WITHOUT HEMATURIA: Primary | ICD-10-CM

## 2023-01-01 DIAGNOSIS — R07.9 CHEST PAIN: ICD-10-CM

## 2023-01-01 DIAGNOSIS — M25.562 LEFT KNEE PAIN: ICD-10-CM

## 2023-01-01 DIAGNOSIS — R26.81 GAIT INSTABILITY: ICD-10-CM

## 2023-01-01 DIAGNOSIS — S83.002A SUBLUXATION OF LEFT PATELLA, INITIAL ENCOUNTER: ICD-10-CM

## 2023-01-01 DIAGNOSIS — R53.1 WEAKNESS: ICD-10-CM

## 2023-01-01 DIAGNOSIS — M25.661 DECREASED RANGE OF MOTION OF BOTH KNEES: ICD-10-CM

## 2023-01-01 DIAGNOSIS — M25.662 DECREASED RANGE OF MOTION OF BOTH KNEES: ICD-10-CM

## 2023-01-01 PROBLEM — R63.4 WEIGHT LOSS, UNINTENTIONAL: Status: ACTIVE | Noted: 2023-01-01

## 2023-01-01 PROBLEM — D72.829 LEUKOCYTOSIS: Status: ACTIVE | Noted: 2023-01-01

## 2023-01-01 LAB
ALBUMIN SERPL BCP-MCNC: 2.6 G/DL (ref 3.5–5.2)
ALP SERPL-CCNC: 50 U/L (ref 55–135)
ALT SERPL W/O P-5'-P-CCNC: 9 U/L (ref 10–44)
ANION GAP SERPL CALC-SCNC: 9 MMOL/L (ref 8–16)
AST SERPL-CCNC: 15 U/L (ref 10–40)
BACTERIA #/AREA URNS AUTO: ABNORMAL /HPF
BASOPHILS # BLD AUTO: 0.05 K/UL (ref 0–0.2)
BASOPHILS NFR BLD: 0.4 % (ref 0–1.9)
BILIRUB SERPL-MCNC: 0.3 MG/DL (ref 0.1–1)
BILIRUB UR QL STRIP: NEGATIVE
BUN SERPL-MCNC: 21 MG/DL (ref 8–23)
CALCIUM SERPL-MCNC: 8.6 MG/DL (ref 8.7–10.5)
CHLORIDE SERPL-SCNC: 105 MMOL/L (ref 95–110)
CLARITY UR REFRACT.AUTO: ABNORMAL
CO2 SERPL-SCNC: 27 MMOL/L (ref 23–29)
COLOR UR AUTO: YELLOW
CREAT SERPL-MCNC: 1.1 MG/DL (ref 0.5–1.4)
DIFFERENTIAL METHOD: ABNORMAL
EOSINOPHIL # BLD AUTO: 0.2 K/UL (ref 0–0.5)
EOSINOPHIL NFR BLD: 1.2 % (ref 0–8)
ERYTHROCYTE [DISTWIDTH] IN BLOOD BY AUTOMATED COUNT: 15.8 % (ref 11.5–14.5)
EST. GFR  (NO RACE VARIABLE): 49.2 ML/MIN/1.73 M^2
ESTIMATED AVG GLUCOSE: 114 MG/DL (ref 68–131)
GLUCOSE SERPL-MCNC: 131 MG/DL (ref 70–110)
GLUCOSE UR QL STRIP: NEGATIVE
HBA1C MFR BLD: 5.6 % (ref 4–5.6)
HCT VFR BLD AUTO: 32.5 % (ref 37–48.5)
HGB BLD-MCNC: 10.9 G/DL (ref 12–16)
HGB UR QL STRIP: NEGATIVE
HYALINE CASTS UR QL AUTO: 0 /LPF
IMM GRANULOCYTES # BLD AUTO: 0.05 K/UL (ref 0–0.04)
IMM GRANULOCYTES NFR BLD AUTO: 0.4 % (ref 0–0.5)
INFLUENZA A, MOLECULAR: NOT DETECTED
INFLUENZA B, MOLECULAR: NOT DETECTED
KETONES UR QL STRIP: NEGATIVE
LEUKOCYTE ESTERASE UR QL STRIP: ABNORMAL
LYMPHOCYTES # BLD AUTO: 1.6 K/UL (ref 1–4.8)
LYMPHOCYTES NFR BLD: 11.9 % (ref 18–48)
MCH RBC QN AUTO: 31.5 PG (ref 27–31)
MCHC RBC AUTO-ENTMCNC: 33.5 G/DL (ref 32–36)
MCV RBC AUTO: 94 FL (ref 82–98)
MICROSCOPIC COMMENT: ABNORMAL
MONOCYTES # BLD AUTO: 0.7 K/UL (ref 0.3–1)
MONOCYTES NFR BLD: 5.3 % (ref 4–15)
NEUTROPHILS # BLD AUTO: 10.5 K/UL (ref 1.8–7.7)
NEUTROPHILS NFR BLD: 80.8 % (ref 38–73)
NITRITE UR QL STRIP: POSITIVE
NRBC BLD-RTO: 0 /100 WBC
PH UR STRIP: 5 [PH] (ref 5–8)
PLATELET # BLD AUTO: 354 K/UL (ref 150–450)
PMV BLD AUTO: 10 FL (ref 9.2–12.9)
POCT GLUCOSE: 121 MG/DL (ref 70–110)
POCT GLUCOSE: 128 MG/DL (ref 70–110)
POTASSIUM SERPL-SCNC: 4.1 MMOL/L (ref 3.5–5.1)
PROT SERPL-MCNC: 7.1 G/DL (ref 6–8.4)
PROT UR QL STRIP: ABNORMAL
RBC # BLD AUTO: 3.46 M/UL (ref 4–5.4)
RBC #/AREA URNS AUTO: 4 /HPF (ref 0–4)
RSV AG BY MOLECULAR METHOD: NOT DETECTED
SARS-COV-2 RNA RESP QL NAA+PROBE: NOT DETECTED
SODIUM SERPL-SCNC: 141 MMOL/L (ref 136–145)
SP GR UR STRIP: 1.02 (ref 1–1.03)
SQUAMOUS #/AREA URNS AUTO: 1 /HPF
URN SPEC COLLECT METH UR: ABNORMAL
WBC # BLD AUTO: 13.02 K/UL (ref 3.9–12.7)
WBC #/AREA URNS AUTO: >100 /HPF (ref 0–5)
WBC CLUMPS UR QL AUTO: ABNORMAL

## 2023-01-01 PROCEDURE — 99285 EMERGENCY DEPT VISIT HI MDM: CPT | Mod: FS,CS,, | Performed by: EMERGENCY MEDICINE

## 2023-01-01 PROCEDURE — 25000003 PHARM REV CODE 250

## 2023-01-01 PROCEDURE — 96365 THER/PROPH/DIAG IV INF INIT: CPT

## 2023-01-01 PROCEDURE — 99223 PR INITIAL HOSPITAL CARE,LEVL III: ICD-10-PCS | Mod: ,,,

## 2023-01-01 PROCEDURE — 82962 GLUCOSE BLOOD TEST: CPT

## 2023-01-01 PROCEDURE — 81001 URINALYSIS AUTO W/SCOPE: CPT | Performed by: PHYSICIAN ASSISTANT

## 2023-01-01 PROCEDURE — 83036 HEMOGLOBIN GLYCOSYLATED A1C: CPT | Performed by: PHYSICIAN ASSISTANT

## 2023-01-01 PROCEDURE — G0378 HOSPITAL OBSERVATION PER HR: HCPCS

## 2023-01-01 PROCEDURE — 25000003 PHARM REV CODE 250: Performed by: PHYSICIAN ASSISTANT

## 2023-01-01 PROCEDURE — 99223 1ST HOSP IP/OBS HIGH 75: CPT | Mod: ,,,

## 2023-01-01 PROCEDURE — 87086 URINE CULTURE/COLONY COUNT: CPT | Performed by: PHYSICIAN ASSISTANT

## 2023-01-01 PROCEDURE — 0241U SARS-COV2 (COVID) WITH FLU/RSV BY PCR: CPT | Performed by: PHYSICIAN ASSISTANT

## 2023-01-01 PROCEDURE — 99285 PR EMERGENCY DEPT VISIT,LEVEL V: ICD-10-PCS | Mod: FS,CS,, | Performed by: EMERGENCY MEDICINE

## 2023-01-01 PROCEDURE — 80053 COMPREHEN METABOLIC PANEL: CPT | Performed by: PHYSICIAN ASSISTANT

## 2023-01-01 PROCEDURE — 85025 COMPLETE CBC W/AUTO DIFF WBC: CPT | Performed by: PHYSICIAN ASSISTANT

## 2023-01-01 PROCEDURE — 99285 EMERGENCY DEPT VISIT HI MDM: CPT | Mod: 25

## 2023-01-01 PROCEDURE — 63600175 PHARM REV CODE 636 W HCPCS: Performed by: PHYSICIAN ASSISTANT

## 2023-01-01 RX ORDER — PROCHLORPERAZINE EDISYLATE 5 MG/ML
5 INJECTION INTRAMUSCULAR; INTRAVENOUS EVERY 6 HOURS PRN
Status: DISCONTINUED | OUTPATIENT
Start: 2023-01-01 | End: 2023-01-03 | Stop reason: HOSPADM

## 2023-01-01 RX ORDER — NAPROXEN SODIUM 220 MG/1
81 TABLET, FILM COATED ORAL DAILY
Status: DISCONTINUED | OUTPATIENT
Start: 2023-01-02 | End: 2023-01-03 | Stop reason: HOSPADM

## 2023-01-01 RX ORDER — CARVEDILOL 25 MG/1
25 TABLET ORAL 2 TIMES DAILY
Status: DISCONTINUED | OUTPATIENT
Start: 2023-01-01 | End: 2023-01-03 | Stop reason: HOSPADM

## 2023-01-01 RX ORDER — ACETAMINOPHEN 325 MG/1
650 TABLET ORAL
Status: COMPLETED | OUTPATIENT
Start: 2023-01-01 | End: 2023-01-01

## 2023-01-01 RX ORDER — ONDANSETRON 8 MG/1
8 TABLET, ORALLY DISINTEGRATING ORAL EVERY 8 HOURS PRN
Status: DISCONTINUED | OUTPATIENT
Start: 2023-01-01 | End: 2023-01-03 | Stop reason: HOSPADM

## 2023-01-01 RX ORDER — IBUPROFEN 200 MG
16 TABLET ORAL
Status: DISCONTINUED | OUTPATIENT
Start: 2023-01-01 | End: 2023-01-03 | Stop reason: HOSPADM

## 2023-01-01 RX ORDER — POLYETHYLENE GLYCOL 3350 17 G/17G
17 POWDER, FOR SOLUTION ORAL 2 TIMES DAILY PRN
Status: DISCONTINUED | OUTPATIENT
Start: 2023-01-01 | End: 2023-01-03 | Stop reason: HOSPADM

## 2023-01-01 RX ORDER — IBUPROFEN 200 MG
24 TABLET ORAL
Status: DISCONTINUED | OUTPATIENT
Start: 2023-01-01 | End: 2023-01-03 | Stop reason: HOSPADM

## 2023-01-01 RX ORDER — IPRATROPIUM BROMIDE AND ALBUTEROL SULFATE 2.5; .5 MG/3ML; MG/3ML
3 SOLUTION RESPIRATORY (INHALATION) EVERY 4 HOURS PRN
Status: DISCONTINUED | OUTPATIENT
Start: 2023-01-01 | End: 2023-01-03 | Stop reason: HOSPADM

## 2023-01-01 RX ORDER — SODIUM CHLORIDE 0.9 % (FLUSH) 0.9 %
10 SYRINGE (ML) INJECTION EVERY 8 HOURS PRN
Status: DISCONTINUED | OUTPATIENT
Start: 2023-01-01 | End: 2023-01-03 | Stop reason: HOSPADM

## 2023-01-01 RX ORDER — ACETAMINOPHEN 325 MG/1
650 TABLET ORAL EVERY 6 HOURS PRN
Status: DISCONTINUED | OUTPATIENT
Start: 2023-01-01 | End: 2023-01-03 | Stop reason: HOSPADM

## 2023-01-01 RX ORDER — INSULIN ASPART 100 [IU]/ML
0-5 INJECTION, SOLUTION INTRAVENOUS; SUBCUTANEOUS
Status: DISCONTINUED | OUTPATIENT
Start: 2023-01-01 | End: 2023-01-03 | Stop reason: HOSPADM

## 2023-01-01 RX ORDER — GLUCAGON 1 MG
1 KIT INJECTION
Status: DISCONTINUED | OUTPATIENT
Start: 2023-01-01 | End: 2023-01-03 | Stop reason: HOSPADM

## 2023-01-01 RX ORDER — GABAPENTIN 400 MG/1
800 CAPSULE ORAL 3 TIMES DAILY
Status: DISCONTINUED | OUTPATIENT
Start: 2023-01-01 | End: 2023-01-03 | Stop reason: HOSPADM

## 2023-01-01 RX ORDER — NALOXONE HCL 0.4 MG/ML
0.02 VIAL (ML) INJECTION
Status: DISCONTINUED | OUTPATIENT
Start: 2023-01-01 | End: 2023-01-03 | Stop reason: HOSPADM

## 2023-01-01 RX ORDER — ATORVASTATIN CALCIUM 10 MG/1
10 TABLET, FILM COATED ORAL DAILY
Status: DISCONTINUED | OUTPATIENT
Start: 2023-01-02 | End: 2023-01-03 | Stop reason: HOSPADM

## 2023-01-01 RX ORDER — MAG HYDROX/ALUMINUM HYD/SIMETH 200-200-20
30 SUSPENSION, ORAL (FINAL DOSE FORM) ORAL 4 TIMES DAILY PRN
Status: DISCONTINUED | OUTPATIENT
Start: 2023-01-01 | End: 2023-01-03 | Stop reason: HOSPADM

## 2023-01-01 RX ADMIN — CARVEDILOL 25 MG: 25 TABLET, FILM COATED ORAL at 08:01

## 2023-01-01 RX ADMIN — ACETAMINOPHEN 650 MG: 325 TABLET ORAL at 10:01

## 2023-01-01 RX ADMIN — CEFTRIAXONE 1 G: 1 INJECTION, POWDER, FOR SOLUTION INTRAMUSCULAR; INTRAVENOUS at 01:01

## 2023-01-01 RX ADMIN — GABAPENTIN 800 MG: 400 CAPSULE ORAL at 08:01

## 2023-01-01 NOTE — ED TRIAGE NOTES
"Pt. is an 87 y.o. female presenting to the ED via personal transport from home. Per the pt. She was walking with her walker this morning and her left knee "gave out." Pt.  was present at the time of the fall and was able to catch the pt. And prevent her from any head injury.  Pt. Reports she has chronic knee pain and numbness, but her knees have never "given out" before.   "

## 2023-01-01 NOTE — Clinical Note
Diagnosis: Acute cystitis without hematuria [[id number]]   Future Attending Provider: GUSTABO HURLEY [[id number]]   Is the patient being sent to ED Observation?: No   Admitting Provider:: GUSTABO HURLEY [[id number]]   Special Needs:: No Special Needs [1]

## 2023-01-01 NOTE — ASSESSMENT & PLAN NOTE
Leukocytosis     - UA grossly infectious, without hematuria.   - 1/4 SIRS for WBC 14K. AFVSS.   - Given 1g rocephin in the ED, will continue q24 hours.   - follow urine culture

## 2023-01-01 NOTE — ASSESSMENT & PLAN NOTE
Patient's FSGs are controlled with lifestyle changes. No home antihyperglycemics.   Last A1c reviewed- will repeat  Lab Results   Component Value Date    HGBA1C 6.0 (H) 01/21/2020     Most recent fingerstick glucose reviewed-   Recent Labs   Lab 01/01/23  1032   POCTGLUCOSE 128*    Diabetic diet ordered.

## 2023-01-01 NOTE — ASSESSMENT & PLAN NOTE
Chronic pain L knee, Osteoarthritis  S/p total knee replacement     Patient with acute on chronic LE weakness and knee pain, causing gait instability. Fall risk. Suspect acute cystitis is exacerbating her weakness. Will treat.  - XR L knee reviewed  - able to ambulate with L knee immobilizer in place  - PT/OT consulted   - tylenol prn for pain control

## 2023-01-01 NOTE — ED PROVIDER NOTES
Encounter Date: 1/1/2023       History     Chief Complaint   Patient presents with    Knee Pain     Reports left knee giving out for a while but worse lately. Reports fall PTA and now having left knee pain     87-year-old female with past medical history of DM, osteoarthritis, hypertension, hyperlipidemia presents the emergency department with chief complaint of pain and weakness in the left knee. She has chronic knee pain. Has done PT in the past. She woke up with morning without significant pain in the knee. She ambulates with a walker at baseline. She was attempting to get in the car to go to [employer] when he left knee gave out. Her  caught her and she did not fall to the ground.  She states that it gave out on her again when she got to [place].  She is now having pain in the lateral aspect of the knee.  She is not been able to ambulate on her own.  She denies other complaints.  She denies chest pain, shortness of breath, abdominal pain, fever, chills, vomiting. Denies other worsening or alleviating factors.     Review of patient's allergies indicates:   Allergen Reactions    No known drug allergies      Past Medical History:   Diagnosis Date    Arthritis     Diabetes mellitus with neurological manifestation     Essential hypertension 2/28/2019    High blood pressure     Hyperlipidemia     Urinary urgency      Past Surgical History:   Procedure Laterality Date    EPIDURAL STEROID INJECTION N/A 1/31/2019    Procedure: INJECTION, STEROID, EPIDURAL, L3-L4 need consent;  Surgeon: Marlin Barber MD;  Location: [facility];  Service: Pain Management;  Laterality: N/A;    left knee replacement       low back surgery        Family History   Problem Relation Age of Onset    Diabetes Mother     Hypertension Mother     Diabetes Sister     Hypertension Sister     Diabetes Maternal Uncle     Diabetes Maternal Grandmother      Social History     Tobacco Use    Smoking status: Never    Smokeless tobacco: Never   Substance  Use Topics    Alcohol use: No    Drug use: No     Review of Systems   Constitutional:  Negative for fever.   HENT:  Negative for sore throat.    Respiratory:  Negative for shortness of breath.    Cardiovascular:  Negative for chest pain.   Gastrointestinal:  Negative for nausea.   Genitourinary:  Negative for dysuria.   Musculoskeletal:  Positive for arthralgias. Negative for back pain.   Skin:  Negative for rash.   Neurological:  Positive for weakness.   Hematological:  Does not bruise/bleed easily.     Physical Exam     Initial Vitals [01/01/23 0845]   BP Pulse Resp Temp SpO2   129/83 86 18 98.6 °F (37 °C) 99 %      MAP       --         Physical Exam    Nursing note and vitals reviewed.  Constitutional: She appears well-developed and well-nourished. She is not diaphoretic. No distress.   HENT:   Head: Normocephalic and atraumatic.   Mouth/Throat: Oropharynx is clear and moist.   Eyes: Conjunctivae and EOM are normal. Pupils are equal, round, and reactive to light.   Neck: Neck supple.   Normal range of motion.  Cardiovascular:  Normal rate, regular rhythm, normal heart sounds and intact distal pulses.     Exam reveals no gallop and no friction rub.       No murmur heard.  Pulmonary/Chest: Breath sounds normal. She has no wheezes. She has no rhonchi. She has no rales.   Abdominal: Abdomen is soft. Bowel sounds are normal. There is no abdominal tenderness.   Musculoskeletal:         General: Normal range of motion.      Cervical back: Normal range of motion and neck supple.      Comments: Left knee laxity. Patellar subluxation. Mild swelling. No warmth or overlying skin changes. Strength 4/5 bilateral lower extremities. 2+ distal pulses.   Falls back onto exam table when attempting to stand to ambulate.      Neurological: She is alert and oriented to person, place, and time. She has normal strength. No cranial nerve deficit or sensory deficit. GCS score is 15. GCS eye subscore is 4. GCS verbal subscore is 5. GCS  motor subscore is 6.   Skin: Skin is warm and dry. Capillary refill takes less than 2 seconds.   Psychiatric: She has a normal mood and affect. Her behavior is normal. Judgment and thought content normal.       ED Course   Procedures  Labs Reviewed   CBC W/ AUTO DIFFERENTIAL - Abnormal; Notable for the following components:       Result Value    WBC 13.02 (*)     RBC 3.46 (*)     Hemoglobin 10.9 (*)     Hematocrit 32.5 (*)     MCH 31.5 (*)     RDW 15.8 (*)     Gran # (ANC) 10.5 (*)     Immature Grans (Abs) 0.05 (*)     Gran % 80.8 (*)     Lymph % 11.9 (*)     All other components within normal limits   COMPREHENSIVE METABOLIC PANEL - Abnormal; Notable for the following components:    Glucose 131 (*)     Calcium 8.6 (*)     Albumin 2.6 (*)     Alkaline Phosphatase 50 (*)     ALT 9 (*)     eGFR 49.2 (*)     All other components within normal limits   URINALYSIS, REFLEX TO URINE CULTURE - Abnormal; Notable for the following components:    Appearance, UA Cloudy (*)     Protein, UA 1+ (*)     Nitrite, UA Positive (*)     Leukocytes, UA 3+ (*)     All other components within normal limits    Narrative:     Specimen Source->Urine   URINALYSIS MICROSCOPIC - Abnormal; Notable for the following components:    WBC, UA >100 (*)     WBC Clumps, UA Few (*)     Bacteria Many (*)     All other components within normal limits    Narrative:     Specimen Source->Urine   POCT GLUCOSE - Abnormal; Notable for the following components:    POCT Glucose 128 (*)     All other components within normal limits   CULTURE, URINE   SARS-COV2 (COVID) WITH FLU/RSV BY PCR   HEMOGLOBIN A1C   URINALYSIS, REFLEX TO URINE CULTURE          Imaging Results              X-Ray Chest PA And Lateral (Final result)  Result time 01/01/23 11:27:49      Final result by Balaji Goldman MD (01/01/23 11:27:49)                   Impression:      No acute process in the thorax.    Linear metallic density overlying the inferior aortic arch, possibly secondary to  prior surgery, external to the patient, or foreign body.  Clinical correlation is recommended.      Electronically signed by: Balaji Goldman MD  Date:    01/01/2023  Time:    11:27               Narrative:    EXAMINATION:  XR CHEST PA AND LATERAL    CLINICAL HISTORY:  Weakness    TECHNIQUE:  PA and lateral views of the chest were performed.    COMPARISON:  None    FINDINGS:  Cardiac silhouette is not enlarged.  Linear metallic density overlying the left inferior aortic arch.  This may be secondary to prior surgery, external to the patient, or foreign body.  Rounded high density focus inferior to the aortic arch likely a calcified granuloma.  Normal pulmonary vasculature.  Lungs are clear.  No focal consolidation.  No pleural effusion.  No pneumothorax.  No evidence of acute fracture.                                       X-Ray Knee 3 View Left (Final result)  Result time 01/01/23 11:27:42      Final result by Balaji Hanley MD (01/01/23 11:27:42)                   Impression:      As above.      Electronically signed by: Balaji Hanley  Date:    01/01/2023  Time:    11:27               Narrative:    EXAMINATION:  XR KNEE 3 VIEW LEFT    CLINICAL HISTORY:  Pain in left knee    TECHNIQUE:  AP, lateral, and Merchant views of the left knee were performed.    COMPARISON:  None    FINDINGS:  No convincing evidence of acute fracture or dislocation.    Status post total knee arthroplasty and patellar resurfacing.  Approximately 3.5-4 mm of lucency between the tibial component of the hardware adjoining cement.  Attention on follow-up recommended.    No definite radiopaque foreign body.  Question at least small suprapatellar knee joint effusion.  Atherosclerotic calcifications are noted.                                    X-Rays:   Independently Interpreted Readings:   Chest X-Ray: Normal heart size.  No infiltrates.  No acute abnormalities. Foreign body noted from patient's blouse.    Medications   sodium chloride 0.9%  flush 10 mL (has no administration in time range)   albuterol-ipratropium 2.5 mg-0.5 mg/3 mL nebulizer solution 3 mL (has no administration in time range)   ondansetron disintegrating tablet 8 mg (has no administration in time range)   prochlorperazine injection Soln 5 mg (has no administration in time range)   polyethylene glycol packet 17 g (has no administration in time range)   acetaminophen tablet 650 mg (has no administration in time range)   aluminum-magnesium hydroxide-simethicone 200-200-20 mg/5 mL suspension 30 mL (has no administration in time range)   naloxone 0.4 mg/mL injection 0.02 mg (has no administration in time range)   insulin aspart U-100 pen 0-5 Units (has no administration in time range)   glucose chewable tablet 16 g (has no administration in time range)   glucose chewable tablet 24 g (has no administration in time range)   glucagon (human recombinant) injection 1 mg (has no administration in time range)   dextrose 10% bolus 125 mL 125 mL (has no administration in time range)   dextrose 10% bolus 250 mL 250 mL (has no administration in time range)   acetaminophen tablet 650 mg (650 mg Oral Given 1/1/23 1009)   cefTRIAXone (ROCEPHIN) 1 g in dextrose 5 % in water (D5W) 5 % 50 mL IVPB (MB+) (0 g Intravenous Stopped 1/1/23 1418)     Medical Decision Making:   History:   Old Medical Records: I decided to obtain old medical records.  Initial Assessment:   Emergent evaluation of a 87 y.o. female presenting to the emergency department complaining of left sided knee pain and weakness. Her knee gave out on her twice today and she is no longer able to ambulate. Patient is afebrile, hemodynamically stable, and non toxic appearing.   Will order labs, imaging, continue to monitor.   Differential Diagnosis:   Ddx includes but is not limited to patellar subluxation, ligamentous injury, fracture, metabolic derangement, anemia, UTI.   Independently Interpreted Test(s):   I have ordered and independently  interpreted X-rays - see prior notes.  I have ordered and independently interpreted EKG Reading(s) - see prior notes  Clinical Tests:   Lab Tests: Ordered and Reviewed  Radiological Study: Ordered and Reviewed  Medical Tests: Ordered and Reviewed  ED Management:  Patient presenting for left-sided knee pain and weakness.  She states that her knee gave out on her twice today, and she has been unable to ambulate since. She falls back onto the exam table when attempting to stand. She has generalized weakness in addition to laxity in the knee.   She is leukocytosis of 13.  Stable anemia.  Mild hyperglycemia 31.  Kidney function baseline.  UA concerning for infection.  Will treat with Rocephin.  Chest x-ray without evidence pneumonia.  Knee x-ray without evidence of acute fracture or dislocation.  Small suprapatellar joint effusion.    Patient is able to somewhat ambulate with a knee immobilizer.  However, when immobilizer is removed, she can not.  Her  drove her here in his truck.  She will not be able to get into the truck or up the front steps of her house while in the knee immobilizer.   I recommend admission for treatment of UTI, PT/OT evaluation. Patient agreeable. All questions answered.   The patient's history, physical exam, and plan of care was discussed with and agreed upon with my supervising physician, who performed a substantive portion of the MDM.                ED Course as of 01/01/23 1448   Sun Jan 01, 2023   1033 WBC(!): 13.02 [JM]   1033 Hemoglobin(!): 10.9 [JM]   1033 Hematocrit(!): 32.5 [JM]   1033 Platelets: 354 [JM]   1033 POCT Glucose(!): 128 [JM]   1055 BUN: 21 [JM]   1055 Creatinine: 1.1 [JM]      ED Course User Index  [JM] Nini Fox PA-C                 Clinical Impression:   Final diagnoses:  [M25.562] Left knee pain  [R53.1] Weakness  [S83.002A] Subluxation of left patella, initial encounter  [N30.00] Acute cystitis without hematuria (Primary)        ED Disposition Condition     Observation Stable                Nini Fox PA-C  01/01/23 8850

## 2023-01-01 NOTE — HPI
"Jojo Ayoub is a 87-year-old female with past medical history of DM, osteoarthritis, hypertension, and hyperlipidemia presents the emergency department with chief complaint of pain and weakness in the left knee. She has chronic knee pain s/p total knee replacement and worked with PT for this earlier this year for patellar subluxation. She ambulates with a walker at baseline, but she has felt more unstable lately. She reports her knee gave out twice when going to [employer] this morning. Her  caught her and she did not fall to the ground; denies new injuries.  She complains of pain in the lateral aspect of the knee.  Reports chronic urinary urgency and frequency. She denies recent injury, weight changes, fever or chills, falls, lightheadedness, CP, SOB, confusion, lethargy, N/V/D, urinary symptoms, bleeding.     In the ED, AFVSS. WBC elevated to 13K without left shift. H/H 10.9/32 (baseline normocytic anemia). CMP with Cr 1.1 (baseline), glucose 131. UA grossly infectious, reflex culture pending. CXR without acute cardiopulmonary process. XR L knee without fracture or dislocation; "Status post total knee arthroplasty and patellar resurfacing.  Approximately 3.5-4 mm of lucency between the tibial component of the hardware adjoining cement.  Attention on follow-up recommended. No definite radiopaque foreign body.  Question at least small suprapatellar knee joint effusion.  Atherosclerotic calcifications are noted." Given tylenol and 1g rocephin.   "

## 2023-01-02 PROBLEM — D64.9 ANEMIA: Status: ACTIVE | Noted: 2023-01-02

## 2023-01-02 LAB
ANION GAP SERPL CALC-SCNC: 9 MMOL/L (ref 8–16)
BACTERIA #/AREA URNS AUTO: ABNORMAL /HPF
BASOPHILS # BLD AUTO: 0.03 K/UL (ref 0–0.2)
BASOPHILS NFR BLD: 0.5 % (ref 0–1.9)
BILIRUB UR QL STRIP: NEGATIVE
BUN SERPL-MCNC: 18 MG/DL (ref 8–23)
CALCIUM SERPL-MCNC: 8.2 MG/DL (ref 8.7–10.5)
CHLORIDE SERPL-SCNC: 106 MMOL/L (ref 95–110)
CLARITY UR REFRACT.AUTO: ABNORMAL
CO2 SERPL-SCNC: 26 MMOL/L (ref 23–29)
COLOR UR AUTO: YELLOW
CREAT SERPL-MCNC: 1 MG/DL (ref 0.5–1.4)
DIFFERENTIAL METHOD: ABNORMAL
EOSINOPHIL # BLD AUTO: 0.3 K/UL (ref 0–0.5)
EOSINOPHIL NFR BLD: 5.4 % (ref 0–8)
ERYTHROCYTE [DISTWIDTH] IN BLOOD BY AUTOMATED COUNT: 15.9 % (ref 11.5–14.5)
EST. GFR  (NO RACE VARIABLE): 55.2 ML/MIN/1.73 M^2
GLUCOSE SERPL-MCNC: 87 MG/DL (ref 70–110)
GLUCOSE UR QL STRIP: NEGATIVE
HCT VFR BLD AUTO: 27 % (ref 37–48.5)
HGB BLD-MCNC: 8.7 G/DL (ref 12–16)
HGB UR QL STRIP: ABNORMAL
IMM GRANULOCYTES # BLD AUTO: 0.03 K/UL (ref 0–0.04)
IMM GRANULOCYTES NFR BLD AUTO: 0.5 % (ref 0–0.5)
KETONES UR QL STRIP: NEGATIVE
LEUKOCYTE ESTERASE UR QL STRIP: ABNORMAL
LYMPHOCYTES # BLD AUTO: 1.5 K/UL (ref 1–4.8)
LYMPHOCYTES NFR BLD: 24.4 % (ref 18–48)
MAGNESIUM SERPL-MCNC: 1.8 MG/DL (ref 1.6–2.6)
MCH RBC QN AUTO: 30.7 PG (ref 27–31)
MCHC RBC AUTO-ENTMCNC: 32.2 G/DL (ref 32–36)
MCV RBC AUTO: 95 FL (ref 82–98)
MICROSCOPIC COMMENT: ABNORMAL
MONOCYTES # BLD AUTO: 0.6 K/UL (ref 0.3–1)
MONOCYTES NFR BLD: 9.7 % (ref 4–15)
NEUTROPHILS # BLD AUTO: 3.6 K/UL (ref 1.8–7.7)
NEUTROPHILS NFR BLD: 59.5 % (ref 38–73)
NITRITE UR QL STRIP: POSITIVE
NRBC BLD-RTO: 0 /100 WBC
PH UR STRIP: 7 [PH] (ref 5–8)
PLATELET # BLD AUTO: 308 K/UL (ref 150–450)
PMV BLD AUTO: 10.3 FL (ref 9.2–12.9)
POCT GLUCOSE: 107 MG/DL (ref 70–110)
POCT GLUCOSE: 122 MG/DL (ref 70–110)
POCT GLUCOSE: 153 MG/DL (ref 70–110)
POCT GLUCOSE: 90 MG/DL (ref 70–110)
POTASSIUM SERPL-SCNC: 3.9 MMOL/L (ref 3.5–5.1)
PROT UR QL STRIP: ABNORMAL
RBC # BLD AUTO: 2.83 M/UL (ref 4–5.4)
RBC #/AREA URNS AUTO: 6 /HPF (ref 0–4)
SODIUM SERPL-SCNC: 141 MMOL/L (ref 136–145)
SP GR UR STRIP: 1.02 (ref 1–1.03)
SQUAMOUS #/AREA URNS AUTO: 23 /HPF
URN SPEC COLLECT METH UR: ABNORMAL
WBC # BLD AUTO: 5.98 K/UL (ref 3.9–12.7)
WBC #/AREA URNS AUTO: >100 /HPF (ref 0–5)

## 2023-01-02 PROCEDURE — 81001 URINALYSIS AUTO W/SCOPE: CPT | Performed by: INTERNAL MEDICINE

## 2023-01-02 PROCEDURE — 97161 PT EVAL LOW COMPLEX 20 MIN: CPT

## 2023-01-02 PROCEDURE — G0378 HOSPITAL OBSERVATION PER HR: HCPCS

## 2023-01-02 PROCEDURE — 97530 THERAPEUTIC ACTIVITIES: CPT

## 2023-01-02 PROCEDURE — 99233 SBSQ HOSP IP/OBS HIGH 50: CPT | Mod: ,,,

## 2023-01-02 PROCEDURE — 80048 BASIC METABOLIC PNL TOTAL CA: CPT

## 2023-01-02 PROCEDURE — 36415 COLL VENOUS BLD VENIPUNCTURE: CPT

## 2023-01-02 PROCEDURE — 97110 THERAPEUTIC EXERCISES: CPT

## 2023-01-02 PROCEDURE — 99233 PR SUBSEQUENT HOSPITAL CARE,LEVL III: ICD-10-PCS | Mod: ,,,

## 2023-01-02 PROCEDURE — 85025 COMPLETE CBC W/AUTO DIFF WBC: CPT

## 2023-01-02 PROCEDURE — 25000003 PHARM REV CODE 250

## 2023-01-02 PROCEDURE — 83735 ASSAY OF MAGNESIUM: CPT

## 2023-01-02 PROCEDURE — 63600175 PHARM REV CODE 636 W HCPCS

## 2023-01-02 PROCEDURE — 87086 URINE CULTURE/COLONY COUNT: CPT | Performed by: INTERNAL MEDICINE

## 2023-01-02 PROCEDURE — 97165 OT EVAL LOW COMPLEX 30 MIN: CPT

## 2023-01-02 PROCEDURE — 96366 THER/PROPH/DIAG IV INF ADDON: CPT

## 2023-01-02 RX ADMIN — ATORVASTATIN CALCIUM 10 MG: 10 TABLET, FILM COATED ORAL at 08:01

## 2023-01-02 RX ADMIN — GABAPENTIN 800 MG: 400 CAPSULE ORAL at 08:01

## 2023-01-02 RX ADMIN — ACETAMINOPHEN 650 MG: 325 TABLET ORAL at 08:01

## 2023-01-02 RX ADMIN — ASPIRIN 81 MG: 81 TABLET, CHEWABLE ORAL at 08:01

## 2023-01-02 RX ADMIN — CEFTRIAXONE 1 G: 1 INJECTION, POWDER, FOR SOLUTION INTRAMUSCULAR; INTRAVENOUS at 02:01

## 2023-01-02 RX ADMIN — CARVEDILOL 25 MG: 25 TABLET, FILM COATED ORAL at 08:01

## 2023-01-02 RX ADMIN — GABAPENTIN 800 MG: 400 CAPSULE ORAL at 03:01

## 2023-01-02 NOTE — PT/OT/SLP EVAL
"Physical Therapy Co-Evaluation and Co-Treatment    Patient Name:  Jojo Ayoub   MRN:  [id number]  Admit Date: 1/1/2023  Admitting Diagnosis:  Acute cystitis without hematuria   Length of Stay: 0 days  Recent Surgery: * No surgery found *      Recommendations:     Discharge Recommendations:  other (see comments)   Discharge Equipment Recommendations: rollator   Barriers to discharge: Inaccessible home    Plan:     During this hospitalization, patient to be seen 4 x/week to address the identified rehab impairments via gait training, therapeutic activities, therapeutic exercises, neuromuscular re-education and progress towards the established goals.  Plan of Care Expires:  02/01/23  Plan of Care Reviewed with: patient, spouse, family    Assessment:     Jojo Ayobu is a 87 y.o. female admitted with a medical diagnosis of Acute cystitis without hematuria. Pt tolerated initial evaluation fairly on this date. Pt presents s/p 2 LLE buckling events resulting in near falls. Pt reports  right next to patient when these events happened and helped prevent her hitting her head. Upon assessment pt with WFL RLE strength and ROM. LLE with increased swelling, bruising, warmth. Pt with good functional ROM. Noted patellar subluxation laterally with full extension. Pt reports "it's been happening for awhile, I guess I've ignored it for too long." Pt with improved stability with addition of knee immobilizer. Pt requiring increased vc's for transfers with knee immobilizer. Reviewed ascent/descent of stairs with pt, but pt unable to perform on this date 2/2 poor endurance. Pt will continue to benefit from skilled PT services during this hospital admit to continue to improve transfer ability and efficiency as well as continue to progress pt's ambulation distance and cardiopulmonary endurance to maximize pt's functional independence and return to PLOF.    Problem List: weakness, impaired endurance, impaired self care skills, " gait instability, impaired functional mobility, impaired balance, decreased lower extremity function, decreased safety awareness, decreased ROM, edema, impaired joint extensibility, impaired muscle length.  Rehab Prognosis: Good; patient would benefit from acute skilled PT services to address these deficits and reach maximum level of function.      Goals:   Multidisciplinary Problems       Physical Therapy Goals          Problem: Physical Therapy    Goal Priority Disciplines Outcome Goal Variances Interventions   Physical Therapy Goal     PT, PT/OT Ongoing, Progressing     Description: Goals to be met by: 2023     Patient will increase functional independence with mobility by performin. Sit to stand transfer with Modified [place]  2. Bed to chair transfer with Supervision using Rolling Walker  3. Gait  x 150 feet with Supervision using RW or LRAD.   4. Ascend/descend 5 stair with bilateral Handrails Contact Guard Assistance using AD if needed.   5. Lower extremity exercise program x30 reps per handout, with independence                         Subjective     RN notified prior to session.  present upon PT entrance into room. Patient agreeable to PT evaluation.    Chief Complaint: Knee Pain (Reports left knee giving out for a while but worse lately. Reports fall PTA and now having left knee pain)  Patient/Family Comments/goals: improved LLE function  Pain/Comfort:  Pain Rating 1: 0/10  Pain Rating Post-Intervention 1: 0/10    Social History:  Residence: lives with their spouse 1-story house with 5 CAM and bilateral HR. Pt's bathroom has a walk in [facility] with shower chair and grab bars.  Support available: family  Equipment Used: grab bar, rollator  Equipment Owned (not using): None  Prior level of function: independent with use of rollator  Hand Dominance: right.   Work: Retired.   Drive: no.   Managing Medicines/Managing Home: yes.   Hobbies: spending time with family    Patient reports they  "will have assistance from  upon discharge.    Objective:     Additional staff present: OT; OT for co-evaluation due to suspected patient need for two skilled therapists to appropriately assess patient's functional deficits as well as ensure patient safety, accommodate for limited activity tolerance, and provide appropriate, skilled assistance to maximize functional potential during evaluation.    Patient found HOB elevated with: telemetry, knee immobilizer, PureWick, peripheral IV     General Precautions: Standard, Cardiac fall   Orthopedic Precautions:N/A   Braces: Knee immobilizer   Body mass index is 20.53 kg/m².  Oxygen Device: Room Air  Vitals: BP (!) 108/58 (BP Location: Right arm, Patient Position: Sitting)   Pulse 83   Temp 98.6 °F (37 °C) (Oral)   Resp 17   Ht 5' 8" (1.727 m)   Wt 61.2 kg (135 lb)   SpO2 97%   BMI 20.53 kg/m²     Exams:  Cognition:   Alert and Cooperative  AxOx4  Command following: Follows multistep verbal commands  Fluency: clear/fluent  Hearing: Intact  Vision:  Intact  Skin Integrity: Visible skin intact; bruising of medial LT knee  Postural Assessment: slouched posture, rounded shoulders, and forward head  Physical Exam:    Left UE Left LE Right UE Right LE   Edema absent present absent absent   ROM AROM WFL AROM WFL AROM WFL AROM WFL   Strength within normal limits ~3+/5 knee flex/ext, hip flexion; 5/5 ankle DF/PF adequate ROM, adequate strength 5/5   Sensation intact to light touch intact to light touch intact to light touch intact to light touch   Coordination normal normal normal normal     Outcome Measures:  AM-PAC 6 CLICK MOBILITY  Turning over in bed (including adjusting bedclothes, sheets and blankets)?: 4  Sitting down on and standing up from a chair with arms (e.g., wheelchair, bedside commode, etc.): 3  Moving from lying on back to sitting on the side of the bed?: 4  Moving to and from a bed to a chair (including a wheelchair)?: 3  Need to walk in hospital " room?: 3  Climbing 3-5 steps with a railing?: 2  Basic Mobility Total Score: 19     Functional Mobility:    Bed Mobility:   Supine to Sit: stand by assistance; from Rt side of bed  Scooting anteriorly to EOB to have both feet planted on floor: stand by assistance  Sit to Supine: stand by assistance; to Rt side of bed    Sitting Balance at Edge of Bed:  Static Sitting Balance: Good- : able to accept minimal resistance  Dynamic Sitting Balance: Good- : able to sit unsupported and weight shift across midline minimally  Assistance Level Required: Stand-by Assistance  Time: 15 minutes  Postural deviations noted: slouched posture, rounded shoulders, and forward head  Comments: knee immobilizer donned prior to transfers    Transfers:   Sit <> Stand Transfer: moderate assistance with hand-held assist   Stand <> Sit Transfer: moderate assistance with hand-held assist   [id number] from EOB  Bed <> Chair Transfer: Step Transfer technique with minimum assistance and of 2 persons with hand-held assist  Chair on patient's Rt    Standing Balance:  Static Standing Balance: Fair- : requires minimal assistance or UE support in order to stand without LOB  Dynamic Standing Balance: Poor+ : able to stand with minimal assistance and reach ipsilaterally. Unable to weight shift.  Assistance Level Required: Minimal Assistance  Patient used: hand-held assist       Gait:   Patient ambulated: ~4 steps to bedside chair   Patient required: minimal assist and of 2 persons  Patient used:  hand-held assist   Gait Pattern observed: step-to  Gait Deviation(s): decreased step length, narrow base of support, decreased weight shift, shuffle gait, flexed posture, and decreased leandro  Impairments due to: pain and decreased ROM  all lines remained intact throughout ambulation trial  Comments: pt requiring Min A x 2 to maintain balance assist with progression of LLE with knee immobilizer. Pt would likely demo improved mechanics with use of  rW.    Stairs:  Reviewed mechanics for stair ascent/descent with knee immobilizer including side step, step to technique with RLE leading for ascent, LLE leading for descent.     Education:  Time provided for education, counseling and discussion of health disposition in regards to patient's current status  All questions answered within PT scope of practice and to patient's satisfaction  PT role in POC to address current functional deficits  Pt educated on proper body mechanics, safety techniques, and energy conservation with PT facilitation and cueing throughout session  Call nursing/pct to transfer to chair/use bathroom. Pt stated understanding.  Whiteboard updated with therapist name and pt's current mobility status documented above  Safe to perform step transfer to/from chair/bedside commode assist x 1 with RW and /or assist x 2 with HHA w/ nursing/PCT present  Importance of OOB tolerance prn hrs/day to improve lung ventilation and expansion as well as strengthen postural musculature  Educated on wearing schedule of knee immobilizer especially when standing/ambulating to prevent fall    Patient left up in chair with all lines intact, call button in reach, RN notified, and family present.      History:     Past Medical History:   Diagnosis Date    Arthritis     Diabetes mellitus with neurological manifestation     Essential hypertension 2/28/2019    High blood pressure     Hyperlipidemia     Urinary urgency        Past Surgical History:   Procedure Laterality Date    EPIDURAL STEROID INJECTION N/A 1/31/2019    Procedure: INJECTION, STEROID, EPIDURAL, L3-L4 need consent;  Surgeon: Marlin Barber MD;  [facility]: [facility];  Service: Pain Management;  Laterality: N/A;    left knee replacement       low back surgery          Family History   Problem Relation Age of Onset    Diabetes Mother     Hypertension Mother     Diabetes Sister     Hypertension Sister     Diabetes Maternal Uncle     Diabetes Maternal Grandmother         Social History     Socioeconomic History    Marital status:    Tobacco Use    Smoking status: Never    Smokeless tobacco: Never   Substance and Sexual Activity    Alcohol use: No    Drug use: No    Sexual activity: Yes     Partners: Male       Time Tracking:     PT Received On: 01/02/23  PT Start Time: 1101     PT Stop Time: 1132  PT Total Time (min): 31 min     Billable Minutes: Evaluation 8 minutes and Therapeutic Activity 23 minutes    Caryn Munoz PT, DPT  1/2/2023  Pager: [phone]

## 2023-01-02 NOTE — PT/OT/SLP EVAL
Occupational Therapy   Evaluation    Name: Jojo Ayoub  MRN: [id number]  Admitting Diagnosis:  Acute cystitis without hematuria    Length of Stay: 0 days    Recommendations:     Discharge Recommendations: other (see comments) (outpatient)  Discharge Equipment Recommendations:  rollator  Barriers to discharge:  None    Plan:     Patient to be seen 3 x/week to address the above listed problems via self-care/home management, therapeutic activities, therapeutic exercises, neuromuscular re-education  Plan of Care Expires: 02/01/23  Plan of Care Reviewed with: patient, family    Assessment:     Jojo Ayoub is a 87 y.o. female with a medical diagnosis of Acute cystitis without hematuria.  She presents with the following performance deficits affecting function: weakness, impaired endurance, impaired self care skills, impaired functional mobility, gait instability, pain, decreased safety awareness, impaired balance, decreased lower extremity function, impaired coordination, decreased coordination, decreased ROM, edema.      Pt presenting with impaired LLE function, increased fatigue, increased weakness, decreased activity tolerance, impaired balance, and decreased safety awareness upon evaluation this date, requiring increased time and assistance to complete functional tasks. Patient with observed increased edema to LLE at knee, bruising noted, reports may have hit knee during recent near fall events. Patient completed bed mobility with SBA while HOB elevated. Patient completed sit>Stand transfer with Mod A and step transfer to chair with MIN A x 2 using hand-held assist. Patient educated on use of knee immobilizer for stands and transfers for knee stability. Pt would benefit from continued acute skilled OT services at this time to increase functional performance, and improve quality of life. OT to recommend Outpatient at discharge once medically appropriate for increased functional independence and to improve patient  "safety before returning home.    Rehab Prognosis: Good; patient would benefit from acute skilled OT services to address these deficits and reach maximum level of function.       Subjective   Patient states: " It's been doing that for a long time. " -re L knee instability    Communicated with: Nurse prior to session.  Patient found HOB elevated with telemetry, PureWick, knee immobilizer, peripheral IV upon OT entry to room.    Chief Complaint: Knee Pain (Reports left knee giving out for a while but worse lately. Reports fall PTA and now having left knee pain)    Patient/Family Comments/goals: to [facility]    Pain/Comfort:  Pain Rating 1: 0/10  Location - Side 1: Left  Location - Orientation 1: generalized  Location 1: knee  Pain Addressed 1: Reposition, Distraction, Cessation of Activity  Pain Rating Post-Intervention 1: 0/10    Patients cultural, spiritual, Rastafarian conflicts given the current situation: no    Occupational Profile:  Living Environment: lives with spouse in [address] rails; walk-in shower (primary) with shower chair and grab bars  Prior Level of Function: Patient reports being Mod-Independent using rollator with mobility & with ADLs.   Patient uses DME as follows: grab bar, rollator, shower chair.   DME owned (not currently used): none.  Roles/Repsonsibilities:   Hand Dominance: right   Work: no.   Drive: no.   Managing Medicines/Managing Home: yes.   Equipment Used at Home:  grab bar, rollator, shower chair    Patient reports they will have assistance from family upon discharge.      Objective:     Patient found with: telemetry, PureWick, knee immobilizer, peripheral IV   General Precautions: Standard, Cardiac fall   Orthopedic Precautions:N/A   Braces: Knee immobilizer   Oxygen Device: Room Air  Vitals: BP (!) 108/58 (BP Location: Right arm, Patient Position: Sitting)   Pulse 83   Temp 98.6 °F (37 °C) (Oral)   Resp 17   Ht 5' 8" (1.727 m)   Wt 61.2 kg (135 lb)   SpO2 97%   BMI " 20.53 kg/m²     Cognitive and Psychosocial Function:   AxOx4 -- Person, Place, Time, and Situation   Follows Commands/attention:follows two-step commands  Communication:  clear/fluent  Memory: No Deficits noted  Safety awareness/insight to disability: impaired   Mood/Affect/Coping skills/emotional control: Appropriate to situation    Hearing: Intact    Vision:  Intact visual fields and wears glasses     Physical Exam:  Postural examination/scapula alignment:    -       Rounded shoulders  -       Forward head  Skin integrity: Visible skin intact     Left UE Right UE   UE Edema absent absent   UE ROM AROM WFL AROM WFL   UE Strength 3+/5   3+/5    Strength 3+/5 3+/5   Sensation LUE INTACT:WFL RUE INTACT: WFL   Fine Motor Coordination:  LUE INTACT: WFL RUE INTACT: WFL   Gross Motor Coordination: LUE INTACT: WFL RUE INTACT:WFL     Occupational Performance:  Bed Mobility:    Patient completed Rolling/Turning to Right with stand by assistance  Patient completed Supine to Sit with stand by assistance on R side of bed; x 2 trials, HOB elevated  Scooting anteriorly to EOB to have both feet planted on floor: stand by assistance  Patient completed Sit to Supine with stand by assistance on R side of bed    Functional Mobility/Transfers:  Static Sitting EOB: SBA  Patient completed Sit <> Stand Transfer from EOB with moderate assistance  with  hand-held assist   Static Standing Balance: CGA  Patient completed Bed <> Chair Transfer using Step Transfer technique with minimum assistance and of 2 persons with hand-held assist; posterior lean and FF posture    Activities of Daily Living:  Lower Body Dressing: maximal assistance to don socks and doff/don LLE knee immobilizer      AMPAC 6 Click ADL:  AMPAC Total Score: 22    Treatment & Education:  -Pt education on OT role and POC.  -Importance of E/OOB activity with staff assistance, emphasis on daily participation  -Safety during functional transfer and mobility ensured  -Patient  provided with education on importance of Bilateral UB/LB integration during functional tasks for improvement in functional performance.   -Education provided/reviewed, questions answered within OT scope of practice.   -Patient demonstrates understanding and learning this date.         Patient left up in chair with all lines intact, call button in reach, and nurse notified and family present    GOALS:   Multidisciplinary Problems       Occupational Therapy Goals          Problem: Occupational Therapy    Goal Priority Disciplines Outcome Interventions   Occupational Therapy Goal     OT, PT/OT Ongoing, Progressing    Description: Goals set on 1/2, with expiration date 1/16:  Patient will increase functional independence with ADLs by performing:    Bed mobility with Supervision  Grooming while standing at sink with Supervision  UB Dressing with Supervision.  LB Dressing with Supervision.  Toileting from toilet with Supervision for hygiene and clothing management.   Functional mobility of household and community distance with Supervision and AD as needed  Pt will demonstrate understanding of education provided regarding energy conservation and task modification through teach-back method.  Pt will demonstrate [place] in HEP for BUE strengthening GM/FM exercises to improve functional performance.                          History:     Past Medical History:   Diagnosis Date    Arthritis     Diabetes mellitus with neurological manifestation     Essential hypertension 2/28/2019    High blood pressure     Hyperlipidemia     Urinary urgency          Past Surgical History:   Procedure Laterality Date    EPIDURAL STEROID INJECTION N/A 1/31/2019    Procedure: INJECTION, STEROID, EPIDURAL, L3-L4 need consent;  Surgeon: Marlin Barber MD;  Location: [facility];  Service: Pain Management;  Laterality: N/A;    left knee replacement       low back surgery          Time Tracking:       OT Date of Treatment: 01/02/23  OT Start Time:  1101  OT Stop Time: 1125  OT Total Time (min): 24 min    Billable Minutes:Evaluation 10  Therapeutic Exercise 14      1/2/2023

## 2023-01-02 NOTE — H&P
"[facility] [address] [place] [address] Medicine  History & Physical    Patient Name: oJjo Ayoub  MRN: [id number]  Patient Class: OP- Observation  Admission Date: 1/1/2023  Attending Physician: Makenzie Reynolds MD   Primary Care Provider: [facility]         Patient information was obtained from patient, ER records and daughter.     Subjective:     Principal Problem:Gait instability    Chief Complaint:   Chief Complaint   Patient presents with    Knee Pain     Reports left knee giving out for a while but worse lately. Reports fall PTA and now having left knee pain        HPI: Jojo Ayoub is a 87-year-old female with past medical history of DM, osteoarthritis, hypertension, and hyperlipidemia presents the emergency department with chief complaint of pain and weakness in the left knee. She has chronic knee pain s/p total knee replacement and worked with PT for this earlier this year for patellar subluxation. She ambulates with a walker at baseline, but she has felt more unstable lately. She reports her knee gave out twice when going to [employer] this morning. Her  caught her and she did not fall to the ground; denies new injuries.  She complains of pain in the lateral aspect of the knee.  Reports chronic urinary urgency and frequency. She denies recent injury, weight changes, fever or chills, falls, lightheadedness, CP, SOB, confusion, lethargy, N/V/D, urinary symptoms, bleeding.     In the ED, AFVSS. WBC elevated to 13K without left shift. H/H 10.9/32 (baseline normocytic anemia). CMP with Cr 1.1 (baseline), glucose 131. UA grossly infectious, reflex culture pending. CXR without acute cardiopulmonary process. XR L knee without fracture or dislocation; "Status post total knee arthroplasty and patellar resurfacing.  Approximately 3.5-4 mm of lucency between the tibial component of the hardware adjoining cement.  Attention on follow-up recommended. No definite radiopaque foreign body.  " "Question at least small suprapatellar knee joint effusion.  Atherosclerotic calcifications are noted." Given tylenol and 1g rocephin.       Past Medical History:   Diagnosis Date    Arthritis     Diabetes mellitus with neurological manifestation     Essential hypertension 2/28/2019    High blood pressure     Hyperlipidemia     Urinary urgency        Past Surgical History:   Procedure Laterality Date    EPIDURAL STEROID INJECTION N/A 1/31/2019    Procedure: INJECTION, STEROID, EPIDURAL, L3-L4 need consent;  Surgeon: Marlin Barber MD;  [facility];  Service: Pain Management;  Laterality: N/A;    left knee replacement       low back surgery          Review of patient's allergies indicates:   Allergen Reactions    No known drug allergies        No current facility-administered medications on file prior to encounter.     Current Outpatient Medications on File Prior to Encounter   Medication Sig    acetaminophen (TYLENOL) 500 MG tablet Take 2 tablets (1,000 mg total) by mouth every 8 (eight) hours as needed for Pain.    aspirin (HECTOR CHILDRENS ASPIRIN) 81 MG chewable tablet Take 81 mg by mouth. 1 Tablet Oral Every day    atorvastatin (LIPITOR) 10 MG tablet TAKE 1 TABLET (10 MG TOTAL) BY MOUTH ONCE DAILY.    carvediloL (COREG) 25 MG tablet TAKE 1 TABLET BY MOUTH TWICE A DAY    cephALEXin (KEFLEX) 500 MG capsule Take 1 capsule (500 mg total) by mouth every 12 (twelve) hours.    gabapentin (NEURONTIN) 800 MG tablet TAKE 1 TABLET (800 MG TOTAL) BY MOUTH 3 (THREE) TIMES DAILY.    metFORMIN (GLUCOPHAGE-XR) 500 MG ER 24hr tablet Take 1 tablet (500 mg total) by mouth daily with breakfast.    ONETOUCH ULTRA BLUE TEST STRIP Strp TEST ONCE DAILY    ONETOUCH ULTRASOFT LANCETS lancets USE TO TEST BLOOD SUGAR DAILY     Family History       Problem Relation (Age of Onset)    Diabetes Mother, Sister, Maternal Uncle, Maternal Grandmother    Hypertension Mother, Sister          Tobacco Use    Smoking status: " Never    Smokeless tobacco: Never   Substance and Sexual Activity    Alcohol use: No    Drug use: No    Sexual activity: Yes     Partners: Male     Review of Systems   Constitutional:  Negative for activity change, chills and fever.   HENT:  Negative for trouble swallowing.    Eyes:  Negative for photophobia and visual disturbance.   Respiratory:  Negative for cough, chest tightness and shortness of breath.    Cardiovascular:  Negative for chest pain, palpitations and leg swelling.   Gastrointestinal:  Negative for abdominal pain, constipation, diarrhea, nausea and vomiting.   Genitourinary:  Positive for frequency (chronic) and urgency (chronic). Negative for dysuria and hematuria.   Musculoskeletal:  Positive for arthralgias and gait problem. Negative for back pain and neck pain.   Skin:  Negative for rash and wound.   Neurological:  Positive for weakness. Negative for dizziness, syncope, speech difficulty and light-headedness.   Psychiatric/Behavioral:  Negative for agitation and confusion. The patient is not nervous/anxious.    Objective:     Vital Signs (Most Recent):  Temp: 98.3 °F (36.8 °C) (01/01/23 1356)  Pulse: 78 (01/01/23 1356)  Resp: 18 (01/01/23 1356)  BP: (!) 142/64 (01/01/23 1356)  SpO2: 97 % (01/01/23 1356)   Vital Signs (24h Range):  Temp:  [98.3 °F (36.8 °C)-98.6 °F (37 °C)] 98.3 °F (36.8 °C)  Pulse:  [78-86] 78  Resp:  [18] 18  SpO2:  [97 %-99 %] 97 %  BP: (129-142)/(64-83) 142/64     Weight: 61.2 kg (135 lb)  Body mass index is 20.53 kg/m².    Physical Exam  Vitals and nursing note reviewed.   Constitutional:       General: She is not in acute distress.     Appearance: She is well-developed.   HENT:      Head: Normocephalic and atraumatic.      Mouth/Throat:      Pharynx: No oropharyngeal exudate.   Eyes:      Conjunctiva/sclera: Conjunctivae normal.      Pupils: Pupils are equal, round, and reactive to light.   Cardiovascular:      Rate and Rhythm: Normal rate and regular rhythm.      Heart  sounds: Normal heart sounds.   Pulmonary:      Effort: Pulmonary effort is normal. No respiratory distress.      Breath sounds: Normal breath sounds. No wheezing.   Abdominal:      General: Bowel sounds are normal. There is no distension.      Palpations: Abdomen is soft.      Tenderness: There is no abdominal tenderness.   Musculoskeletal:         General: No tenderness. Normal range of motion.      Cervical back: Normal range of motion and neck supple.      Comments: L knee immobilizer in place   Lymphadenopathy:      Cervical: No cervical adenopathy.   Skin:     General: Skin is warm and dry.      Capillary Refill: Capillary refill takes less than 2 seconds.      Findings: No rash.   Neurological:      Mental Status: She is alert and oriented to person, place, and time.      Cranial Nerves: No cranial nerve deficit.      Sensory: No sensory deficit.      Coordination: Coordination normal.   Psychiatric:         Behavior: Behavior normal.         Thought Content: Thought content normal.         Judgment: Judgment normal.         CRANIAL NERVES     CN III, IV, VI   Pupils are equal, round, and reactive to light.     Significant Labs: All pertinent labs within the past 24 hours have been reviewed.  CBC:   Recent Labs   Lab 01/01/23  1011   WBC 13.02*   HGB 10.9*   HCT 32.5*        CMP:   Recent Labs   Lab 01/01/23  1011      K 4.1      CO2 27   *   BUN 21   CREATININE 1.1   CALCIUM 8.6*   PROT 7.1   ALBUMIN 2.6*   BILITOT 0.3   ALKPHOS 50*   AST 15   ALT 9*   ANIONGAP 9     Urine Studies:   Recent Labs   Lab 01/01/23  1255   COLORU Yellow   APPEARANCEUA Cloudy*   PHUR 5.0   SPECGRAV 1.020   PROTEINUA 1+*   GLUCUA Negative   KETONESU Negative   BILIRUBINUA Negative   OCCULTUA Negative   NITRITE Positive*   LEUKOCYTESUR 3+*   RBCUA 4   WBCUA >100*   BACTERIA Many*   SQUAMEPITHEL 1   HYALINECASTS 0       Significant Imaging: I have reviewed all pertinent imaging results/findings within the  past 24 hours.    Imaging Results              X-Ray Chest PA And Lateral (Final result)  Result time 01/01/23 11:27:49      Final result by Balaji Goldman MD (01/01/23 11:27:49)                   Impression:      No acute process in the thorax.    Linear metallic density overlying the inferior aortic arch, possibly secondary to prior surgery, external to the patient, or foreign body.  Clinical correlation is recommended.      Electronically signed by: Balaji Goldman MD  Date:    01/01/2023  Time:    11:27               Narrative:    EXAMINATION:  XR CHEST PA AND LATERAL    CLINICAL HISTORY:  Weakness    TECHNIQUE:  PA and lateral views of the chest were performed.    COMPARISON:  None    FINDINGS:  Cardiac silhouette is not enlarged.  Linear metallic density overlying the left inferior aortic arch.  This may be secondary to prior surgery, external to the patient, or foreign body.  Rounded high density focus inferior to the aortic arch likely a calcified granuloma.  Normal pulmonary vasculature.  Lungs are clear.  No focal consolidation.  No pleural effusion.  No pneumothorax.  No evidence of acute fracture.                                       X-Ray Knee 3 View Left (Final result)  Result time 01/01/23 11:27:42      Final result by Balaji Hanley MD (01/01/23 11:27:42)                   Impression:      As above.      Electronically signed by: Balaji Hanley  Date:    01/01/2023  Time:    11:27               Narrative:    EXAMINATION:  XR KNEE 3 VIEW LEFT    CLINICAL HISTORY:  Pain in left knee    TECHNIQUE:  AP, lateral, and Merchant views of the left knee were performed.    COMPARISON:  None    FINDINGS:  No convincing evidence of acute fracture or dislocation.    Status post total knee arthroplasty and patellar resurfacing.  Approximately 3.5-4 mm of lucency between the tibial component of the hardware adjoining cement.  Attention on follow-up recommended.    No definite radiopaque foreign body.   Question at least small suprapatellar knee joint effusion.  Atherosclerotic calcifications are noted.                                        Assessment/Plan:     * Gait instability  Chronic pain L knee, Osteoarthritis  S/p total knee replacement     Patient with acute on chronic LE weakness and knee pain, causing gait instability. Fall risk. Suspect acute cystitis is exacerbating her weakness. Will treat.  - XR L knee reviewed  - able to ambulate with L knee immobilizer in place  - PT/OT consulted   - tylenol prn for pain control     Acute cystitis  Leukocytosis     - UA grossly infectious, without hematuria.   - 1/4 SIRS for WBC 14K. AFVSS.   - Given 1g rocephin in the ED, will continue q24 hours.   - follow urine culture     Weight loss, unintentional  - reports unintentional weight loss recently  - lactose intolerant  - RD consulted    Essential hypertension  - BP controlled on admit   - continue coreg BID  - monitor BP closely    Hyperlipidemia  - continue statin    Type 2 diabetes mellitus, without long-term current use of insulin  Patient's FSGs are controlled with lifestyle changes. No home antihyperglycemics.   Last A1c reviewed- will repeat  Lab Results   Component Value Date    HGBA1C 6.0 (H) 01/21/2020     Most recent fingerstick glucose reviewed-   Recent Labs   Lab 01/01/23  1032   POCTGLUCOSE 128*    Diabetic diet ordered.      VTE Risk Mitigation (From admission, onward)         Ordered     IP VTE HIGH RISK PATIENT  Once         01/01/23 1418     Place sequential compression device  Until discontinued         01/01/23 1418                   Rashida Sanches PA-C  Department of Hospital Medicine   [facility]

## 2023-01-02 NOTE — NURSING
Pt arrived onto unit from ED. AAO. VSS on room. Pt oriented to room. Safety and fall precautions maintained. Pt and daughter aware of aware of plan of care

## 2023-01-02 NOTE — PLAN OF CARE
Evaluation completed today. PT goals appropriate.    Patient is safe to perform bed <> chair transfer with assist x 2 with HHA; assist x 1 with RW with nursing staff.    Please continue Progressive Mobility Protocol as appropriate.    Caryn Munoz, PT, DPT  2023  Pager: [phone]      Problem: Physical Therapy  Goal: Physical Therapy Goal  Description: Goals to be met by: 2023     Patient will increase functional independence with mobility by performin. Sit to stand transfer with Modified [place]  2. Bed to chair transfer with Supervision using Rolling Walker  3. Gait  x 150 feet with Supervision using RW or LRAD.   4. Ascend/descend 5 stair with bilateral Handrails Contact Guard Assistance using AD if needed.   5. Lower extremity exercise program x30 reps per handout, with independence    Outcome: Ongoing, Progressing

## 2023-01-02 NOTE — PLAN OF CARE
Problem: Adult Inpatient Plan of Care  Goal: Plan of Care Review  1/2/2023 0704 by Rosalia Aldrich RN  Outcome: Ongoing, Progressing  1/2/2023 0702 by Rosalia Aldrich RN  Outcome: Ongoing, Progressing  Goal: Patient-Specific Goal (Individualized)  1/2/2023 0704 by Rosalia Aldrich RN  Outcome: Ongoing, Progressing  1/2/2023 0702 by Rosalia Aldrich RN  Outcome: Ongoing, Progressing  Goal: Absence of Hospital-Acquired Illness or Injury  1/2/2023 0704 by Rosalia Aldrich RN  Outcome: Ongoing, Progressing  1/2/2023 0702 by Rosalia Aldrich RN  Outcome: Ongoing, Progressing  Goal: Optimal Comfort and Wellbeing  1/2/2023 0704 by Rosalia Aldrich RN  Outcome: Ongoing, Progressing  1/2/2023 0702 by Rosalia Aldrich RN  Outcome: Ongoing, Progressing  Goal: Readiness for Transition of Care  1/2/2023 0704 by Rosalia Aldrich RN  Outcome: Ongoing, Progressing  1/2/2023 0702 by Rosalia Aldrich RN  Outcome: Ongoing, Progressing     Problem: Infection  Goal: Absence of Infection Signs and Symptoms  1/2/2023 0704 by Rosalia Aldrich RN  Outcome: Ongoing, Progressing  1/2/2023 0702 by Rosalia Aldrich RN  Outcome: Ongoing, Progressing     Problem: Diabetes Comorbidity  Goal: Blood Glucose Level Within Targeted Range  1/2/2023 0704 by Rosalia Aldrich RN  Outcome: Ongoing, Progressing  1/2/2023 0702 by Rosalia Aldrich RN  Outcome: Ongoing, Progressing

## 2023-01-02 NOTE — PLAN OF CARE
OT evaluation completed, POC established as appropriate. OT recommending Outpatient once medically stable for discharge.    Problem: Occupational Therapy  Goal: Occupational Therapy Goal  Description: Goals set on 1/2, with expiration date 1/16:  Patient will increase functional independence with ADLs by performing:    Bed mobility with Supervision  Grooming while standing at sink with Supervision  UB Dressing with Supervision.  LB Dressing with Supervision.  Toileting from toilet with Supervision for hygiene and clothing management.   Functional mobility of household and community distance with Supervision and AD as needed  Pt will demonstrate understanding of education provided regarding energy conservation and task modification through teach-back method.  Pt will demonstrate [place] in HEP for BUE strengthening GM/FM exercises to improve functional performance.     Outcome: Ongoing, Progressing

## 2023-01-03 VITALS
WEIGHT: 135 LBS | HEART RATE: 80 BPM | BODY MASS INDEX: 20.46 KG/M2 | DIASTOLIC BLOOD PRESSURE: 57 MMHG | HEIGHT: 68 IN | SYSTOLIC BLOOD PRESSURE: 124 MMHG | OXYGEN SATURATION: 96 % | RESPIRATION RATE: 17 BRPM | TEMPERATURE: 98 F

## 2023-01-03 LAB
ANION GAP SERPL CALC-SCNC: 5 MMOL/L (ref 8–16)
BASOPHILS # BLD AUTO: 0.05 K/UL (ref 0–0.2)
BASOPHILS NFR BLD: 0.7 % (ref 0–1.9)
BUN SERPL-MCNC: 19 MG/DL (ref 8–23)
CALCIUM SERPL-MCNC: 8.3 MG/DL (ref 8.7–10.5)
CHLORIDE SERPL-SCNC: 104 MMOL/L (ref 95–110)
CO2 SERPL-SCNC: 27 MMOL/L (ref 23–29)
CREAT SERPL-MCNC: 1.1 MG/DL (ref 0.5–1.4)
DIFFERENTIAL METHOD: ABNORMAL
EOSINOPHIL # BLD AUTO: 0.4 K/UL (ref 0–0.5)
EOSINOPHIL NFR BLD: 5 % (ref 0–8)
ERYTHROCYTE [DISTWIDTH] IN BLOOD BY AUTOMATED COUNT: 16.2 % (ref 11.5–14.5)
EST. GFR  (NO RACE VARIABLE): 49.2 ML/MIN/1.73 M^2
GLUCOSE SERPL-MCNC: 89 MG/DL (ref 70–110)
HCT VFR BLD AUTO: 27.9 % (ref 37–48.5)
HGB BLD-MCNC: 8.8 G/DL (ref 12–16)
IMM GRANULOCYTES # BLD AUTO: 0.02 K/UL (ref 0–0.04)
IMM GRANULOCYTES NFR BLD AUTO: 0.3 % (ref 0–0.5)
LYMPHOCYTES # BLD AUTO: 1.8 K/UL (ref 1–4.8)
LYMPHOCYTES NFR BLD: 25 % (ref 18–48)
MCH RBC QN AUTO: 30.6 PG (ref 27–31)
MCHC RBC AUTO-ENTMCNC: 31.5 G/DL (ref 32–36)
MCV RBC AUTO: 97 FL (ref 82–98)
MONOCYTES # BLD AUTO: 0.6 K/UL (ref 0.3–1)
MONOCYTES NFR BLD: 8.5 % (ref 4–15)
NEUTROPHILS # BLD AUTO: 4.3 K/UL (ref 1.8–7.7)
NEUTROPHILS NFR BLD: 60.5 % (ref 38–73)
NRBC BLD-RTO: 0 /100 WBC
PLATELET # BLD AUTO: 318 K/UL (ref 150–450)
PMV BLD AUTO: 10.3 FL (ref 9.2–12.9)
POCT GLUCOSE: 119 MG/DL (ref 70–110)
POTASSIUM SERPL-SCNC: 3.7 MMOL/L (ref 3.5–5.1)
RBC # BLD AUTO: 2.88 M/UL (ref 4–5.4)
SODIUM SERPL-SCNC: 136 MMOL/L (ref 136–145)
WBC # BLD AUTO: 7.17 K/UL (ref 3.9–12.7)

## 2023-01-03 PROCEDURE — 97116 GAIT TRAINING THERAPY: CPT

## 2023-01-03 PROCEDURE — 80048 BASIC METABOLIC PNL TOTAL CA: CPT

## 2023-01-03 PROCEDURE — 85025 COMPLETE CBC W/AUTO DIFF WBC: CPT

## 2023-01-03 PROCEDURE — 94761 N-INVAS EAR/PLS OXIMETRY MLT: CPT

## 2023-01-03 PROCEDURE — 99239 HOSP IP/OBS DSCHRG MGMT >30: CPT | Mod: ,,,

## 2023-01-03 PROCEDURE — 99239 PR HOSPITAL DISCHARGE DAY,>30 MIN: ICD-10-PCS | Mod: ,,,

## 2023-01-03 PROCEDURE — 25000003 PHARM REV CODE 250

## 2023-01-03 PROCEDURE — 36415 COLL VENOUS BLD VENIPUNCTURE: CPT

## 2023-01-03 PROCEDURE — G0378 HOSPITAL OBSERVATION PER HR: HCPCS

## 2023-01-03 PROCEDURE — 96366 THER/PROPH/DIAG IV INF ADDON: CPT

## 2023-01-03 PROCEDURE — 97530 THERAPEUTIC ACTIVITIES: CPT

## 2023-01-03 PROCEDURE — 63600175 PHARM REV CODE 636 W HCPCS

## 2023-01-03 RX ADMIN — ASPIRIN 81 MG: 81 TABLET, CHEWABLE ORAL at 08:01

## 2023-01-03 RX ADMIN — ATORVASTATIN CALCIUM 10 MG: 10 TABLET, FILM COATED ORAL at 08:01

## 2023-01-03 RX ADMIN — CEFTRIAXONE 1 G: 1 INJECTION, POWDER, FOR SOLUTION INTRAMUSCULAR; INTRAVENOUS at 02:01

## 2023-01-03 RX ADMIN — POLYETHYLENE GLYCOL 3350 17 G: 17 POWDER, FOR SOLUTION ORAL at 09:01

## 2023-01-03 RX ADMIN — GABAPENTIN 800 MG: 400 CAPSULE ORAL at 08:01

## 2023-01-03 RX ADMIN — CARVEDILOL 25 MG: 25 TABLET, FILM COATED ORAL at 08:01

## 2023-01-03 RX ADMIN — GABAPENTIN 800 MG: 400 CAPSULE ORAL at 02:01

## 2023-01-03 NOTE — PLAN OF CARE
[facility]      HOME HEALTH ORDERS  FACE TO FACE ENCOUNTER    Patient Name: Jojo Ayoub  YOB: 1935    PCP: [facility] [address]   PCP Address: [address] / [place] [address]  PCP Phone Number: [phone]  PCP Fax: [phone]    Encounter Date: 1/1/23    Admit to Home Health    Diagnoses:  Active Hospital Problems    Diagnosis  POA    *Acute cystitis without hematuria [N30.00]  Yes     Priority: 2     Anemia [D64.9]  Yes    Leukocytosis [D72.829]  Yes    Weight loss, unintentional [R63.4]  Yes    Chronic pain of left knee [M25.562, G89.29]  Yes    Gait instability [R26.81]  Yes    Essential hypertension [I10]  Yes    Hyperlipidemia [E78.5]  Yes    S/P knee replacement [Z96.659]  Not Applicable    Osteoarthritis of knee [M17.9]  Yes    Type 2 diabetes mellitus, without long-term current use of insulin [E11.9]  Yes      Resolved Hospital Problems   No resolved problems to display.       Follow Up Appointments:  No future appointments.    Allergies:  Review of patient's allergies indicates:   Allergen Reactions    No known drug allergies        Medications: Review discharge medications with patient and family and provide education.    Current Facility-Administered Medications   Medication Dose Route Frequency Provider Last Rate Last Admin    acetaminophen tablet 650 mg  650 mg Oral Q6H PRN Rut Sanderson PA-C   650 mg at 01/02/23 2006    albuterol-ipratropium 2.5 mg-0.5 mg/3 mL nebulizer solution 3 mL  3 mL Nebulization Q4H PRN Rut Sanderson PA-C        aluminum-magnesium hydroxide-simethicone 200-200-20 mg/5 mL suspension 30 mL  30 mL Oral QID PRN Rut Sanderson PA-C        aspirin chewable tablet 81 mg  81 mg Oral Daily Rashida Sanches PA-C   81 mg at 01/03/23 0858    atorvastatin tablet 10 mg  10 mg Oral Daily Rashida Sanches PA-C   10 mg at 01/03/23 0857    carvediloL tablet 25 mg  25 mg Oral BID Rashida Sanches PA-C   25 mg at 01/03/23  0857    cefTRIAXone (ROCEPHIN) 1 g in dextrose 5 % in water (D5W) 5 % 50 mL IVPB (MB+)  1 g Intravenous Q24H Rashida Sanches PA-C   Stopped at 01/02/23 1440    dextrose 10% bolus 125 mL 125 mL  12.5 g Intravenous PRN Rut Sanderson PA-C        dextrose 10% bolus 250 mL 250 mL  25 g Intravenous PRN Rut Sanderson PA-C        gabapentin capsule 800 mg  800 mg Oral TID Rashida Sanches PA-C   800 mg at 01/03/23 0857    glucagon (human recombinant) injection 1 mg  1 mg Intramuscular PRN Rut Sanderson PA-C        glucose chewable tablet 16 g  16 g Oral PRN Rut Sanderson PA-C        glucose chewable tablet 24 g  24 g Oral PRN Rut Sanderson PA-C        insulin aspart U-100 pen 0-5 Units  0-5 Units Subcutaneous QID (AC + HS) PRN Rut Sanderson PA-C        naloxone 0.4 mg/mL injection 0.02 mg  0.02 mg Intravenous PRN Rut Sanderson PA-C        ondansetron disintegrating tablet 8 mg  8 mg Oral Q8H PRN Rut Sanderson PA-C        polyethylene glycol packet 17 g  17 g Oral BID PRN Rut Sanderson PA-C   17 g at 01/03/23 0900    prochlorperazine injection Soln 5 mg  5 mg Intravenous Q6H PRN Rut Sanderson PA-C        sodium chloride 0.9% flush 10 mL  10 mL Intravenous Q8H PRN Rut Sanderson PA-C         Current Discharge Medication List        CONTINUE these medications which have NOT CHANGED    Details   acetaminophen (TYLENOL) 500 MG tablet Take 2 tablets (1,000 mg total) by mouth every 8 (eight) hours as needed for Pain.  Qty: 90 tablet, Refills: 3      aspirin ([employer] ASPIRIN) 81 MG chewable tablet Take 81 mg by mouth. 1 Tablet Oral Every day      atorvastatin (LIPITOR) 10 MG tablet TAKE 1 TABLET (10 MG TOTAL) BY MOUTH ONCE DAILY.  Qty: 90 tablet, Refills: 3      carvediloL (COREG) 25 MG tablet TAKE 1 TABLET BY MOUTH TWICE A DAY  Qty: 180 tablet, Refills: 0    Associated Diagnoses: Essential hypertension      gabapentin (NEURONTIN) 800 MG tablet TAKE 1 TABLET (800 MG  TOTAL) BY MOUTH 3 (THREE) TIMES DAILY.  Qty: 270 tablet, Refills: 3      metFORMIN (GLUCOPHAGE-XR) 500 MG ER 24hr tablet Take 1 tablet (500 mg total) by mouth daily with breakfast.  Qty: 90 tablet, Refills: 0    Associated Diagnoses: Uncontrolled type 2 diabetes mellitus with hyperglycemia      ONETOUCH ULTRA BLUE TEST STRIP Strp TEST ONCE DAILY  Qty: 100 strip, Refills: 11    Associated Diagnoses: Encounter for screening for diabetes mellitus      ONETOUCH ULTRASOFT LANCETS lancets USE TO TEST BLOOD SUGAR DAILY  Qty: 100 each, Refills: 0    Comments: DX Code Needed  .  Associated Diagnoses: Encounter for screening for diabetes mellitus               I have seen and examined this patient within the last 30 days. My clinical findings that support the need for the home health skilled services and home bound status are the following:no   Requiring assistive device to leave home due to unsteady gait caused by  knee instability.     Diet:   diabetic diet 2000 calorie    Labs:  none    Referrals/ Consults  Physical Therapy to evaluate and treat. Evaluate for home safety and equipment needs; Establish/upgrade home exercise program. Perform / instruct on therapeutic exercises, gait training, transfer training, and Range of Motion.  Occupational Therapy to evaluate and treat. Evaluate home environment for safety and equipment needs. Perform/Instruct on transfers, ADL training, ROM, and therapeutic exercises.   to evaluate for community resources/long-range planning.  Aide to provide assistance with personal care, ADLs, and vital signs.    Activities:   activity as tolerated    Nursing:   Agency to admit patient within 24 hours of hospital discharge unless specified on physician order or at patient request    SN to complete comprehensive assessment including routine vital signs. Instruct on disease process and s/s of complications to report to MD. Review/verify medication list sent home with the patient at time  of discharge  and instruct patient/caregiver as needed. Frequency may be adjusted depending on start of care date.     Skilled nurse to perform up to 3 visits PRN for symptoms related to diagnosis    Notify MD if SBP > 160 or < 90; DBP > 90 or < 50; HR > 120 or < 50; Temp > 101; O2 < 88%;    Ok to schedule additional visits based on staff availability and patient request on consecutive days within the home health episode.    When multiple disciplines ordered:    Start of Care occurs on Sunday - Wednesday schedule remaining discipline evaluations as ordered on separate consecutive days following the start of care.    Thursday SOC -schedule subsequent evaluations Friday and Monday the following week.     Friday - Saturday SOC - schedule subsequent discipline evaluations on consecutive days starting Monday of the following week.      Home Health Aide:  Nursing Twice weekly, Physical Therapy Three times weekly, Occupational Therapy Three times weekly, and Home Health Aide Twice weekly    Wound Care Orders  no    I certify that this patient is confined to her home and needs intermittent skilled nursing care, physical therapy, and occupational therapy.

## 2023-01-03 NOTE — PROGRESS NOTES
"[facility] [address] [place] [address] Medicine  Progress Note    Patient Name: Jojo Ayoub  MRN: [id number]  Patient Class: OP- Observation   Admission Date: 1/1/2023  Length of Stay: 0 days  Attending Physician: Makenzie Reynolds MD  Primary Care Provider: [facility]        Subjective:     Principal Problem:Acute cystitis without hematuria        HPI:  Jojo Ayoub is a 87-year-old female with past medical history of DM, osteoarthritis, hypertension, and hyperlipidemia presents the emergency department with chief complaint of pain and weakness in the left knee. She has chronic knee pain s/p total knee replacement and worked with PT for this earlier this year for patellar subluxation. She ambulates with a walker at baseline, but she has felt more unstable lately. She reports her knee gave out twice when going to [employer] this morning. Her  caught her and she did not fall to the ground; denies new injuries.  She complains of pain in the lateral aspect of the knee.  Reports chronic urinary urgency and frequency. She denies recent injury, weight changes, fever or chills, falls, lightheadedness, CP, SOB, confusion, lethargy, N/V/D, urinary symptoms, bleeding.     In the ED, AFVSS. WBC elevated to 13K without left shift. H/H 10.9/32 (baseline normocytic anemia). CMP with Cr 1.1 (baseline), glucose 131. UA grossly infectious, reflex culture pending. CXR without acute cardiopulmonary process. XR L knee without fracture or dislocation; "Status post total knee arthroplasty and patellar resurfacing.  Approximately 3.5-4 mm of lucency between the tibial component of the hardware adjoining cement.  Attention on follow-up recommended. No definite radiopaque foreign body.  Question at least small suprapatellar knee joint effusion.  Atherosclerotic calcifications are noted." Given tylenol and 1g rocephin.       Overview/Hospital Course:  Jojo Ayoub is a 88 yo F admitted to hospital medicine " for further evaluation of knee pain and weakness. Found to have a UTI, started CTX. Weakness improving. PT/OT evaluated; recommending OP PT and knee brace. Evaluated by RD; recommending Boost supplements. Plan to discharge home when medically ready.       Interval History: NAEON. VSSAF. Many family members in the room visiting. Patient feeling better overall and denies weakness at this time.  PT/OT eval performed today, recommending OP PT for patient's knee. Did not walk with PT today and unable to walk without knee immobilizer. PT recommending new rollator and hinged knee brace. Patient reports swelling to L knee, but pain improved.     Review of Systems   Constitutional:  Negative for activity change, chills and fever.   HENT:  Negative for trouble swallowing.    Eyes:  Negative for photophobia and visual disturbance.   Respiratory:  Negative for cough, chest tightness and shortness of breath.    Cardiovascular:  Negative for chest pain, palpitations and leg swelling.   Gastrointestinal:  Negative for abdominal pain, constipation, diarrhea, nausea and vomiting.   Genitourinary:  Positive for frequency (chronic) and urgency (chronic). Negative for dysuria and hematuria.   Musculoskeletal:  Positive for arthralgias and gait problem. Negative for back pain and neck pain.   Skin:  Negative for rash and wound.   Neurological:  Positive for weakness. Negative for dizziness, syncope, speech difficulty and light-headedness.   Psychiatric/Behavioral:  Negative for agitation and confusion. The patient is not nervous/anxious.    Objective:     Vital Signs (Most Recent):  Temp: 99.7 °F (37.6 °C) (01/02/23 1938)  Pulse: 85 (01/02/23 1938)  Resp: 18 (01/02/23 1938)  BP: (!) 122/57 (01/02/23 1938)  SpO2: (!) 93 % (01/02/23 1938)   Vital Signs (24h Range):  Temp:  [97 °F (36.1 °C)-99.7 °F (37.6 °C)] 99.7 °F (37.6 °C)  Pulse:  [76-88] 85  Resp:  [17-18] 18  SpO2:  [92 %-97 %] 93 %  BP: (108-136)/(57-63) 122/57     Weight: 61.2 kg  (135 lb)  Body mass index is 20.53 kg/m².    Intake/Output Summary (Last 24 hours) at 1/2/2023 1943  Last data filed at 1/2/2023 0730  Gross per 24 hour   Intake --   Output 400 ml   Net -400 ml      Physical Exam  Vitals and nursing note reviewed.   Constitutional:       General: She is not in acute distress.     Appearance: She is well-developed.   HENT:      Head: Normocephalic and atraumatic.      Mouth/Throat:      Pharynx: No oropharyngeal exudate.   Eyes:      Conjunctiva/sclera: Conjunctivae normal.      Pupils: Pupils are equal, round, and reactive to light.   Cardiovascular:      Rate and Rhythm: Normal rate and regular rhythm.      Heart sounds: Normal heart sounds.   Pulmonary:      Effort: Pulmonary effort is normal. No respiratory distress.      Breath sounds: Normal breath sounds. No wheezing.   Abdominal:      General: Bowel sounds are normal. There is no distension.      Palpations: Abdomen is soft.      Tenderness: There is no abdominal tenderness.   Musculoskeletal:         General: No tenderness. Normal range of motion.      Cervical back: Normal range of motion and neck supple.      Comments: L knee immobilizer in place   Lymphadenopathy:      Cervical: No cervical adenopathy.   Skin:     General: Skin is warm and dry.      Capillary Refill: Capillary refill takes less than 2 seconds.      Findings: No rash.   Neurological:      Mental Status: She is alert and oriented to person, place, and time.      Cranial Nerves: No cranial nerve deficit.      Sensory: No sensory deficit.      Coordination: Coordination normal.   Psychiatric:         Behavior: Behavior normal.         Thought Content: Thought content normal.         Judgment: Judgment normal.       Significant Labs: All pertinent labs within the past 24 hours have been reviewed.  BMP:   Recent Labs   Lab 01/02/23  0416   GLU 87      K 3.9      CO2 26   BUN 18   CREATININE 1.0   CALCIUM 8.2*   MG 1.8     CBC:   Recent Labs   Lab  01/01/23  1011 01/02/23  0416   WBC 13.02* 5.98   HGB 10.9* 8.7*   HCT 32.5* 27.0*    308       Significant Imaging: I have reviewed all pertinent imaging results/findings within the past 24 hours.      Assessment/Plan:      * Acute cystitis without hematuria  Leukocytosis     - UA grossly infectious, without hematuria.   - 1/4 SIRS for WBC 14K. AFVSS.   - Given 1g rocephin in the ED, will continue q24 hours.   - follow urine culture   - WBC downtrending    Weight loss, unintentional  - reports unintentional weight loss recently  - lactose intolerant  - RD consulted; recommending Boost supplements with meals    Gait instability  Chronic pain L knee, Osteoarthritis  S/p total knee replacement     Patient with acute on chronic LE weakness and knee pain, causing gait instability. Fall risk. Suspect acute cystitis is exacerbating her weakness. Will treat.  - XR L knee reviewed  - able to ambulate with L knee immobilizer in place  - PT/OT consulted; recommending OPPT with hinged knee brace  - tylenol prn for pain control     Essential hypertension  - BP controlled on admit   - continue coreg BID  - monitor BP closely    Hyperlipidemia  - continue statin    Type 2 diabetes mellitus, without long-term current use of insulin  Patient's FSGs are controlled with lifestyle changes. No home antihyperglycemics.   Last A1c reviewed- will repeat  Lab Results   Component Value Date    HGBA1C 5.6 01/01/2023     Most recent fingerstick glucose reviewed-   Recent Labs   Lab 01/02/23  0741 01/02/23  1122 01/02/23  1604   POCTGLUCOSE 90 153* 107    Diabetic diet ordered.      VTE Risk Mitigation (From admission, onward)         Ordered     IP VTE HIGH RISK PATIENT  Once         01/01/23 1418     Place sequential compression device  Until discontinued         01/01/23 1418                Discharge Planning   FAY: 1/2/2023     Code Status: Full Code   Is the patient medically ready for discharge?: No    Reason for patient still in  [facility] (select all that apply): Patient trending condition and Treatment                     Rashdia Sanches PA-C  Department of Hospital Medicine   [facility]

## 2023-01-03 NOTE — PLAN OF CARE
[facility]  Initial Discharge Assessment       Primary Care Provider: [facility] [address]    Admission Diagnosis: Weakness [R53.1]  Left knee pain [M25.562]  Acute cystitis without hematuria [N30.00]  Chest pain [R07.9]  Subluxation of left patella, initial encounter [S83.002A]    Admission Date: 1/1/2023  Expected Discharge Date: 1/3/2023         Payor: MEDICARE / Plan: MEDICARE PART A & B / Product Type: Government /     Extended Emergency Contact Information  Primary Emergency Contact: Samuel Ayoub  Address: [address] [place] [address] [facility]  Home Phone: [phone]  Relation: Spouse    Discharge Plan A: Home Health  Discharge Plan B: Home with family, Home Health      [id number] - [place], [place] - [address]  [address] [facility] [address]  Phone: [phone] Fax: [phone]                 SW completed Discharge Planning Assessment with patient via bedside. Discharge planning booklet given to patient/family and whiteboard updated with FAY and phone #. All questions answered.    Patient reported that her  will provide transportation upon discharge.     Patient reported that she lives at home with her , and prior to hospitalization she was independent with her ADL's; however, if she needed assistance her  assisted her. Patient reported that she uses a rollator at home. Patient reported that she is not on dialysis and does not go to a Coumadin clinic.      Patient lives in a [facility] with five steps to enter with railings.       Verenice Sandoval LMSW  [facility]  Ext. [phone]

## 2023-01-03 NOTE — PT/OT/SLP PROGRESS
Physical Therapy Treatment    Patient Name:  Jojo Ayoub   MRN:  [id number]    Recommendations:     Discharge Recommendations: home with home health  Discharge Equipment Recommendations: walker, rolling, wheelchair  Barriers to discharge: Inaccessible home and current level of assistance required to stand from lowered surfaces    Assessment:     Jojo Ayoub is a 87 y.o. female admitted with a medical diagnosis of Acute cystitis without hematuria.  She presents with the following impairments/functional limitations: weakness, impaired endurance, impaired functional mobility, gait instability, impaired balance, decreased lower extremity function, impaired cardiopulmonary response to activity, orthopedic precautions.    Patient is motivated and has strong social support from  and children. Patient requires MODA to complete sit>Stand xfers from lowered surfaces, able to ambulate 10ft using RW requiring CGA-MAVIS. Patient tolerates stair navigation training with knee immobilizer via lateral technique requiring MODA to complete 2/2 force production deficits BLE and dec activity tolerance. Performed family training for safe stair navigation technique and guarding/assist techniques/mechanics. Recommending HHPT due to level of assistance patient currently requires to enter and exit her home, and current level of activity tolerance.     Rehab Prognosis: Good; patient would benefit from acute skilled PT services to address these deficits and reach maximum level of function.    Recent Surgery: * No surgery found *      Plan:     During this hospitalization, patient to be seen 4 x/week to address the identified rehab impairments via gait training, therapeutic activities, therapeutic exercises, neuromuscular re-education and progress toward the following goals:    Plan of Care Expires:  02/01/23    Subjective     Chief Complaint: Fatigue  Patient/Family Comments/goals: Get home  Pain/Comfort:  Pain Rating 1: 0/10  (Patient denies pain, endorses feeling pressure)  Pain Rating Post-Intervention 1: 0/10      Objective:     Communicated with RN prior to session.  Patient found  supine with  present  with telemetry, peripheral IV, knee immobilizer upon PT entry to room.     General Precautions: Standard, fall  Orthopedic Precautions: N/A  Braces: Knee immobilizer (LLE)  Respiratory Status: Room air     Functional Mobility:  Bed Mobility:     Scooting: supervision  Supine to Sit: supervision  Using bedrails, increased time required  Transfers:     Sit to Stand:  moderate assistance with rolling walker  MAVIS from elevated EOB, MODA from WC, 100% VC for correct technique and close RW positioning  Bed to Chair: minimal assistance with  rolling walker  using  Stand Pivot  Gait: Patient ambulates 10'x2 using RW + L KI requiring CGA-MAVIS for dynamic stability and increased time required, limited by fatigue.       AM-PAC 6 CLICK MOBILITY  Turning over in bed (including adjusting bedclothes, sheets and blankets)?: 4  Sitting down on and standing up from a chair with arms (e.g., wheelchair, bedside commode, etc.): 2  Moving from lying on back to sitting on the side of the bed?: 4  Moving to and from a bed to a chair (including a wheelchair)?: 2  Need to walk in hospital room?: 3  Climbing 3-5 steps with a railing?: 2  Basic Mobility Total Score: 17       Treatment & Education:  Performed functional mobility and gait training detailed above.     Patient left up in chair with  vendor present to fit hinged knee brace (Currently to be locked in extension)    GOALS:   Multidisciplinary Problems       Physical Therapy Goals          Problem: Physical Therapy    Goal Priority Disciplines Outcome Goal Variances Interventions   Physical Therapy Goal     PT, PT/OT Ongoing, Progressing     Description: Goals to be met by: 2023     Patient will increase functional independence with mobility by performin. Sit to stand transfer with  Modified [place]  2. Bed to chair transfer with Supervision using Rolling Walker  3. Gait  x 150 feet with Supervision using RW or LRAD.   4. Ascend/descend 5 stair with bilateral Handrails Contact Guard Assistance using AD if needed.   5. Lower extremity exercise program x30 reps per handout, with independence                         Time Tracking:     PT Received On: 01/03/23  PT Start Time: 1028     PT Stop Time: 1111  PT Total Time (min): 43 min     Billable Minutes: Gait Training 13 and Therapeutic Activity 30    Treatment Type: Treatment  PT/PTA: PT     PTA Visit Number: 0     01/03/2023

## 2023-01-03 NOTE — SUBJECTIVE & OBJECTIVE
Interval History: NAEON. VSSAF. Many family members in the room visiting. Patient feeling better overall and denies weakness at this time.  PT/OT eval performed today, recommending OP PT for patient's knee. Did not walk with PT today and unable to walk without knee immobilizer. PT recommending new rollator and hinged knee brace. Patient reports swelling to L knee, but pain improved.     Review of Systems   Constitutional:  Negative for activity change, chills and fever.   HENT:  Negative for trouble swallowing.    Eyes:  Negative for photophobia and visual disturbance.   Respiratory:  Negative for cough, chest tightness and shortness of breath.    Cardiovascular:  Negative for chest pain, palpitations and leg swelling.   Gastrointestinal:  Negative for abdominal pain, constipation, diarrhea, nausea and vomiting.   Genitourinary:  Positive for frequency (chronic) and urgency (chronic). Negative for dysuria and hematuria.   Musculoskeletal:  Positive for arthralgias and gait problem. Negative for back pain and neck pain.   Skin:  Negative for rash and wound.   Neurological:  Positive for weakness. Negative for dizziness, syncope, speech difficulty and light-headedness.   Psychiatric/Behavioral:  Negative for agitation and confusion. The patient is not nervous/anxious.    Objective:     Vital Signs (Most Recent):  Temp: 99.7 °F (37.6 °C) (01/02/23 1938)  Pulse: 85 (01/02/23 1938)  Resp: 18 (01/02/23 1938)  BP: (!) 122/57 (01/02/23 1938)  SpO2: (!) 93 % (01/02/23 1938)   Vital Signs (24h Range):  Temp:  [97 °F (36.1 °C)-99.7 °F (37.6 °C)] 99.7 °F (37.6 °C)  Pulse:  [76-88] 85  Resp:  [17-18] 18  SpO2:  [92 %-97 %] 93 %  BP: (108-136)/(57-63) 122/57     Weight: 61.2 kg (135 lb)  Body mass index is 20.53 kg/m².    Intake/Output Summary (Last 24 hours) at 1/2/2023 1943  Last data filed at 1/2/2023 0730  Gross per 24 hour   Intake --   Output 400 ml   Net -400 ml      Physical Exam  Vitals and nursing note reviewed.    Constitutional:       General: She is not in acute distress.     Appearance: She is well-developed.   HENT:      Head: Normocephalic and atraumatic.      Mouth/Throat:      Pharynx: No oropharyngeal exudate.   Eyes:      Conjunctiva/sclera: Conjunctivae normal.      Pupils: Pupils are equal, round, and reactive to light.   Cardiovascular:      Rate and Rhythm: Normal rate and regular rhythm.      Heart sounds: Normal heart sounds.   Pulmonary:      Effort: Pulmonary effort is normal. No respiratory distress.      Breath sounds: Normal breath sounds. No wheezing.   Abdominal:      General: Bowel sounds are normal. There is no distension.      Palpations: Abdomen is soft.      Tenderness: There is no abdominal tenderness.   Musculoskeletal:         General: No tenderness. Normal range of motion.      Cervical back: Normal range of motion and neck supple.      Comments: L knee immobilizer in place   Lymphadenopathy:      Cervical: No cervical adenopathy.   Skin:     General: Skin is warm and dry.      Capillary Refill: Capillary refill takes less than 2 seconds.      Findings: No rash.   Neurological:      Mental Status: She is alert and oriented to person, place, and time.      Cranial Nerves: No cranial nerve deficit.      Sensory: No sensory deficit.      Coordination: Coordination normal.   Psychiatric:         Behavior: Behavior normal.         Thought Content: Thought content normal.         Judgment: Judgment normal.       Significant Labs: All pertinent labs within the past 24 hours have been reviewed.  BMP:   Recent Labs   Lab 01/02/23  0416   GLU 87      K 3.9      CO2 26   BUN 18   CREATININE 1.0   CALCIUM 8.2*   MG 1.8     CBC:   Recent Labs   Lab 01/01/23  1011 01/02/23  0416   WBC 13.02* 5.98   HGB 10.9* 8.7*   HCT 32.5* 27.0*    308       Significant Imaging: I have reviewed all pertinent imaging results/findings within the past 24 hours.

## 2023-01-03 NOTE — CONSULTS
"[facility]  Adult Nutrition  Consult Note    SUMMARY     Recommendations  1. Continue diabetic, lactose restricted diet-fluid per MD  2. Continue Boost for optimization of protein and calorie intake   3. RD following    Goals: Meet % of EEN/EPN by RD f/u date  Nutrition Goal Status: progressing towards goal  Communication of RD Recs: POC    Assessment and Plan    Nutrition Problem  Increased protein/energy needs     Related to (etiology):   Physiological needs     Signs and Symptoms (as evidenced by):   Acute cystitis without hematuria    Interventions(treatment strategy):  Collaboration of nutrition care w/ other providers     Nutrition Diagnosis Status:   New     Reason for Assessment    Reason For Assessment: consult  Diagnosis: acute cystitis without hematuria  Relevant Medical History: HLD, HTN  Interdisciplinary Rounds: attended  General Information Comments: RD consulted for weight loss. Spoke w/ pt at bedside, reports a good appetite currently and PTA. Pt reports some weight loss d/t not eating as much as she typically eats in the past year. RD discussed addition of Boost and any concerns pt may have with it-pt agreeable to trying. Per chart review, noted signficant weight loss of 4%. NFPE completed, 1/3/22, pt presents w/ age related wasting however does not meet criteria for malnutrition at this time. LBM noted-1/2/22  Nutrition Discharge Planning: pending clinical course    Nutrition Risk Screen    Nutrition Risk Screen: no indicators present    Nutrition/Diet History    Spiritual, Cultural Beliefs, Muslim Practices, Values that Affect Care: no    Anthropometrics    Temp: 98 °F (36.7 °C)  Height Method: Stated  Height: 5' 8" (172.7 cm)  Height (inches): 68 in  Weight Method: Estimated  Weight: 61.2 kg (135 lb)  Weight (lb): 135 lb  Ideal Body Weight (IBW), Female: 140 lb  % Ideal Body Weight, Female (lb): 96.43 %  BMI (Calculated): 20.5       Lab/Procedures/Meds    Pertinent " Labs Reviewed: reviewed  Pertinent Labs Comments: GFR 49.2  Pertinent Medications Reviewed: reviewed  Pertinent Medications Comments: insulin    Estimated/Assessed Needs    Weight Used For Calorie Calculations: 61.2 kg (134 lb 14.7 oz)  Energy Calorie Requirements (kcal): 1836  Energy Need Method: Kcal/kg (30 kcal/kg)  Protein Requirements: 91 g (1.5 g/kg)  Weight Used For Protein Calculations: 61.2 kg (134 lb 14.7 oz)   RDA Method (mL): 1836       Nutrition Prescription Ordered    Current Diet Order: Diabetic, lactose restricted    Evaluation of Received Nutrient/Fluid Intake    I/O: -956 ml since admit  Energy Calories Required: not meeting needs  Protein Required: not meeting needs  Fluid Required: not meeting needs  Total Fluid Intake (mL/kg): 1 ml or fluid per MD  Tolerance: tolerating  % Intake of Estimated Energy Needs: 25 - 50 %  % Meal Intake: 25 - 50 %    Nutrition Risk    Level of Risk/Frequency of Follow-up: low ((1x/week))     Monitor and Evaluation    Food and Nutrient Intake: energy intake, food and beverage intake  Food and Nutrient Adminstration: diet order  Knowledge/Beliefs/Attitudes: food and nutrition knowledge/skill, beliefs and attitudes  Physical Activity and Function: nutrition-related ADLs and IADLs, factors affecting access to physical activity  Anthropometric Measurements: height/length, weight, weight change, growth pattern indices/percentile ranks, body mass index  Biochemical Data, Medical Tests and Procedures: electrolyte and renal panel, gastrointestinal profile, glucose/endocrine profile, inflammatory profile, lipid profile  Nutrition-Focused Physical Findings: overall appearance, extremities, muscles and bones, head and eyes, skin       Nutrition Follow-Up    RD Follow-up?: Yes  By Nieves Wheeler, Registration Eligible, Provisional LDN

## 2023-01-03 NOTE — PROGRESS NOTES
Hospital Medicine Brief Note    Patient medically cleared to discharge to home with outpatient PT/OT. Will complete IV ceftriaxone for UTI this AM, no further antimicrobials.    Awaiting knee brace.    Makenzie Reynolds MD, MPH, FACP  Senior Physician, Department of Hospital Medicine  [facility]  [address] [place]

## 2023-01-03 NOTE — NURSING
Patient discharged in stable condition. Patient given discharge instructions. Patient verbalized understanding. Iv taken out. Transported via home wheelchair.

## 2023-01-03 NOTE — PLAN OF CARE
01/03/23 1238   Post-Acute Status   Post-Acute Authorization Home Health   Home Health Status Set-up Complete/Auth obtained     SW received notification that pt has been accepted with [facility].    Verenice Sandoval LMSW  [facility]  Ext. [phone]

## 2023-01-03 NOTE — PLAN OF CARE
Recommendations  1. Continue diabetic, lactose restricted diet-fluid per MD  2. Continue Boost for optimization of protein and calorie intake   3. RD following     Goals: Meet % of EEN/EPN by RD f/u date  Nutrition Goal Status: progressing towards goal  Communication of RD Recs: POC

## 2023-01-03 NOTE — PLAN OF CARE
Problem: Adult Inpatient Plan of Care  Goal: Plan of Care Review  1/3/2023 0539 by Rosalia Aldrich RN  Outcome: Ongoing, Progressing  1/3/2023 0538 by Rosalia Aldrich RN  Outcome: Ongoing, Progressing  Goal: Patient-Specific Goal (Individualized)  1/3/2023 0539 by Rosalia Aldrich RN  Outcome: Ongoing, Progressing  1/3/2023 0538 by Rosalia Aldrich RN  Outcome: Ongoing, Progressing  Goal: Absence of Hospital-Acquired Illness or Injury  1/3/2023 0539 by Rosalia Aldrich RN  Outcome: Ongoing, Progressing  1/3/2023 0538 by Rosalia Aldrich RN  Outcome: Ongoing, Progressing  Goal: Optimal Comfort and Wellbeing  1/3/2023 0539 by Rosalia Aldrich RN  Outcome: Ongoing, Progressing  1/3/2023 0538 by Rosalia Aldrich RN  Outcome: Ongoing, Progressing  Goal: Readiness for Transition of Care  1/3/2023 0539 by Rosalia Aldrich RN  Outcome: Ongoing, Progressing  1/3/2023 0538 by Rosalia Aldrich RN  Outcome: Ongoing, Progressing     Problem: Infection  Goal: Absence of Infection Signs and Symptoms  1/3/2023 0539 by Rosalia Aldrich RN  Outcome: Ongoing, Progressing  1/3/2023 0538 by Rosalia Aldrich RN  Outcome: Ongoing, Progressing     Problem: Diabetes Comorbidity  Goal: Blood Glucose Level Within Targeted Range  1/3/2023 0539 by Rosalia Aldrich RN  Outcome: Ongoing, Progressing  1/3/2023 0538 by Rosalia Aldrich RN  Outcome: Ongoing, Progressing     Problem: Fall Injury Risk  Goal: Absence of Fall and Fall-Related Injury  1/3/2023 0539 by Rosalia Aldrich RN  Outcome: Ongoing, Progressing  1/3/2023 0538 by Rosalia Aldrich RN  Outcome: Ongoing, Progressing

## 2023-01-03 NOTE — ASSESSMENT & PLAN NOTE
Leukocytosis     - UA grossly infectious, without hematuria.   - 1/4 SIRS for WBC 14K. AFVSS.   - Given 1g rocephin in the ED, will continue q24 hours.   - follow urine culture   - WBC downtrending

## 2023-01-03 NOTE — ASSESSMENT & PLAN NOTE
- reports unintentional weight loss recently  - lactose intolerant  - RD consulted; recommending Boost supplements with meals

## 2023-01-03 NOTE — PLAN OF CARE
Problem: Adult Inpatient Plan of Care  Goal: Plan of Care Review  Outcome: Ongoing, Progressing  Goal: Patient-Specific Goal (Individualized)  Outcome: Ongoing, Progressing  Goal: Absence of Hospital-Acquired Illness or Injury  Outcome: Ongoing, Progressing  Goal: Optimal Comfort and Wellbeing  Outcome: Ongoing, Progressing  Goal: Readiness for Transition of Care  Outcome: Ongoing, Progressing     Problem: Infection  Goal: Absence of Infection Signs and Symptoms  Outcome: Ongoing, Progressing     Problem: Diabetes Comorbidity  Goal: Blood Glucose Level Within Targeted Range  Outcome: Ongoing, Progressing     Problem: Fall Injury Risk  Goal: Absence of Fall and Fall-Related Injury  Outcome: Ongoing, Progressing

## 2023-01-03 NOTE — ASSESSMENT & PLAN NOTE
Patient's FSGs are controlled with lifestyle changes. No home antihyperglycemics.   Last A1c reviewed- will repeat  Lab Results   Component Value Date    HGBA1C 5.6 01/01/2023     Most recent fingerstick glucose reviewed-   Recent Labs   Lab 01/02/23  0741 01/02/23  1122 01/02/23  1604   POCTGLUCOSE 90 153* 107    Diabetic diet ordered.

## 2023-01-03 NOTE — NURSING
Care provided as ordered during shift. No falls or injuries noted. No complaints of pain. Left knee brace in place. Safety and fall precautions maintained

## 2023-01-03 NOTE — PLAN OF CARE
01/03/23 1220   Post-Acute Status   Post-Acute Authorization Home Health   Home Health Status Referrals Sent     Pt is expected to discharge home with home health. SW sent referrals via [facility] and is waiting for an accepting agency.    JELANI will continue to follow up.    Verenice Sandoval LMSW  [facility]  Ext. [phone]

## 2023-01-03 NOTE — HOSPITAL COURSE
Jojo Ayoub is a 88 yo F admitted to hospital medicine for further evaluation of knee pain and weakness. Found to have a UTI, completed course of CTX. Weakness improved. PT/OT evaluated; recommending knee brace and home health. Able to ambulate with knee brace on when working with PT. Evaluated by RD for recent unintentional weight loss; recommending Boost supplements. Discharged home with home health.

## 2023-01-03 NOTE — PLAN OF CARE
Problem: Adult Inpatient Plan of Care  Goal: Plan of Care Review  Outcome: Ongoing, Progressing  Goal: Patient-Specific Goal (Individualized)  Outcome: Ongoing, Progressing  Goal: Absence of Hospital-Acquired Illness or Injury  Outcome: Ongoing, Progressing  Goal: Optimal Comfort and Wellbeing  Outcome: Ongoing, Progressing  Goal: Readiness for Transition of Care  Outcome: Ongoing, Progressing     Problem: Infection  Goal: Absence of Infection Signs and Symptoms  Outcome: Ongoing, Progressing     Problem: Diabetes Comorbidity  Goal: Blood Glucose Level Within Targeted Range  Outcome: Ongoing, Progressing     Problem: Fall Injury Risk  Goal: Absence of Fall and Fall-Related Injury  Outcome: Ongoing, Progressing     Problem: Adult Inpatient Plan of Care  Goal: Plan of Care Review  Outcome: Ongoing, Progressing  Goal: Patient-Specific Goal (Individualized)  Outcome: Ongoing, Progressing  Goal: Absence of Hospital-Acquired Illness or Injury  Outcome: Ongoing, Progressing  Goal: Optimal Comfort and Wellbeing  Outcome: Ongoing, Progressing  Goal: Readiness for Transition of Care  Outcome: Ongoing, Progressing     Problem: Infection  Goal: Absence of Infection Signs and Symptoms  Outcome: Ongoing, Progressing     Problem: Diabetes Comorbidity  Goal: Blood Glucose Level Within Targeted Range  Outcome: Ongoing, Progressing     Problem: Fall Injury Risk  Goal: Absence of Fall and Fall-Related Injury  Outcome: Ongoing, Progressing

## 2023-01-03 NOTE — ASSESSMENT & PLAN NOTE
Chronic pain L knee, Osteoarthritis  S/p total knee replacement     Patient with acute on chronic LE weakness and knee pain, causing gait instability. Fall risk. Suspect acute cystitis is exacerbating her weakness. Will treat.  - XR L knee reviewed  - able to ambulate with L knee immobilizer in place  - PT/OT consulted; recommending OPPT with hinged knee brace  - tylenol prn for pain control

## 2023-01-03 NOTE — PLAN OF CARE
[facility]  Discharge Final Note    Primary Care Provider: [facility]    Expected Discharge Date: 1/3/2023    Patient discharged to home via personal transportation.     Patient's bedside nurse and patient/family notified of the above.      Final Discharge Note (most recent)       Final Note - 01/03/23 1544          Final Note    Assessment Type Final Discharge Note (P)      Anticipated Discharge Disposition Home-Health Care Svc (P)         Post-Acute Status    Post-Acute Authorization Home Health (P)      Home Health Status Set-up Complete/Auth obtained (P)                      Important Message from Medicare             Contact Info       [facility] [address]   Relationship: PCP - General    [address]  [facility] [address]   Phone: [phone]       Next Steps: Follow up on 1/5/2023    Instructions: Hospital follow visit at 11:30am. Please bring your discharge summary, current medications, insurance card and ID            No future appointments.    SW scheduled post-discharge follow-up appointment and information added to AVS.     [facility].    Verenice Sandoval LMSW  [facility]  Ext. [phone]

## 2023-01-04 LAB
BACTERIA UR CULT: NO GROWTH
POCT GLUCOSE: 106 MG/DL (ref 70–110)

## 2023-01-04 PROCEDURE — G0180 PR HOME HEALTH MD CERTIFICATION: ICD-10-PCS | Mod: ,,, | Performed by: INTERNAL MEDICINE

## 2023-01-04 PROCEDURE — G0180 MD CERTIFICATION HHA PATIENT: HCPCS | Mod: ,,, | Performed by: INTERNAL MEDICINE

## 2023-01-09 NOTE — DISCHARGE SUMMARY
"[facility] [address] [place] [address] Medicine  Discharge Summary      Patient Name: Jojo Ayoub  MRN: [id number]  OSITO: [id number]  Patient Class: OP- Observation  Admission Date: 1/1/2023  Hospital Length of Stay: 0 days  Discharge Date and Time: 1/3/2023  3:29 PM  Attending Physician: No att. providers found   Discharging Provider: Rashida Sanches PA-C  Primary Care Provider: [facility] Medicine Team: [facility] Rashida Sanches PA-C  Primary Care Team: [facility]    HPI:   Jojo Ayoub is a 87-year-old female with past medical history of DM, osteoarthritis, hypertension, and hyperlipidemia presents the emergency department with chief complaint of pain and weakness in the left knee. She has chronic knee pain s/p total knee replacement and worked with PT for this earlier this year for patellar subluxation. She ambulates with a walker at baseline, but she has felt more unstable lately. She reports her knee gave out twice when going to [employer] this morning. Her  caught her and she did not fall to the ground; denies new injuries.  She complains of pain in the lateral aspect of the knee.  Reports chronic urinary urgency and frequency. She denies recent injury, weight changes, fever or chills, falls, lightheadedness, CP, SOB, confusion, lethargy, N/V/D, urinary symptoms, bleeding.     In the ED, AFVSS. WBC elevated to 13K without left shift. H/H 10.9/32 (baseline normocytic anemia). CMP with Cr 1.1 (baseline), glucose 131. UA grossly infectious, reflex culture pending. CXR without acute cardiopulmonary process. XR L knee without fracture or dislocation; "Status post total knee arthroplasty and patellar resurfacing.  Approximately 3.5-4 mm of lucency between the tibial component of the hardware adjoining cement.  Attention on follow-up recommended. No definite radiopaque foreign body.  Question at least small suprapatellar knee joint effusion.  Atherosclerotic " "calcifications are noted." Given tylenol and 1g rocephin.       * No surgery found *      Hospital Course:   Jojo Ayoub is a 88 yo F admitted to hospital medicine for further evaluation of knee pain and weakness. Found to have a UTI, completed course of CTX. Weakness improved. PT/OT evaluated; recommending knee brace and home health. Able to ambulate with knee brace on when working with PT. Evaluated by RD for recent unintentional weight loss; recommending Boost supplements. Discharged home with home health.        Goals of Care Treatment Preferences:  Code Status: Full Code      Consults:   Consults (From admission, onward)        Status Ordering Provider     Inpatient consult to Registered Dietitian/Nutritionist  Once        Provider:  (Not yet assigned)    DELLA Newman          No new Assessment & Plan notes have been filed under this hospital service since the last note was generated.  Service: Hospital Medicine    Final Active Diagnoses:    Diagnosis Date Noted POA    PRINCIPAL PROBLEM:  Acute cystitis without hematuria [N30.00] 01/01/2023 Yes    Anemia [D64.9] 01/02/2023 Yes    Leukocytosis [D72.829] 01/01/2023 Yes    Weight loss, unintentional [R63.4] 01/01/2023 Yes    Chronic pain of left knee [M25.562, G89.29] 03/04/2022 Yes    Gait instability [R26.81] 03/15/2019 Yes    Essential hypertension [I10] 02/28/2019 Yes    Hyperlipidemia [E78.5]  Yes    S/P knee replacement [Z96.659] 08/21/2012 Not Applicable    Osteoarthritis of knee [M17.9] 07/03/2012 Yes    Type 2 diabetes mellitus, without long-term current use of insulin [E11.9] 02/01/2012 Yes      Problems Resolved During this Admission:       Discharged Condition: stable    Disposition: Home or Self Care    Follow Up:   Follow-up Information     [facility] Follow up on 1/5/2023.    Why: Hospital follow visit at 11:30am. Please bring your discharge summary, current medications, insurance card and " "ID  Contact information:  [address]  [facility] [address]  [phone]                       Patient Instructions:      WALKER FOR HOME USE     Order Specific Question Answer Comments   Type of Walker: Rollator with brakes and/or seat    With wheels? Yes    Height: 5' 8" (1.727 m)    Weight: 61.2 kg (135 lb)    Length of need (1-99 months): 99    Does patient have medical equipment at home? grab bar    Does patient have medical equipment at home? rollator    Please check all that apply: Patient's condition impairs ambulation.    Please check all that apply: Patient is unable to safely ambulate without equipment.      KNEE BRACE FOR HOME USE     Order Specific Question Answer Comments   What type of Knee Brace? breg t-scope knee brace set at 0 degrees    Height: 5' 8" (1.727 m)    Weight: 61.2 kg (135 lb)    Does patient have medical equipment at home? grab bar    Does patient have medical equipment at home? rollator      WHEELCHAIR FOR HOME USE     Order Specific Question Answer Comments   Hours in W/C per day: 4 as needed with far distances   Type of Wheelchair: Standard    Size(Width): 18"(STD adult)    Leg Support: STD footrests    Lap Belt: Velcro    Accessories: None    Cushion: Basic    Height: 5' 8" (1.727 m)    Weight: 61.2 kg (135 lb)    Does patient have medical equipment at home? rollator    Length of need (1-99 months): 99    Please check all that apply: Caregiver is capable and willing to operate wheelchair safely.    Please check all that apply: Patient's upper body strength is sufficient for propulsion.      Ambulatory referral/consult to Physical/Occupational Therapy   Standing Status: Future   Referral Priority: Routine Referral Type: Physical Medicine   Referral Reason: Specialty Services Required   Number of Visits Requested: 1     Diet Adult Regular     Notify your health care provider if you experience any of the following:  persistent dizziness, light-headedness, or visual disturbances "     Notify your health care provider if you experience any of the following:  increased confusion or weakness     Activity as tolerated         Pending Diagnostic Studies:     None         Medications:  Reconciled Home Medications:      Medication List      CONTINUE taking these medications    acetaminophen 500 MG tablet  Commonly known as: TYLENOL  Take 2 tablets (1,000 mg total) by mouth every 8 (eight) hours as needed for Pain.     atorvastatin 10 MG tablet  Commonly known as: LIPITOR  TAKE 1 TABLET (10 MG TOTAL) BY MOUTH ONCE DAILY.     HECTOR CHILDRENS ASPIRIN 81 MG Chew  Generic drug: aspirin  Take 81 mg by mouth. 1 Tablet Oral Every day     carvediloL 25 MG tablet  Commonly known as: COREG  TAKE 1 TABLET BY MOUTH TWICE A DAY     gabapentin 800 MG tablet  Commonly known as: NEURONTIN  TAKE 1 TABLET (800 MG TOTAL) BY MOUTH 3 (THREE) TIMES DAILY.     metFORMIN 500 MG ER 24hr tablet  Commonly known as: GLUCOPHAGE-XR  Take 1 tablet (500 mg total) by mouth daily with breakfast.     ONETOUCH ULTRA BLUE TEST STRIP Strp  Generic drug: blood sugar diagnostic  TEST ONCE DAILY     ONETOUCH ULTRASOFT LANCETS Misc  Generic drug: lancets  USE TO TEST BLOOD SUGAR DAILY            Indwelling Lines/Drains at time of discharge:   Lines/Drains/Airways     None                 Time spent on the discharge of patient: 35 minutes         Rashida Sanches PA-C  Department of Hospital Medicine  [facility]

## 2023-04-05 ENCOUNTER — EXTERNAL HOME HEALTH (OUTPATIENT)
Dept: HOME HEALTH SERVICES | Facility: HOSPITAL | Age: 88
End: 2023-04-05
Payer: MEDICARE

## 2024-01-01 NOTE — TELEPHONE ENCOUNTER
Followed up with patient regarding nurse oncall message. Patient stated that she was advised to elevate her legs to bring down the swelling and patient stated that it helped. Patient states that she does not feel the need to be seen in clinic and that she will continue to use the advise the on call nurse gave her.   2024

## 2024-06-28 ENCOUNTER — OFFICE VISIT (OUTPATIENT)
Dept: URGENT CARE | Facility: CLINIC | Age: 89
End: 2024-06-28
Payer: MEDICARE

## 2024-06-28 VITALS
RESPIRATION RATE: 17 BRPM | DIASTOLIC BLOOD PRESSURE: 76 MMHG | BODY MASS INDEX: 20.53 KG/M2 | OXYGEN SATURATION: 98 % | WEIGHT: 135 LBS | TEMPERATURE: 99 F | SYSTOLIC BLOOD PRESSURE: 145 MMHG | HEART RATE: 101 BPM

## 2024-06-28 DIAGNOSIS — U07.1 COVID-19: ICD-10-CM

## 2024-06-28 DIAGNOSIS — U07.1 COVID-19 VIRUS DETECTED: ICD-10-CM

## 2024-06-28 DIAGNOSIS — R05.9 COUGH, UNSPECIFIED TYPE: Primary | ICD-10-CM

## 2024-06-28 LAB
CTP QC/QA: YES
SARS-COV-2 AG RESP QL IA.RAPID: POSITIVE

## 2024-06-28 RX ORDER — OMEPRAZOLE 20 MG/1
20 CAPSULE, DELAYED RELEASE ORAL
COMMUNITY
Start: 2024-06-26

## 2024-06-28 RX ORDER — NIRMATRELVIR AND RITONAVIR 300-100 MG
KIT ORAL
Qty: 30 TABLET | Refills: 0 | Status: SHIPPED | OUTPATIENT
Start: 2024-06-28

## 2024-06-28 RX ORDER — AMLODIPINE BESYLATE 5 MG/1
5 TABLET ORAL
COMMUNITY
Start: 2024-05-17

## 2024-06-28 RX ORDER — VIBEGRON 75 MG/1
1 TABLET, FILM COATED ORAL
COMMUNITY
Start: 2024-05-10

## 2024-06-28 NOTE — PROGRESS NOTES
Subjective:      Patient ID: Jojo Ayoub is a 89 y.o. female.    Vitals:  weight is 61.2 kg (135 lb). Her oral temperature is 99.3 °F (37.4 °C). Her blood pressure is 145/76 (abnormal) and her pulse is 101. Her respiration is 17 and oxygen saturation is 98%.     Chief Complaint: Cough    Pt states she would like a covid test, her husbands test was positive here just now and she has a cough and lots of nasal and chest congestion. Denies chest pain, SOB, diarrhea, vomiting, abdominal pain, dysuria, weakness.    Cough  This is a new problem. The current episode started in the past 7 days. The problem has been unchanged. Associated symptoms include nasal congestion. Pertinent negatives include no postnasal drip.       HENT:  Negative for postnasal drip.    Respiratory:  Positive for cough.       Objective:     Physical Exam   Constitutional: She is oriented to person, place, and time. She appears well-developed. She is cooperative.  Non-toxic appearance. She does not appear ill. No distress.   HENT:   Head: Normocephalic and atraumatic.   Ears:   Right Ear: Hearing, tympanic membrane, external ear and ear canal normal. no impacted cerumen  Left Ear: Hearing, tympanic membrane, external ear and ear canal normal. no impacted cerumen  Nose: Congestion present. No mucosal edema, rhinorrhea or nasal deformity. No epistaxis. Right sinus exhibits no maxillary sinus tenderness and no frontal sinus tenderness. Left sinus exhibits no maxillary sinus tenderness and no frontal sinus tenderness.   Mouth/Throat: Uvula is midline, oropharynx is clear and moist and mucous membranes are normal. No trismus in the jaw. Normal dentition. No uvula swelling. No posterior oropharyngeal edema.   Eyes: Conjunctivae and lids are normal. No scleral icterus.   Neck: Trachea normal and phonation normal. Neck supple. No edema present. No erythema present. No neck rigidity present.   Cardiovascular: Normal rate, regular rhythm, normal heart  sounds and normal pulses.   No murmur heard.  Pulmonary/Chest: Effort normal and breath sounds normal. No stridor. No respiratory distress. She has no decreased breath sounds. She has no wheezes. She has no rhonchi. She has no rales.   Abdominal: Normal appearance. She exhibits no distension. There is no abdominal tenderness. There is no left CVA tenderness and no right CVA tenderness.   Musculoskeletal: Normal range of motion.         General: No deformity. Normal range of motion.      Cervical back: She exhibits no tenderness.   Lymphadenopathy:     She has no cervical adenopathy.   Neurological: She is alert, oriented to person, place, and time and at baseline. She exhibits normal muscle tone. Coordination normal.   Skin: Skin is warm, dry, intact, not diaphoretic and not pale.   Psychiatric: Her speech is normal and behavior is normal. Judgment and thought content normal.   Nursing note and vitals reviewed.      Assessment:     1. Cough, unspecified type    2. COVID-19        Plan:   Counseled patient and answered questions in regards to COVID-19 testing and diagnosis. Quarantine precautions, home care with plenty of fluids and use of OTC meds discussed. Use of pulse oxymeter discussed. Advised the patient to inform the PCP for +ve COVID status. ER precautions discussed. Patient verbalized understanding.      Cough, unspecified type  -     SARS Coronavirus 2 Antigen, POCT Manual Read    COVID-19    Other orders  -     nirmatrelvir-ritonavir (PAXLOVID) 300 mg (150 mg x 2)-100 mg copackaged tablets (EUA); Take 3 tablets by mouth 2 (two) times daily. Each dose contains 2 nirmatrelvir (pink tablets) and 1 ritonavir (white tablet). Take all 3 tablets together  Dispense: 30 tablet; Refill: 0

## 2025-04-14 ENCOUNTER — HOSPITAL ENCOUNTER (INPATIENT)
Facility: OTHER | Age: 89
LOS: 11 days | Discharge: HOSPICE | DRG: 823 | End: 2025-04-26
Attending: EMERGENCY MEDICINE | Admitting: HOSPITALIST
Payer: MEDICARE

## 2025-04-14 DIAGNOSIS — I48.91 ATRIAL FIBRILLATION WITH RAPID VENTRICULAR RESPONSE: ICD-10-CM

## 2025-04-14 DIAGNOSIS — M25.562 ACUTE PAIN OF LEFT KNEE: ICD-10-CM

## 2025-04-14 DIAGNOSIS — I48.0 AF (PAROXYSMAL ATRIAL FIBRILLATION): ICD-10-CM

## 2025-04-14 DIAGNOSIS — J98.59 MEDIASTINAL MASS: Primary | ICD-10-CM

## 2025-04-14 DIAGNOSIS — E87.1 HYPONATREMIA: ICD-10-CM

## 2025-04-14 DIAGNOSIS — M25.562 LEFT KNEE PAIN: ICD-10-CM

## 2025-04-14 DIAGNOSIS — R60.0 LOWER EXTREMITY EDEMA: ICD-10-CM

## 2025-04-14 DIAGNOSIS — J90 PLEURAL EFFUSION: ICD-10-CM

## 2025-04-14 DIAGNOSIS — R59.0 AXILLARY ADENOPATHY: ICD-10-CM

## 2025-04-14 DIAGNOSIS — R50.9 FEVER, UNSPECIFIED FEVER CAUSE: ICD-10-CM

## 2025-04-14 DIAGNOSIS — R07.9 CHEST PAIN: ICD-10-CM

## 2025-04-14 DIAGNOSIS — N30.00 ACUTE CYSTITIS WITHOUT HEMATURIA: ICD-10-CM

## 2025-04-14 LAB
ABSOLUTE EOSINOPHIL (OHS): 0.23 K/UL
ABSOLUTE MONOCYTE (OHS): 0.95 K/UL (ref 0.3–1)
ABSOLUTE NEUTROPHIL COUNT (OHS): 16.72 K/UL (ref 1.8–7.7)
ALBUMIN SERPL BCP-MCNC: 2.2 G/DL (ref 3.5–5.2)
ALP SERPL-CCNC: 60 UNIT/L (ref 40–150)
ALT SERPL W/O P-5'-P-CCNC: 15 UNIT/L (ref 10–44)
ANION GAP (OHS): 7 MMOL/L (ref 8–16)
AST SERPL-CCNC: 29 UNIT/L (ref 11–45)
BACTERIA #/AREA URNS AUTO: ABNORMAL /HPF
BASOPHILS # BLD AUTO: 0.06 K/UL
BASOPHILS NFR BLD AUTO: 0.3 %
BILIRUB SERPL-MCNC: 0.2 MG/DL (ref 0.1–1)
BILIRUB UR QL STRIP.AUTO: NEGATIVE
BNP SERPL-MCNC: 181 PG/ML (ref 0–99)
BUN SERPL-MCNC: 13 MG/DL (ref 8–23)
CALCIUM SERPL-MCNC: 8.6 MG/DL (ref 8.7–10.5)
CHLORIDE SERPL-SCNC: 95 MMOL/L (ref 95–110)
CLARITY UR: ABNORMAL
CO2 SERPL-SCNC: 19 MMOL/L (ref 23–29)
COLOR UR AUTO: YELLOW
CREAT SERPL-MCNC: 1 MG/DL (ref 0.5–1.4)
ERYTHROCYTE [DISTWIDTH] IN BLOOD BY AUTOMATED COUNT: 13.7 % (ref 11.5–14.5)
GFR SERPLBLD CREATININE-BSD FMLA CKD-EPI: 55 ML/MIN/1.73/M2
GLUCOSE SERPL-MCNC: 94 MG/DL (ref 70–110)
GLUCOSE UR QL STRIP: NEGATIVE
HCT VFR BLD AUTO: 28 % (ref 37–48.5)
HGB BLD-MCNC: 9.9 GM/DL (ref 12–16)
HGB UR QL STRIP: NEGATIVE
IMM GRANULOCYTES # BLD AUTO: 0.12 K/UL (ref 0–0.04)
IMM GRANULOCYTES NFR BLD AUTO: 0.6 % (ref 0–0.5)
KETONES UR QL STRIP: NEGATIVE
LACTATE SERPL-SCNC: 1.2 MMOL/L (ref 0.5–2.2)
LEUKOCYTE ESTERASE UR QL STRIP: ABNORMAL
LYMPHOCYTES # BLD AUTO: 1.74 K/UL (ref 1–4.8)
MCH RBC QN AUTO: 31 PG (ref 27–31)
MCHC RBC AUTO-ENTMCNC: 35.4 G/DL (ref 32–36)
MCV RBC AUTO: 88 FL (ref 82–98)
MICROSCOPIC COMMENT: ABNORMAL
NITRITE UR QL STRIP: POSITIVE
NUCLEATED RBC (/100WBC) (OHS): 0 /100 WBC
PH UR STRIP: 6 [PH]
PLATELET # BLD AUTO: 423 K/UL (ref 150–450)
PMV BLD AUTO: 8 FL (ref 9.2–12.9)
POTASSIUM SERPL-SCNC: 5.2 MMOL/L (ref 3.5–5.1)
PROT SERPL-MCNC: 7.9 GM/DL (ref 6–8.4)
PROT UR QL STRIP: NEGATIVE
RBC # BLD AUTO: 3.19 M/UL (ref 4–5.4)
RBC #/AREA URNS AUTO: 1 /HPF (ref 0–4)
RELATIVE EOSINOPHIL (OHS): 1.2 %
RELATIVE LYMPHOCYTE (OHS): 8.8 % (ref 18–48)
RELATIVE MONOCYTE (OHS): 4.8 % (ref 4–15)
RELATIVE NEUTROPHIL (OHS): 84.3 % (ref 38–73)
SODIUM SERPL-SCNC: 121 MMOL/L (ref 136–145)
SP GR UR STRIP: 1
SQUAMOUS #/AREA URNS AUTO: 3 /HPF
TROPONIN I SERPL DL<=0.01 NG/ML-MCNC: <0.006 NG/ML
UROBILINOGEN UR STRIP-ACNC: NEGATIVE EU/DL
WBC # BLD AUTO: 19.82 K/UL (ref 3.9–12.7)
WBC #/AREA URNS AUTO: 20 /HPF (ref 0–5)

## 2025-04-14 PROCEDURE — 63600175 PHARM REV CODE 636 W HCPCS

## 2025-04-14 PROCEDURE — 96374 THER/PROPH/DIAG INJ IV PUSH: CPT

## 2025-04-14 PROCEDURE — 83880 ASSAY OF NATRIURETIC PEPTIDE: CPT | Performed by: EMERGENCY MEDICINE

## 2025-04-14 PROCEDURE — G0378 HOSPITAL OBSERVATION PER HR: HCPCS

## 2025-04-14 PROCEDURE — 93005 ELECTROCARDIOGRAM TRACING: CPT

## 2025-04-14 PROCEDURE — 93010 ELECTROCARDIOGRAM REPORT: CPT | Mod: ,,, | Performed by: INTERNAL MEDICINE

## 2025-04-14 PROCEDURE — 99285 EMERGENCY DEPT VISIT HI MDM: CPT | Mod: 25

## 2025-04-14 PROCEDURE — 87086 URINE CULTURE/COLONY COUNT: CPT

## 2025-04-14 PROCEDURE — 25000003 PHARM REV CODE 250

## 2025-04-14 PROCEDURE — 83605 ASSAY OF LACTIC ACID: CPT

## 2025-04-14 PROCEDURE — 81003 URINALYSIS AUTO W/O SCOPE: CPT

## 2025-04-14 PROCEDURE — 85025 COMPLETE CBC W/AUTO DIFF WBC: CPT | Performed by: EMERGENCY MEDICINE

## 2025-04-14 PROCEDURE — 80053 COMPREHEN METABOLIC PANEL: CPT | Performed by: EMERGENCY MEDICINE

## 2025-04-14 PROCEDURE — 96361 HYDRATE IV INFUSION ADD-ON: CPT

## 2025-04-14 PROCEDURE — 84484 ASSAY OF TROPONIN QUANT: CPT | Performed by: EMERGENCY MEDICINE

## 2025-04-14 RX ORDER — CEFTRIAXONE 1 G/1
1 INJECTION, POWDER, FOR SOLUTION INTRAMUSCULAR; INTRAVENOUS
Status: COMPLETED | OUTPATIENT
Start: 2025-04-14 | End: 2025-04-14

## 2025-04-14 RX ORDER — SODIUM CHLORIDE 9 MG/ML
1000 INJECTION, SOLUTION INTRAVENOUS
Status: COMPLETED | OUTPATIENT
Start: 2025-04-14 | End: 2025-04-15

## 2025-04-14 RX ADMIN — CEFTRIAXONE SODIUM 1 G: 1 INJECTION, POWDER, FOR SOLUTION INTRAMUSCULAR; INTRAVENOUS at 10:04

## 2025-04-14 RX ADMIN — SODIUM CHLORIDE 1000 ML: 9 INJECTION, SOLUTION INTRAVENOUS at 09:04

## 2025-04-14 NOTE — Clinical Note
Diagnosis: Hyponatremia [198519]   Future Attending Provider: MARIELY YADAV [[id number]]   Is the patient being sent to ED Observation?: No   Special Needs:: No Special Needs [1]

## 2025-04-15 LAB
ANION GAP (OHS): 6 MMOL/L (ref 8–16)
AV MEAN GRADIENT: 6 MMHG
AV PEAK GRADIENT: 10 MMHG
AV REGURGITATION PRESSURE HALF TIME: 355 MS
BSA FOR ECHO PROCEDURE: 1.66 M2
BUN SERPL-MCNC: 10 MG/DL (ref 8–23)
CALCIUM SERPL-MCNC: 7.7 MG/DL (ref 8.7–10.5)
CHLORIDE SERPL-SCNC: 102 MMOL/L (ref 95–110)
CO2 SERPL-SCNC: 19 MMOL/L (ref 23–29)
CORTIS SERPL-MCNC: 16.3 UG/DL
CREAT SERPL-MCNC: 0.8 MG/DL (ref 0.5–1.4)
CREAT UR-MCNC: 20.2 MG/DL (ref 15–325)
CREAT UR-MCNC: 20.4 MG/DL (ref 15–325)
CV ECHO LV RWT: 0.32 CM
DOP CALC AO PEAK VEL: 1.6 M/S
DOP CALC AO VTI: 36.7 CM
DOP CALC LVOT AREA: 3.1 CM2
DOP CALC LVOT DIAMETER: 2 CM
E WAVE DECELERATION TIME: 202 MSEC
E/A RATIO: 0.82
E/E' RATIO: 12 M/S
EAG (OHS): 117 MG/DL (ref 68–131)
ECHO LV POSTERIOR WALL: 0.6 CM (ref 0.6–1.1)
FRACTIONAL SHORTENING: 35.1 % (ref 28–44)
GFR SERPLBLD CREATININE-BSD FMLA CKD-EPI: >60 ML/MIN/1.73/M2
GLUCOSE SERPL-MCNC: 99 MG/DL (ref 70–110)
HBA1C MFR BLD: 5.7 % (ref 4–5.6)
HOLD SPECIMEN: NORMAL
INTERVENTRICULAR SEPTUM: 0.6 CM (ref 0.6–1.1)
IVC DIAMETER: 1.79 CM
IVRT: 57 MSEC
LA MAJOR: 5.1 CM
LA MINOR: 4.7 CM
LACTATE SERPL-SCNC: 0.7 MMOL/L (ref 0.5–2.2)
LEFT ATRIUM SIZE: 2.8 CM
LEFT INTERNAL DIMENSION IN SYSTOLE: 2.4 CM (ref 2.1–4)
LEFT VENTRICLE DIASTOLIC VOLUME INDEX: 35.54 ML/M2
LEFT VENTRICLE DIASTOLIC VOLUME: 59 ML
LEFT VENTRICLE MASS INDEX: 33.9 G/M2
LEFT VENTRICLE SYSTOLIC VOLUME INDEX: 11.4 ML/M2
LEFT VENTRICLE SYSTOLIC VOLUME: 19 ML
LEFT VENTRICULAR INTERNAL DIMENSION IN DIASTOLE: 3.7 CM (ref 3.5–6)
LEFT VENTRICULAR MASS: 56.3 G
LV LATERAL E/E' RATIO: 10.4 M/S
LV SEPTAL E/E' RATIO: 13.8 M/S
LVED V (TEICH): 59.41 ML
LVES V (TEICH): 19.25 ML
MV PEAK A VEL: 1.01 M/S
MV PEAK E VEL: 0.83 M/S
MV STENOSIS PRESSURE HALF TIME: 58.57 MS
MV VALVE AREA P 1/2 METHOD: 3.76 CM2
OHS QRS DURATION: 86 MS
OHS QTC CALCULATION: 442 MS
OSMOLALITY SERPL: 270 MOSM/KG (ref 275–295)
OSMOLALITY UR: 141 MOSM/KG (ref 50–1200)
PISA AR MAX VEL: 3.68 M/S
PISA MRMAX VEL: 4.13 M/S
PISA TR MAX VEL: 2.8 M/S
POCT GLUCOSE: 100 MG/DL (ref 70–110)
POCT GLUCOSE: 102 MG/DL (ref 70–110)
POCT GLUCOSE: 113 MG/DL (ref 70–110)
POCT GLUCOSE: 93 MG/DL (ref 70–110)
POTASSIUM SERPL-SCNC: 4.4 MMOL/L (ref 3.5–5.1)
PROT UR-MCNC: <7 MG/DL
PROT/CREAT UR: NORMAL MG/G{CREAT}
PULM VEIN S/D RATIO: 1.88
PV PEAK D VEL: 0.41 M/S
PV PEAK GRADIENT: 3 MMHG
PV PEAK S VEL: 0.77 M/S
PV PEAK VELOCITY: 0.91 M/S
RA MAJOR: 4.62 CM
RA PRESSURE ESTIMATED: 3 MMHG
RV TB RVSP: 6 MMHG
SODIUM SERPL-SCNC: 127 MMOL/L (ref 136–145)
SODIUM UR-SCNC: 29 MMOL/L (ref 20–250)
TDI LATERAL: 0.08 M/S
TDI SEPTAL: 0.06 M/S
TDI: 0.07 M/S
TR MAX PG: 31 MMHG
TSH SERPL-ACNC: 0.59 UIU/ML (ref 0.4–4)
TV REST PULMONARY ARTERY PRESSURE: 34 MMHG
URATE SERPL-MCNC: 7.8 MG/DL (ref 2.4–5.7)
UUN UR-MCNC: 131 MG/DL (ref 140–1050)
Z-SCORE OF LEFT VENTRICULAR DIMENSION IN END DIASTOLE: -2.29
Z-SCORE OF LEFT VENTRICULAR DIMENSION IN END SYSTOLE: -1.45

## 2025-04-15 PROCEDURE — 63600175 PHARM REV CODE 636 W HCPCS: Performed by: PHYSICIAN ASSISTANT

## 2025-04-15 PROCEDURE — 80048 BASIC METABOLIC PNL TOTAL CA: CPT | Performed by: HOSPITALIST

## 2025-04-15 PROCEDURE — 84540 ASSAY OF URINE/UREA-N: CPT | Performed by: HOSPITALIST

## 2025-04-15 PROCEDURE — 25000003 PHARM REV CODE 250: Performed by: PHYSICIAN ASSISTANT

## 2025-04-15 PROCEDURE — 21400001 HC TELEMETRY ROOM

## 2025-04-15 PROCEDURE — 84550 ASSAY OF BLOOD/URIC ACID: CPT | Performed by: HOSPITALIST

## 2025-04-15 PROCEDURE — 83935 ASSAY OF URINE OSMOLALITY: CPT | Performed by: HOSPITALIST

## 2025-04-15 PROCEDURE — A4216 STERILE WATER/SALINE, 10 ML: HCPCS | Performed by: PHYSICIAN ASSISTANT

## 2025-04-15 PROCEDURE — 82533 TOTAL CORTISOL: CPT | Performed by: HOSPITALIST

## 2025-04-15 PROCEDURE — 83930 ASSAY OF BLOOD OSMOLALITY: CPT | Performed by: HOSPITALIST

## 2025-04-15 PROCEDURE — 82570 ASSAY OF URINE CREATININE: CPT | Performed by: HOSPITALIST

## 2025-04-15 PROCEDURE — 84443 ASSAY THYROID STIM HORMONE: CPT | Performed by: HOSPITALIST

## 2025-04-15 PROCEDURE — 83605 ASSAY OF LACTIC ACID: CPT | Performed by: HOSPITALIST

## 2025-04-15 PROCEDURE — 84300 ASSAY OF URINE SODIUM: CPT | Performed by: HOSPITALIST

## 2025-04-15 PROCEDURE — 84156 ASSAY OF PROTEIN URINE: CPT | Performed by: HOSPITALIST

## 2025-04-15 PROCEDURE — 83036 HEMOGLOBIN GLYCOSYLATED A1C: CPT | Performed by: HOSPITALIST

## 2025-04-15 PROCEDURE — 11000001 HC ACUTE MED/SURG PRIVATE ROOM

## 2025-04-15 PROCEDURE — 36415 COLL VENOUS BLD VENIPUNCTURE: CPT | Performed by: HOSPITALIST

## 2025-04-15 PROCEDURE — 96372 THER/PROPH/DIAG INJ SC/IM: CPT | Performed by: PHYSICIAN ASSISTANT

## 2025-04-15 PROCEDURE — 94761 N-INVAS EAR/PLS OXIMETRY MLT: CPT

## 2025-04-15 RX ORDER — CEFTRIAXONE 1 G/1
1 INJECTION, POWDER, FOR SOLUTION INTRAMUSCULAR; INTRAVENOUS
Status: DISCONTINUED | OUTPATIENT
Start: 2025-04-15 | End: 2025-04-17

## 2025-04-15 RX ORDER — SODIUM CHLORIDE 0.9 % (FLUSH) 0.9 %
10 SYRINGE (ML) INJECTION
Status: DISCONTINUED | OUTPATIENT
Start: 2025-04-15 | End: 2025-04-26 | Stop reason: HOSPADM

## 2025-04-15 RX ORDER — TALC
6 POWDER (GRAM) TOPICAL NIGHTLY PRN
Status: DISCONTINUED | OUTPATIENT
Start: 2025-04-15 | End: 2025-04-15

## 2025-04-15 RX ORDER — AMOXICILLIN 250 MG
1 CAPSULE ORAL 2 TIMES DAILY PRN
Status: DISCONTINUED | OUTPATIENT
Start: 2025-04-15 | End: 2025-04-26 | Stop reason: HOSPADM

## 2025-04-15 RX ORDER — CARVEDILOL 12.5 MG/1
25 TABLET ORAL 2 TIMES DAILY
Status: DISCONTINUED | OUTPATIENT
Start: 2025-04-15 | End: 2025-04-15

## 2025-04-15 RX ORDER — INSULIN ASPART 100 [IU]/ML
0-5 INJECTION, SOLUTION INTRAVENOUS; SUBCUTANEOUS
Status: DISCONTINUED | OUTPATIENT
Start: 2025-04-15 | End: 2025-04-25

## 2025-04-15 RX ORDER — GABAPENTIN 400 MG/1
400 CAPSULE ORAL 3 TIMES DAILY
Status: DISCONTINUED | OUTPATIENT
Start: 2025-04-15 | End: 2025-04-26 | Stop reason: HOSPADM

## 2025-04-15 RX ORDER — NALOXONE HCL 0.4 MG/ML
0.02 VIAL (ML) INJECTION
Status: DISCONTINUED | OUTPATIENT
Start: 2025-04-15 | End: 2025-04-26 | Stop reason: HOSPADM

## 2025-04-15 RX ORDER — GLUCAGON 1 MG
1 KIT INJECTION
Status: DISCONTINUED | OUTPATIENT
Start: 2025-04-15 | End: 2025-04-25

## 2025-04-15 RX ORDER — IBUPROFEN 200 MG
16 TABLET ORAL
Status: DISCONTINUED | OUTPATIENT
Start: 2025-04-15 | End: 2025-04-25

## 2025-04-15 RX ORDER — HEPARIN SODIUM 5000 [USP'U]/ML
5000 INJECTION, SOLUTION INTRAVENOUS; SUBCUTANEOUS EVERY 8 HOURS
Status: DISCONTINUED | OUTPATIENT
Start: 2025-04-15 | End: 2025-04-17

## 2025-04-15 RX ORDER — IBUPROFEN 200 MG
24 TABLET ORAL
Status: DISCONTINUED | OUTPATIENT
Start: 2025-04-15 | End: 2025-04-25

## 2025-04-15 RX ORDER — ATORVASTATIN CALCIUM 10 MG/1
10 TABLET, FILM COATED ORAL DAILY
Status: ON HOLD | COMMUNITY
End: 2025-04-26 | Stop reason: HOSPADM

## 2025-04-15 RX ORDER — TALC
6 POWDER (GRAM) TOPICAL NIGHTLY PRN
Status: DISCONTINUED | OUTPATIENT
Start: 2025-04-15 | End: 2025-04-26 | Stop reason: HOSPADM

## 2025-04-15 RX ORDER — AMLODIPINE BESYLATE 5 MG/1
5 TABLET ORAL DAILY
Status: DISCONTINUED | OUTPATIENT
Start: 2025-04-15 | End: 2025-04-15

## 2025-04-15 RX ORDER — SODIUM CHLORIDE 0.9 % (FLUSH) 0.9 %
10 SYRINGE (ML) INJECTION EVERY 8 HOURS
Status: DISCONTINUED | OUTPATIENT
Start: 2025-04-15 | End: 2025-04-26 | Stop reason: HOSPADM

## 2025-04-15 RX ORDER — NAPROXEN SODIUM 220 MG/1
81 TABLET, FILM COATED ORAL DAILY
Status: DISCONTINUED | OUTPATIENT
Start: 2025-04-15 | End: 2025-04-17

## 2025-04-15 RX ORDER — ACETAMINOPHEN 325 MG/1
650 TABLET ORAL EVERY 6 HOURS PRN
Status: DISCONTINUED | OUTPATIENT
Start: 2025-04-15 | End: 2025-04-16

## 2025-04-15 RX ADMIN — HEPARIN SODIUM 5000 UNITS: 5000 INJECTION INTRAVENOUS; SUBCUTANEOUS at 09:04

## 2025-04-15 RX ADMIN — CEFTRIAXONE 1 G: 1 INJECTION, POWDER, FOR SOLUTION INTRAMUSCULAR; INTRAVENOUS at 09:04

## 2025-04-15 RX ADMIN — SODIUM CHLORIDE, PRESERVATIVE FREE 10 ML: 5 INJECTION INTRAVENOUS at 09:04

## 2025-04-15 RX ADMIN — GABAPENTIN 400 MG: 400 CAPSULE ORAL at 09:04

## 2025-04-15 RX ADMIN — ASPIRIN 81 MG CHEWABLE TABLET 81 MG: 81 TABLET CHEWABLE at 08:04

## 2025-04-15 RX ADMIN — HEPARIN SODIUM 5000 UNITS: 5000 INJECTION INTRAVENOUS; SUBCUTANEOUS at 02:04

## 2025-04-15 RX ADMIN — HEPARIN SODIUM 5000 UNITS: 5000 INJECTION INTRAVENOUS; SUBCUTANEOUS at 08:04

## 2025-04-15 RX ADMIN — SODIUM CHLORIDE, PRESERVATIVE FREE 10 ML: 5 INJECTION INTRAVENOUS at 02:04

## 2025-04-15 RX ADMIN — GABAPENTIN 400 MG: 400 CAPSULE ORAL at 08:04

## 2025-04-15 RX ADMIN — GABAPENTIN 400 MG: 400 CAPSULE ORAL at 02:04

## 2025-04-15 NOTE — ED TRIAGE NOTES
Pt presents to the ER with complaints of swelling to the BLE for the past week. Pt also reporting chronic pain to the left knee. Pt was found to be hypotensive by EMS; states her BP has been running low recently.

## 2025-04-15 NOTE — ASSESSMENT & PLAN NOTE
Patient's blood pressure range in the last 24 hours was: BP  Min: 110/62  Max: 128/57.The patient's inpatient anti-hypertensive regimen is listed below:  Current Antihypertensives  amLODIPine tablet 5 mg, Daily, Oral  carvediloL tablet 25 mg, 2 times daily, Oral    Plan  - BP is controlled, no changes needed to their regimen

## 2025-04-15 NOTE — H&P
"  [facility] Medicine  History & Physical    Patient Name: Jojo Ayoub  MRN: [id number]  Patient Class: OP- Observation  Admission Date: 4/14/2025  Attending Physician: Zach Adams MD   Primary Care Provider: Michelle, Primary Doctor         Patient information was obtained from patient, relative(s), past medical records, and ER records.     Subjective:     Principal Problem:Chronic pain of left knee    Chief Complaint:   Chief Complaint   Patient presents with    Leg Swelling     BL LE edema, also reporting L knee pain and hypermobility.     Hypotension     BP today 90/55. In /62. Pt denies dizziness of fatigue. AAO x4.        HPI: Ms. Jojo Ayoub is a 89 y.o. female, with PMH of T2DM, HTN, HLD, chronic constipation, OA w/ chronic left knee pain, anemia, who presented to [facility] ED on 4/15/25 due to b/l LE swelling x "a few weeks." Her daughter syayes the leg swelling was first noted by her today. She additionally notes low blood pressure readings of 90/55 at home today. She endorses left knee pain, and states she feels like her left knee "dislocate" a few nights ago. She states she had a knee replacement of her left knee over 20 years ago. She denied fall/trauma, leg pain or redness or warmth. She denied dizziness, fatigue, fever, chills, sore throat, runny nose, congestion, shortness of breath, chest pain, palpitations, abdominal pain, nausea, vomiting, diarrhea, or urinary symptoms. She was evaluated in the ED with labs showing leukocytosis of 19K, with left shift and H&H of 9.9/28.0. A metabolic panel showed sodium of 121, potassium of 5.2, glucose was 94, with CO2 of 19, and anion gap of 7. A BNP was 181 without elevation of troponin. A UA showed nitrite positive UTI with 2+ leuk esterase, with 20 WBC, and many bacteria. A CXR showed bilateral hilar prominence, and mild bibasilar densities. 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 An avulsive fracture and a suprapatellar effusion. She was treated in the ED with 1L NS and rocephin. She was placed on observation.     Past Medical History:   Diagnosis Date    Arthritis     Diabetes mellitus with neurological manifestation     Essential hypertension 2/28/2019    High blood pressure     Hyperlipidemia     Urinary urgency        Past Surgical History:   Procedure Laterality Date    EPIDURAL STEROID INJECTION N/A 1/31/2019    Procedure: INJECTION, STEROID, EPIDURAL, L3-L4 need consent;  Surgeon: Marlin Barber MD;  Location: [facility];  Service: Pain Management;  Laterality: N/A;    left knee replacement       low back surgery          Review of patient's allergies indicates:  No Active Allergies    No current facility-administered medications on file prior to encounter.     Current Outpatient Medications on File Prior to Encounter   Medication Sig    acetaminophen (TYLENOL) 500 MG tablet Take 2 tablets (1,000 mg total) by mouth every 8 (eight) hours as needed for Pain.    amLODIPine (NORVASC) 5 MG tablet Take 5 mg by mouth.    aspirin (HE[employer]IL[employer] ASPIRIN) 81 MG chewable tablet Take 81 mg by mouth. 1 Tablet Oral Every day    carvediloL (COREG) 25 MG tablet TAKE 1 TABLET BY MOUTH TWICE A DAY    gabapentin (NEURONTIN) 800 MG tablet TAKE 1 TABLET (800 MG TOTAL) BY MOUTH 3 (THREE) TIMES DAILY.    GEMTESA 75 mg Tab Take 1 tablet by mouth.    metFORMIN (GLUCOPHAGE-XR) 500 MG ER 24hr tablet Take 1 tablet (500 mg total) by mouth daily with breakfast.    nirmatrelvir-ritonavir (PAXLOVID) 300 mg (150 mg x 2)-100 mg copackaged tablets (EUA) Take 3 tablets by mouth 2 (two) times daily. Each dose contains 2 nirmatrelvir (pink tablets) and 1 ritonavir (white tablet). Take all 3 tablets together    omeprazole (PRILOSEC) 20 MG capsule Take 20 mg by mouth.    ONETOUCH ULTRA BLUE TEST STRIP Strp TEST ONCE DAILY    ONETOUCH ULTRASOFT LANCETS lancets USE TO TEST BLOOD SUGAR DAILY     Family History        Problem Relation (Age of Onset)    Diabetes Mother, Sister, Maternal Uncle, Maternal Grandmother    Hypertension Mother, Sister          Tobacco Use    Smoking status: Never    Smokeless tobacco: Never   Substance and Sexual Activity    Alcohol use: No    Drug use: No    Sexual activity: Yes     Partners: Male     Review of Systems   Constitutional:  Positive for fatigue. Negative for chills, diaphoresis and fever.        States she feels hot and cold.    Respiratory:  Negative for cough, shortness of breath and wheezing.    Cardiovascular:  Positive for leg swelling (b/l). Negative for chest pain and palpitations.   Gastrointestinal:  Positive for abdominal pain (generalized) and nausea. Negative for abdominal distention, constipation, diarrhea and vomiting.   Genitourinary:  Negative for decreased urine volume, difficulty urinating, dysuria, flank pain, frequency, hematuria and urgency.   Musculoskeletal:  Negative for arthralgias, back pain, myalgias, neck pain and neck stiffness.   Skin:  Negative for color change, pallor, rash and wound.   Neurological:  Positive for numbness (b/l LEs). Negative for dizziness, syncope, weakness (generalized), light-headedness and headaches.   Psychiatric/Behavioral:  Negative for agitation and confusion.      Objective:     Vital Signs (Most Recent):  Temp: 98.2 °F (36.8 °C) (04/14/25 1848)  Pulse: 92 (04/15/25 0315)  Resp: 18 (04/15/25 0315)  BP: 111/60 (04/15/25 0300)  SpO2: 95 % (04/15/25 0315) Vital Signs (24h Range):  Temp:  [98.2 °F (36.8 °C)] 98.2 °F (36.8 °C)  Pulse:  [86-93] 92  Resp:  [15-21] 18  SpO2:  [93 %-97 %] 95 %  BP: (110-128)/(56-70) 111/60     Weight: 61.2 kg (135 lb)  Body mass index is 23.17 kg/m².     Physical Exam  Vitals and nursing note reviewed.   Constitutional:       General: She is not in acute distress.     Appearance: Normal appearance. She is well-developed and normal weight. She is not ill-appearing, toxic-appearing or diaphoretic.       Comments: Elderly appearing female.    HENT:      Head: Normocephalic and atraumatic.   Eyes:      General: No scleral icterus.        Right eye: No discharge.         Left eye: No discharge.      Conjunctiva/sclera: Conjunctivae normal.   Neck:      Trachea: No tracheal deviation.   Cardiovascular:      Rate and Rhythm: Normal rate and regular rhythm.      Heart sounds: Normal heart sounds. No murmur heard.     No gallop.      Comments: B/l LEs demonstrate 2+ pitting edema.   Pulmonary:      Effort: Pulmonary effort is normal. No respiratory distress.      Breath sounds: Normal breath sounds. No stridor. No wheezing or rales.   Abdominal:      General: Bowel sounds are normal. There is no distension.      Palpations: Abdomen is soft. There is no mass.      Tenderness: There is no abdominal tenderness. There is no guarding.   Musculoskeletal:         General: No deformity. Normal range of motion.      Cervical back: Normal range of motion and neck supple.   Skin:     General: Skin is warm and dry.      Coloration: Skin is not pale.      Findings: No erythema or rash.   Neurological:      General: No focal deficit present.      Mental Status: She is alert and oriented to person, place, and time.      Cranial Nerves: No cranial nerve deficit.      Motor: No abnormal muscle tone.   Psychiatric:         Mood and Affect: Mood normal.         Behavior: Behavior normal.         Thought Content: Thought content normal.         Judgment: Judgment normal.                Significant Labs: All pertinent labs within the past 24 hours have been reviewed.  BMP:   Recent Labs   Lab 04/14/25 2042   *   K 5.2*   CL 95   CO2 19*   BUN 13   CREATININE 1.0   CALCIUM 8.6*     CBC:   Recent Labs   Lab 04/14/25 2042   WBC 19.82*   HGB 9.9*   HCT 28.0*        CMP:   Recent Labs   Lab 04/14/25 2042   *   K 5.2*   CL 95   CO2 19*   BUN 13   CREATININE 1.0   CALCIUM 8.6*   ALBUMIN 2.2*   BILITOT 0.2   ALKPHOS 60   AST 29  "  ALT 15   ANIONGAP 7*     Cardiac Markers:   Recent Labs   Lab 04/14/25 2042   *     Urine Culture: No results for input(s): "LABURIN" in the last 48 hours.  Urine Studies:   Recent Labs   Lab 04/14/25 2130   COLORU Yellow   APPEARANCEUA Hazy*   PHUR 6.0   SPECGRAV 1.005   PROTEINUA Negative   GLUCUA Negative   BILIRUBINUA Negative   OCCULTUA Negative   NITRITE Positive*   UROBILINOGEN Negative   LEUKOCYTESUR 2+*   RBCUA 1   WBCUA 20*   BACTERIA Many*       Significant Imaging: I have reviewed all pertinent imaging results/findings within the past 24 hours.  Imaging Results              X-Ray Knee 3 View Left (Final result)  Result time 04/14/25 21:55:54      Final result by Cyndie Holley MD (04/14/25 21:55:54)                   Impression:      As above described.      Electronically signed by: Cyndie Holley  Date:    04/14/2025  Time:    21:55               Narrative:    EXAMINATION:  THREE VIEWS OF THE LEFT KNEE    CLINICAL HISTORY:  Pain in left knee    TECHNIQUE:  AP, oblique, and lateral view of the left knee    COMPARISON:  01/01/2023    FINDINGS:  Left knee arthroplasty is present.  Three views of the left knee demonstrate a curvilinear calcification/ossification adjacent to the medial femoral condyle paralleling the femoral cortex.  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  An avulsive fracture may have a similar appearance.  Correlate with the site of pain or history of trauma.  Moderate suprapatellar effusion is present.  The bones are osteopenic.  Vascular calcifications are present.                                       X-Ray Chest AP Portable (Final result)  Result time 04/14/25 22:03:45      Final result by Cyndie Holley MD (04/14/25 22:03:45)                   Impression:      As above described.      Electronically signed by: Cyndie Holley  Date:    04/14/2025  Time:    22:03               Narrative:    EXAMINATION:  AP PORTABLE CHEST    CLINICAL " "HISTORY:  Chest Pain;    TECHNIQUE:  AP portable chest radiograph was submitted.    COMPARISON:  01/01/2023    FINDINGS:  AP portable chest radiograph demonstrates a cardiac silhouette within normal limits.  There is bilateral hilar prominence, which may be due to adenopathy, infiltrates, pulmonary vasculature, and less likely masses.  Mild bibasilar densities are seen, which may be related to atelectatic change or infiltrates.  There is no pneumothorax or large pleural effusion.  There are calcify thoracic lymph nodes.                                       Assessment/Plan:     Assessment & Plan  Acute on Chronic pain of left knee  - Ms. Jojo Ayoub presents with worsening atraumatic pain in the left knee associated with LE swelling   - acutely worse than baseline   - X-ray w/ possible avulsion  - PRN pain meds  - consult ortho   - partook in shared decision making with patient and daughter who would like to trial elevation and SCDs to aid in decreased LE swelling prior to attempting IV diuresis     Type 2 diabetes mellitus, without long-term current use of insulin  Patient's FSGs are controlled on current medication regimen.  Last A1c reviewed-   Lab Results   Component Value Date    HGBA1C 5.6 01/01/2023     Most recent fingerstick glucose reviewed-   No results for input(s): "POCTGLUCOSE" in the last 24 hours.  Current correctional scale  Low  Maintain anti-hyperglycemic dose as follows-   Antihyperglycemics (From admission, onward)      Start     Stop Route Frequency Ordered    04/15/25 0206  insulin aspart U-100 pen 0-5 Units         -- SubQ Before meals & nightly PRN 04/15/25 0106          Hold Oral hypoglycemics while patient is in the hospital.      Hyperlipidemia  - continue statin     Essential hypertension  Patient's blood pressure range in the last 24 hours was: BP  Min: 110/62  Max: 128/57.The patient's inpatient anti-hypertensive regimen is listed below:  Current Antihypertensives  amLODIPine " tablet 5 mg, Daily, Oral  carvediloL tablet 25 mg, 2 times daily, Oral    Plan  - BP is controlled, no changes needed to their regimen  Acute cystitis without hematuria  - denies symptoms   - s/p rocephin in ED, continue daily   - UC pending       VTE Risk Mitigation (From admission, onward)           Ordered     heparin (porcine) injection 5,000 Units  Every 8 hours         04/15/25 0105     IP VTE HIGH RISK PATIENT  Once         04/15/25 0109     Place sequential compression device  Until discontinued         04/15/25 0109     Place sequential compression device  Until discontinued         04/15/25 0105                         On 04/15/2025, patient should be placed in hospital observation services under the care of Dr. Zach Adams MD.           CLARE SanchezC  Department of Hospital Medicine  [facility] - Emergency Dept

## 2025-04-15 NOTE — PT/OT/SLP PROGRESS
Occupational Therapy      Patient Name:  Jojo Ayoub   MRN:  [id number]    Patient not seen today secondary to Therapist assessment (Pending Ortho evaluation.  OT needing WB and ROM orders.).     4/15/2025

## 2025-04-15 NOTE — PLAN OF CARE
Case Management Assessment     PCP: Bahman Vincent   Pharmacy: [facility]    Patient Arrived From: home  Existing Help at Home:     Barriers to Discharge: none    Discharge Plan:    A. Home Health   B. Skilled Nursing     Therapy evals pending -  will provide transportation home     [facility] - Emergency Dept  Initial Discharge Assessment       Primary Care Provider: Bahman Vincent Jr., MD    Admission Diagnosis: Hyponatremia [E87.1]    Admission Date: 4/14/2025  Expected Discharge Date:     Transition of Care Barriers: None    Payor: MEDICARE / Plan: MEDICARE PART A & B / Product Type: Government /     Extended Emergency Contact Information  Primary Emergency Contact: Wingruiz  Mobile Phone: [phone]  Relation: Daughter  Preferred language: English   needed? No  Secondary Emergency Contact: Samuel Ayoub  Address: [address] [place] [address]  Home Phone: [phone]  Relation: Spouse    Discharge Plan A: Home Health (TBD)  Discharge Plan B: Skilled Nursing Facility (TBD)      [id number] [place], [place] - [address]  [address] [address]  Phone: [phone] Fax: [phone]      Initial Assessment (most recent)       Adult Discharge Assessment - 04/15/25 0952          Discharge Assessment    Assessment Type Discharge Planning Assessment     Confirmed/corrected address, phone number and insurance Yes     Confirmed Demographics Correct on Facesheet     Source of Information patient;family     Does patient/caregiver understand observation status Yes     Communicated FAY with patient/caregiver Yes     People in Home spouse     Do you expect to return to your current living situation? Yes     Do you have help at home or someone to help you manage your care at home? Yes     Prior to hospitilization cognitive status: Alert/Oriented     Current cognitive status: Alert/Oriented     Walking or  Climbing Stairs Difficulty yes     Walking or Climbing Stairs ambulation difficulty, requires equipment;transferring difficulty, requires equipment     Dressing/Bathing Difficulty yes     Dressing/Bathing bathing difficulty, requires equipment     Equipment Currently Used at Home rollator;wheelchair;shower chair     Readmission within 30 days? No     Patient currently being followed by outpatient case management? No     Do you currently have service(s) that help you manage your care at home? No     Do you take prescription medications? Yes     Do you have prescription coverage? Yes     Do you have any problems affording any of your prescribed medications? No     Is the patient taking medications as prescribed? yes     Who is going to help you get home at discharge?      How do you get to doctors appointments? family or friend will provide     Are you on dialysis? No     Do you take coumadin? No     Discharge Plan A Home Health   TBD    Discharge Plan B Skilled Nursing Facility   TBD    DME Needed Upon Discharge  none     Discharge Plan discussed with: Patient;Spouse/sig other     Transition of Care Barriers None        Physical Activity    On average, how many days per week do you engage in moderate to strenuous exercise (like a brisk walk)? 0 days     On average, how many minutes do you engage in exercise at this level? 0 min        Financial Resource Strain    How hard is it for you to pay for the very basics like food, housing, medical care, and heating? Not hard at all        Housing Stability    In the last 12 months, was there a time when you were not able to pay the mortgage or rent on time? No     At any time in the past 12 months, were you homeless or living in a shelter (including now)? No        Transportation Needs    In the past 12 months, has lack of transportation kept you from medical appointments or from getting medications? No     In the past 12 months, has lack of transportation kept you  from meetings, work, or from getting things needed for daily living? No        Food Insecurity    Within the past 12 months, you worried that your food would run out before you got the money to buy more. Never true     Within the past 12 months, the food you bought just didn't last and you didn't have money to get more. Never true        Stress    Do you feel stress - tense, restless, nervous, or anxious, or unable to sleep at night because your mind is troubled all the time - these days? Not at all        Social Isolation    How often do you feel lonely or isolated from those around you?  Never        Alcohol Use    Q1: How often do you have a drink containing alcohol? Never     Q2: How many drinks containing alcohol do you have on a typical day when you are drinking? Patient does not drink     Q3: How often do you have six or more drinks on one occasion? Never        Utilities    In the past 12 months has the electric, gas, oil, or water company threatened to shut off services in your home? No        Health Literacy    How often do you need to have someone help you when you read instructions, pamphlets, or other written material from your doctor or pharmacy? Never

## 2025-04-15 NOTE — PLAN OF CARE
VIRTUAL NURSE: Permission received per patient to turn camera to view patient. VIP model explained; patient and family informed this VN will be working with bedside nurse and the rest of the care team. Plan of care reviewed with family and  patient.  Educated patient and family  on VTE, fall risk and fall risk precautions in place. Call light within reach, side rails up x2. Admission questions completed. Patient and family instructed to ask staff for assistance. Patient and family  verbalized complete understanding. Patient  and family denies complaints or any needs at this time.

## 2025-04-15 NOTE — ED PROVIDER NOTES
"Encounter Date: 4/14/2025       History     Chief Complaint   Patient presents with    Leg Swelling     BL LE edema, also reporting L knee pain and hypermobility.     Hypotension     BP today 90/55. In /62. Pt denies dizziness of fatigue. AAO x4.     Jojo Ayoub is a 89 y.o. female presenting to the emergency department for evaluation of bilateral leg swelling "for a few weeks." Per daughter, she noticed the swelling today when she took the patient's socks off and decided to present to the emergency department. She denies leg pain, redness or temperature change. No recent falls or trauma to the legs. She also reports associated left knee pain and states that her "knee joint" dislocated a few nights ago. She does report history of left knee replacement over 20 years ago. She denies recent falls or trauma to the left knee.  She reports cough for awhile that has been getting better since her PCP started her on ipratropium bromide nasal solution.  She denies fever, chills, sore throat, runny nose, congestion, shortness of breath, chest pain, palpitations, abdominal pain, nausea, vomiting, diarrhea, or urinary symptoms.  Per daughter, she is currently not on Lasix.      The history is provided by the patient (daughter at bedside).     Review of patient's allergies indicates:  No Active Allergies  Past Medical History:   Diagnosis Date    Arthritis     Diabetes mellitus with neurological manifestation     Essential hypertension 2/28/2019    High blood pressure     Hyperlipidemia     Urinary urgency      Past Surgical History:   Procedure Laterality Date    EPIDURAL STEROID INJECTION N/A 1/31/2019    Procedure: INJECTION, STEROID, EPIDURAL, L3-L4 need consent;  Surgeon: Marlin Barber MD;  Location: [facility];  Service: Pain Management;  Laterality: N/A;    left knee replacement       low back surgery        Family History   Problem Relation Name Age of Onset    Diabetes Mother      Hypertension Mother      " Diabetes Sister      Hypertension Sister      Diabetes Maternal Uncle      Diabetes Maternal Grandmother       Social History[1]    Review of Systems  As per HPI.    Physical Exam     Initial Vitals   BP Pulse Resp Temp SpO2   04/14/25 1847 04/14/25 1847 04/14/25 1847 04/14/25 1848 04/14/25 2022   110/62 86 16 98.2 °F (36.8 °C) 97 %      MAP       --                Physical Exam    Nursing note and vitals reviewed.  Constitutional: She appears well-developed and well-nourished. No distress.   HENT:   Head: Normocephalic and atraumatic.   Right Ear: External ear normal.   Left Ear: External ear normal.   Nose: Nose normal. Mouth/Throat: Oropharynx is clear and moist.   Eyes: Conjunctivae and EOM are normal.   Neck: Neck supple.   Normal range of motion.  Cardiovascular:  Normal rate, regular rhythm, normal heart sounds and intact distal pulses.           Pulses:       Radial pulses are 2+ on the right side and 2+ on the left side.        Dorsalis pedis pulses are 2+ on the right side and 2+ on the left side.        Posterior tibial pulses are 2+ on the right side and 2+ on the left side.   Pulmonary/Chest: Breath sounds normal. No respiratory distress. She has no wheezes. She has no rhonchi. She has no rales.   Abdominal: Abdomen is soft. Bowel sounds are normal. She exhibits no distension. There is no abdominal tenderness. There is no rebound and no guarding.   Musculoskeletal:      Cervical back: Normal range of motion and neck supple.      Comments: 2+ pitting edema to bilateral lower extremities extending from the feet to just below the knees.  No tenderness to palpation, erythema, ecchymoses, or overlying skin changes.  Limited range of motion of the left knee.  Popping sensation to patella with flexion of the left knee.  No tenderness to palpation of left anterior, medial, lateral, or posterior knee. No crepitus or obvious deformity. No masses. No overlying skin changes, erythema, or ecchymoses.       Neurological: She is alert and oriented to person, place, and time. She has normal strength.   Bilateral lower extremities are neurovascularly intact.   Skin: Skin is warm and dry. Capillary refill takes less than 2 seconds. No rash noted. No erythema. No pallor.   Psychiatric: She has a normal mood and affect. Her behavior is normal. Judgment and thought content normal.         ED Course   Procedures  Labs Reviewed   COMPREHENSIVE METABOLIC PANEL - Abnormal       Result Value    Sodium 121 (*)     Potassium 5.2 (*)     Chloride 95      CO2 19 (*)     Glucose 94      BUN 13      Creatinine 1.0      Calcium 8.6 (*)     Protein Total 7.9      Albumin 2.2 (*)     Bilirubin Total 0.2      ALP 60      AST 29      ALT 15      Anion Gap 7 (*)     eGFR 55 (*)    B-TYPE NATRIURETIC PEPTIDE - Abnormal     (*)    CBC WITH DIFFERENTIAL - Abnormal    WBC 19.82 (*)     RBC 3.19 (*)     HGB 9.9 (*)     HCT 28.0 (*)     MCV 88      MCH 31.0      MCHC 35.4      RDW 13.7      Platelet Count 423      MPV 8.0 (*)     Nucleated RBC 0      Neut % 84.3 (*)     Lymph % 8.8 (*)     Mono % 4.8      Eos % 1.2      Basophil % 0.3      Imm Grans % 0.6 (*)     Neut # 16.72 (*)     Lymph # 1.74      Mono # 0.95      Eos # 0.23      Baso # 0.06      Imm Grans # 0.12 (*)    URINALYSIS, REFLEX TO URINE CULTURE - Abnormal    Color, UA Yellow      Appearance, UA Hazy (*)     pH, UA 6.0      Spec Grav UA 1.005      Protein, UA Negative      Glucose, UA Negative      Ketones, UA Negative      Bilirubin, UA Negative      Blood, UA Negative      Nitrites, UA Positive (*)     Urobilinogen, UA Negative      Leukocyte Esterase, UA 2+ (*)    URINALYSIS MICROSCOPIC - Abnormal    RBC, UA 1      WBC, UA 20 (*)     Bacteria, UA Many (*)     Squamous Epithelial Cells, UA 3      Microscopic Comment       TROPONIN I - Normal    Troponin-I <0.006     LACTIC ACID, PLASMA - Normal    Lactic Acid Level 1.2      Narrative:     Falsely low lactic acid results  can be found in samples containing >=13.0 mg/dL total bilirubin and/or >=3.5 mg/dL direct bilirubin.    CULTURE, URINE   CBC W/ AUTO DIFFERENTIAL    Narrative:     The following orders were created for panel order CBC auto differential.  Procedure                               Abnormality         Status                     ---------                               -----------         ------                     CBC with Differential[[id number]]       Abnormal            Final result                 Please view results for these tests on the individual orders.     EKG Readings: (Independently Interpreted)   Initial Reading: No STEMI.   Normal sinus rhythm at a rate of 87.  No ST or T-wave changes.  Normal axis.  Normal intervals.       Imaging Results              X-Ray Knee 3 View Left (Final result)  Result time 04/14/25 21:55:54      Final result by Cyndie Holley MD (04/14/25 21:55:54)                   Impression:      As above described.      Electronically signed by: Cyndie Holley  Date:    04/14/2025  Time:    21:55               Narrative:    EXAMINATION:  THREE VIEWS OF THE LEFT KNEE    CLINICAL HISTORY:  Pain in left knee    TECHNIQUE:  AP, oblique, and lateral view of the left knee    COMPARISON:  01/01/2023    FINDINGS:  Left knee arthroplasty is present.  Three views of the left knee demonstrate a curvilinear calcification/ossification adjacent to the medial femoral condyle paralleling the femoral cortex.  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  An avulsive fracture may have a similar appearance.  Correlate with the site of pain or history of trauma.  Moderate suprapatellar effusion is present.  The bones are osteopenic.  Vascular calcifications are present.                                       X-Ray Chest AP Portable (Final result)  Result time 04/14/25 22:03:45      Final result by Cyndie Holley MD (04/14/25 22:03:45)                   Impression:      As above  "described.      Electronically signed by: Cyndie Holley  Date:    04/14/2025  Time:    22:03               Narrative:    EXAMINATION:  AP PORTABLE CHEST    CLINICAL HISTORY:  Chest Pain;    TECHNIQUE:  AP portable chest radiograph was submitted.    COMPARISON:  01/01/2023    FINDINGS:  AP portable chest radiograph demonstrates a cardiac silhouette within normal limits.  There is bilateral hilar prominence, which may be due to adenopathy, infiltrates, pulmonary vasculature, and less likely masses.  Mild bibasilar densities are seen, which may be related to atelectatic change or infiltrates.  There is no pneumothorax or large pleural effusion.  There are calcify thoracic lymph nodes.                                       Medications   0.9% NaCl infusion (1,000 mLs Intravenous New Bag 4/14/25 2136)   cefTRIAXone injection 1 g (1 g Intravenous Given 4/14/25 2211)     Medical Decision Making  Amount and/or Complexity of Data Reviewed  Radiology: ordered.    Risk  Prescription drug management.                          Medical Decision Making:   Initial Assessment:   Urgent evaluation of 89-year-old female with PMHx of HTN, HLD, DM and left knee replacement years ago presenting for bilateral leg swelling "for few weeks. " She is also reporting atraumatic, left knee pain.  No recent falls or injuries to the leg.  She also reports cough "for a while" that is getting better since her PCP prescribed ipratropium bromide nasal solution.  Per daughter, she seems more tired and generally weak the past few days.  She denies fever, chills, shortness of breath, chest pain, palpitations, GI symptoms, or urinary symptoms.  On exam, she is an elderly female.  Nontoxic appearing.  Hemodynamically stable.  Afebrile in the ED. No focal abdominal tenderness to palpation.  No CVA tenderness to palpation bilaterally.  Heart and lungs are clear to auscultation.  Plan for labs, chest x-ray, EKG, and x-ray of the left knee.  Differential " "Diagnosis:   Differential diagnosis includes but not limited to acute CHF, dependent edema, electrolyte abnormality, left knee sprain, strain, fracture, dislocation, UTI.  Clinical Tests:   Lab Tests: Ordered and Reviewed  Radiological Study: Ordered and Reviewed  Medical Tests: Ordered and Reviewed  ED Management:  No acute ischemia on EKG.    Labs reveal leukocytosis with a left shift.  Stable anemia around her baseline.  Normal platelet count.  Significant hyponatremia on CMP.  Potassium of 5.2.  Patient without EKG changes.  Normal anion gap.  Lactic acid of 1.2.  BNP elevated at 181.  Troponin is negative. UA is nitrite positive with 2+ leukocytes.  There are many bacteria on microscopy.  Results consistent with UTI.  She is given a dose of IV Rocephin.  Currently receiving slow infusion of IV fluids.  Case was discussed with Dr. Fisher who agrees with plan for admission.  Case was discussed with Jo Danielle PA-C of  who will admit the patient to Dr. Colon's service for further management of significant hyponatremia, UTI, and bilateral lower extremity edema likely requiring diuresis.  I updated the patient and her daughter on all results.  They agree with plan for admission.  She is currently resting comfortably. Admission orders placed.     No pneumothorax or large effusion on chest x-ray.  No large infiltrate or consolidation.  Lung sounds are clear and she is not hypoxic.   On review of x-ray of the left knee, "left knee arthroplasty is present.  Three views of the left knee demonstrate a curvilinear calcification/ossification adjacent to the medial femoral condyle paralleling the femoral cortex.  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  An avulsive fracture may have a similar appearance.  Correlate with the site of pain or history of trauma.  Moderate suprapatellar effusion is present.  The bones are osteopenic.  Vascular calcifications are present." She has no tenderness to palpation " of the left knee.  No crepitus, deformity, edema or overlying skin changes.  Patient and her family denies recent falls or trauma to the left knee. Very low suspicion for left knee fracture.  She currently denies knee pain.  She has not received any analgesics. There was a popping sensation to left patella during my physical exam. Knee was placed in an Ace wrap for support and stabilization of joint. Likely requires outpatient ortho follow up.              Clinical Impression:  Final diagnoses:  [R07.9] Chest pain  [M25.562] Left knee pain  [E87.1] Hyponatremia          ED Disposition Condition    Observation Stable                    [1]   Social History  Tobacco Use    Smoking status: Never    Smokeless tobacco: Never   Substance Use Topics    Alcohol use: No    Drug use: No        Asim Lentz PA-C  04/14/25 4352

## 2025-04-15 NOTE — CONSULTS
Chief complaint-left knee pain     Jojo Ayoub is an 89-year-old female who I had performed a left total knee replacement on at least 20 years ago.  She states that she has noted some instability in the left knee with pain.  She notes occasional popping and snapping.  No recent injury.    Past medical history-hypertension, diabetes, osteoarthritis  No known drug allergies    Orthopedic exam-left knee demonstrates a healed anterior incision with no obvious effusion.  She has good alignment with a range of motion of 0-120 degrees.  Knee is stable to exam.  She does have occasional lateral patellar subluxation.  Has some quadriceps and VMO atrophy.    X-rays-left knee demonstrates a cemented K[employer] total knee replacement in good position with no evidence of loosening.  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  Patella in appropriate position.    Impression-lateral patellar subluxation left knee    Plan-will obtain Adamaris brace for patellar tracking.  Physical therapy for quadriceps and VMO strengthening an E stim.  Follow-up in clinic as needed.

## 2025-04-15 NOTE — SUBJECTIVE & OBJECTIVE
Past Medical History:   Diagnosis Date    Arthritis     Diabetes mellitus with neurological manifestation     Essential hypertension 2/28/2019    High blood pressure     Hyperlipidemia     Urinary urgency        Past Surgical History:   Procedure Laterality Date    EPIDURAL STEROID INJECTION N/A 1/31/2019    Procedure: INJECTION, STEROID, EPIDURAL, L3-L4 need consent;  Surgeon: Marlin Barber MD;  Location: [facility];  Service: Pain Management;  Laterality: N/A;    left knee replacement       low back surgery          Review of patient's allergies indicates:  No Active Allergies    No current facility-administered medications on file prior to encounter.     Current Outpatient Medications on File Prior to Encounter   Medication Sig    acetaminophen (TYLENOL) 500 MG tablet Take 2 tablets (1,000 mg total) by mouth every 8 (eight) hours as needed for Pain.    amLODIPine (NORVASC) 5 MG tablet Take 5 mg by mouth.    aspirin ([employer] ASPIRIN) 81 MG chewable tablet Take 81 mg by mouth. 1 Tablet Oral Every day    carvediloL (COREG) 25 MG tablet TAKE 1 TABLET BY MOUTH TWICE A DAY    gabapentin (NEURONTIN) 800 MG tablet TAKE 1 TABLET (800 MG TOTAL) BY MOUTH 3 (THREE) TIMES DAILY.    GEMTESA 75 mg Tab Take 1 tablet by mouth.    metFORMIN (GLUCOPHAGE-XR) 500 MG ER 24hr tablet Take 1 tablet (500 mg total) by mouth daily with breakfast.    nirmatrelvir-ritonavir (PAXLOVID) 300 mg (150 mg x 2)-100 mg copackaged tablets (EUA) Take 3 tablets by mouth 2 (two) times daily. Each dose contains 2 nirmatrelvir (pink tablets) and 1 ritonavir (white tablet). Take all 3 tablets together    omeprazole (PRILOSEC) 20 MG capsule Take 20 mg by mouth.    ONETOUCH ULTRA BLUE TEST STRIP Strp TEST ONCE DAILY    ONETOUCH ULTRASOFT LANCETS lancets USE TO TEST BLOOD SUGAR DAILY     Family History       Problem Relation (Age of Onset)    Diabetes Mother, Sister, Maternal Uncle, Maternal Grandmother    Hypertension Mother, Sister          Tobacco  Use    Smoking status: Never    Smokeless tobacco: Never   Substance and Sexual Activity    Alcohol use: No    Drug use: No    Sexual activity: Yes     Partners: Male     Review of Systems   Constitutional:  Positive for fatigue. Negative for chills, diaphoresis and fever.        States she feels hot and cold.    Respiratory:  Negative for cough, shortness of breath and wheezing.    Cardiovascular:  Positive for leg swelling (b/l). Negative for chest pain and palpitations.   Gastrointestinal:  Positive for abdominal pain (generalized) and nausea. Negative for abdominal distention, constipation, diarrhea and vomiting.   Genitourinary:  Negative for decreased urine volume, difficulty urinating, dysuria, flank pain, frequency, hematuria and urgency.   Musculoskeletal:  Negative for arthralgias, back pain, myalgias, neck pain and neck stiffness.   Skin:  Negative for color change, pallor, rash and wound.   Neurological:  Positive for numbness (b/l LEs). Negative for dizziness, syncope, weakness (generalized), light-headedness and headaches.   Psychiatric/Behavioral:  Negative for agitation and confusion.      Objective:     Vital Signs (Most Recent):  Temp: 98.2 °F (36.8 °C) (04/14/25 1848)  Pulse: 92 (04/15/25 0315)  Resp: 18 (04/15/25 0315)  BP: 111/60 (04/15/25 0300)  SpO2: 95 % (04/15/25 0315) Vital Signs (24h Range):  Temp:  [98.2 °F (36.8 °C)] 98.2 °F (36.8 °C)  Pulse:  [86-93] 92  Resp:  [15-21] 18  SpO2:  [93 %-97 %] 95 %  BP: (110-128)/(56-70) 111/60     Weight: 61.2 kg (135 lb)  Body mass index is 23.17 kg/m².     Physical Exam  Vitals and nursing note reviewed.   Constitutional:       General: She is not in acute distress.     Appearance: Normal appearance. She is well-developed and normal weight. She is not ill-appearing, toxic-appearing or diaphoretic.      Comments: Elderly appearing female.    HENT:      Head: Normocephalic and atraumatic.   Eyes:      General: No scleral icterus.        Right eye: No  "discharge.         Left eye: No discharge.      Conjunctiva/sclera: Conjunctivae normal.   Neck:      Trachea: No tracheal deviation.   Cardiovascular:      Rate and Rhythm: Normal rate and regular rhythm.      Heart sounds: Normal heart sounds. No murmur heard.     No gallop.      Comments: B/l LEs demonstrate 2+ pitting edema.   Pulmonary:      Effort: Pulmonary effort is normal. No respiratory distress.      Breath sounds: Normal breath sounds. No stridor. No wheezing or rales.   Abdominal:      General: Bowel sounds are normal. There is no distension.      Palpations: Abdomen is soft. There is no mass.      Tenderness: There is no abdominal tenderness. There is no guarding.   Musculoskeletal:         General: No deformity. Normal range of motion.      Cervical back: Normal range of motion and neck supple.   Skin:     General: Skin is warm and dry.      Coloration: Skin is not pale.      Findings: No erythema or rash.   Neurological:      General: No focal deficit present.      Mental Status: She is alert and oriented to person, place, and time.      Cranial Nerves: No cranial nerve deficit.      Motor: No abnormal muscle tone.   Psychiatric:         Mood and Affect: Mood normal.         Behavior: Behavior normal.         Thought Content: Thought content normal.         Judgment: Judgment normal.                Significant Labs: All pertinent labs within the past 24 hours have been reviewed.  BMP:   Recent Labs   Lab 04/14/25 2042   *   K 5.2*   CL 95   CO2 19*   BUN 13   CREATININE 1.0   CALCIUM 8.6*     CBC:   Recent Labs   Lab 04/14/25 2042   WBC 19.82*   HGB 9.9*   HCT 28.0*        CMP:   Recent Labs   Lab 04/14/25 2042   *   K 5.2*   CL 95   CO2 19*   BUN 13   CREATININE 1.0   CALCIUM 8.6*   ALBUMIN 2.2*   BILITOT 0.2   ALKPHOS 60   AST 29   ALT 15   ANIONGAP 7*     Cardiac Markers:   Recent Labs   Lab 04/14/25 2042   *     Urine Culture: No results for input(s): "LABURIN" in " the last 48 hours.  Urine Studies:   Recent Labs   Lab 04/14/25  2130   COLORU Yellow   APPEARANCEUA Hazy*   PHUR 6.0   SPECGRAV 1.005   PROTEINUA Negative   GLUCUA Negative   BILIRUBINUA Negative   OCCULTUA Negative   NITRITE Positive*   UROBILINOGEN Negative   LEUKOCYTESUR 2+*   RBCUA 1   WBCUA 20*   BACTERIA Many*       Significant Imaging: I have reviewed all pertinent imaging results/findings within the past 24 hours.  Imaging Results              X-Ray Knee 3 View Left (Final result)  Result time 04/14/25 21:55:54      Final result by Cyndie Holley MD (04/14/25 21:55:54)                   Impression:      As above described.      Electronically signed by: Cyndie Holley  Date:    04/14/2025  Time:    21:55               Narrative:    EXAMINATION:  THREE VIEWS OF THE LEFT KNEE    CLINICAL HISTORY:  Pain in left knee    TECHNIQUE:  AP, oblique, and lateral view of the left knee    COMPARISON:  01/01/2023    FINDINGS:  Left knee arthroplasty is present.  Three views of the left knee demonstrate a curvilinear calcification/ossification adjacent to the medial femoral condyle paralleling the femoral cortex.  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  An avulsive fracture may have a similar appearance.  Correlate with the site of pain or history of trauma.  Moderate suprapatellar effusion is present.  The bones are osteopenic.  Vascular calcifications are present.                                       X-Ray Chest AP Portable (Final result)  Result time 04/14/25 22:03:45      Final result by Cyndie Holley MD (04/14/25 22:03:45)                   Impression:      As above described.      Electronically signed by: Cyndie Holley  Date:    04/14/2025  Time:    22:03               Narrative:    EXAMINATION:  AP PORTABLE CHEST    CLINICAL HISTORY:  Chest Pain;    TECHNIQUE:  AP portable chest radiograph was submitted.    COMPARISON:  01/01/2023    FINDINGS:  AP portable chest radiograph  demonstrates a cardiac silhouette within normal limits.  There is bilateral hilar prominence, which may be due to adenopathy, infiltrates, pulmonary vasculature, and less likely masses.  Mild bibasilar densities are seen, which may be related to atelectatic change or infiltrates.  There is no pneumothorax or large pleural effusion.  There are calcify thoracic lymph nodes.

## 2025-04-15 NOTE — PT/OT/SLP PROGRESS
Physical Therapy  Not Seen    Patient Name:  Jojo Ayoub   MRN:  [id number]    Patient not seen today secondary to Therapist assessment (pending ortho consult). Will follow-up when consult completed or WB/ROM orders placed in chart.

## 2025-04-15 NOTE — ED NOTES
LOC: The patient is awake, alert, and oriented to self, place, time, and situation. Pt is calm and cooperative. Affect is appropriate.  Speech is appropriate and clear.     APPEARANCE: Patient resting on stretcher in no acute distress.  Patient is clean and well groomed.    SKIN: The skin is warm and dry; color consistent with ethnicity.  Patient has normal skin turgor and moist mucus membranes.  Skin intact; no breakdown or bruising noted.     MUSCULOSKELETAL: Patient moving upper and lower extremities without difficulty but is reporting chronic pain to the left knee that limits mobility; denies pain in the back.  Denies weakness.     RESPIRATORY: Airway is open and patent. Respirations spontaneous, even, easy, and non-labored.  Patient has a normal effort and rate.  No accessory muscle use noted. Denies cough.     CARDIAC:  Normal heart rate noted. 1+ pitting edema noted to the BLE. No complaints of chest pain.     ABDOMEN: Soft and non tender to palpation.  No distention noted. Pt denies abdominal pain; denies nausea, vomiting, diarrhea, or constipation.    NEUROLOGIC: Eyes open spontaneously.  Behavior appropriate to situation.  Follows commands; facial expression symmetrical.  Purposeful motor response noted; normal sensation in all extremities. Pt denies headache; reporting mild lightheadedness; denies visual disturbances; denies loss of balance; denies unilateral weakness.

## 2025-04-15 NOTE — ASSESSMENT & PLAN NOTE
- Ms. Jojo Ayoub presents with worsening atraumatic pain in the left knee associated with LE swelling   - acutely worse than baseline   - X-ray w/ possible avulsion  - PRN pain meds  - consult ortho   - partook in shared decision making with patient and daughter who would like to trial elevation and SCDs to aid in decreased LE swelling prior to attempting IV diuresis

## 2025-04-15 NOTE — HPI
"Per Jo Danielle PA-C:    "Ms. Jojo Ayoub is a 89 y.o. female, with PMH of T2DM, HTN, HLD, chronic constipation, OA w/ chronic left knee pain, anemia, who [place] [facility] [place] on [place]15/25 due to b/l LE swelling x "a few weeks." Her daughter syrobertaes the leg swelling was first noted by her today. She additionally notes low blood pressure readings of 90/55 at home today. She endorses left knee pain, and states she feels like her left knee "dislocate" a few nights ago. She states she had a knee replacement of her left knee over 20 years ago. She denied fall/trauma, leg pain or redness or warmth. She denied dizziness, fatigue, fever, chills, sore throat, runny nose, congestion, shortness of breath, chest pain, palpitations, abdominal pain, nausea, vomiting, diarrhea, or urinary symptoms. She was evaluated in the ED with labs showing leukocytosis of 19K, with left shift and H&H of 9.9/28.0. A metabolic panel showed sodium of 121, potassium of 5.2, glucose was 94, with CO2 of 19, and anion gap of 7. A BNP was 181 without elevation of troponin. A UA showed nitrite positive UTI with 2+ leuk esterase, with 20 WBC, and many bacteria. A CXR showed bilateral hilar prominence, and mild bibasilar densities. An x-ray of the knee had a findings that might reporesent a Pellegrinin-Stieda lesion vs. An avulsive fracture and a suprapatellar effusion. She was treated in the ED with 1L NS and rocephin."  "

## 2025-04-15 NOTE — ED NOTES
Pt had one uncharted episode of urine as it was expressed upon changing brief and placing purewick.

## 2025-04-15 NOTE — ASSESSMENT & PLAN NOTE
"Patient's FSGs are controlled on current medication regimen.  Last A1c reviewed-   Lab Results   Component Value Date    HGBA1C 5.6 01/01/2023     Most recent fingerstick glucose reviewed-   No results for input(s): "POCTGLUCOSE" in the last 24 hours.  Current correctional scale  Low  Maintain anti-hyperglycemic dose as follows-   Antihyperglycemics (From admission, onward)      Start     Stop Route Frequency Ordered    04/15/25 0206  insulin aspart U-100 pen 0-5 Units         -- SubQ Before meals & nightly PRN 04/15/25 0106          Hold Oral hypoglycemics while patient is in the hospital.      "

## 2025-04-16 PROBLEM — A41.9 SEPSIS: Status: ACTIVE | Noted: 2023-01-01

## 2025-04-16 PROBLEM — E87.1 HYPONATREMIA: Status: ACTIVE | Noted: 2025-04-16

## 2025-04-16 LAB
ABSOLUTE EOSINOPHIL (OHS): 0.15 K/UL
ABSOLUTE MONOCYTE (OHS): 1.03 K/UL (ref 0.3–1)
ABSOLUTE NEUTROPHIL COUNT (OHS): 14.34 K/UL (ref 1.8–7.7)
ANION GAP (OHS): 9 MMOL/L (ref 8–16)
BACTERIA UR CULT: NORMAL
BASOPHILS # BLD AUTO: 0.08 K/UL
BASOPHILS NFR BLD AUTO: 0.5 %
BUN SERPL-MCNC: 9 MG/DL (ref 8–23)
CALCIUM SERPL-MCNC: 8.4 MG/DL (ref 8.7–10.5)
CHLORIDE SERPL-SCNC: 101 MMOL/L (ref 95–110)
CO2 SERPL-SCNC: 20 MMOL/L (ref 23–29)
CREAT SERPL-MCNC: 0.8 MG/DL (ref 0.5–1.4)
ERYTHROCYTE [DISTWIDTH] IN BLOOD BY AUTOMATED COUNT: 14 % (ref 11.5–14.5)
GFR SERPLBLD CREATININE-BSD FMLA CKD-EPI: >60 ML/MIN/1.73/M2
GLUCOSE SERPL-MCNC: 100 MG/DL (ref 70–110)
HCT VFR BLD AUTO: 26.9 % (ref 37–48.5)
HGB BLD-MCNC: 9 GM/DL (ref 12–16)
IMM GRANULOCYTES # BLD AUTO: 0.12 K/UL (ref 0–0.04)
IMM GRANULOCYTES NFR BLD AUTO: 0.7 % (ref 0–0.5)
LYMPHOCYTES # BLD AUTO: 1.65 K/UL (ref 1–4.8)
MAGNESIUM SERPL-MCNC: 1.5 MG/DL (ref 1.6–2.6)
MCH RBC QN AUTO: 30.3 PG (ref 27–31)
MCHC RBC AUTO-ENTMCNC: 33.5 G/DL (ref 32–36)
MCV RBC AUTO: 91 FL (ref 82–98)
NUCLEATED RBC (/100WBC) (OHS): 0 /100 WBC
PHOSPHATE SERPL-MCNC: 3.1 MG/DL (ref 2.7–4.5)
PLATELET # BLD AUTO: 454 K/UL (ref 150–450)
PMV BLD AUTO: 8.5 FL (ref 9.2–12.9)
POCT GLUCOSE: 133 MG/DL (ref 70–110)
POCT GLUCOSE: 139 MG/DL (ref 70–110)
POCT GLUCOSE: 97 MG/DL (ref 70–110)
POCT GLUCOSE: 98 MG/DL (ref 70–110)
POTASSIUM SERPL-SCNC: 4.6 MMOL/L (ref 3.5–5.1)
RBC # BLD AUTO: 2.97 M/UL (ref 4–5.4)
RELATIVE EOSINOPHIL (OHS): 0.9 %
RELATIVE LYMPHOCYTE (OHS): 9.5 % (ref 18–48)
RELATIVE MONOCYTE (OHS): 5.9 % (ref 4–15)
RELATIVE NEUTROPHIL (OHS): 82.5 % (ref 38–73)
SODIUM SERPL-SCNC: 130 MMOL/L (ref 136–145)
WBC # BLD AUTO: 17.37 K/UL (ref 3.9–12.7)

## 2025-04-16 PROCEDURE — 94761 N-INVAS EAR/PLS OXIMETRY MLT: CPT

## 2025-04-16 PROCEDURE — 97535 SELF CARE MNGMENT TRAINING: CPT

## 2025-04-16 PROCEDURE — 25000003 PHARM REV CODE 250: Performed by: PHYSICIAN ASSISTANT

## 2025-04-16 PROCEDURE — 97166 OT EVAL MOD COMPLEX 45 MIN: CPT

## 2025-04-16 PROCEDURE — 80048 BASIC METABOLIC PNL TOTAL CA: CPT | Performed by: HOSPITALIST

## 2025-04-16 PROCEDURE — 63600175 PHARM REV CODE 636 W HCPCS: Performed by: HOSPITALIST

## 2025-04-16 PROCEDURE — 97530 THERAPEUTIC ACTIVITIES: CPT

## 2025-04-16 PROCEDURE — 84100 ASSAY OF PHOSPHORUS: CPT | Performed by: HOSPITALIST

## 2025-04-16 PROCEDURE — 87040 BLOOD CULTURE FOR BACTERIA: CPT | Performed by: HOSPITALIST

## 2025-04-16 PROCEDURE — 97162 PT EVAL MOD COMPLEX 30 MIN: CPT

## 2025-04-16 PROCEDURE — 63600175 PHARM REV CODE 636 W HCPCS: Performed by: PHYSICIAN ASSISTANT

## 2025-04-16 PROCEDURE — 83735 ASSAY OF MAGNESIUM: CPT | Performed by: HOSPITALIST

## 2025-04-16 PROCEDURE — A4216 STERILE WATER/SALINE, 10 ML: HCPCS | Performed by: PHYSICIAN ASSISTANT

## 2025-04-16 PROCEDURE — 21400001 HC TELEMETRY ROOM

## 2025-04-16 PROCEDURE — 85025 COMPLETE CBC W/AUTO DIFF WBC: CPT | Performed by: HOSPITALIST

## 2025-04-16 PROCEDURE — 25000003 PHARM REV CODE 250: Performed by: HOSPITALIST

## 2025-04-16 PROCEDURE — 36415 COLL VENOUS BLD VENIPUNCTURE: CPT | Performed by: HOSPITALIST

## 2025-04-16 PROCEDURE — 11000001 HC ACUTE MED/SURG PRIVATE ROOM

## 2025-04-16 RX ORDER — ACETAMINOPHEN 500 MG
1000 TABLET ORAL EVERY 8 HOURS PRN
Status: DISCONTINUED | OUTPATIENT
Start: 2025-04-16 | End: 2025-04-26 | Stop reason: HOSPADM

## 2025-04-16 RX ORDER — MAGNESIUM SULFATE HEPTAHYDRATE 40 MG/ML
2 INJECTION, SOLUTION INTRAVENOUS ONCE
Status: COMPLETED | OUTPATIENT
Start: 2025-04-16 | End: 2025-04-16

## 2025-04-16 RX ADMIN — SODIUM CHLORIDE, PRESERVATIVE FREE 10 ML: 5 INJECTION INTRAVENOUS at 06:04

## 2025-04-16 RX ADMIN — ACETAMINOPHEN 650 MG: 325 TABLET, FILM COATED ORAL at 04:04

## 2025-04-16 RX ADMIN — SODIUM CHLORIDE, PRESERVATIVE FREE 10 ML: 5 INJECTION INTRAVENOUS at 02:04

## 2025-04-16 RX ADMIN — VANCOMYCIN HYDROCHLORIDE 1500 MG: 1.5 INJECTION, POWDER, LYOPHILIZED, FOR SOLUTION INTRAVENOUS at 11:04

## 2025-04-16 RX ADMIN — HEPARIN SODIUM 5000 UNITS: 5000 INJECTION INTRAVENOUS; SUBCUTANEOUS at 11:04

## 2025-04-16 RX ADMIN — GABAPENTIN 400 MG: 400 CAPSULE ORAL at 09:04

## 2025-04-16 RX ADMIN — GABAPENTIN 400 MG: 400 CAPSULE ORAL at 04:04

## 2025-04-16 RX ADMIN — CEFTRIAXONE 1 G: 1 INJECTION, POWDER, FOR SOLUTION INTRAMUSCULAR; INTRAVENOUS at 09:04

## 2025-04-16 RX ADMIN — ASPIRIN 81 MG CHEWABLE TABLET 81 MG: 81 TABLET CHEWABLE at 09:04

## 2025-04-16 RX ADMIN — AZITHROMYCIN MONOHYDRATE 500 MG: 500 INJECTION, POWDER, LYOPHILIZED, FOR SOLUTION INTRAVENOUS at 11:04

## 2025-04-16 RX ADMIN — MAGNESIUM SULFATE HEPTAHYDRATE 2 G: 40 INJECTION, SOLUTION INTRAVENOUS at 09:04

## 2025-04-16 RX ADMIN — HEPARIN SODIUM 5000 UNITS: 5000 INJECTION INTRAVENOUS; SUBCUTANEOUS at 06:04

## 2025-04-16 RX ADMIN — SODIUM CHLORIDE, PRESERVATIVE FREE 10 ML: 5 INJECTION INTRAVENOUS at 09:04

## 2025-04-16 RX ADMIN — HEPARIN SODIUM 5000 UNITS: 5000 INJECTION INTRAVENOUS; SUBCUTANEOUS at 02:04

## 2025-04-16 NOTE — PT/OT/SLP EVAL
Occupational Therapy   Evaluation and Treatment    Name: Jojo Ayoub  MRN: [id number]  Admitting Diagnosis: Chronic pain of left knee  Recent Surgery: * No surgery found *      Recommendations:     Discharge Recommendations: High Intensity Therapy  Discharge Equipment Recommendations:  walker, rolling, to be determined by next level of care  Barriers to discharge:   (Current functional level)    Assessment:     Jojo Ayoub is a 89 y.o. female with a medical diagnosis of Chronic pain of left knee.  Pt with diagnosis of left patella subluxation.  She presents with the following performance deficits affecting function: weakness, impaired endurance, impaired self care skills, impaired functional mobility, gait instability, impaired balance, decreased ROM, orthopedic precautions, decreased coordination, decreased lower extremity function, decreased safety awareness, pain, impaired joint extensibility, edema.      Rehab Prognosis:  Good to return to [facility] ; patient would benefit from acute skilled OT services to address these deficits and reach maximum level of function.       Plan:     Patient to be seen 5 x/week to address the above listed problems via self-care/home management, therapeutic activities, therapeutic exercises  Plan of Care Expires: 05/16/25  Plan of Care Reviewed with: patient, spouse, daughter, son    Subjective     Chief Complaint: pt reporting that she has been fearful of falling  Patient/Family Comments/goals: Pt's family wanting a gait belt for when they assist pt with ambulation.      Occupational Profile:  Living Environment: Lives with spouse and son in [place] with 5 CAM, walk-in shower and has a shower chair with arm rests  Previous level of function: Set up/SBA for ADL, Mod I with rollator  Roles and Routines: Mother, wife, grandmother  Equipment Used at Home: bedside commode, shower chair, rollator  Assistance upon Discharge:  all the time and son when he is not at  work    Pain/Comfort:  Pain Rating 1:  (Pt reporting no pain.)    Patients cultural, spiritual, Amish conflicts given the current situation: no    Objective:     Communicated with: nurseVerónica prior to session.  Patient found HOB elevated with bed alarm, telemetry, peripheral IV, PureWick (Family in room) upon OT entry to room.    General Precautions: Standard, diabetic, fall, anti-coagulation medicine  Orthopedic Precautions: N/A  Braces:  (Left knee brace due to patella subluxation)  Respiratory Status: Room air    Occupational Performance:    Bed Mobility:    Supine to sit: Min A with use of bed rail and HOB elevated  Sit to supine: Min A with bed flat and use of bed rail    Functional Mobility/Transfers: Gait belt and  socks  Sit to stand: Min A with RW  Transfers: Min A with RW, assist to navigate RW, occasional posterior lean   Functional Mobility: Min A with RW, assist to navigate RW and give verbal cues for steps at times, pt mostly having difficulty with turning     Activities of Daily Living:  LB Dressing: Total A for socks  Toileting: Using female external catheter to suction  Feeding: Set up/Supervision  Grooming: Min A    Cognitive/Visual Perceptual:  Cognitive/Psychosocial Skills:     -       Oriented to: Person, Place, and Situation   -       Follows Commands/attention:Follows 75% of one-step commands  -       Communication: clear/fluent  -       Safety awareness/insight to disability: impaired   -       Mood/Affect/Coping skills/emotional control: Cooperative and Pleasant    Physical Exam:  UE Function: Shoulders 3+/5, Elbows 4/5,  4/5  Sitting Balance: Static - SBA  Standing Balance: Static - CGA with RW, Dynamic - Min A with RW    AMPAC 6 Click ADL:  AMPAC Total Score: 15    Treatment & Education:  Role of OT, POC, ADL and ADL mobility training, Safety, use of gait belt when assisting pt with ADL mobility, use of bed rail on bed at home, discharge recs     Patient left HOB elevated,  pillow under bilateral LE to float heels off bed with all lines intact, call button in reach, bed alarm on, and family present    Second Treatment Session: 1639 - 1653    Pt with HOB elevated and granddaughter, Gela, in room.  Pt with left knee brace donned.    Supine to sit: SBA with HOB elevated  Sit to supine: Min A   Sit to stand: CGA/Min A with RW, verbal cues  Lateral steps at side of bed: CGA with RW, verbal cues  Upon pt standing, OT noticed folded bed pad between pt's legs was wet with urine but not other bed pads.  Female external catheter to suction in use.  At end of tx session, granddaughter notifying nursing staff by way of call button, that pt is needing toilet hygiene.  Upon seeing pt's PCT, April, in [place], OT communicated that pt is needing nursing care for toilet hygiene with external cathehter.     Education and Treatment: Pt issued gait belt with education and training of pt and granddaughter on donning gait belt and use during pt sit <> stand, transfers and ambulation with RW.  Pt's granddaughter is a nurse and verbalizing understanding.  Written directions from gait belt package left with pt and family for future reference.  White erase board updated to include use of gait belt with pt mobility for nursing staff.        Pt left with HOB and FOB elevated, call button in reach, granddaughter in room and pt's heels floated off bed.     GOALS:   Multidisciplinary Problems       Occupational Therapy Goals          Problem: Occupational Therapy    Goal Priority Disciplines Outcome Interventions   Occupational Therapy Goal     OT, PT/OT Progressing    Description: Goals to be met by: 5/3/25     Patient will increase functional independence with ADLs by performing:    UB dressing at Set up/SBA.  LB dressing at Set up/SBA.  Toileting at SBA.  Toilet transfer at SBA.   Grooming standing at sink at SBA.   Sponge bathing at Set up/SBA.                         DME Justifications:   Jojo's mobility  limitation cannot be sufficiently resolved by the use of a cane. Her functional mobility deficit can be sufficiently resolved with the use of a Rolling Walker. Patient's mobility limitation significantly impairs their ability to participate in one of more activities of daily living.  The use of a RW will significantly improve the patient's ability to participate in MRADLS and the patient will use it on regular basis in the home.    History:     Past Medical History:   Diagnosis Date    Arthritis     Diabetes mellitus with neurological manifestation     Essential hypertension 2/28/2019    High blood pressure     Hyperlipidemia     Urinary urgency          Past Surgical History:   Procedure Laterality Date    EPIDURAL STEROID INJECTION N/A 1/31/2019    Procedure: INJECTION, STEROID, EPIDURAL, L3-L4 need consent;  Surgeon: Marlin Barber MD;  Location: [facility];  Service: Pain Management;  Laterality: N/A;    left knee replacement       low back surgery          Time Tracking:     OT Date of Treatment: 04/16/25  OT Start Time: 1226  OT Stop Time: 1302  OT Total Time (min): 36 min    Billable Minutes:Evaluation 8  Self Care/Home Management 28    OT Start Time: 1639  OT Stop Time: 1653  OT Total Time (min): 14    Billable Minutes: Self Care/Home Management 14    4/16/2025

## 2025-04-16 NOTE — CONSULTS
"[facility])  Wound Care    Patient Name:  Jojo Ayoub   MRN:  [id number]  Date: 4/16/2025  Diagnosis: Chronic pain of left knee    History:     Past Medical History:   Diagnosis Date    Arthritis     Diabetes mellitus with neurological manifestation     Essential hypertension 2/28/2019    High blood pressure     Hyperlipidemia     Urinary urgency        Social History[1]    Precautions:     Allergies as of 04/14/2025    (No Active Allergies)       [facility] Assessment Details/Treatment     Wound care consult received for assessment of sacrum. Patient is a 89 year old femalewith PMH of T2DM, HTN, HLD, chronic constipation, OA w/ chronic left knee pain, anemia, who [place] [facility] [place] on [place]/15/25 due to b/l LE swelling x "a few weeks." Her daughter syayes the leg swelling was first noted by her today. She additionally notes low blood pressure readings of 90/55 at home today. She endorses left knee pain, and states she feels like her left knee "dislocate" a few nights ago. An x-ray of the knee had a findings that might reporesent a Pellegrinin-Stieda lesion vs. An avulsive fracture and a suprapatellar effusion. She was treated in the ED with 1L NS and rocephin. She was placed on observation.     Upon assessment to sacrum noted resolving incontinence associated dermatitis. Patient stated that her skin stays moist due to incontinent episodes at home. Area was cleansed with Vashe and triad paste applied to manage moisture and promote wound healing. Patient reports no pain to her sacrum at this time.     Recommend applying triad paste to affected area daily and prn to manage moisture. Nursing and MD team notified. Orders in place. Wound care will follow.        04/16/25 1015        Wound 04/15/25 1200 Incontinence associated dermatitis Sacral spine #1   Date First Assessed/Time First Assessed: 04/15/25 1200   Present on Original Admission: Yes  Primary Wound Type: Incontinence associated dermatitis  Location: " Sacral spine  Wound Number: #1  Is this injury device related?: No   Wound Image    Dressing Appearance Dry;Intact;Clean   Drainage Amount None   Drainage Characteristics/Odor No odor   Appearance Pink;Dry;Granulating   Tissue loss description Partial thickness   Red (%), Wound Tissue Color 100 %   Periwound Area Intact;Dry;Scar tissue   Wound Edges Open   Care Cleansed with:;Wound cleanser;Applied:  (triad paste)   Dressing Applied;Foam  (sacral mepilex)   Dressing Change Due 04/17/25 04/16/2025         [1]   Social History  Socioeconomic History    Marital status:    Tobacco Use    Smoking status: Never    Smokeless tobacco: Never   Substance and Sexual Activity    Alcohol use: No    Drug use: No    Sexual activity: Yes     Partners: Male     Social Drivers of Health     Financial Resource Strain: Low Risk  (4/16/2025)    Overall Financial Resource Strain (CARDIA)     Difficulty of Paying Living Expenses: Not hard at all   Food Insecurity: No Food Insecurity (4/16/2025)    Hunger Vital Sign     Worried About Running Out of Food in the Last Year: Never true     Ran Out of Food in the Last Year: Never true   Transportation Needs: No Transportation Needs (4/15/2025)    PRAPARE - Transportation     Lack of Transportation (Medical): No     Lack of Transportation (Non-Medical): No   Physical Activity: Inactive (4/15/2025)    Exercise Vital Sign     Days of Exercise per Week: 0 days     Minutes of Exercise per Session: 0 min   Stress: No Stress Concern Present (4/16/2025)    [employer] - Occupational Stress Questionnaire     Feeling of Stress : Not at all   Housing Stability: Low Risk  (4/16/2025)    Housing Stability Vital Sign     Unable to Pay for Housing in the Last Year: No     Homeless in the Last Year: No

## 2025-04-16 NOTE — PLAN OF CARE
Problem: Occupational Therapy  Goal: Occupational Therapy Goal  Description: Goals to be met by: 5/3/25     Patient will increase functional independence with ADLs by performing:    UB dressing at Set up/SBA.  LB dressing at Set up/SBA.  Toileting at SBA.  Toilet transfer at SBA.   Grooming standing at sink at SBA.   Sponge bathing at Set up/SBA.    Outcome: Progressing  Recommend High Intensity Therapy.

## 2025-04-16 NOTE — PROGRESS NOTES
Pharmacokinetic Initial Assessment: IV Vancomycin    Assessment/Plan:    Initiate intravenous vancomycin with loading dose of 1500 mg once followed by a maintenance dose of vancomycin 1000 mg IV every 24 hours  Desired empiric serum trough concentration is 15 to 20 mcg/mL  Draw vancomycin trough level 60 min prior to third dose on 4/18/25 at approximately 1000  Pharmacy will continue to follow and monitor vancomycin.      Please contact pharmacy at extension [phone] with any questions regarding this assessment.     Thank you for the consult,   Franny Weinerello       Patient brief summary:  Jojo Ayoub is a 89 y.o. female initiated on antimicrobial therapy with IV Vancomycin for treatment of suspected bacteremia    Drug Allergies:   Review of patient's allergies indicates:  No Active Allergies    Actual Body Weight:   61.2 kg    Renal Function:   Estimated Creatinine Clearance: 42.8 mL/min (based on SCr of 0.8 mg/dL).,     Dialysis Method (if applicable):  N/A    CBC (last 72 hours):  Recent Labs   Lab Result Units 04/14/25  2042 04/15/25  0831 04/16/25  0341   WBC K/uL 19.82*  --  17.37*   HGB gm/dL 9.9*  --  9.0*   Hemoglobin A1c %  --  5.7*  --    HCT % 28.0*  --  26.9*   Platelet Count K/uL 423  --  454*   Lymph % % 8.8*  --  9.5*   Mono % % 4.8  --  5.9   Eos % % 1.2  --  0.9   Basophil % % 0.3  --  0.5       Metabolic Panel (last 72 hours):  Recent Labs   Lab Result Units 04/14/25  2042 04/14/25  2130 04/15/25  0831 04/15/25  0840 04/16/25  0341   Sodium mmol/L 121*  --  127*  --  130*   Urine Sodium mmol/L  --   --   --  29  --    Potassium mmol/L 5.2*  --  4.4  --  4.6   Chloride mmol/L 95  --  102  --  101   CO2 mmol/L 19*  --  19*  --  20*   Glucose mg/dL 94  --  99  --  100   Glucose, UA   --  Negative  --   --   --    BUN mg/dL 13  --  10  --  9   Creatinine mg/dL 1.0  --  0.8  --  0.8   Urine Creatinine mg/dL  --   --   --  20.4  20.2  --    Albumin g/dL 2.2*  --   --   --   --    Bilirubin Total  "mg/dL 0.2  --   --   --   --    ALP unit/L 60  --   --   --   --    AST unit/L 29  --   --   --   --    ALT unit/L 15  --   --   --   --    Magnesium  mg/dL  --   --   --   --  1.5*   Phosphorus Level mg/dL  --   --   --   --  3.1       Drug levels (last 3 results):  No results for input(s): "VANCOMYCINRA", "VANCORANDOM", "VANCOMYCINPE", "VANCOPEAK", "VANCOMYCINTR", "VANCOTROUGH" in the last 72 hours.    Microbiologic Results:  Microbiology Results (last 7 days)       Procedure Component Value Units Date/Time    Blood culture [[id number]]     Order Status: Sent Specimen: Blood     Urine culture [[id number]] Collected: 04/14/25 2130    Order Status: Sent Specimen: Urine Updated: 04/14/25 2144            "

## 2025-04-16 NOTE — PT/OT/SLP EVAL
"Physical Therapy Evaluation    Patient Name:  Jojo Ayoub   MRN:  [id number]    Recommendations:     Discharge Recommendations: High Intensity Therapy   Discharge Equipment Recommendations: walker, rolling, to be determined by next level of care   Barriers to discharge: Inaccessible home and Decreased caregiver support    Assessment:     Jojo Ayoub is a 89 y.o. female admitted with a medical diagnosis of Chronic pain of left knee with ortho MD reporting "lateral patellar subluxation left knee" with rec for marybel brace for patellar tracking. Pmhx pertinent for L TKA ("years ago"), arthritis, DM, and HTN. She presents with the following impairments/functional limitations: weakness, impaired self care skills, gait instability, impaired functional mobility, impaired balance, decreased upper extremity function, decreased lower extremity function, decreased safety awareness, pain, decreased ROM, impaired joint extensibility, edema, orthopedic precautions.    Patient evaluated by PT and goals established. Patient with minimal knee pain complaints, occasional lateral subluxation noted with ROM despite brace placement. Able to stand and WB, but significant posterior lean and decreased alertness with standing with suspicion for pre-syncope and unable to progress to ambulation at this time. PT will continue to follow and progress as tolerated. Rec for dc to High Intensity Therapy.    Rehab Prognosis: Good; patient would benefit from acute skilled PT services to address these deficits and reach maximum level of function.    Recent Surgery: * No surgery found *      Plan:     During this hospitalization, patient to be seen 5 x/week to address the identified rehab impairments via gait training, therapeutic activities, therapeutic exercises, neuromuscular re-education and progress toward the following goals:    Plan of Care Expires:  05/16/25    Subjective     Chief Complaint: Denies pain  Patient/Family Comments/goals: " Goal to get back to walking; Patient agreeable to evaluation.  Pain/Comfort:  Pain Rating 1:  (unrated, denies significant pain at rest)  Location - Side 1: Left  Location 1: knee  Pain Addressed 1: Reposition, Distraction, Cessation of Activity  Pain Rating Post-Intervention 1:  (appears comfortable at rest, no reports of pain with mobility)    Patients cultural, spiritual, Orthodox conflicts given the current situation: no    Living Environment:  Lives with her spouse in [facility] with 5 CAM.  Prior to admission, patients level of function was mod(I) with rollator until increase in knee instability and reports staying mostly in bed for past week to avoid falls.  Equipment used at home: rollator, shower chair.  Upon discharge, patient will have assistance from her family.    Objective:     Communicated with RN prior to session.  Patient found HOB elevated with peripheral IV, PureWick, bed alarm  upon PT entry to room.    General Precautions: Standard, fall, diabetic  Orthopedic Precautions:N/A   Braces:  (brace to L knee)  Respiratory Status: Room air    Patient donned non slip socks and gait belt for OOB mobility.    Exams:  Cognition:   Patient is oriented to person, place, situation.  Pt follows approximately 75% of one step commands.    Mood: Very pleasant and cooperative, talkative   Safety Awareness: Impaired  Musculoskeletal:  BMI: 23.17  Posture:  Forward head, rounded shoulders  LE ROM/Strength:   R ROM: WFL  L ROM: WFL, notable patellar laxity with minor lateral subluxation with knee flexion despite brace in place  R Strength:   Knee extension: 4/5  Dorsiflexion: 4/5   L Strength:   Knee extension: 4/5  Dorsiflexion: 4/5   Neuromuscular:  Coordination/Tone/Reflexes: No impairments identified with functional mobility. No formal testing performed.    Balance:   Static sitting: Good  Static standing: Poor, notable posterior lean with  pt reporting sensation of RW sliding anterior (RW not moving, pt leaning away  from RW)  Visual-vestibular: No impairments identified with functional mobility. No formal testing performed.  Integument: Visible skin intact  Cardiopulmonary:  Vital signs:      25 1003 25 1007 25 1008   Vital Signs   Pulse 95 (!) 132  --    SpO2 96 %  --   --    BP (!) 95/55 (!) 97/56   (error unable to get reading)   MAP (mmHg)  --  67  --    Patient Position Sitting Sitting Standing      25 1010   Vital Signs   Pulse 103   SpO2  --    /61   MAP (mmHg) 75   Patient Position Sitting     Edema: Slight to BLE    Functional Mobility:  Bed Mobility:     Bridging: contact guard assistance  Supine to Sit: minimum assistance  Sit to Supine: minimum assistance  Transfers:     Sit to Stand:  moderate assistance with rolling walker  Gait: Unable to perform 2/2 poor balance in standing  Balance: Static sitting EOB x5 min without sway and variable UE and LE support, but support not required. Static standing 2x10-15 sec with BUE support on RW and near equal WB on LE with min regressing to maxA 2/2 increasing posterior lean/retropulsion.       AM-PAC 6 CLICK MOBILITY  Total Score:12       Treatment & Education:  Instructed in use of RW and standing  PT educated patient re:   PT plan of care/role of PT  Safety with OOB mobility  Use of DME  Discharge disposition    Pt and family verbalized understanding       Patient left HOB elevated with all lines intact, call button in reach, bed alarm on, and Rn and family present.    GOALS:   Multidisciplinary Problems       Physical Therapy Goals          Problem: Physical Therapy    Goal Priority Disciplines Outcome Interventions   Physical Therapy Goal     PT, PT/OT Progressing    Description: Goals to be met by: 25    Patient will increase functional independence with mobility by performin. Supine<>sit with supervision and without use of HB features.   2. Sit<>stand with CGA with RW.  3. Transfer bed<>chair with Carmen and LRAD.  4. Gait x 25  feet with Carmen with RW.  5. Ascend/descend 5 step(s) with least restrictive assistive device and Carmen.                        DME Justifications:   Jojo's mobility limitation cannot be sufficiently resolved by the use of a cane. Her functional mobility deficit can be sufficiently resolved with the use of a Rolling Walker. Patient's mobility limitation significantly impairs their ability to participate in one of more activities of daily living.  The use of a RW will significantly improve the patient's ability to participate in MRADLS and the patient will use it on regular basis in the home.    History:     Past Medical History:   Diagnosis Date    Arthritis     Diabetes mellitus with neurological manifestation     Essential hypertension 2/28/2019    High blood pressure     Hyperlipidemia     Urinary urgency        Past Surgical History:   Procedure Laterality Date    EPIDURAL STEROID INJECTION N/A 1/31/2019    Procedure: INJECTION, STEROID, EPIDURAL, L3-L4 need consent;  Surgeon: Marlin Barber MD;  Location: [facility];  Service: Pain Management;  Laterality: N/A;    left knee replacement       low back surgery          Time Tracking:     PT Received On: 04/16/25  PT Start Time: 0944     PT Stop Time: 1016  PT Total Time (min): 32 min     Billable Minutes: Evaluation 20 and Therapeutic Activity 12      04/16/2025

## 2025-04-16 NOTE — PLAN OF CARE
Problem: Adult Inpatient Plan of Care  Goal: Plan of Care Review  Outcome: Progressing  Goal: Patient-Specific Goal (Individualized)  Outcome: Progressing  Goal: Absence of Hospital-Acquired Illness or Injury  Outcome: Progressing  Goal: Optimal Comfort and Wellbeing  Outcome: Progressing  Goal: Readiness for Transition of Care  Outcome: Progressing     Problem: Diabetes Comorbidity  Goal: Blood Glucose Level Within Targeted Range  Outcome: Progressing     Problem: Wound  Goal: Optimal Coping  Outcome: Progressing  Goal: Optimal Functional Ability  Outcome: Progressing  Goal: Absence of Infection Signs and Symptoms  Outcome: Progressing  Goal: Improved Oral Intake  Outcome: Progressing  Goal: Optimal Pain Control and Function  Outcome: Progressing  Goal: Skin Health and Integrity  Outcome: Progressing  Goal: Optimal Wound Healing  Outcome: Progressing     Problem: Skin Injury Risk Increased  Goal: Skin Health and Integrity  Outcome: Progressing     Problem: Fall Injury Risk  Goal: Absence of Fall and Fall-Related Injury  Outcome: Progressing     Problem: UTI (Urinary Tract Infection)  Goal: Improved Infection Symptoms  Outcome: Progressing     Problem: Pain Acute  Goal: Optimal Pain Control and Function  Outcome: Progressing

## 2025-04-16 NOTE — PROGRESS NOTES
"Progress Note  Orthopedics    Admit Date: 4/14/2025   Patient ID: Jojo Ayoub is a 89 y.o. female.  She has obtained the Adamaris brace for her left knee which she will continue to wear.  She will continue her physical therapy.  She can follow-up with me in my office.            Parveen Malagon      Vital Sign (recent):  BP (!) 96/51 (BP Location: Right arm, Patient Position: Lying)   Pulse 106   Temp 98.3 °F (36.8 °C) (Oral)   Resp 17   Ht 5' 4" (1.626 m)   Wt 61.2 kg (135 lb)   SpO2 95%   Breastfeeding No   BMI 23.17 kg/m²       Laboratory:    CBC:   Recent Labs   Lab 04/16/25  0341   WBC 17.37*   RBC 2.97*   HGB 9.0*   HCT 26.9*   *   MCV 91   MCH 30.3   MCHC 33.5       CMP:   Recent Labs   Lab 04/14/25  2042 04/15/25  0831 04/16/25  0341   CALCIUM 8.6*   < > 8.4*   ALBUMIN 2.2*  --   --    *   < > 130*   K 5.2*   < > 4.6   CO2 19*   < > 20*   CL 95   < > 101   BUN 13   < > 9   CREATININE 1.0   < > 0.8   ALKPHOS 60  --   --    ALT 15  --   --    AST 29  --   --    BILITOT 0.2  --   --     < > = values in this interval not displayed.           Intake/Output Summary (Last 24 hours) at 4/16/2025 1604  Last data filed at 4/16/2025 1553  Gross per 24 hour   Intake 1260 ml   Output 1625 ml   Net -365 ml         Current Medications:   aspirin  81 mg Oral Daily    cefTRIAXone (Rocephin) IV (PEDS and ADULTS)  1 g Intravenous Q24H    gabapentin  400 mg Oral TID    heparin (porcine)  5,000 Units Subcutaneous Q8H    sodium chloride 0.9%  10 mL Intravenous Q8H    [START ON 4/17/2025] vancomycin (VANCOCIN) IV (PEDS and ADULTS)  1,000 mg Intravenous Q24H       Continuous Infusions:  PRN Meds:.  Current Facility-Administered Medications:     acetaminophen, 1,000 mg, Oral, Q8H PRN    dextrose 50%, 12.5 g, Intravenous, PRN    dextrose 50%, 25 g, Intravenous, PRN    glucagon (human recombinant), 1 mg, Intramuscular, PRN    glucose, 16 g, Oral, PRN    glucose, 24 g, Oral, PRN    insulin aspart U-100, 0-5 " Units, Subcutaneous, QID (AC + HS) PRN    melatonin, 6 mg, Oral, Nightly PRN    naloxone, 0.02 mg, Intravenous, PRN    senna-docusate, 1 tablet, Oral, BID PRN    sodium chloride 0.9%, 10 mL, Intravenous, PRN    Pharmacy to dose Vancomycin consult, , , Once **AND** vancomycin - pharmacy to dose, , Intravenous, pharmacy to manage frequency

## 2025-04-16 NOTE — PLAN OF CARE
Met with patient & daughter Gris to review discharge recommendation of IPR and both are agreeable to plan    Patient/family provided list of facilities in-network with patient's payor plan. Providers that are owned, operated, or affiliated with [facility] are included on the list.     Patient/family instructed to identify preference.    Preferred Facility: [facility]    Referral forwarded via EPIC     04/16/25 7292   Post-Acute Status   Post-Acute Authorization Placement   Post-Acute Placement Status Referrals Sent   Hospital Resources/Appts/Education Provided Provided patient/caregiver with written discharge plan information   Discharge Delays None known at this time   Discharge Plan   Discharge Plan A Rehab

## 2025-04-16 NOTE — CODE/ RAPID DOCUMENTATION
"RAPID RESPONSE NURSE CHART REVIEW        Chart Reviewed: 04/16/2025, 4:33 AM    MRN: [id number]  Bed: B318/B318 A    Dx: Chronic pain of left knee    Jojo Ayoub has a past medical history of Arthritis, Diabetes mellitus with neurological manifestation, Essential hypertension, High blood pressure, Hyperlipidemia, and Urinary urgency.    Last VS: BP (!) 118/55 (BP Location: Left arm, Patient Position: Lying)   Pulse (!) 116   Temp (!) 101.1 °F (38.4 °C) (Oral)   Resp 18   Ht 5' 4" (1.626 m)   Wt 61.2 kg (135 lb)   SpO2 (!) 92%   Breastfeeding No   BMI 23.17 kg/m²     24H Vital Sign Range:  Temp:  [98 °F (36.7 °C)-101.1 °F (38.4 °C)]   Pulse:  []   Resp:  [16-20]   BP: ()/(50-63)   SpO2:  [92 %-99 %]     Level of Consciousness (AVPU): alert    Recent Labs     04/14/25 2042 04/16/25  0341   WBC 19.82* 17.37*   HGB 9.9* 9.0*   HCT 28.0* 26.9*    454*       Recent Labs     04/14/25  2042 04/15/25  0831   * 127*   K 5.2* 4.4   CL 95 102   CO2 19* 19*   BUN 13 10   CREATININE 1.0 0.8        No results for input(s): "PH", "PCO2", "PO2", "HCO3", "POCSATURATED", "BE" in the last 72 hours.     OXYGEN:             MEWS score: 4    Bedside nurse, Marisa MANN      Pt MEWS 4, temp has been rising throughout the night. 0330 temp 101.1F. Reached out to nurse to give a dose of tylenol. Available PRN    No additional concerns verbalized at this time.     Instructed to call [phone] for further concerns or assistance.    Carol Vincent RN       "

## 2025-04-16 NOTE — PLAN OF CARE
Medicare Message     Important Message from Medicare regarding Discharge Appeal Rights Explained to patient/caregiver; Signed/date by patient/caregiver   Date IMM was signed 4/15/2025   Time IMM was signed 1774

## 2025-04-16 NOTE — PLAN OF CARE
Problem: Physical Therapy  Goal: Physical Therapy Goal  Description: Goals to be met by: 25    Patient will increase functional independence with mobility by performin. Supine<>sit with supervision and without use of HB features.   2. Sit<>stand with CGA with RW.  3. Transfer bed<>chair with Carmen and LRAD.  4. Gait x 25 feet with Carmen with RW.  5. Ascend/descend 5 step(s) with least restrictive assistive device and Carmen.   Outcome: Progressing

## 2025-04-17 PROBLEM — R04.2 HEMOPTYSIS: Status: ACTIVE | Noted: 2025-04-17

## 2025-04-17 LAB
ABSOLUTE EOSINOPHIL (OHS): 0.15 K/UL
ABSOLUTE MONOCYTE (OHS): 1.17 K/UL (ref 0.3–1)
ABSOLUTE NEUTROPHIL COUNT (OHS): 17.32 K/UL (ref 1.8–7.7)
ANION GAP (OHS): 5 MMOL/L (ref 8–16)
BASOPHILS # BLD AUTO: 0.08 K/UL
BASOPHILS NFR BLD AUTO: 0.4 %
BUN SERPL-MCNC: 13 MG/DL (ref 8–23)
CALCIUM SERPL-MCNC: 8.2 MG/DL (ref 8.7–10.5)
CHLORIDE SERPL-SCNC: 103 MMOL/L (ref 95–110)
CO2 SERPL-SCNC: 21 MMOL/L (ref 23–29)
CREAT SERPL-MCNC: 0.8 MG/DL (ref 0.5–1.4)
ERYTHROCYTE [DISTWIDTH] IN BLOOD BY AUTOMATED COUNT: 14.2 % (ref 11.5–14.5)
GFR SERPLBLD CREATININE-BSD FMLA CKD-EPI: >60 ML/MIN/1.73/M2
GLUCOSE SERPL-MCNC: 101 MG/DL (ref 70–110)
HCT VFR BLD AUTO: 24.2 % (ref 37–48.5)
HGB BLD-MCNC: 8.5 GM/DL (ref 12–16)
IMM GRANULOCYTES # BLD AUTO: 0.17 K/UL (ref 0–0.04)
IMM GRANULOCYTES NFR BLD AUTO: 0.8 % (ref 0–0.5)
LYMPHOCYTES # BLD AUTO: 1.9 K/UL (ref 1–4.8)
MAGNESIUM SERPL-MCNC: 1.7 MG/DL (ref 1.6–2.6)
MCH RBC QN AUTO: 31.4 PG (ref 27–31)
MCHC RBC AUTO-ENTMCNC: 35.1 G/DL (ref 32–36)
MCV RBC AUTO: 89 FL (ref 82–98)
NUCLEATED RBC (/100WBC) (OHS): 0 /100 WBC
PHOSPHATE SERPL-MCNC: 2.9 MG/DL (ref 2.7–4.5)
PLATELET # BLD AUTO: 450 K/UL (ref 150–450)
PMV BLD AUTO: 8.4 FL (ref 9.2–12.9)
POCT GLUCOSE: 105 MG/DL (ref 70–110)
POCT GLUCOSE: 115 MG/DL (ref 70–110)
POCT GLUCOSE: 130 MG/DL (ref 70–110)
POCT GLUCOSE: 146 MG/DL (ref 70–110)
POTASSIUM SERPL-SCNC: 4.2 MMOL/L (ref 3.5–5.1)
PROCALCITONIN SERPL-MCNC: 0.09 NG/ML
RBC # BLD AUTO: 2.71 M/UL (ref 4–5.4)
RELATIVE EOSINOPHIL (OHS): 0.7 %
RELATIVE LYMPHOCYTE (OHS): 9.1 % (ref 18–48)
RELATIVE MONOCYTE (OHS): 5.6 % (ref 4–15)
RELATIVE NEUTROPHIL (OHS): 83.4 % (ref 38–73)
SODIUM SERPL-SCNC: 129 MMOL/L (ref 136–145)
WBC # BLD AUTO: 20.79 K/UL (ref 3.9–12.7)

## 2025-04-17 PROCEDURE — 84145 PROCALCITONIN (PCT): CPT | Performed by: HOSPITALIST

## 2025-04-17 PROCEDURE — 11000001 HC ACUTE MED/SURG PRIVATE ROOM

## 2025-04-17 PROCEDURE — 94761 N-INVAS EAR/PLS OXIMETRY MLT: CPT

## 2025-04-17 PROCEDURE — 25000003 PHARM REV CODE 250

## 2025-04-17 PROCEDURE — 87641 MR-STAPH DNA AMP PROBE: CPT | Performed by: HOSPITALIST

## 2025-04-17 PROCEDURE — 63600175 PHARM REV CODE 636 W HCPCS: Performed by: PHYSICIAN ASSISTANT

## 2025-04-17 PROCEDURE — 83735 ASSAY OF MAGNESIUM: CPT | Performed by: HOSPITALIST

## 2025-04-17 PROCEDURE — 25000003 PHARM REV CODE 250: Performed by: HOSPITALIST

## 2025-04-17 PROCEDURE — 97116 GAIT TRAINING THERAPY: CPT

## 2025-04-17 PROCEDURE — 97535 SELF CARE MNGMENT TRAINING: CPT

## 2025-04-17 PROCEDURE — A4216 STERILE WATER/SALINE, 10 ML: HCPCS | Performed by: PHYSICIAN ASSISTANT

## 2025-04-17 PROCEDURE — 85025 COMPLETE CBC W/AUTO DIFF WBC: CPT | Performed by: HOSPITALIST

## 2025-04-17 PROCEDURE — 84100 ASSAY OF PHOSPHORUS: CPT | Performed by: HOSPITALIST

## 2025-04-17 PROCEDURE — 63600175 PHARM REV CODE 636 W HCPCS: Performed by: HOSPITALIST

## 2025-04-17 PROCEDURE — 36415 COLL VENOUS BLD VENIPUNCTURE: CPT | Performed by: HOSPITALIST

## 2025-04-17 PROCEDURE — 97530 THERAPEUTIC ACTIVITIES: CPT

## 2025-04-17 PROCEDURE — 80048 BASIC METABOLIC PNL TOTAL CA: CPT | Performed by: HOSPITALIST

## 2025-04-17 PROCEDURE — 21400001 HC TELEMETRY ROOM

## 2025-04-17 PROCEDURE — 25500020 PHARM REV CODE 255: Performed by: HOSPITALIST

## 2025-04-17 PROCEDURE — 25000003 PHARM REV CODE 250: Performed by: PHYSICIAN ASSISTANT

## 2025-04-17 RX ADMIN — SODIUM CHLORIDE, PRESERVATIVE FREE 10 ML: 5 INJECTION INTRAVENOUS at 01:04

## 2025-04-17 RX ADMIN — HEPARIN SODIUM 5000 UNITS: 5000 INJECTION INTRAVENOUS; SUBCUTANEOUS at 06:04

## 2025-04-17 RX ADMIN — AZITHROMYCIN MONOHYDRATE 500 MG: 500 INJECTION, POWDER, LYOPHILIZED, FOR SOLUTION INTRAVENOUS at 11:04

## 2025-04-17 RX ADMIN — ASPIRIN 81 MG CHEWABLE TABLET 81 MG: 81 TABLET CHEWABLE at 08:04

## 2025-04-17 RX ADMIN — GABAPENTIN 400 MG: 400 CAPSULE ORAL at 08:04

## 2025-04-17 RX ADMIN — SODIUM CHLORIDE, PRESERVATIVE FREE 10 ML: 5 INJECTION INTRAVENOUS at 09:04

## 2025-04-17 RX ADMIN — GABAPENTIN 400 MG: 400 CAPSULE ORAL at 02:04

## 2025-04-17 RX ADMIN — VANCOMYCIN HYDROCHLORIDE 1000 MG: 1 INJECTION, POWDER, LYOPHILIZED, FOR SOLUTION INTRAVENOUS at 11:04

## 2025-04-17 RX ADMIN — IOHEXOL 75 ML: 350 INJECTION, SOLUTION INTRAVENOUS at 12:04

## 2025-04-17 RX ADMIN — GABAPENTIN 400 MG: 400 CAPSULE ORAL at 09:04

## 2025-04-17 RX ADMIN — PIPERACILLIN SODIUM AND TAZOBACTAM SODIUM 4.5 G: 4; .5 INJECTION, POWDER, FOR SOLUTION INTRAVENOUS at 05:04

## 2025-04-17 RX ADMIN — SODIUM CHLORIDE, PRESERVATIVE FREE 10 ML: 5 INJECTION INTRAVENOUS at 06:04

## 2025-04-17 RX ADMIN — Medication 6 MG: at 09:04

## 2025-04-17 NOTE — ASSESSMENT & PLAN NOTE
Prior history of hyponatremia.  Cause unclear.  Corrected here on it's own without intervention.  TSH wnl.  Coristol speaks against adrenal insuffiencey.  Hypervolemic on exam however without clear liver, heart, or kidney disease.  Possible edema due to knee injury.  Monitor.

## 2025-04-17 NOTE — ASSESSMENT & PLAN NOTE
Presented with elevated white blood cell count and now with fever.  Unclear source.  UA suggestive of possible UTI but without urinary symptoms.  Left knee not tender with good range of motion.  Left knee swelling improving.  Resolving URI symptoms however today reports onset scant hemoptysis and cough.  Inflammatory response not improving with empiric antibiotic therapy.  Blood culture no growth to date thus far.  Possible noninfectious source of inflammatory response.  Most common cause of hemoptysis airway irritation from pneumonia or upper respiratory infection.  However in light of advanced age concern for possible malignancy.  Will check CT scan with contrast today evaluate for possible mass and/or airway lesion and better evaluate for airspace disease.  In light of hemoptysis will hold off on subcutaneous heparin and aspirin therapy for now.  Mild drop in hemoglobin but stable.

## 2025-04-17 NOTE — PLAN OF CARE
Problem: Adult Inpatient Plan of Care  Goal: Plan of Care Review  Outcome: Progressing  Goal: Patient-Specific Goal (Individualized)  Outcome: Progressing  Goal: Absence of Hospital-Acquired Illness or Injury  Outcome: Progressing  Goal: Optimal Comfort and Wellbeing  Outcome: Progressing  Goal: Readiness for Transition of Care  Outcome: Progressing     Problem: Diabetes Comorbidity  Goal: Blood Glucose Level Within Targeted Range  Outcome: Progressing     Problem: Wound  Goal: Optimal Coping  Outcome: Progressing  Goal: Optimal Functional Ability  Outcome: Progressing  Goal: Absence of Infection Signs and Symptoms  Outcome: Progressing  Goal: Improved Oral Intake  Outcome: Progressing  Goal: Optimal Pain Control and Function  Outcome: Progressing  Goal: Skin Health and Integrity  Outcome: Progressing  Goal: Optimal Wound Healing  Outcome: Progressing     Problem: Skin Injury Risk Increased  Goal: Skin Health and Integrity  Outcome: Progressing     Problem: Fall Injury Risk  Goal: Absence of Fall and Fall-Related Injury  Outcome: Progressing     Problem: UTI (Urinary Tract Infection)  Goal: Improved Infection Symptoms  Outcome: Progressing     Problem: Pain Acute  Goal: Optimal Pain Control and Function  Outcome: Progressing     Problem: Sepsis/Septic Shock  Goal: Optimal Coping  Outcome: Progressing  Goal: Absence of Bleeding  Outcome: Progressing  Goal: Blood Glucose Level Within Targeted Range  Outcome: Progressing  Goal: Absence of Infection Signs and Symptoms  Outcome: Progressing  Goal: Optimal Nutrition Intake  Outcome: Progressing

## 2025-04-17 NOTE — ASSESSMENT & PLAN NOTE
Presented with elevated white blood cell count and now with fever.  Unclear source.  Resolving URI symptoms.  UA suggestive of possible UTI but without urinary symptoms.  Initial CXR not convincing for pneumonia but possible.  Left knee not tender and with good ROM. Checking blood culture, expand antibiotic coverage, add procalcitonin.

## 2025-04-17 NOTE — ASSESSMENT & PLAN NOTE
Prior history of hyponatremia.  Cause unclear.  Serum sodium improved without intervention.  TSH wnl.  Coristol speaks against adrenal insuffiencey.  Hypervolemic initially on exam however swelling seems serum improved on its own.  Urine sodium appropriately dilute.  Monitor.  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  Continue to monitor serum sodium level.  If trends down again reasonable to repeat urine studies.

## 2025-04-17 NOTE — ASSESSMENT & PLAN NOTE
Brace and therapy per Orthopedic surgery.  Patient doing well with physical therapy.  Continue.  Swelling improving.

## 2025-04-17 NOTE — PT/OT/SLP PROGRESS
Occupational Therapy      Patient Name:  Jojo Ayoub   MRN:  [id number]    Patient not seen today secondary to Testing/imaging (xray/CT/MRI). Will follow-up later today as schedule permits.    4/17/2025

## 2025-04-17 NOTE — SUBJECTIVE & OBJECTIVE
Interval History: Fevers and leukocytosis persists.  Patient reports scant hemoptysis this morning with cough.    Review of Systems   Constitutional:  Positive for fever. Negative for chills.   Respiratory:  Positive for cough. Negative for shortness of breath.    Cardiovascular:  Negative for chest pain.   Gastrointestinal:  Negative for abdominal pain, nausea and vomiting.   Genitourinary:  Negative for dysuria and frequency.     Objective:     Vital Signs (Most Recent):  Temp: 98.6 °F (37 °C) (04/17/25 0753)  Pulse: 90 (04/17/25 0753)  Resp: 17 (04/17/25 0753)  BP: (!) 100/48 (04/17/25 0753)  SpO2: (!) 92 % (04/17/25 0753) Vital Signs (24h Range):  Temp:  [97.6 °F (36.4 °C)-101 °F (38.3 °C)] 98.6 °F (37 °C)  Pulse:  [] 90  Resp:  [17-18] 17  SpO2:  [92 %-95 %] 92 %  BP: ()/(48-61) 100/48     Weight: 61.2 kg (135 lb)  Body mass index is 23.17 kg/m².    Intake/Output Summary (Last 24 hours) at 4/17/2025 1108  Last data filed at 4/17/2025 0530  Gross per 24 hour   Intake 780 ml   Output 1000 ml   Net -220 ml         Physical Exam  Constitutional:       General: She is not in acute distress.     Appearance: She is ill-appearing.   HENT:      Head: Atraumatic.   Eyes:      Conjunctiva/sclera: Conjunctivae normal.   Cardiovascular:      Rate and Rhythm: Normal rate and regular rhythm.      Heart sounds: Normal heart sounds. No murmur heard.  Pulmonary:      Effort: Pulmonary effort is normal.      Breath sounds: Rhonchi present. No wheezing.      Comments: Breathing comfortably been lung sounds courses with more prominent rhonchi in the left lower base that does not clear with cough.  Abdominal:      General: Bowel sounds are normal. There is no distension.      Palpations: Abdomen is soft.      Tenderness: There is no abdominal tenderness.   Musculoskeletal:         General: No deformity. Normal range of motion.      Cervical back: Neck supple.      Comments: Left knee not tender, swelling around the left  knee and bilateral lower extremities much improved.   Neurological:      Mental Status: She is alert and oriented to person, place, and time.               Significant Labs: All pertinent labs within the past 24 hours have been reviewed.    Significant Imaging: I have reviewed all pertinent imaging results/findings within the past 24 hours.

## 2025-04-17 NOTE — PT/OT/SLP PROGRESS
Physical Therapy Treatment    Patient Name:  Jojo Ayoub   MRN:  [id number]    Recommendations:     Discharge Recommendations: High Intensity Therapy  Discharge Equipment Recommendations: walker, rolling, to be determined by next level of care  Barriers to discharge:  mobility limitations    Assessment:     Jojo Ayoub is a 89 y.o. female admitted with a medical diagnosis of Sepsis.  She presents with the following impairments/functional limitations: weakness, impaired endurance, impaired functional mobility, gait instability, impaired balance, decreased coordination, edema .  Pt continues to improve with mobility however is still well below her baseline. Required Mod A to perform sit to stand at RW today. Ambulated 30' in room w verbal and tactile cues for upright posture and Min A for balance and RW management.     Rehab Prognosis: Good; patient would benefit from acute skilled PT services to address these deficits and reach maximum level of function.    Recent Surgery: * No surgery found *      Plan:     During this hospitalization, patient to be seen 5 x/week to address the identified rehab impairments via therapeutic activities, gait training, therapeutic exercises, neuromuscular re-education and progress toward the following goals:    Plan of Care Expires:  05/16/25    Subjective     Chief Complaint: edema and instability of L knee   Patient/Family Comments/goals: agreeable to participate w PT today  Pain/Comfort:  Pain Rating 1: 0/10      Objective:     Communicated with FAUSTO vides prior to session.  Patient found supine with family at bedside. Pt cheerful.  telemetry, PureWick, peripheral IV upon PT entry to room.     General Precautions: Standard, fall, diabetic  Orthopedic Precautions: N/A  Braces:  (brace to L knee)  Respiratory Status: Room air     Functional Mobility:  Bed Mobility:  Rolling Right: minimum assistance  Scooting: minimum assistance  Bridging: minimum assistance  Supine to Sit:  minimum assistance  Sit to Supine: minimum assistance  Transfers:  Sit to Stand:  moderate assistance with rolling walker  Gait: 30' w RW and Min A for balance intermittently as well as RW management.       AM-PAC 6 CLICK MOBILITY  Turning over in bed (including adjusting bedclothes, sheets and blankets)?: 3  Sitting down on and standing up from a chair with arms (e.g., wheelchair, bedside commode, etc.): 3  Moving from lying on back to sitting on the side of the bed?: 3  Moving to and from a bed to a chair (including a wheelchair)?: 3  Need to walk in hospital room?: 3  Climbing 3-5 steps with a railing?: 2  Basic Mobility Total Score: 17       Treatment & Education:  Pt performed the following exercises while seated at EOB x10 ANTHONY: heel/toe taps, LAQs. Decreased ankle AROM observed.   Educated on PT role, PT POC, safety &use of RW.     Patient left supine with all lines intact, call button in reach, and MD at bedside . Nursing tech notified that pt needing bed linens changed as pt urinated on bed linens while MD rounding at bedside prior to therapist being able to put purewick back in place.     GOALS:   Multidisciplinary Problems       Physical Therapy Goals          Problem: Physical Therapy    Goal Priority Disciplines Outcome Interventions   Physical Therapy Goal     PT, PT/OT Progressing    Description: Goals to be met by: 25    Patient will increase functional independence with mobility by performin. Supine<>sit with supervision and without use of HB features.   2. Sit<>stand with CGA with RW.  3. Transfer bed<>chair with Carmen and LRAD.  4. Gait x 25 feet with Carmen with RW.  5. Ascend/descend 5 step(s) with least restrictive assistive device and Carmen.                        DME Justifications:  No DME recommended requiring DME justifications    Time Tracking:     PT Received On: 25  PT Start Time: 1028     PT Stop Time: 1106  PT Total Time (min): 38 min     Billable Minutes: Gait Training  10 and Therapeutic Activity 28    Treatment Type: Treatment  PT/PTA: PT     Number of PTA visits since last PT visit: 0     04/17/2025

## 2025-04-17 NOTE — ASSESSMENT & PLAN NOTE
[facility] [address]    [address]    [place] [address]    Phone:  [phone]       Thank You for choosing us for your health care visit. We are glad to serve you and happy to provide you with this summary of your visit. Please help us to ensure we have accurate records. If you find anything that needs to be changed, please let our staff know as soon as possible.          Your Demographic Information     Patient Name Sex Jordyn Prasad A Female 1974       Ethnic Group Patient Race    Not of  or  Origin White      Your Visit Details     Date & Time Provider Department    6/15/2017 1:50 PM María Sandoval MD [facility] [place],  [address]      Your Upcoming Appointment*(Max 10)     Monday July 10, 2017  8:00 AM CDT   Complete Physical Exam with Nandini Peraza MD   [facility],  [address] ([facility])    [address]  [place] [address]   [phone]            Monday July 10, 2017  1:45 PM CDT   New Patient Visit with Dana Alcaraz MD   [facility] ([facility])    [address] [address]   [phone]              Your To Do List     Future Orders Please Complete On or Around Expires    FREE T4  Gerald 15, 2017 Jul 15, 2017    Standing Orders Interval Expires    THYROID STIMULATING HORMONE   2018    Follow-Up    Return in about 6 months (around 12/15/2017) for hypothyroidism.      Conditions Discussed Today or Order-Related Diagnoses        Comments    Acquired hypothyroidism    -  Primary       Your Vitals Were     BP Pulse Temp Resp    110/70 (BP Location: [facility], Patient Position: Sitting, Cuff Size: Regular) 72 98.3 °F (36.8 °C) (Temporal Artery) 18    Weight LMP BMI Smoking Status    198 lb 3.2 oz (89.9 kg) 2017 30.14 kg/m2 Former Smoker      Medications Prescribed or Re-Ordered  Brace and therapy per ortho.  Continue.     Today     levothyroxine (SYNTHROID, LEVOTHROID) 75 MCG tablet    Sig - Route: Take 1 tablet by mouth daily. Except sunday 2 tab - Oral    Class: Eprescribe    Pharmacy: [id number] - [facility] [address] [facility] #: [phone]      Your Current Medications Are        Disp Refills Start End    levothyroxine (SYNTHROID, LEVOTHROID) 75 MCG tablet 102 tablet 1 6/15/2017     Sig - Route: Take 1 tablet by mouth daily. Except sunday 2 tab - Oral    Class: Eprescribe    fexofenadine (ALLEGRA) 180 MG tablet 30 tablet 1 6/13/2017     Sig: TAKE ONE TABLET BY MOUTH EVERY DAY    Class: Eprescribe    ibuprofen (MOTRIN) 600 MG tablet        Sig - Route: Take 600 mg by mouth every 6 hours as needed for Pain. - Oral    Class: Historical Med    montelukast (SINGULAIR) 10 MG tablet 90 tablet 3 4/19/2017     Sig - Route: Take 1 tablet by mouth nightly. - Oral    Class: Eprescribe    sertraline (ZOLOFT) 50 MG tablet 90 tablet 1 4/19/2017     Sig - Route: Take 1 tablet by mouth daily. - Oral    Class: Eprescribe    fluticasone (FLONASE) 50 MCG/ACT nasal spray 1 Bottle 11 4/19/2017     Sig - Route: Spray 1-2 sprays in each nostril daily. - Nasal    Class: Eprescribe    Cyanocobalamin ([employer] B-12) 500 MCG SL Tab        Class: Historical Med    Route: Sublingual    triamcinolone (KENALOG) 0.5 % ointment 30 g 0 12/15/2016     Sig: Apply BID PRN to eczema flares.    Class: Eprescribe    olopatadine (PATANOL) 0.1 % ophthalmic solution 5 mL 12 9/22/2016     Sig - Route: Place 1 drop into both eyes 2 times daily. - Both Eyes    ramelteon (ROZEREM) 8 MG tablet        Sig - Route: Take 8 mg by mouth nightly as needed for Sleep. - Oral    Class: Historical Med    cyclobenzaprine (FLEXERIL) 5 MG tablet        Sig - Route: Take 5 mg by mouth 3 times daily as needed for Muscle spasms. - Oral    Class: Historical Med    VITAMIN D, CHOLECALCIFEROL, PO        Sig - Route: Take 2,000 Units by mouth daily. - Oral    Class: Historical  Med    FOLIC ACID PO        Sig - Route: Take by mouth daily. - Oral    Class: Historical Med    levalbuterol (XOPENEX HFA) 45 MCG/ACT inhaler 1 Inhaler 0 6/27/2016     Sig - Route: Inhale 1-2 puffs into the lungs every 4 hours as needed for Wheezing. - Inhalation    Class: Eprescribe      Allergies     Vicodin [Hydrocodone-acetaminophen] DIZZINESS    Albuterol Other (See Comments)    HEART PALPITATIONS      Immunizations History as of 6/15/2017     Name Date    Depo-provera 10/22/2002, 7/31/2002, 5/9/2002, 2/15/2002, 8/27/2001, 6/4/2001    Hepatitis B Adult 7/23/1999    Influenza 9/19/2016, 9/22/2015, 9/23/2014, 9/26/2011, 9/29/2009, 10/29/2008    Influenza A novel H1N1 11/3/2009    PPD 5/12/2004    Tdap 10/1/2007    Tetanus 9/1/1999      Problem List as of 6/15/2017     Extrinsic asthma, unspecified    Personal history of tobacco use, presenting hazards to health    Migraine without aura, without mention of intractable migraine without mention of status migrainosus    Obesity, unspecified    Acquired hypothyroidism    Anxiety and depression    Seasonal allergic rhinitis due to pollen    MS (multiple sclerosis) (CMS/[facility])            Patient Instructions     None

## 2025-04-17 NOTE — PROGRESS NOTES
Therapy with vancomycin complete and/or consult discontinued by provider.  Pharmacy will sign off, please re-consult as needed.    Thank you for the consult,    Keyona Robles  04/17/2025

## 2025-04-17 NOTE — SUBJECTIVE & OBJECTIVE
Interval History: Fevers overnight.  Elevated white count persistent.     Review of Systems   Constitutional:  Positive for fever. Negative for chills.   Respiratory:  Negative for shortness of breath.    Cardiovascular:  Negative for chest pain.   Gastrointestinal:  Negative for abdominal pain, nausea and vomiting.   Genitourinary:  Negative for dysuria and frequency.     Objective:     Vital Signs (Most Recent):  Temp: 99.7 °F (37.6 °C) (04/16/25 1932)  Pulse: 87 (04/16/25 1932)  Resp: 18 (04/16/25 1932)  BP: (!) 127/58 (04/16/25 1932)  SpO2: 95 % (04/16/25 1932) Vital Signs (24h Range):  Temp:  [97.5 °F (36.4 °C)-101.1 °F (38.4 °C)] 99.7 °F (37.6 °C)  Pulse:  [] 87  Resp:  [17-18] 18  SpO2:  [92 %-96 %] 95 %  BP: ()/(49-61) 127/58     Weight: 61.2 kg (135 lb)  Body mass index is 23.17 kg/m².    Intake/Output Summary (Last 24 hours) at 4/16/2025 2214  Last data filed at 4/16/2025 1932  Gross per 24 hour   Intake 1080 ml   Output 500 ml   Net 580 ml         Physical Exam  Constitutional:       General: She is not in acute distress.     Appearance: She is ill-appearing.   HENT:      Head: Atraumatic.   Eyes:      Conjunctiva/sclera: Conjunctivae normal.   Cardiovascular:      Rate and Rhythm: Normal rate and regular rhythm.      Heart sounds: Normal heart sounds. No murmur heard.  Pulmonary:      Effort: Pulmonary effort is normal.      Breath sounds: Normal breath sounds. No wheezing.   Abdominal:      General: Bowel sounds are normal. There is no distension.      Palpations: Abdomen is soft.      Tenderness: There is no abdominal tenderness.   Musculoskeletal:         General: No deformity. Normal range of motion.      Cervical back: Neck supple.      Right lower leg: Edema present.      Left lower leg: Edema present.      Comments: Left knee not tender   Neurological:      Mental Status: She is alert and oriented to person, place, and time.               Significant Labs: All pertinent labs within the  past 24 hours have been reviewed.    Significant Imaging: I have reviewed all pertinent imaging results/findings within the past 24 hours.

## 2025-04-17 NOTE — PROGRESS NOTES
"[facility] Medicine  Progress Note    Patient Name: Jojo Ayoub  MRN: [id number]  Patient Class: IP- Inpatient   Admission Date: 4/14/2025  Length of Stay: 1 days  Attending Physician: Zach Adams MD  Primary Care Provider: Bahman Vincent Jr., MD        Subjective     Principal Problem:Sepsis        HPI:  Himanshu Danielle, PA-C:    "Ms. Jojo Ayoub is a 89 y.o. female, with PMH of T2DM, HTN, HLD, chronic constipation, OA w/ chronic left knee pain, anemia, who presented to [facility] ED on 4/15/25 due to b/l LE swelling x "a few weeks." Her daughter syayes the leg swelling was first noted by her today. She additionally notes low blood pressure readings of 90/55 at home today. She endorses left knee pain, and states she feels like her left knee "dislocate" a few nights ago. She states she had a knee replacement of her left knee over 20 years ago. She denied fall/trauma, leg pain or redness or warmth. She denied dizziness, fatigue, fever, chills, sore throat, runny nose, congestion, shortness of breath, chest pain, palpitations, abdominal pain, nausea, vomiting, diarrhea, or urinary symptoms. She was evaluated in the ED with labs showing leukocytosis of 19K, with left shift and H&H of 9.9/28.0. A metabolic panel showed sodium of 121, potassium of 5.2, glucose was 94, with CO2 of 19, and anion gap of 7. A BNP was 181 without elevation of troponin. A UA showed nitrite positive UTI with 2+ leuk esterase, with 20 WBC, and many bacteria. A CXR showed bilateral hilar prominence, and mild bibasilar densities. An x-ray of the knee had a findings that might reporesent a Pellegrinin-Stieda lesion vs. An avulsive fracture and a suprapatellar effusion. She was treated in the ED with 1L NS and rocephin."    Overview/Hospital Course:  Patient is a 89 year-old woman history of hypertension, diabetes mellitus type 2, osteoarthritis of left knee status post remote left knee replacement who " presents with worsening left knee pain with swelling.  Patient walks with walker at home at baseline.  She reports she recently slid to ground from a chair at home a few weeks but denies any more recent fall or any other trauma.     Exam notable for significant bilateral lower extremity edema more so on the left side.  Laxity of the left knee.  Not tender to touch. Lungs clear.  Imaging concerning for possible avulsion fracture.  Orthopedic surgery service consult for evaluation and recommended Adamaris brace and physical therapy.    Ultrasound of lower extremity negative for clot.  Echocardiogram does not show evidence of heart failure.  Hyponatremia on presentation which is improving without intervention.    Patient with persistent elevated white count and now fever overnight.  Blood cultures ordered.  On empiric broad-spectrum antibiotics.    No new subjective & objective note has been filed under this hospital service since the last note was generated.        Assessment & Plan  Sepsis  Presented with elevated white blood cell count and now with fever.  Unclear source.  Resolving URI symptoms.  UA suggestive of possible UTI but without urinary symptoms.  Initial CXR not convincing for pneumonia but possible.  Left knee not tender and with good ROM. Checking blood culture, expand antibiotic coverage, add procalcitonin.    Acute on Chronic pain of left knee  Brace and therapy per ortho.  Continue.    Type 2 diabetes mellitus, without long-term current use of insulin  Reasonably controlled with current regimen.  Will continue with current regimen and continue to monitor.  Hyperlipidemia  - continue statin     Essential hypertension  Reasonably controlled with current regimen.  Will continue with current regimen and continue to monitor.    Hyponatremia  Prior history of hyponatremia.  Cause unclear.  Corrected here on it's own without intervention.  TSH wnl.  Coristol speaks against adrenal insuffiencey.  Hypervolemic  on exam however without clear liver, heart, or kidney disease.  Possible edema due to knee injury.  Monitor.      VTE Risk Mitigation (From admission, onward)           Ordered     heparin (porcine) injection 5,000 Units  Every 8 hours         04/15/25 0105     IP VTE HIGH RISK PATIENT  Once         04/15/25 0109     Place sequential compression device  Until discontinued         04/15/25 0105                    Zach Adams MD  Department of Hospital Medicine   [facility] ([place])

## 2025-04-17 NOTE — PLAN OF CARE
Plan of care reviewed with patient. Patient without needs at this time. Will continue to observe and note any changes as they occur.       Problem: Adult Inpatient Plan of Care  Goal: Plan of Care Review  Outcome: Progressing  Goal: Patient-Specific Goal (Individualized)  Outcome: Progressing  Goal: Absence of Hospital-Acquired Illness or Injury  Outcome: Progressing  Goal: Optimal Comfort and Wellbeing  Outcome: Progressing  Goal: Readiness for Transition of Care  Outcome: Progressing     Problem: Diabetes Comorbidity  Goal: Blood Glucose Level Within Targeted Range  Outcome: Progressing     Problem: Wound  Goal: Optimal Coping  Outcome: Progressing  Goal: Optimal Functional Ability  Outcome: Progressing  Goal: Absence of Infection Signs and Symptoms  Outcome: Progressing  Goal: Improved Oral Intake  Outcome: Progressing  Goal: Optimal Pain Control and Function  Outcome: Progressing  Goal: Skin Health and Integrity  Outcome: Progressing  Goal: Optimal Wound Healing  Outcome: Progressing     Problem: Skin Injury Risk Increased  Goal: Skin Health and Integrity  Outcome: Progressing     Problem: Fall Injury Risk  Goal: Absence of Fall and Fall-Related Injury  Outcome: Progressing     Problem: UTI (Urinary Tract Infection)  Goal: Improved Infection Symptoms  Outcome: Progressing     Problem: Pain Acute  Goal: Optimal Pain Control and Function  Outcome: Progressing     Problem: Sepsis/Septic Shock  Goal: Optimal Coping  Outcome: Progressing  Goal: Absence of Bleeding  Outcome: Progressing  Goal: Blood Glucose Level Within Targeted Range  Outcome: Progressing  Goal: Absence of Infection Signs and Symptoms  Outcome: Progressing  Goal: Optimal Nutrition Intake  Outcome: Progressing      scattered including bilateral upper and lower extremities, abdomen

## 2025-04-17 NOTE — HOSPITAL COURSE
Patient is a 89 year-old woman history of hypertension, diabetes mellitus type 2, osteoarthritis of left knee status post remote left knee replacement who presents with worsening left knee pain with swelling.  Patient walks with walker at home at baseline.  She reports she recently slid to ground from a chair at home a few weeks but denies any more recent fall or any other trauma.     Exam notable for significant bilateral lower extremity edema more so on the left side.  Laxity of the left knee.  Not tender to touch. Lungs clear.  Imaging concerning for possible avulsion fracture.  Orthopedic surgery service consult for evaluation and recommended Adamaris brace and physical therapy.    Ultrasound of lower extremity negative for clot.  Echocardiogram does not show evidence of heart failure.  Hyponatremia on presentation which is improving without intervention.    Patient with persistent elevated white count and now fever overnight.  Blood cultures ordered.  On empiric broad-spectrum antibiotics.   cognitive limitations/communication limitations/comprehension

## 2025-04-17 NOTE — PROGRESS NOTES
"[facility] Medicine  Progress Note    Patient Name: Jojo Ayoub  MRN: [id number]  Patient Class: IP- Inpatient   Admission Date: 4/14/2025  Length of Stay: 2 days  Attending Physician: Zach Adams MD  Primary Care Provider: Bahman Vincent Jr., MD        Subjective     Principal Problem:Sepsis        HPI:  Himanshu Danielle, PA-C:    "Ms. Jojo Ayoub is a 89 y.o. female, with PMH of T2DM, HTN, HLD, chronic constipation, OA w/ chronic left knee pain, anemia, who presented to [facility] ED on 4/15/25 due to b/l LE swelling x "a few weeks." Her daughter syayes the leg swelling was first noted by her today. She additionally notes low blood pressure readings of 90/55 at home today. She endorses left knee pain, and states she feels like her left knee "dislocate" a few nights ago. She states she had a knee replacement of her left knee over 20 years ago. She denied fall/trauma, leg pain or redness or warmth. She denied dizziness, fatigue, fever, chills, sore throat, runny nose, congestion, shortness of breath, chest pain, palpitations, abdominal pain, nausea, vomiting, diarrhea, or urinary symptoms. She was evaluated in the ED with labs showing leukocytosis of 19K, with left shift and H&H of 9.9/28.0. A metabolic panel showed sodium of 121, potassium of 5.2, glucose was 94, with CO2 of 19, and anion gap of 7. A BNP was 181 without elevation of troponin. A UA showed nitrite positive UTI with 2+ leuk esterase, with 20 WBC, and many bacteria. A CXR showed bilateral hilar prominence, and mild bibasilar densities. An x-ray of the knee had a findings that might reporesent a Pellegrinin-Stieda lesion vs. An avulsive fracture and a suprapatellar effusion. She was treated in the ED with 1L NS and rocephin."    Overview/Hospital Course:  Patient is a 89 year-old woman history of hypertension, diabetes mellitus type 2, osteoarthritis of left knee status post remote left knee replacement who " presents with worsening left knee pain with swelling.  Patient walks with walker at home at baseline.  She reports she recently slid to ground from a chair at home a few weeks but denies any more recent fall or any other trauma.     Exam notable for significant bilateral lower extremity edema more so on the left side.  Laxity of the left knee.  Not tender to touch. Lungs clear.  Imaging concerning for possible avulsion fracture.  Orthopedic surgery service consult for evaluation and recommended Adamaris brace and physical therapy.    Ultrasound of lower extremity negative for clot.  Echocardiogram does not show evidence of heart failure.  Hyponatremia on presentation which is improving without intervention.    Patient with persistent elevated white count and now fever overnight.  Blood cultures ordered.  On empiric broad-spectrum antibiotics.    Interval History: Fevers and leukocytosis persists.  Patient reports scant hemoptysis this morning with cough.    Review of Systems   Constitutional:  Positive for fever. Negative for chills.   Respiratory:  Positive for cough. Negative for shortness of breath.    Cardiovascular:  Negative for chest pain.   Gastrointestinal:  Negative for abdominal pain, nausea and vomiting.   Genitourinary:  Negative for dysuria and frequency.     Objective:     Vital Signs (Most Recent):  Temp: 98.6 °F (37 °C) (04/17/25 0753)  Pulse: 90 (04/17/25 0753)  Resp: 17 (04/17/25 0753)  BP: (!) 100/48 (04/17/25 0753)  SpO2: (!) 92 % (04/17/25 0753) Vital Signs (24h Range):  Temp:  [97.6 °F (36.4 °C)-101 °F (38.3 °C)] 98.6 °F (37 °C)  Pulse:  [] 90  Resp:  [17-18] 17  SpO2:  [92 %-95 %] 92 %  BP: ()/(48-61) 100/48     Weight: 61.2 kg (135 lb)  Body mass index is 23.17 kg/m².    Intake/Output Summary (Last 24 hours) at 4/17/2025 1108  Last data filed at 4/17/2025 0530  Gross per 24 hour   Intake 780 ml   Output 1000 ml   Net -220 ml         Physical Exam  Constitutional:       General:  She is not in acute distress.     Appearance: She is ill-appearing.   HENT:      Head: Atraumatic.   Eyes:      Conjunctiva/sclera: Conjunctivae normal.   Cardiovascular:      Rate and Rhythm: Normal rate and regular rhythm.      Heart sounds: Normal heart sounds. No murmur heard.  Pulmonary:      Effort: Pulmonary effort is normal.      Breath sounds: Rhonchi present. No wheezing.      Comments: Breathing comfortably been lung sounds courses with more prominent rhonchi in the left lower base that does not clear with cough.  Abdominal:      General: Bowel sounds are normal. There is no distension.      Palpations: Abdomen is soft.      Tenderness: There is no abdominal tenderness.   Musculoskeletal:         General: No deformity. Normal range of motion.      Cervical back: Neck supple.      Comments: Left knee not tender, swelling around the left knee and bilateral lower extremities much improved.   Neurological:      Mental Status: She is alert and oriented to person, place, and time.               Significant Labs: All pertinent labs within the past 24 hours have been reviewed.    Significant Imaging: I have reviewed all pertinent imaging results/findings within the past 24 hours.      Assessment & Plan  Sepsis  Hemoptysis  Presented with elevated white blood cell count and now with fever.  Unclear source.  UA suggestive of possible UTI but without urinary symptoms.  Left knee not tender with good range of motion.  Left knee swelling improving.  Resolving URI symptoms however today reports onset scant hemoptysis and cough.  Inflammatory response not improving with empiric antibiotic therapy.  Blood culture no growth to date thus far.  Possible noninfectious source of inflammatory response.  Most common cause of hemoptysis airway irritation from pneumonia or upper respiratory infection.  However in light of advanced age concern for possible malignancy.  Will check CT scan with contrast today evaluate for possible  mass and/or airway lesion and better evaluate for airspace disease.  In light of hemoptysis will hold off on subcutaneous heparin and aspirin therapy for now.  Mild drop in hemoglobin but stable.  Hyponatremia  Prior history of hyponatremia.  Cause unclear.  Serum sodium improved without intervention.  TSH wnl.  Coristol speaks against adrenal insuffiencey.  Hypervolemic initially on exam however swelling seems serum improved on its own.  Urine sodium appropriately dilute.  Monitor.  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  Continue to monitor serum sodium level.  If trends down again reasonable to repeat urine studies.  Acute on Chronic pain of left knee  Brace and therapy per Orthopedic surgery.  Patient doing well with physical therapy.  Continue.  Swelling improving.  Type 2 diabetes mellitus, without long-term current use of insulin  Reasonably controlled with current regimen.  Will continue with current regimen and continue to monitor.  Hyperlipidemia  Continue statin therapy.  Essential hypertension  Normotensive to mildly hypotensive.  Hold blood pressure medications.  Monitor.    VTE Risk Mitigation (From admission, onward)           Ordered     IP VTE HIGH RISK PATIENT  Once         04/17/25 1115     Place LIANNE hose  Until discontinued         04/17/25 1115     Place sequential compression device  Until discontinued         04/17/25 1115     Reason for No Pharmacological VTE Prophylaxis  Once        Question:  Reasons:  Answer:  Active Bleeding    04/17/25 1115                    Zach Adams MD  Department of Hospital Medicine   [facility] ([place])

## 2025-04-17 NOTE — PT/OT/SLP PROGRESS
Occupational Therapy   Treatment    Name: Jojo Ayoub  MRN: [id number]  Admitting Diagnosis:  Sepsis       Recommendations:     Discharge Recommendations: High Intensity Therapy  Discharge Equipment Recommendations:  walker, rolling, to be determined by next level of care  Barriers to discharge:   (Current functional level)    Assessment:     Jojo Ayoub is a 89 y.o. female with a medical diagnosis of Sepsis.  She presents with the following performance deficits affecting function: weakness, impaired endurance, impaired self care skills, impaired functional mobility, gait instability, impaired balance, decreased ROM, orthopedic precautions, impaired joint extensibility, decreased safety awareness, decreased lower extremity function, decreased coordination.     Patient was Mod I<>Min A at home prior to this event for ADL and ADL mobility. There is expectation of returning to prior level of function to maintain independence avoiding readmission.  Pt needing a total interdisciplinary treatment approach. Pt is at high risk of unplanned readmission due to fall risk and lack of 24 hour caregiver in prior setting.  Pt is able to tolerate 3 hours of daily therapy. Pt is motivated to return to prior level of function.       Rehab Prognosis:  Good; patient would benefit from acute skilled OT services to address these deficits and reach maximum level of function.       Plan:     Patient to be seen 5 x/week to address the above listed problems via self-care/home management, therapeutic activities, therapeutic exercises  Plan of Care Expires: 05/16/25  Plan of Care Reviewed with: patient, son    Subjective     Chief Complaint: None  Patient/Family Comments/goals: Pt's son reporting that pt uses a rollator at home and pt is walking better and safer with RW during tx session.  Pain/Comfort:  Pain Rating 1: 0/10  Pain Rating Post-Intervention 1: 0/10    Objective:     Communicated with: nurseRitika, prior to session.   Patient found HOB elevated with peripheral IV, telemetry, PureWick upon OT entry to room.    General Precautions: Standard, anti-coagulation medicine, diabetic, fall    Orthopedic Precautions:N/A  Braces:  (Left knee brace due to patella subluxation)  Respiratory Status: Room air     Occupational Performance:     Bed Mobility:    Supine to Sit: SBA with HOB elevated  Sit to supine: SBA with HOB flat     Functional Mobility/Transfers:  Sit to stand: Min A with RW, cues for bilateral feet and hand placement  Functional Mobility: CGA with RW, occasional assist for navigation of RW  Education and training on placement of RW when standing at sink for grooming tasks   Education and training of pt's son on donning and use of gait belt when assisting pt with ADL mobility    Activities of Daily Living:  UB dressing: Min A   Toileting: Pt is using female external catheter to suction but safe to transfer to toilet or BSC with knee brace donned, RW and staff using gait belt.  Gait belt issued to pt and left in pt's room yesterday.        [facility] 6 Click ADL: 15    Treatment & Education:  Role of OT, POC, ADL and ADL mobility training and safety, caregiver donning and use of gait belt, correct placement of RW when standing at sink for grooming tasks, discharge recs     Patient left  HOB elevated, bilateral heels floated off bed with use of pillow  with all lines intact, call button in reach, bed alarm on, nurse notified, and son present    GOALS:   Multidisciplinary Problems       Occupational Therapy Goals          Problem: Occupational Therapy    Goal Priority Disciplines Outcome Interventions   Occupational Therapy Goal     OT, PT/OT Progressing    Description: Goals to be met by: 5/3/25     Patient will increase functional independence with ADLs by performing:    UB dressing at Set up/SBA.  LB dressing at Set up/SBA.  Toileting at SBA.  Toilet transfer at SBA.   Grooming standing at sink at SBA.   Sponge bathing at Set  up/SBA.                         DME Justifications:   Jojo requires a commode for home use because she is confined to a single room.   Jojo's mobility limitation cannot be sufficiently resolved by the use of a cane. Her functional mobility deficit can be sufficiently resolved with the use of a Rolling Walker. Patient's mobility limitation significantly impairs their ability to participate in one of more activities of daily living.  The use of a RW will significantly improve the patient's ability to participate in MRADLS and the patient will use it on regular basis in the home.    Time Tracking:     OT Date of Treatment: 04/16/25  OT Start Time: 1511  OT Stop Time: 1536  OT Total Time (min): 25 min    Billable Minutes:Self Care/Home Management 25    OT/CAESAR: OT          4/17/2025

## 2025-04-17 NOTE — CARE UPDATE
Reviewed CT findings with patient and son at the bedside and I spoke with daughter on the phone.  Findings concerning for malignancy.  Will need a tissue diagnosis.  Consult with Pulmonary Service who plan on diagnostic thoracentesis tomorrow however still with likely need incisional lymph node biopsy due to concern for possible lymphoma.  Consult General Surgery about arranging for an excisional lymph node biopsy potentially from left axilla.  Ultrasound of left axilla ordered.

## 2025-04-18 PROBLEM — J98.59 MEDIASTINAL MASS: Status: ACTIVE | Noted: 2025-04-18

## 2025-04-18 PROBLEM — R60.0 LOWER EXTREMITY EDEMA: Status: ACTIVE | Noted: 2025-04-18

## 2025-04-18 PROBLEM — J90 PLEURAL EFFUSION: Status: ACTIVE | Noted: 2025-04-18

## 2025-04-18 PROBLEM — M25.562 ACUTE PAIN OF LEFT KNEE: Status: ACTIVE | Noted: 2025-04-18

## 2025-04-18 PROBLEM — R50.9 FEVER: Status: ACTIVE | Noted: 2025-04-18

## 2025-04-18 LAB
ABSOLUTE EOSINOPHIL (OHS): 0.26 K/UL
ABSOLUTE MONOCYTE (OHS): 1.32 K/UL (ref 0.3–1)
ABSOLUTE NEUTROPHIL COUNT (OHS): 17.09 K/UL (ref 1.8–7.7)
ANION GAP (OHS): 6 MMOL/L (ref 8–16)
BASOPHILS # BLD AUTO: 0.08 K/UL
BASOPHILS NFR BLD AUTO: 0.4 %
BUN SERPL-MCNC: 13 MG/DL (ref 8–23)
CALCIUM SERPL-MCNC: 8.8 MG/DL (ref 8.7–10.5)
CHLORIDE SERPL-SCNC: 102 MMOL/L (ref 95–110)
CO2 SERPL-SCNC: 23 MMOL/L (ref 23–29)
CREAT SERPL-MCNC: 0.9 MG/DL (ref 0.5–1.4)
ERYTHROCYTE [DISTWIDTH] IN BLOOD BY AUTOMATED COUNT: 14.3 % (ref 11.5–14.5)
GFR SERPLBLD CREATININE-BSD FMLA CKD-EPI: >60 ML/MIN/1.73/M2
GLUCOSE SERPL-MCNC: 112 MG/DL (ref 70–110)
HCT VFR BLD AUTO: 25.1 % (ref 37–48.5)
HGB BLD-MCNC: 8.7 GM/DL (ref 12–16)
HOLD SPECIMEN: NORMAL
HOLD SPECIMEN: NORMAL
IMM GRANULOCYTES # BLD AUTO: 0.19 K/UL (ref 0–0.04)
IMM GRANULOCYTES NFR BLD AUTO: 0.9 % (ref 0–0.5)
LDH SERPL-CCNC: 302 U/L (ref 110–260)
LYMPHOCYTES # BLD AUTO: 1.85 K/UL (ref 1–4.8)
MAGNESIUM SERPL-MCNC: 1.6 MG/DL (ref 1.6–2.6)
MCH RBC QN AUTO: 30.6 PG (ref 27–31)
MCHC RBC AUTO-ENTMCNC: 34.7 G/DL (ref 32–36)
MCV RBC AUTO: 88 FL (ref 82–98)
MRSA PCR SCRN (OHS): NOT DETECTED
NUCLEATED RBC (/100WBC) (OHS): 0 /100 WBC
PHOSPHATE SERPL-MCNC: 3.2 MG/DL (ref 2.7–4.5)
PLATELET # BLD AUTO: 460 K/UL (ref 150–450)
PMV BLD AUTO: 8 FL (ref 9.2–12.9)
POCT GLUCOSE: 112 MG/DL (ref 70–110)
POCT GLUCOSE: 115 MG/DL (ref 70–110)
POCT GLUCOSE: 121 MG/DL (ref 70–110)
POCT GLUCOSE: 134 MG/DL (ref 70–110)
POTASSIUM SERPL-SCNC: 3.9 MMOL/L (ref 3.5–5.1)
RBC # BLD AUTO: 2.84 M/UL (ref 4–5.4)
RELATIVE EOSINOPHIL (OHS): 1.3 %
RELATIVE LYMPHOCYTE (OHS): 8.9 % (ref 18–48)
RELATIVE MONOCYTE (OHS): 6.3 % (ref 4–15)
RELATIVE NEUTROPHIL (OHS): 82.2 % (ref 38–73)
SODIUM SERPL-SCNC: 131 MMOL/L (ref 136–145)
WBC # BLD AUTO: 20.79 K/UL (ref 3.9–12.7)

## 2025-04-18 PROCEDURE — A4216 STERILE WATER/SALINE, 10 ML: HCPCS | Performed by: PHYSICIAN ASSISTANT

## 2025-04-18 PROCEDURE — 99223 1ST HOSP IP/OBS HIGH 75: CPT | Mod: ,,, | Performed by: SURGERY

## 2025-04-18 PROCEDURE — A9698 NON-RAD CONTRAST MATERIALNOC: HCPCS | Performed by: INTERNAL MEDICINE

## 2025-04-18 PROCEDURE — 25000003 PHARM REV CODE 250: Performed by: HOSPITALIST

## 2025-04-18 PROCEDURE — 21400001 HC TELEMETRY ROOM

## 2025-04-18 PROCEDURE — 36415 COLL VENOUS BLD VENIPUNCTURE: CPT | Performed by: HOSPITALIST

## 2025-04-18 PROCEDURE — 80048 BASIC METABOLIC PNL TOTAL CA: CPT | Performed by: HOSPITALIST

## 2025-04-18 PROCEDURE — 83615 LACTATE (LD) (LDH) ENZYME: CPT | Performed by: STUDENT IN AN ORGANIZED HEALTH CARE EDUCATION/TRAINING PROGRAM

## 2025-04-18 PROCEDURE — 83735 ASSAY OF MAGNESIUM: CPT | Performed by: HOSPITALIST

## 2025-04-18 PROCEDURE — 63600175 PHARM REV CODE 636 W HCPCS: Performed by: HOSPITALIST

## 2025-04-18 PROCEDURE — 63700000 PHARM REV CODE 250 ALT 637 W/O HCPCS: Performed by: INTERNAL MEDICINE

## 2025-04-18 PROCEDURE — 25000003 PHARM REV CODE 250

## 2025-04-18 PROCEDURE — 99223 1ST HOSP IP/OBS HIGH 75: CPT | Mod: GC,,, | Performed by: INTERNAL MEDICINE

## 2025-04-18 PROCEDURE — 25500020 PHARM REV CODE 255: Performed by: INTERNAL MEDICINE

## 2025-04-18 PROCEDURE — 25000003 PHARM REV CODE 250: Performed by: PHYSICIAN ASSISTANT

## 2025-04-18 PROCEDURE — 85025 COMPLETE CBC W/AUTO DIFF WBC: CPT | Performed by: HOSPITALIST

## 2025-04-18 PROCEDURE — 94761 N-INVAS EAR/PLS OXIMETRY MLT: CPT

## 2025-04-18 PROCEDURE — 84100 ASSAY OF PHOSPHORUS: CPT | Performed by: HOSPITALIST

## 2025-04-18 PROCEDURE — 36415 COLL VENOUS BLD VENIPUNCTURE: CPT | Performed by: STUDENT IN AN ORGANIZED HEALTH CARE EDUCATION/TRAINING PROGRAM

## 2025-04-18 PROCEDURE — 0W9B3ZZ DRAINAGE OF LEFT PLEURAL CAVITY, PERCUTANEOUS APPROACH: ICD-10-PCS | Performed by: INTERNAL MEDICINE

## 2025-04-18 PROCEDURE — 11000001 HC ACUTE MED/SURG PRIVATE ROOM

## 2025-04-18 RX ORDER — AZITHROMYCIN 250 MG/1
500 TABLET, FILM COATED ORAL NIGHTLY
Status: DISCONTINUED | OUTPATIENT
Start: 2025-04-18 | End: 2025-04-21

## 2025-04-18 RX ADMIN — SODIUM CHLORIDE, PRESERVATIVE FREE 10 ML: 5 INJECTION INTRAVENOUS at 03:04

## 2025-04-18 RX ADMIN — GABAPENTIN 400 MG: 400 CAPSULE ORAL at 08:04

## 2025-04-18 RX ADMIN — Medication 6 MG: at 08:04

## 2025-04-18 RX ADMIN — PIPERACILLIN SODIUM AND TAZOBACTAM SODIUM 4.5 G: 4; .5 INJECTION, POWDER, FOR SOLUTION INTRAVENOUS at 08:04

## 2025-04-18 RX ADMIN — SODIUM CHLORIDE, PRESERVATIVE FREE 10 ML: 5 INJECTION INTRAVENOUS at 05:04

## 2025-04-18 RX ADMIN — IOHEXOL 1000 ML: 9 SOLUTION ORAL at 04:04

## 2025-04-18 RX ADMIN — ACETAMINOPHEN 1000 MG: 500 TABLET ORAL at 08:04

## 2025-04-18 RX ADMIN — AZITHROMYCIN DIHYDRATE 500 MG: 250 TABLET ORAL at 09:04

## 2025-04-18 RX ADMIN — SODIUM CHLORIDE, PRESERVATIVE FREE 10 ML: 5 INJECTION INTRAVENOUS at 09:04

## 2025-04-18 RX ADMIN — GABAPENTIN 400 MG: 400 CAPSULE ORAL at 03:04

## 2025-04-18 RX ADMIN — PIPERACILLIN SODIUM AND TAZOBACTAM SODIUM 4.5 G: 4; .5 INJECTION, POWDER, FOR SOLUTION INTRAVENOUS at 04:04

## 2025-04-18 RX ADMIN — PIPERACILLIN SODIUM AND TAZOBACTAM SODIUM 4.5 G: 4; .5 INJECTION, POWDER, FOR SOLUTION INTRAVENOUS at 01:04

## 2025-04-18 NOTE — SUBJECTIVE & OBJECTIVE
Interval History: 1 overnight fever and remains with leukocytosis. She has no real complaints for me. We discussed likely thoracentesis and possible axillary node biopsy.     Review of Systems   Constitutional:  Positive for fever. Negative for chills.   Respiratory:  Positive for cough. Negative for shortness of breath.    Cardiovascular:  Negative for chest pain.   Gastrointestinal:  Negative for abdominal pain, nausea and vomiting.   Genitourinary:  Negative for dysuria and frequency.     Objective:     Vital Signs (Most Recent):  Temp: 98.5 °F (36.9 °C) (04/18/25 1118)  Pulse: 82 (04/18/25 1118)  Resp: 18 (04/18/25 1118)  BP: (!) 102/58 (04/18/25 1118)  SpO2: (!) 92 % (04/18/25 1118) Vital Signs (24h Range):  Temp:  [97.9 °F (36.6 °C)-100.9 °F (38.3 °C)] 98.5 °F (36.9 °C)  Pulse:  [] 82  Resp:  [16-22] 18  SpO2:  [92 %-98 %] 92 %  BP: (100-118)/(58-74) 102/58     Weight: 61.2 kg (134 lb 14.7 oz)  Body mass index is 23.16 kg/m².    Intake/Output Summary (Last 24 hours) at 4/18/2025 1500  Last data filed at 4/18/2025 1118  Gross per 24 hour   Intake 1543.34 ml   Output 751 ml   Net 792.34 ml         Physical Exam  Constitutional:       General: She is not in acute distress.     Appearance: She is ill-appearing.   HENT:      Head: Atraumatic.   Eyes:      Conjunctiva/sclera: Conjunctivae normal.   Cardiovascular:      Rate and Rhythm: Normal rate and regular rhythm.      Heart sounds: Normal heart sounds. No murmur heard.  Pulmonary:      Effort: Pulmonary effort is normal.      Breath sounds: Rhonchi present. No wheezing.      Comments: Breathing comfortably been lung sounds courses with more prominent rhonchi in the left lower base that does not clear with cough.  Abdominal:      General: Bowel sounds are normal. There is no distension.      Palpations: Abdomen is soft.      Tenderness: There is no abdominal tenderness.   Musculoskeletal:         General: No deformity. Normal range of motion.      Cervical  back: Neck supple.      Comments: Left knee not tender, swelling around the left knee and bilateral lower extremities much improved.   Neurological:      Mental Status: She is alert and oriented to person, place, and time.               Significant Labs: All pertinent labs within the past 24 hours have been reviewed.    Significant Imaging: I have reviewed all pertinent imaging results/findings within the past 24 hours.

## 2025-04-18 NOTE — PROGRESS NOTES
Pharmacist Intervention IV to PO Note    Jojo Ayoub is a 89 y.o. female being treated with IV medication azithromycin    Patient Data:    Vital Signs (Most Recent):  Temp: 99.1 °F (37.3 °C) (04/18/25 1543)  Pulse: 67 (04/18/25 1543)  Resp: 17 (04/18/25 1543)  BP: 107/80 (04/18/25 1543)  SpO2: (!) 94 % (04/18/25 1543) Vital Signs (72h Range):  Temp:  [97.5 °F (36.4 °C)-101.1 °F (38.4 °C)]   Pulse:  []   Resp:  [16-22]   BP: ()/(48-80)   SpO2:  [92 %-98 %]      CBC:  Recent Labs   Lab 04/16/25  0341 04/17/25  0354 04/18/25  0639   WBC 17.37* 20.79* 20.79*   RBC 2.97* 2.71* 2.84*   HGB 9.0* 8.5* 8.7*   HCT 26.9* 24.2* 25.1*   * 450 460*   MCV 91 89 88   MCH 30.3 31.4* 30.6   MCHC 33.5 35.1 34.7     CMP:     Recent Labs   Lab 04/14/25  2042 04/15/25  0831 04/16/25  0341 04/17/25  0354 04/18/25  0639   CALCIUM 8.6*   < > 8.4* 8.2* 8.8   ALBUMIN 2.2*  --   --   --   --    *   < > 130* 129* 131*   K 5.2*   < > 4.6 4.2 3.9   CO2 19*   < > 20* 21* 23   CL 95   < > 101 103 102   BUN 13   < > 9 13 13   CREATININE 1.0   < > 0.8 0.8 0.9   ALKPHOS 60  --   --   --   --    ALT 15  --   --   --   --    AST 29  --   --   --   --    BILITOT 0.2  --   --   --   --     < > = values in this interval not displayed.       Dietary Orders:  Diet Orders            Diet Consistent Carbohydrate 2000 Calories (up to 75 gm per meal); Low Sodium,2gm, Renal Non-Dialysis: Carb Control starting at 04/15 0107            Based on the following criteria, this patient qualifies for intravenous to oral conversion:  [x] The patients gastrointestinal tract is functioning (tolerating medications via oral or enteral route for 24 hours and tolerating food or enteral feeds for 24 hours).  [x] The patient is hemodynamically stable for 24 hours (heart rate <100 beats per minute, systolic blood pressure >99 mm Hg, and respiratory rate <20 breaths per minute).  [x] The patient shows clinical improvement (afebrile for at least 24  hours and white blood cell count downtrending or normalized). Additionally, the patient must be non-neutropenic (absolute neutrophil count >500 cells/mm3).  [x] For antimicrobials, the patient has received IV therapy for at least 24 hours.    IV medication azithromycin will be changed to oral medication     Pharmacist's Name: Franny Weems  Pharmacist's Extension: [phone]

## 2025-04-18 NOTE — PLAN OF CARE
Plan of care reviewed with patient. IV antibiotics administered as ordered. Bedside thoracentesis performed via MD. Family updated. Patient without needs at this time. Will note any changes as they occur.       Problem: Adult Inpatient Plan of Care  Goal: Plan of Care Review  Outcome: Progressing  Goal: Patient-Specific Goal (Individualized)  Outcome: Progressing  Goal: Absence of Hospital-Acquired Illness or Injury  Outcome: Progressing  Goal: Optimal Comfort and Wellbeing  Outcome: Progressing  Goal: Readiness for Transition of Care  Outcome: Progressing     Problem: Diabetes Comorbidity  Goal: Blood Glucose Level Within Targeted Range  Outcome: Progressing     Problem: Wound  Goal: Optimal Coping  Outcome: Progressing  Goal: Optimal Functional Ability  Outcome: Progressing  Goal: Absence of Infection Signs and Symptoms  Outcome: Progressing  Goal: Improved Oral Intake  Outcome: Progressing  Goal: Optimal Pain Control and Function  Outcome: Progressing  Goal: Skin Health and Integrity  Outcome: Progressing  Goal: Optimal Wound Healing  Outcome: Progressing     Problem: Skin Injury Risk Increased  Goal: Skin Health and Integrity  Outcome: Progressing     Problem: Fall Injury Risk  Goal: Absence of Fall and Fall-Related Injury  Outcome: Progressing     Problem: UTI (Urinary Tract Infection)  Goal: Improved Infection Symptoms  Outcome: Progressing     Problem: Pain Acute  Goal: Optimal Pain Control and Function  Outcome: Progressing     Problem: Sepsis/Septic Shock  Goal: Optimal Coping  Outcome: Progressing  Goal: Absence of Bleeding  Outcome: Progressing  Goal: Blood Glucose Level Within Targeted Range  Outcome: Progressing  Goal: Absence of Infection Signs and Symptoms  Outcome: Progressing  Goal: Optimal Nutrition Intake  Outcome: Progressing     Problem: Comorbidity Management  Goal: Blood Pressure in Desired Range  Outcome: Progressing  Goal: Maintenance of Osteoarthritis Symptom Control  Outcome: Progressing

## 2025-04-18 NOTE — PLAN OF CARE
CT A/P reviewed, official read pending.  Air in Left pleural space post thoracentesis noted. While this could be from the lung parenchyma, it may also be external air entrained with manipulation of the catheter during procedure. Given that the air is loculated, this raise the likelihood of complicated effusion, possibly even gelatinous as there was very little fluid I was able to aspirate.     VSS are stable with no chest pain or SOB. The lungs are not collapsed. Will obtain upright portable CXR this evening to ensure no enlarging pneumothorax. If VS change, obtain STAT CXR, next step would be placement of pleural drain.     Discussed with staff, Dr. Sanchez.    Gino Lamb MD  Pulmonary/Critical Care Fellow

## 2025-04-18 NOTE — ASSESSMENT & PLAN NOTE
4/18: Seen on CT, along with L  pleural effusion and axillary mass. Pulm attempted tap but unable to obtain fluid, will consult with IR. The family is considering axillary biopsy, a full US of the axilla is pending as surgery unable to palpate mass.

## 2025-04-18 NOTE — CONSULTS
[facility] ([place])  General Surgery  Consult Note    Date of Consultation:4/18/2025    SUBJECTIVE:     History of Present Illness:  Patient is a 89 y.o. female admitted with lower extremity swelling and L knee subluxation.  Her course is c/b fever and leukocytosis of unknown etiology and new found 7.4cm mediastinal mass with bilateral mediastinal and hilar lymphadenopathy.    CT of her chest with contrast was obtained which showed an 8 x 4 cm subcarinal mass and other extensive mediastinal adenopathy.  She also complained of some scant hemoptysis for which Pulmonary was consulted.  CT of the chest also mentioned mild left axillary lymphadenopathy.  General surgery was consulted in case a lymph node could be obtained for diagnosis from her left axilla.    Chief Complaint   Patient presents with    Leg Swelling     BL LE edema, also reporting L knee pain and hypermobility.     Hypotension     BP today 90/55. In /62. Pt denies dizziness of fatigue. AAO x4.       Review of patient's allergies indicates:  No Active Allergies    Current Medications[1]    Past Medical History:   Diagnosis Date    Arthritis     Diabetes mellitus with neurological manifestation     Essential hypertension 2/28/2019    High blood pressure     Hyperlipidemia     Urinary urgency      Past Surgical History:   Procedure Laterality Date    EPIDURAL STEROID INJECTION N/A 1/31/2019    Procedure: INJECTION, STEROID, EPIDURAL, L3-L4 need consent;  Surgeon: Marlin Barber MD;  Location: [facility];  Service: Pain Management;  Laterality: N/A;    left knee replacement       low back surgery        Family History   Problem Relation Name Age of Onset    Diabetes Mother      Hypertension Mother      Diabetes Sister      Hypertension Sister      Diabetes Maternal Uncle      Diabetes Maternal Grandmother       Social History[2]     Review of Systems:  Review of Systems  See HPI.  No complaints currently  OBJECTIVE:     Vital Signs (Most  "Recent)  Temp: 98.5 °F (36.9 °C) (04/18/25 1118)  Pulse: 82 (04/18/25 1118)  Resp: 18 (04/18/25 1118)  BP: (!) 102/58 (04/18/25 1118)  SpO2: (!) 92 % (04/18/25 1118)  5' 4" (1.626 m)  61.2 kg (134 lb 14.7 oz)     Physical Exam:  Physical Exam  General no apparent distress  Eyes nonicteric sclera  Abdomen soft nontender nondistended  Lymph system no palpable adenopathy in the cervical, bilateral supraclavicular, bilateral axillary, bilateral femoral/inguinal basins.      Laboratory    CBC:   Recent Labs   Lab 04/18/25  0639   WBC 20.79*   RBC 2.84*   HGB 8.7*   HCT 25.1*   *   MCV 88   MCH 30.6   MCHC 34.7     CMP:   Recent Labs   Lab 04/18/25  0639   CALCIUM 8.8   *   K 3.9   CO2 23      BUN 13   CREATININE 0.9     Diagnostic Results:    CT: I have reviewed all pertinent results/findings within the past 24 hours.  Discussed in HPI    ASSESSMENT/PLAN:     Primarily we are dealing with a large subcarinal mediastinal mass that is possibly lymphadenopathy.    No obvious left axillary lymphadenopathy palpable on my exam    PLAN:Plan     Ultrasound of the left axilla has been ordered to further evaluate the left axilla.  I have explained that lymphoma in an axilla is usually fairly easily palpable.  I further explained ,that at this point, I am not sure how productive a left axillary biopsy would be at obtaining a diagnosis.  I will see the patient in the morning hopefully after left axillary ultrasound has been completed. Family also reports that she maybe scheduled for pleurocentesis.  We will make our final decision over the weekend about axillary biopsy  after axillary ultrasound and possible pleurocentesis      Thank you for the opportunity of seeing Jojo Ayoub in consultation        [1]   Current Facility-Administered Medications   Medication Dose Route Frequency Provider Last Rate Last Admin    acetaminophen tablet 1,000 mg  1,000 mg Oral Q8H PRN Zach Adams MD        azithromycin " (ZITHROMAX) 500 mg in 0.9% NaCl 250 mL IVPB (admixture device)  500 mg Intravenous Q24H Zach Adams MD   Stopped at 04/17/25 2352    dextrose 50% injection 12.5 g  12.5 g Intravenous PRN Jo Danielle PA-C        dextrose 50% injection 25 g  25 g Intravenous PRN Jo Danielle PA-C        gabapentin capsule 400 mg  400 mg Oral TID Jo Danielle PA-C   400 mg at 04/18/25 0846    glucagon (human recombinant) injection 1 mg  1 mg Intramuscular PRN Jo Danielle PA-C        glucose chewable tablet 16 g  16 g Oral PRJo Tay PA-C        glucose chewable tablet 24 g  24 g Oral PRN Jo Danielle PA-C        insulin aspart U-100 pen 0-5 Units  0-5 Units Subcutaneous QID (AC + HS) PRJo Tay PA-C        melatonin tablet 6 mg  6 mg Oral Nightly PRN Asim Lentz PA-C   6 mg at 04/17/25 2127    naloxone 0.4 mg/mL injection 0.02 mg  0.02 mg Intravenous PRN Jo Danielle PA-C        piperacillin-tazobactam (ZOSYN) 4.5 g in D5W 100 mL IVPB (MB+)  4.5 g Intravenous Q8H Zach Adams MD   Stopped at 04/18/25 1250    senna-docusate 8.6-50 mg per tablet 1 tablet  1 tablet Oral BID PRN Jo Danielle PA-C        sodium chloride 0.9% flush 10 mL  10 mL Intravenous PRN Asim Lentz PA-C        sodium chloride 0.9% flush 10 mL  10 mL Intravenous Q8H Jo Danielle PA-C   10 mL at 04/18/25 0516   [2]   Social History  Tobacco Use    Smoking status: Never    Smokeless tobacco: Never   Substance Use Topics    Alcohol use: No    Drug use: No

## 2025-04-18 NOTE — PROCEDURES
"Jojo Ayoub is a 89 y.o. female patient.    Temp: 98.5 °F (36.9 °C) (25 1118)  Pulse: 82 (25 1118)  Resp: 18 (25 1118)  BP: (!) 102/58 (25 1118)  SpO2: (!) 92 % (25 1118)  Weight: 61.2 kg (134 lb 14.7 oz) (25)  Height: 5' 4" (162.6 cm) (04/15/25 174)       Thoracentesis    Date/Time: 2025 3:08 PM  Location procedure was performed: [facility] PULMONARY MEDICINE    Performed by: Alexx Lamb MD  Authorized by: Alexx Lamb MD  Pre-operative diagnosis: pleural effusion  Post-operative diagnosis: pleural effusion  Consent Done: Yes  Consent: Written consent obtained  Risks and benefits: risks, benefits and alternatives were discussed  Consent given by: patient  Patient understanding: patient states understanding of the procedure being performed  Patient consent: the patient's understanding of the procedure matches consent given  Procedure consent: procedure consent matches procedure scheduled  Relevant documents: relevant documents present and verified  Test results: test results available and properly labeled  Site marked: the operative site was marked  Imaging studies: imaging studies available  Required items: required blood products, implants, devices, and special equipment available  Patient identity confirmed: MRN,  and name  Time out: Immediately prior to procedure a "time out" was called to verify the correct patient, procedure, equipment, support staff and site/side marked as required.  Procedure purpose: diagnostic  Indications: pleural effusion  Preparation: Patient was prepped and draped in the usual sterile fashion.  Local anesthesia used: yes    Anesthesia:  Local anesthesia used: yes  Local Anesthetic: lidocaine 1% without epinephrine  Anesthetic total: 3 mL    Patient sedated: no  Description of findings: pleural effusion with adequate sized pocket on bedside ultrasound   Preparation: skin prepped with ChloraPrep  Patient position: " sitting  Ultrasound guidance: yes  Location: left lateral  Puncture method: over-the-needle catheter  Number of attempts: 2  Drainage amount: 5 ml  Drainage characteristics: serosanguinous  Patient tolerance: Patient tolerated the procedure well with no immediate complications  Complications: No  Estimated blood loss (mL): 0  Specimens: No  Implants: No  Drainage Tube: removed    Unable to consisently aspirate pleural fluid after multiple attempts at repositioning need/catheter.      4/18/2025    Gino Lamb MD  Pulmonary/Critical Care Fellow

## 2025-04-18 NOTE — PROGRESS NOTES
"[facility] Medicine  Progress Note    Patient Name: Jojo Ayoub  MRN: [id number]  Patient Class: IP- Inpatient   Admission Date: 4/14/2025  Length of Stay: 3 days  Attending Physician: Roger Wood MD  Primary Care Provider: Bahman Vincent Jr., MD        Subjective     Principal Problem:Sepsis        HPI:  Himanshu Danielle, PAMisC:    "Ms. Jojo Ayoub is a 89 y.o. female, with PMH of T2DM, HTN, HLD, chronic constipation, OA w/ chronic left knee pain, anemia, who presented to [facility] ED on 4/15/25 due to b/l LE swelling x "a few weeks." Her daughter syayes the leg swelling was first noted by her today. She additionally notes low blood pressure readings of 90/55 at home today. She endorses left knee pain, and states she feels like her left knee "dislocate" a few nights ago. She states she had a knee replacement of her left knee over 20 years ago. She denied fall/trauma, leg pain or redness or warmth. She denied dizziness, fatigue, fever, chills, sore throat, runny nose, congestion, shortness of breath, chest pain, palpitations, abdominal pain, nausea, vomiting, diarrhea, or urinary symptoms. She was evaluated in the ED with labs showing leukocytosis of 19K, with left shift and H&H of 9.9/28.0. A metabolic panel showed sodium of 121, potassium of 5.2, glucose was 94, with CO2 of 19, and anion gap of 7. A BNP was 181 without elevation of troponin. A UA showed nitrite positive UTI with 2+ leuk esterase, with 20 WBC, and many bacteria. A CXR showed bilateral hilar prominence, and mild bibasilar densities. An x-ray of the knee had a findings that might reporesent a Pellegrinin-Stieda lesion vs. An avulsive fracture and a suprapatellar effusion. She was treated in the ED with 1L NS and rocephin."    Overview/Hospital Course:  Patient is a 89 year-old woman history of hypertension, diabetes mellitus type 2, osteoarthritis of left knee status post remote left knee replacement who " presents with worsening left knee pain with swelling.  Patient walks with walker at home at baseline.  She reports she recently slid to ground from a chair at home a few weeks but denies any more recent fall or any other trauma.     Exam notable for significant bilateral lower extremity edema more so on the left side.  Laxity of the left knee.  Not tender to touch. Lungs clear.  Imaging concerning for possible avulsion fracture.  Orthopedic surgery service consult for evaluation and recommended Adamaris brace and physical therapy.    Ultrasound of lower extremity negative for clot.  Echocardiogram does not show evidence of heart failure.  Hyponatremia on presentation which is improving without intervention.    Patient with persistent elevated white count and now fever overnight.  Blood cultures ordered.  On empiric broad-spectrum antibiotics.    Interval History: 1 overnight fever and remains with leukocytosis. She has no real complaints for me. We discussed likely thoracentesis and possible axillary node biopsy.     Review of Systems   Constitutional:  Positive for fever. Negative for chills.   Respiratory:  Positive for cough. Negative for shortness of breath.    Cardiovascular:  Negative for chest pain.   Gastrointestinal:  Negative for abdominal pain, nausea and vomiting.   Genitourinary:  Negative for dysuria and frequency.     Objective:     Vital Signs (Most Recent):  Temp: 98.5 °F (36.9 °C) (04/18/25 1118)  Pulse: 82 (04/18/25 1118)  Resp: 18 (04/18/25 1118)  BP: (!) 102/58 (04/18/25 1118)  SpO2: (!) 92 % (04/18/25 1118) Vital Signs (24h Range):  Temp:  [97.9 °F (36.6 °C)-100.9 °F (38.3 °C)] 98.5 °F (36.9 °C)  Pulse:  [] 82  Resp:  [16-22] 18  SpO2:  [92 %-98 %] 92 %  BP: (100-118)/(58-74) 102/58     Weight: 61.2 kg (134 lb 14.7 oz)  Body mass index is 23.16 kg/m².    Intake/Output Summary (Last 24 hours) at 4/18/2025 1500  Last data filed at 4/18/2025 1118  Gross per 24 hour   Intake 1543.34 ml    Output 751 ml   Net 792.34 ml         Physical Exam  Constitutional:       General: She is not in acute distress.     Appearance: She is ill-appearing.   HENT:      Head: Atraumatic.   Eyes:      Conjunctiva/sclera: Conjunctivae normal.   Cardiovascular:      Rate and Rhythm: Normal rate and regular rhythm.      Heart sounds: Normal heart sounds. No murmur heard.  Pulmonary:      Effort: Pulmonary effort is normal.      Breath sounds: Rhonchi present. No wheezing.      Comments: Breathing comfortably been lung sounds courses with more prominent rhonchi in the left lower base that does not clear with cough.  Abdominal:      General: Bowel sounds are normal. There is no distension.      Palpations: Abdomen is soft.      Tenderness: There is no abdominal tenderness.   Musculoskeletal:         General: No deformity. Normal range of motion.      Cervical back: Neck supple.      Comments: Left knee not tender, swelling around the left knee and bilateral lower extremities much improved.   Neurological:      Mental Status: She is alert and oriented to person, place, and time.               Significant Labs: All pertinent labs within the past 24 hours have been reviewed.    Significant Imaging: I have reviewed all pertinent imaging results/findings within the past 24 hours.      Assessment & Plan  Mediastinal mass  4/18: Seen on CT, along with L  pleural effusion and axillary mass. Pulm attempted tap but unable to obtain fluid, will consult with IR. The family is considering axillary biopsy, a full US of the axilla is pending as surgery unable to palpate mass.     Sepsis  Hemoptysis  Presented with elevated white blood cell count and now with fever.  Unclear source.  UA suggestive of possible UTI but without urinary symptoms.  Left knee not tender with good range of motion.  Left knee swelling improving.  Resolving URI symptoms however today reports onset scant hemoptysis and cough.  Inflammatory response not improving  with empiric antibiotic therapy.  Blood culture no growth to date thus far.  Possible noninfectious source of inflammatory response.  Most common cause of hemoptysis airway irritation from pneumonia or upper respiratory infection.  However in light of advanced age concern for possible malignancy.    Hyponatremia  Prior history of hyponatremia.  Cause unclear.  Serum sodium improved without intervention.  TSH wnl.  Coristol speaks against adrenal insuffiencey.  Hypervolemic initially on exam however swelling seems serum improved on its own.  Urine sodium appropriately dilute.  Monitor.  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  Continue to monitor serum sodium level.  If trends down again reasonable to repeat urine studies.  Acute on Chronic pain of left knee  Brace and therapy per Orthopedic surgery.  Patient doing well with physical therapy.  Continue.  Swelling improving.  Type 2 diabetes mellitus, without long-term current use of insulin  Reasonably controlled with current regimen.  Will continue with current regimen and continue to monitor.  Hyperlipidemia  Continue statin therapy.  Essential hypertension  Normotensive to mildly hypotensive.  Hold blood pressure medications.  Monitor.  Lower extremity edema      Fever      Pleural effusion      Acute pain of left knee      VTE Risk Mitigation (From admission, onward)           Ordered     IP VTE HIGH RISK PATIENT  Once         04/17/25 1115     Place LIANNE hose  Until discontinued         04/17/25 1115     Place sequential compression device  Until discontinued         04/17/25 1115     Reason for No Pharmacological VTE Prophylaxis  Once        Question:  Reasons:  Answer:  Active Bleeding    04/17/25 1115                    Discharge Planning   FAY: 4/19/2025     Code Status: Full Code   Medical Readiness for Discharge Date:   Discharge Plan A: Rehab   Discharge Delays: None known at this time            Please place Justification for  ZENON Wood MD  Department of Hospital Medicine   [facility] - Med Surg ([place])

## 2025-04-18 NOTE — ASSESSMENT & PLAN NOTE
Presented with elevated white blood cell count and now with fever.  Unclear source.  UA suggestive of possible UTI but without urinary symptoms.  Left knee not tender with good range of motion.  Left knee swelling improving.  Resolving URI symptoms however today reports onset scant hemoptysis and cough.  Inflammatory response not improving with empiric antibiotic therapy.  Blood culture no growth to date thus far.  Possible noninfectious source of inflammatory response.  Most common cause of hemoptysis airway irritation from pneumonia or upper respiratory infection.  However in light of advanced age concern for possible malignancy.

## 2025-04-18 NOTE — CONSULTS
"  [facility] Pulmonary Medicine Consult     Primary Attending:  Roger Wood MD   Consultant Attending: Ayden Sanchez MD   Consultant Fellow: Alexx Lamb, HO-IV     4/14/2025   3       Chief Complaint/Reason for Consult      "Hemoptysis with large lung mass"     History of Present Illness      89-year-old female with osteoarthritis of left knee, dm 2, HTN low back pain presented for/15/25 for bilateral lower extremity for a few weeks.  On admission she had an elevated white count, moderately hyponatremic and BNP of 181.  Left knee x-ray concerning for an avulsion fracture of suprapatellar tendon with effusion.  She was put on empiric Rocephin was concern for infectious of unknown source.  Orthopedic surgery consulted regarding her left knee deemed it to be a lateral patellar subluxation and provided a brace and outpatient follow-up.  On hospital day 2 she developed a fever up to 101 with persistently elevated white count.  Her antibiotics were escalated to include vancomycin and azithromycin in addition to Rocephin.  Blood and urine cultures were obtained.  CT of her chest with contrast was obtained which showed an 8 x 4 cm subcarinal mass.  She also complained of some scant hemoptysis for which Pulmonary was consulted.    On my visit the patient is accompanied by her  Samuel and her daughter Micheal.  Patient states she is feeling okay at this time with no cough.  States that 3 weeks ago she had started to develop decreased appetite, leg swelling, cough productive of white sputum.  She denies having any fevers subjective or objective, thinks she may have noticed some weight loss, denies night sweats.  She has been eating and drinking normally with no dysphagia, regurgitation of food or vomiting.  Denies any history of aspiration but does note some acid reflux.  She has had some loose stools without abdominal pain.  Denies any chest pain or shortness of breath.  No prior diagnosis or family history of autoimmune " disease.     Review of Systems      Negative except as noted above     Medications and nursing orders have been reviewed      PMHx     Past Medical History:   Diagnosis Date    Arthritis     Diabetes mellitus with neurological manifestation     Essential hypertension 2/28/2019    High blood pressure     Hyperlipidemia     Urinary urgency        MEDS     Prior to Admission medications    Medication Sig Start Date End Date Taking? Authorizing Provider   acetaminophen (TYLENOL) 500 MG tablet Take 2 tablets (1,000 mg total) by mouth every 8 (eight) hours as needed for Pain. 2/28/19  Yes Darya Robles MD   amLODIPine (NORVASC) 5 MG tablet Take 5 mg by mouth. 5/17/24  Yes Provider, Historical   aspirin ([employer] ASPIRIN) 81 MG chewable tablet Take 81 mg by mouth. 1 Tablet Oral Every day   Yes Provider, Historical   atorvastatin (LIPITOR) 10 MG tablet Take 10 mg by mouth once daily.   Yes Provider, Historical   carvediloL (COREG) 25 MG tablet TAKE 1 TABLET BY MOUTH TWICE A DAY 3/8/21  Yes Anselmo Torre MD   gabapentin (NEURONTIN) 800 MG tablet TAKE 1 TABLET (800 MG TOTAL) BY MOUTH 3 (THREE) TIMES DAILY. 5/12/20 4/15/25 Yes Christine Paul PA   GEMTESA 75 mg Tab Take 1 tablet by mouth. 5/10/24   Provider, Historical   metFORMIN (GLUCOPHAGE-XR) 500 MG ER 24hr tablet Take 1 tablet (500 mg total) by mouth daily with breakfast. 9/10/20 4/15/25  Anselmo Torre MD   nirmatrelvir-ritonavir (PAXLOVID) 300 mg (150 mg x 2)-100 mg copackaged tablets (EUA) Take 3 tablets by mouth 2 (two) times daily. Each dose contains 2 nirmatrelvir (pink tablets) and 1 ritonavir (white tablet). Take all 3 tablets together 6/28/24   Deonte Joshi MD   omeprazole (PRILOSEC) 20 MG capsule Take 20 mg by mouth. 6/26/24   Provider, Historical   ONETOUCH ULTRA BLUE TEST STRIP Strp TEST ONCE DAILY 8/27/18   Darya Robles MD ONETOUCH ULTRASOFT LANCETS lancets USE TO TEST BLOOD SUGAR DAILY 9/7/18   Travis  MD Darya       ALL     Review of patient's allergies indicates:  No Active Allergies    PSHx     Past Surgical History:   Procedure Laterality Date    EPIDURAL STEROID INJECTION N/A 1/31/2019    Procedure: INJECTION, STEROID, EPIDURAL, L3-L4 need consent;  Surgeon: Marlin Barber MD;  Location: [facility];  Service: Pain Management;  Laterality: N/A;    left knee replacement       low back surgery          FHx     Family History   Problem Relation Name Age of Onset    Diabetes Mother      Hypertension Mother      Diabetes Sister      Hypertension Sister      Diabetes Maternal Uncle      Diabetes Maternal Grandmother         SHx     Social History     Socioeconomic History    Marital status:    Tobacco Use    Smoking status: Never    Smokeless tobacco: Never   Substance and Sexual Activity    Alcohol use: No    Drug use: No    Sexual activity: Yes     Partners: Male     Social Drivers of Health     Financial Resource Strain: Low Risk  (4/16/2025)    Overall Financial Resource Strain (CARDIA)     Difficulty of Paying Living Expenses: Not hard at all   Food Insecurity: No Food Insecurity (4/16/2025)    Hunger Vital Sign     Worried About Running Out of Food in the Last Year: Never true     Ran Out of Food in the Last Year: Never true   Transportation Needs: No Transportation Needs (4/15/2025)    PRAPARE - Transportation     Lack of Transportation (Medical): No     Lack of Transportation (Non-Medical): No   Physical Activity: Inactive (4/15/2025)    Exercise Vital Sign     Days of Exercise per Week: 0 days     Minutes of Exercise per Session: 0 min   Stress: No Stress Concern Present (4/16/2025)    [employer] - Occupational Stress Questionnaire     Feeling of Stress : Not at all   Housing Stability: Low Risk  (4/16/2025)    Housing Stability Vital Sign     Unable to Pay for Housing in the Last Year: No     Homeless in the Last Year: No       On Examination      Vital  "Signs:  Temp:  [97.9 °F (36.6 °C)-100.9 °F (38.3 °C)]   Pulse:  []   Resp:  [16-22]   BP: ()/(51-74)   SpO2:  [92 %-98 %]     No results found for: "HTIN", "WEIGHT"    Intake / Output:    Intake/Output Summary (Last 24 hours) at 4/18/2025 0925  Last data filed at 4/18/2025 0620  Gross per 24 hour   Intake 2263.34 ml   Output 400 ml   Net 1863.34 ml       I have reviewed the vital trends as well as the documented I/Os     Physical Exam:  GEN:  Older woman resting in bed, NAD  HEENT: MMM, no scleral icterus, EOMI  NECK: Supple, midline trachea, no supraclavicular, anterior, posterior or submandibular LAD palpated  CV: RRR, no MRG, pulses equal and symmetric 2+ at radial  PULM:  CTAB  ABDOMEN: Soft, non-tender, non-distended, no rebound or guarding  SKIN: Warm, dry, intact, no rashes  MSK: No deformity, no clubbing, cyanosis, BLE pitting edema to lower shins  NEURO: awake and oriented and following commands, at neurologic baseline    Pocus:   Bilateral pleural effusions left greater than right.  Left effusion with debris some of which could be fibrin stranding suggesting complication      All recent labs and imaging studies have been reviewed    Recent Labs   Lab 04/16/25  0341 04/17/25  0354 04/18/25  0639   WBC 17.37* 20.79* 20.79*   HGB 9.0* 8.5* 8.7*   HCT 26.9* 24.2* 25.1*   * 450 460*       Recent Labs   Lab 04/16/25  0341 04/17/25  0354 04/18/25  0639   * 129* 131*   K 4.6 4.2 3.9    103 102   CO2 20* 21* 23   BUN 9 13 13   CREATININE 0.8 0.8 0.9   CALCIUM 8.4* 8.2* 8.8   MG 1.5* 1.7 1.6   PHOS 3.1 2.9 3.2       Recent Labs   Lab 04/14/25 2042   ALBUMIN 2.2*   BILITOT 0.2   AST 29   ALT 15   ALKPHOS 60       No results for input(s): "PROTIME", "PTT", "INR" in the last 168 hours.    Cardiac:   Recent Labs   Lab 04/14/25 2042   TROPONINI <0.006   *       FLP:   Lab Results   Component Value Date    CHOL 150 01/21/2020    HDL 48 01/21/2020    LDLCALC 83.8 01/21/2020    TRIG " 91 01/21/2020    CHOLHDL 32.0 01/21/2020     DM:   Lab Results   Component Value Date    HGBA1C 5.7 (H) 04/15/2025    HGBA1C 5.6 01/01/2023    HGBA1C 6.0 (H) 01/21/2020    GLUF 135 (H) 02/11/2008    LDLCALC 83.8 01/21/2020    CREATININE 0.9 04/18/2025     Thyroid:   Lab Results   Component Value Date    TSH 0.594 04/15/2025    FREET4 1.13 01/21/2020     Anemia:   Lab Results   Component Value Date    IRON 56 06/03/2010    TIBC 394 06/03/2010    FERRITIN 73 12/28/2011    WLOFPKBH45 884 03/03/2017    FOLATE >20.0 (A) 06/03/2010     Urinalysis:   Lab Results   Component Value Date    LABURIN  04/14/2025     Multiple organisms isolated. None in predominance. Repeat if clinically necessary.    COLORU Yellow 04/14/2025    SPECGRAV 1.005 04/14/2025    NITRITE Positive (A) 04/14/2025    KETONESU Negative 01/02/2023    UROBILINOGEN Negative 04/14/2025       Pertinent findings include:    Lab Results   Component Value Date    CRP 0.6 07/27/2012    [id number] Not Detected 01/01/2023    FERRITIN 73 12/28/2011          Microbiology:  Microbiology Results (last 7 days)       Procedure Component Value Units Date/Time    MRSA Screen by PCR [[id number]]  (Normal) Collected: 04/17/25 1809    Order Status: Completed Specimen: Nasal Swab Updated: 04/18/25 0441     MRSA PCR Screen Not Detected    Blood culture [[id number]]  (Normal) Collected: 04/16/25 1104    Order Status: Completed Specimen: Blood Updated: 04/18/25 0302     Blood Culture No Growth After 24 Hours    Culture, Respiratory with Gram Stain [[id number]]     Order Status: Sent Specimen: Respiratory from Sputum, Expectorated     Urine culture [[id number]] Collected: 04/14/25 2130    Order Status: Completed Specimen: Urine Updated: 04/16/25 1001     Urine Culture Multiple organisms isolated. None in predominance. Repeat if clinically necessary.            EKG/Echo:  Normal sinus rhythm   Normal EF, indeterminate diastolic function, no significant valvular and  reality    Radiology:  CXR images reviewed   CT chest with contrast images reviewed by me and notable for:  -8 x 4 cm subcarinal mediastinal mass with area of central hypoattenuation concerning for necrosis also causing significant esophageal compression with proximal esophageal dilation  -bilateral mediastinal hilar lymphadenopathy  -Bilateral pleural effusions L> R, left appears to be upward tracking against gravity  -basilar and right apical reticulation with honeycombing, bronchiectasis that is tubular and not proximally associated with fibrosis  -no consolidative process  -radiology also comments on mild left axillary LAD    I have personally and independently interpretted the following tests:    Labs / Images / Cultures     Assessment / Plan:     Jojo Ayoub admitted with lower extremity swelling and L knee subluxation.  Her course is c/b fever and leukocytosis of unknown etiology and new found 7.4cm mediastinal mass with bilateral mediastinal and hilar lymphadenopathy.    Mediastinal mass with associated lymphadenopathy  --Highest concern for mediastinal neoplasm, specifically lymphoma considering her left axillary lymphadenopathy.  Her fevers and leukocytosis may represent herpes symptoms.  Esophageal cancer also in the differential but no obvious risk factors for this.  Infection also in the differential but not seem as sick as would expect if this were to be mediastinitis.  These could also be coexisting.  --agree with General surgery consult to evaluate left axillary lymphadenopathy and assess if this is amenable to excisional biopsy as would provide the highest yield for lymphoma.  --also plan on thoracentesis if family is amenable to evaluate for infection and malignancy of the pleural space.   --counseled the family that if pleural fluid studies are nondiagnostic and she is not amenable to surgical biopsy, the next step would be an EBUS.  Counseled that if this were neoplasm the treatment regimen  would likely be quite involved and that her age and functional status would factor into whether this is something to be pursued or not, they were already keenly aware of this idea.  Counseled that if there was a decision to not proceed with treatment in the event of malignancy then with holding the pursuit of invasive diagnostic measures would also be a reasonable course.    Leukocytosis/fever  --potentially represent B symptoms if malignancy is present.  Given the esophageal compression and proximal abnormality she is at significant risk for aspiration however there is no obvious parenchymal consolidation nor is there any other obvious source of infection however, the pleural fluid on the left does appear somewhat complicated on bedside ultrasound and sampling this to assess for infection as well as malignancy would be appropriate-awaiting to see if family is amenable to this.    --agree with continuing Zosyn at this time in the setting of likely aspiration with possible empyema, her bronchiectasis may also place her at risk for pseudomonal infection although doubt this at this time.    --follow-up culture data, obtain respiratory culture if she is producing sputum.  Plan to deescalate antibiotics as able    3. Reported hemoptysis  --her report this is scant, patient denies any further episodes.  No obvious etiology for pulmonary source on the CT scan.  If anything suspect it will be a pseudo hemoptysis from either GI or sinus  --continue to monitor    4. Pulmonary reticulation and honeycombing  --no history of ILD.  No baseline pulmonary symptoms.  No other history of autoimmune disease.  No symptoms consistent with autoimmune disease other than her osteoarthritis.  This could be realted to chronic aspiration fibrosis. Pending the course of her mass +/minus sign infection could consider pursuing autoimmune workup in the outpatient setting.      This plan was rounded on with staff pulmonologist Dr. Sanchez    We will  continue to follow with you, thank you for the opportunity to be involved in Mrs. Ayoub's care.    Please call or message directly through EPIC with any additional questions or concerns     Alexx Lamb MD  Pulmonary & Critical Care Fellow

## 2025-04-18 NOTE — PLAN OF CARE
Report received from RN.POC reviewed with the pt.RN assumed care.AAOX3.Room Air.All due medication administered according to the MAR.No chest pain,SOB occurred during the night shift.No fever,chills ,rigors occurred.Observation reviewed and charted.Care explained,No falls or injury occurred during the shift.No any  significant event undergo in the shift. Pt rest in the bed comfortably. Flow sheets updated timely. Purposeful rounding done every 2 hourly. Daily Weight charted,IP/OP maintained and charted. Pt kept under observation through out the shift.      Problem: Adult Inpatient Plan of Care  Goal: Plan of Care Review  Outcome: Progressing  Goal: Patient-Specific Goal (Individualized)  Outcome: Progressing  Goal: Absence of Hospital-Acquired Illness or Injury  Outcome: Progressing  Goal: Optimal Comfort and Wellbeing  Outcome: Progressing  Goal: Readiness for Transition of Care  Outcome: Progressing     Problem: Diabetes Comorbidity  Goal: Blood Glucose Level Within Targeted Range  Outcome: Progressing     Problem: Wound  Goal: Optimal Coping  Outcome: Progressing  Goal: Optimal Functional Ability  Outcome: Progressing  Goal: Absence of Infection Signs and Symptoms  Outcome: Progressing  Goal: Improved Oral Intake  Outcome: Progressing  Goal: Optimal Pain Control and Function  Outcome: Progressing  Goal: Skin Health and Integrity  Outcome: Progressing  Goal: Optimal Wound Healing  Outcome: Progressing     Problem: Skin Injury Risk Increased  Goal: Skin Health and Integrity  Outcome: Progressing     Problem: Fall Injury Risk  Goal: Absence of Fall and Fall-Related Injury  Outcome: Progressing     Problem: UTI (Urinary Tract Infection)  Goal: Improved Infection Symptoms  Outcome: Progressing     Problem: Pain Acute  Goal: Optimal Pain Control and Function  Outcome: Progressing     Problem: Sepsis/Septic Shock  Goal: Optimal Coping  Outcome: Progressing  Goal: Absence of Bleeding  Outcome: Progressing  Goal: Blood  Glucose Level Within Targeted Range  Outcome: Progressing  Goal: Absence of Infection Signs and Symptoms  Outcome: Progressing  Goal: Optimal Nutrition Intake  Outcome: Progressing     Problem: Comorbidity Management  Goal: Blood Pressure in Desired Range  Outcome: Progressing  Goal: Maintenance of Osteoarthritis Symptom Control  Outcome: Progressing

## 2025-04-19 LAB
ABSOLUTE EOSINOPHIL (OHS): 0.34 K/UL
ABSOLUTE MONOCYTE (OHS): 1.09 K/UL (ref 0.3–1)
ABSOLUTE NEUTROPHIL COUNT (OHS): 18.56 K/UL (ref 1.8–7.7)
ANION GAP (OHS): 12 MMOL/L (ref 8–16)
BASOPHILS # BLD AUTO: 0.09 K/UL
BASOPHILS NFR BLD AUTO: 0.4 %
BUN SERPL-MCNC: 14 MG/DL (ref 8–23)
CALCIUM SERPL-MCNC: 9.5 MG/DL (ref 8.7–10.5)
CHLORIDE SERPL-SCNC: 100 MMOL/L (ref 95–110)
CO2 SERPL-SCNC: 19 MMOL/L (ref 23–29)
CREAT SERPL-MCNC: 0.9 MG/DL (ref 0.5–1.4)
ERYTHROCYTE [DISTWIDTH] IN BLOOD BY AUTOMATED COUNT: 14.3 % (ref 11.5–14.5)
GFR SERPLBLD CREATININE-BSD FMLA CKD-EPI: >60 ML/MIN/1.73/M2
GLUCOSE SERPL-MCNC: 91 MG/DL (ref 70–110)
HCT VFR BLD AUTO: 26.8 % (ref 37–48.5)
HGB BLD-MCNC: 9.2 GM/DL (ref 12–16)
IMM GRANULOCYTES # BLD AUTO: 0.26 K/UL (ref 0–0.04)
IMM GRANULOCYTES NFR BLD AUTO: 1.2 % (ref 0–0.5)
LYMPHOCYTES # BLD AUTO: 1.8 K/UL (ref 1–4.8)
MAGNESIUM SERPL-MCNC: 1.6 MG/DL (ref 1.6–2.6)
MCH RBC QN AUTO: 30.6 PG (ref 27–31)
MCHC RBC AUTO-ENTMCNC: 34.3 G/DL (ref 32–36)
MCV RBC AUTO: 89 FL (ref 82–98)
NUCLEATED RBC (/100WBC) (OHS): 0 /100 WBC
PHOSPHATE SERPL-MCNC: 3.5 MG/DL (ref 2.7–4.5)
PLATELET # BLD AUTO: 415 K/UL (ref 150–450)
PLATELET BLD QL SMEAR: NORMAL
PMV BLD AUTO: 8.9 FL (ref 9.2–12.9)
POCT GLUCOSE: 139 MG/DL (ref 70–110)
POCT GLUCOSE: 99 MG/DL (ref 70–110)
POTASSIUM SERPL-SCNC: 4.4 MMOL/L (ref 3.5–5.1)
RBC # BLD AUTO: 3.01 M/UL (ref 4–5.4)
RELATIVE EOSINOPHIL (OHS): 1.5 %
RELATIVE LYMPHOCYTE (OHS): 8.1 % (ref 18–48)
RELATIVE MONOCYTE (OHS): 4.9 % (ref 4–15)
RELATIVE NEUTROPHIL (OHS): 83.9 % (ref 38–73)
SODIUM SERPL-SCNC: 131 MMOL/L (ref 136–145)
WBC # BLD AUTO: 22.14 K/UL (ref 3.9–12.7)

## 2025-04-19 PROCEDURE — 97535 SELF CARE MNGMENT TRAINING: CPT

## 2025-04-19 PROCEDURE — 85025 COMPLETE CBC W/AUTO DIFF WBC: CPT | Performed by: HOSPITALIST

## 2025-04-19 PROCEDURE — 11000001 HC ACUTE MED/SURG PRIVATE ROOM

## 2025-04-19 PROCEDURE — 84100 ASSAY OF PHOSPHORUS: CPT | Performed by: HOSPITALIST

## 2025-04-19 PROCEDURE — A4216 STERILE WATER/SALINE, 10 ML: HCPCS | Performed by: PHYSICIAN ASSISTANT

## 2025-04-19 PROCEDURE — 25000003 PHARM REV CODE 250: Performed by: STUDENT IN AN ORGANIZED HEALTH CARE EDUCATION/TRAINING PROGRAM

## 2025-04-19 PROCEDURE — 99231 SBSQ HOSP IP/OBS SF/LOW 25: CPT | Mod: ,,, | Performed by: SURGERY

## 2025-04-19 PROCEDURE — 94761 N-INVAS EAR/PLS OXIMETRY MLT: CPT

## 2025-04-19 PROCEDURE — 97530 THERAPEUTIC ACTIVITIES: CPT

## 2025-04-19 PROCEDURE — 63700000 PHARM REV CODE 250 ALT 637 W/O HCPCS: Performed by: INTERNAL MEDICINE

## 2025-04-19 PROCEDURE — 25000003 PHARM REV CODE 250: Performed by: PHYSICIAN ASSISTANT

## 2025-04-19 PROCEDURE — 80048 BASIC METABOLIC PNL TOTAL CA: CPT | Performed by: HOSPITALIST

## 2025-04-19 PROCEDURE — 36415 COLL VENOUS BLD VENIPUNCTURE: CPT | Performed by: HOSPITALIST

## 2025-04-19 PROCEDURE — 21400001 HC TELEMETRY ROOM

## 2025-04-19 PROCEDURE — 25000003 PHARM REV CODE 250: Performed by: HOSPITALIST

## 2025-04-19 PROCEDURE — 83735 ASSAY OF MAGNESIUM: CPT | Performed by: HOSPITALIST

## 2025-04-19 PROCEDURE — 63600175 PHARM REV CODE 636 W HCPCS: Performed by: HOSPITALIST

## 2025-04-19 RX ORDER — ONDANSETRON 4 MG/1
4 TABLET, ORALLY DISINTEGRATING ORAL EVERY 8 HOURS PRN
Status: DISCONTINUED | OUTPATIENT
Start: 2025-04-19 | End: 2025-04-26 | Stop reason: HOSPADM

## 2025-04-19 RX ADMIN — GABAPENTIN 400 MG: 400 CAPSULE ORAL at 03:04

## 2025-04-19 RX ADMIN — SODIUM CHLORIDE, PRESERVATIVE FREE 10 ML: 5 INJECTION INTRAVENOUS at 05:04

## 2025-04-19 RX ADMIN — PIPERACILLIN SODIUM AND TAZOBACTAM SODIUM 4.5 G: 4; .5 INJECTION, POWDER, FOR SOLUTION INTRAVENOUS at 01:04

## 2025-04-19 RX ADMIN — GABAPENTIN 400 MG: 400 CAPSULE ORAL at 09:04

## 2025-04-19 RX ADMIN — AZITHROMYCIN DIHYDRATE 500 MG: 250 TABLET ORAL at 09:04

## 2025-04-19 RX ADMIN — ONDANSETRON 4 MG: 4 TABLET, ORALLY DISINTEGRATING ORAL at 09:04

## 2025-04-19 RX ADMIN — PIPERACILLIN SODIUM AND TAZOBACTAM SODIUM 4.5 G: 4; .5 INJECTION, POWDER, FOR SOLUTION INTRAVENOUS at 10:04

## 2025-04-19 RX ADMIN — GABAPENTIN 400 MG: 400 CAPSULE ORAL at 10:04

## 2025-04-19 RX ADMIN — SODIUM CHLORIDE, PRESERVATIVE FREE 10 ML: 5 INJECTION INTRAVENOUS at 09:04

## 2025-04-19 RX ADMIN — PIPERACILLIN SODIUM AND TAZOBACTAM SODIUM 4.5 G: 4; .5 INJECTION, POWDER, FOR SOLUTION INTRAVENOUS at 05:04

## 2025-04-19 NOTE — SUBJECTIVE & OBJECTIVE
Interval History: She has no real complaints for me.     Thoracentesis attempted  4/18/25 but unable to aspirate fluid due to thick nature ( gelatinous)  Awaiting results of CT and US        Review of Systems   Constitutional:  Positive for fever. Negative for chills.   Respiratory:  Positive for cough. Negative for shortness of breath.    Cardiovascular:  Negative for chest pain.   Gastrointestinal:  Negative for abdominal pain, nausea and vomiting.   Genitourinary:  Negative for dysuria and frequency.     Objective:     Vital Signs (Most Recent):  Temp: 98.2 °F (36.8 °C) (04/19/25 0733)  Pulse: 79 (04/19/25 0733)  Resp: 18 (04/19/25 0733)  BP: (!) 117/58 (04/19/25 0733)  SpO2: 96 % (04/19/25 0733) Vital Signs (24h Range):  Temp:  [98.1 °F (36.7 °C)-100.2 °F (37.9 °C)] 98.2 °F (36.8 °C)  Pulse:  [67-99] 79  Resp:  [17-24] 18  SpO2:  [92 %-97 %] 96 %  BP: ()/(58-80) 117/58     Weight: 61.2 kg (134 lb 14.7 oz)  Body mass index is 23.16 kg/m².    Intake/Output Summary (Last 24 hours) at 4/19/2025 0902  Last data filed at 4/19/2025 0617  Gross per 24 hour   Intake 1550.82 ml   Output 1551 ml   Net -0.18 ml         Physical Exam  Constitutional:       General: She is not in acute distress.     Appearance: She is ill-appearing.   HENT:      Head: Atraumatic.   Eyes:      Conjunctiva/sclera: Conjunctivae normal.   Cardiovascular:      Rate and Rhythm: Normal rate and regular rhythm.      Heart sounds: Normal heart sounds. No murmur heard.  Pulmonary:      Effort: Pulmonary effort is normal.      Breath sounds: Rhonchi present. No wheezing.      Comments: Breathing comfortably been lung sounds courses with more prominent rhonchi in the left lower base that does not clear with cough.  Abdominal:      General: Bowel sounds are normal. There is no distension.      Palpations: Abdomen is soft.      Tenderness: There is no abdominal tenderness.   Musculoskeletal:         General: No deformity. Normal range of motion.       Cervical back: Neck supple.      Comments: Left knee not tender, swelling around the left knee and bilateral lower extremities much improved.   Neurological:      Mental Status: She is alert and oriented to person, place, and time.               Significant Labs: All pertinent labs within the past 24 hours have been reviewed.    Significant Imaging: I have reviewed all pertinent imaging results/findings within the past 24 hours.

## 2025-04-19 NOTE — PROGRESS NOTES
[facility] ([place])  General Surgery  Progress Note    Subjective:     Interval History:  No  complaints    Post-Op Info:  * No surgery found *          Medications:  Continuous Infusions:  Scheduled Meds:   azithromycin  500 mg Oral QHS    gabapentin  400 mg Oral TID    piperacillin-tazobactam (Zosyn) IV (PEDS and ADULTS) (extended infusion is not appropriate)  4.5 g Intravenous Q8H    sodium chloride 0.9%  10 mL Intravenous Q8H     PRN Meds:  Current Facility-Administered Medications:     acetaminophen, 1,000 mg, Oral, Q8H PRN    dextrose 50%, 12.5 g, Intravenous, PRN    dextrose 50%, 25 g, Intravenous, PRN    glucagon (human recombinant), 1 mg, Intramuscular, PRN    glucose, 16 g, Oral, PRN    glucose, 24 g, Oral, PRN    insulin aspart U-100, 0-5 Units, Subcutaneous, QID (AC + HS) PRN    melatonin, 6 mg, Oral, Nightly PRN    naloxone, 0.02 mg, Intravenous, PRN    ondansetron, 4 mg, Oral, Q8H PRN    senna-docusate, 1 tablet, Oral, BID PRN    sodium chloride 0.9%, 10 mL, Intravenous, PRN     Objective:     Vital Signs (Most Recent):  Temp: 98.6 °F (37 °C) (04/19/25 1148)  Pulse: 77 (04/19/25 1148)  Resp: 18 (04/19/25 1148)  BP: 107/62 (04/19/25 1148)  SpO2: 96 % (04/19/25 1148) Vital Signs (24h Range):  Temp:  [98.1 °F (36.7 °C)-100.2 °F (37.9 °C)] 98.6 °F (37 °C)  Pulse:  [67-99] 77  Resp:  [17-24] 18  SpO2:  [93 %-97 %] 96 %  BP: ()/(58-80) 107/62       Intake/Output Summary (Last 24 hours) at 4/19/2025 1539  Last data filed at 4/19/2025 1445  Gross per 24 hour   Intake 1790.82 ml   Output 1200 ml   Net 590.82 ml       Physical Exam  Left axilla no obvious adenopathy palpable  Abdomen soft nontender nondistended  Significant Labs:  CBC:   Recent Labs   Lab 04/19/25  0603   WBC 22.14*   RBC 3.01*   HGB 9.2*   HCT 26.8*      MCV 89   MCH 30.6   MCHC 34.3     CMP:   Recent Labs   Lab 04/19/25  0604   CALCIUM 9.5   *   K 4.4   CO2 19*      BUN 14   CREATININE 0.9       Significant  Diagnostics:  I am in the process of reviewing ultrasound of the axilla, CT scan of the chest and abdomen.  Results were not available when I saw the patient earlier this morning    Assessment/Plan:     Active Diagnoses:    Diagnosis Date Noted POA    PRINCIPAL PROBLEM:  Sepsis [A41.9] 01/01/2023 Yes    Lower extremity edema [R60.0] 04/18/2025 Yes    Fever [R50.9] 04/18/2025 Yes    Mediastinal mass [J98.59] 04/18/2025 Yes    Pleural effusion [J90] 04/18/2025 Yes    Acute pain of left knee [M25.562] 04/18/2025 Yes    Hemoptysis [R04.2] 04/17/2025 No    Hyponatremia [E87.1] 04/16/2025 Yes    Acute on Chronic pain of left knee [M25.562, G89.29] 03/04/2022 Yes    Essential hypertension [I10] 02/28/2019 Yes    Hyperlipidemia [E78.5]  Yes    Type 2 diabetes mellitus, without long-term current use of insulin [E11.9] 02/01/2012 Yes      Problems Resolved During this Admission:     I will discuss the findings of the imaging studies with the patient and the team to decide on the next best course of action which could include left axillary lymph node sampling    Ramon Johnson MD  General Surgery  [facility] ([place])

## 2025-04-19 NOTE — PLAN OF CARE
Report received from RN.POC reviewed with the pt.RN assumed care.AAOX3.Room Air.All due medication administered according to the MAR.No chest pain,SOB occurred during the night shift.Fever occurred.Observation reviewed and charted.Care explained,No falls or injury occurred during the shift.No any  significant event undergo in the shift. Pt rest in the bed comfortably. Flow sheets updated timely. Purposeful rounding done every 2 hourly. Daily Weight charted,IP/OP maintained and charted. Pt kept under observation through out the shift.     Problem: Adult Inpatient Plan of Care  Goal: Plan of Care Review  Outcome: Progressing  Goal: Patient-Specific Goal (Individualized)  Outcome: Progressing  Goal: Absence of Hospital-Acquired Illness or Injury  Outcome: Progressing  Goal: Optimal Comfort and Wellbeing  Outcome: Progressing  Goal: Readiness for Transition of Care  Outcome: Progressing     Problem: Diabetes Comorbidity  Goal: Blood Glucose Level Within Targeted Range  Outcome: Progressing     Problem: Wound  Goal: Optimal Coping  Outcome: Progressing  Goal: Optimal Functional Ability  Outcome: Progressing  Goal: Absence of Infection Signs and Symptoms  Outcome: Progressing  Goal: Improved Oral Intake  Outcome: Progressing  Goal: Optimal Pain Control and Function  Outcome: Progressing  Goal: Skin Health and Integrity  Outcome: Progressing  Goal: Optimal Wound Healing  Outcome: Progressing     Problem: Skin Injury Risk Increased  Goal: Skin Health and Integrity  Outcome: Progressing     Problem: Fall Injury Risk  Goal: Absence of Fall and Fall-Related Injury  Outcome: Progressing     Problem: UTI (Urinary Tract Infection)  Goal: Improved Infection Symptoms  Outcome: Progressing     Problem: Pain Acute  Goal: Optimal Pain Control and Function  Outcome: Progressing     Problem: Sepsis/Septic Shock  Goal: Optimal Coping  Outcome: Progressing  Goal: Absence of Bleeding  Outcome: Progressing  Goal: Blood Glucose Level Within  Targeted Range  Outcome: Progressing  Goal: Absence of Infection Signs and Symptoms  Outcome: Progressing  Goal: Optimal Nutrition Intake  Outcome: Progressing     Problem: Comorbidity Management  Goal: Blood Pressure in Desired Range  Outcome: Progressing  Goal: Maintenance of Osteoarthritis Symptom Control  Outcome: Progressing

## 2025-04-19 NOTE — PROGRESS NOTES
[facility] Pulmonary & Critical Care Medicine Progress Note    Subjective:      Overnight, went for CT abd/pelvis. This morning, did have some nausea associated with likely drinking the oral contrast.     Attempted thoracentesis yesterday afternoon to sample fluid. Pt with gelatinous appearing fluid, unable to aspirate sample due to thick nature, not pus. Pt without SOB or chest pain this morning.      Objective:     Last 24 Hour Vital Signs:  BP  Min: 98/58  Max: 133/80  Temp  Av.7 °F (37.1 °C)  Min: 98.1 °F (36.7 °C)  Max: 100.2 °F (37.9 °C)  Pulse  Av  Min: 67  Max: 99  Resp  Av.9  Min: 16  Max: 24  SpO2  Av.1 %  Min: 92 %  Max: 97 %  Weight  Av.2 kg (134 lb 14.7 oz)  Min: 61.2 kg (134 lb 14.7 oz)  Max: 61.2 kg (134 lb 14.7 oz)  I/O last 3 completed shifts:  In: 3094.2 [P.O.:; I.V.:21.2; IV Piggyback:1001]  Out:  [Urine:1950; Stool:1]    Physical Examination:  Physical Exam  Vitals and nursing note reviewed.   Constitutional:       General: She is not in acute distress.     Appearance: She is not toxic-appearing.      Comments: Sitting up in bed, breakfast tray at bedside. Family at bedside.    HENT:      Head: Normocephalic and atraumatic.      Nose: Nose normal.      Mouth/Throat:      Mouth: Mucous membranes are moist.   Eyes:      General:         Right eye: No discharge.         Left eye: No discharge.      Extraocular Movements: Extraocular movements intact.      Conjunctiva/sclera: Conjunctivae normal.   Pulmonary:      Effort: Pulmonary effort is normal. No respiratory distress.   Skin:     General: Skin is warm and dry.   Neurological:      General: No focal deficit present.      Mental Status: She is alert and oriented to person, place, and time. Mental status is at baseline.         Laboratory:  Trended Lab Data:  Recent Labs     25  0354 25  0639 25  0603 25  0604   WBC 20.79* 20.79* 22.14*  --    HGB 8.5* 8.7* 9.2*  --    HCT 24.2* 25.1* 26.8*   --     460* 415  --    * 131*  --  131*   K 4.2 3.9  --  4.4    102  --  100   CO2 21* 23  --  19*   BUN 13 13  --  14   CREATININE 0.8 0.9  --  0.9   CALCIUM 8.2* 8.8  --  9.5   MG 1.7 1.6  --  1.6   PHOS 2.9 3.2  --  3.5       Cardiac:   Recent Labs   Lab 04/14/25 2042   TROPONINI <0.006   *       Urinalysis:   Lab Results   Component Value Date    LABURIN  04/14/2025     Multiple organisms isolated. None in predominance. Repeat if clinically necessary.    COLORU Yellow 04/14/2025    SPECGRAV 1.005 04/14/2025    NITRITE Positive (A) 04/14/2025    KETONESU Negative 01/02/2023    UROBILINOGEN Negative 04/14/2025       Microbiology:  Microbiology Results (last 7 days)       Procedure Component Value Units Date/Time    Blood culture [[id number]]  (Normal) Collected: 04/16/25 1104    Order Status: Completed Specimen: Blood Updated: 04/19/25 0303     Blood Culture No Growth After 48 Hours    AFB Culture & Smear [[id number]]     Order Status: Sent Specimen: Body Fluid     Culture, Body Fluid (Aerobic) w/ GS [[id number]]     Order Status: Sent Specimen: Body Fluid     MRSA Screen by PCR [[id number]]  (Normal) Collected: 04/17/25 1809    Order Status: Completed Specimen: Nasal Swab Updated: 04/18/25 0441     MRSA PCR Screen Not Detected    Culture, Respiratory with Gram Stain [[id number]]     Order Status: Sent Specimen: Respiratory from Sputum, Expectorated     Urine culture [[id number]] Collected: 04/14/25 2130    Order Status: Completed Specimen: Urine Updated: 04/16/25 1001     Urine Culture Multiple organisms isolated. None in predominance. Repeat if clinically necessary.            Radiology:  CT Abd/pelvis: some air noted in pleural space post-thora, likely due to catheter manipulation. CXR with no PTX, persistent pleural effusion.     I have personally reviewed the above labs and imaging.    Assessment:     Jojo Ayoub is a 89 y.o.female with h/o T2DM, HTN, and lower back pain who  presented with b/l LE swelling and L knee subluxation. Pulm consulted for mediastinal mass and lymphadenopathy. Now s/p attempted bedside thoracentesis with serosanguinous fluid and inability to aspirate. Small overall, do not have high suspicion for infection. Can consider IR consult for another attempt at drainage but seems like yield would likely be low. Need decision from family regarding next steps for dx, pending axilla ultrasound to evaluate for lymphadenopathy.      Plan:     Mediastinal mass with associated lymphadenopathy  Small pleural effusion   --Highest concern for mediastinal neoplasm, specifically lymphoma considering her left axillary lymphadenopathy.  Her fevers and leukocytosis may represent herpes symptoms.  Esophageal cancer also in the differential but no obvious risk factors for this.   --Gen surgery consulted, pending L axillary ultrasound    -- Still pending next steps due to inability to aspirate fluid for studies   - Can consider IR consult for pleural fluid analysis although unlikely to be of benefit as bedside thoracentesis with catheter into the space unable to aspirate fluid      Leukocytosis/fever, improved   --potentially represent B symptoms if malignancy is present.  Given the esophageal compression and proximal abnormality she is at significant risk for aspiration however there is no obvious parenchymal consolidation nor is there any other obvious source of infection however, the pleural fluid on the left does appear somewhat complicated on bedside ultrasound and sampling this to assess for infection as well as malignancy would be appropriate-awaiting to see if family is amenable to this.    --agree with course of Zosyn for PNA, unlikely empyema   --follow-up culture data, obtain respiratory culture if she is producing sputum      3. Reported hemoptysis, resolved   --her report this is scant, patient denies any further episodes.  No obvious etiology for pulmonary source on the CT  scan.  If anything suspect it will be a pseudo hemoptysis from either GI or sinus  --continue to monitor     4. Pulmonary reticulation and honeycombing  --no history of ILD.  No baseline pulmonary symptoms.  No other history of autoimmune disease.  No symptoms consistent with autoimmune disease other than her osteoarthritis.  This could be realted to chronic aspiration fibrosis. Pending the course of her mass +/minus sign infection could consider pursuing autoimmune workup in the outpatient setting.         Thank you for allowing us to participate in the care of this patient, we will continue to follow. Please let us know if there are questions or concerns.      Nadira Gupta MD  [facility] Pulmonary & Critical Care Medicine Fellow

## 2025-04-19 NOTE — PT/OT/SLP PROGRESS
Occupational Therapy   Treatment    Name: Jojo Ayoub  MRN: [id number]  Admitting Diagnosis:  Sepsis       Recommendations:     Discharge Recommendations: High Intensity Therapy  Discharge Equipment Recommendations:  to be determined by next level of care  Barriers to discharge:   (current functional level)    Assessment:   EOB>BSC step transfer with RW going towards R-side with MAX A for lift/lower, RW management, and forward weight shift d/t increased posterior trunk lean.  Attempted sit>stand BSC>RW achieving 50% stand with BUE supported at armrests though unable to achieve full stand; TOTAL A squat-pivot transfer without RW using HHA BSC>EOB.  Needs manual assist for safe footing and tolerable BLE knee flexion in prep for transitional movements.  Toileting with MOD A for thorough back mathew hygiene while seated though able to clean front mathew region seated when handed wipes.  Previously MOD I with ADL and ambulation.  Will require intense interdisciplinary therapy services to return safely to previous living situation and PLOF.  Able to tolerate 3 hours of daily therapy.  Progressing towards goals.  Continued recommendation of post acute High Intensity therapy.  Continue POC.      Jojo Ayoub is a 89 y.o. female with a medical diagnosis of Sepsis.  She presents with below deficits decreasing independence in self-care/functional transfers and increasing caregiver burden. Performance deficits affecting function are weakness, impaired endurance, impaired self care skills, impaired functional mobility, gait instability, impaired balance, decreased coordination, decreased upper extremity function, decreased lower extremity function, decreased safety awareness, decreased ROM.     Rehab Prognosis:  Good; patient would benefit from acute skilled OT services to address these deficits and reach maximum level of function.       Plan:     Patient to be seen 5 x/week to address the above listed problems via  self-care/home management, therapeutic activities, therapeutic exercises  Plan of Care Expires: 05/16/25  Plan of Care Reviewed with: patient, spouse, son (daughter-in-law)    Subjective     Chief Complaint: No c/o pain.   Patient/Family Comments/goals: No goals stated.   Pain/Comfort:  Pain Rating 1: 0/10  Pain Rating Post-Intervention 1: 0/10    Objective:     Communicated with: Tamia BURNETT RN prior to session.  Patient found HOB elevated with peripheral IV, telemetry, PureWick with spouse, son, and daughter-in-law at bedside upon OT entry to room.    General Precautions: Standard, anti-coagulation medicine, diabetic, fall (strict I&O's)    Orthopedic Precautions:N/A  Braces:  (LLE knee brace 2/2 patella subluxation)  Respiratory Status: Room air     Occupational Performance:     Bed Mobility:    Supine>sit MOD A  Assist for BLE management  Sequencing cues  Bed rail use  HOB elevated   Sit>supine MAX A   Assist for trunk and BLE management  Sequencing cues  Bed rail used  Bed flat   R-roll X 2 trials with MOD A   Assist for LLE and lower trunk management  Cues for using bed rail  RN present to replace sacral dressing    Functional Mobility/Transfers:    Fall prevention strategies:  Nonskid socks and gait belt placed piror to functional mobility.     Sit>stand EOB>RW MOD A   Assist for lift  Cues for safe hand placement  Manual assist for tolerable knee flex  MAX A for static stance with posterior trunk noted   Step transfer EOB>BSC RW MAX A   Assist for lift/lower and RW management  Increased sequencing cues   Increased posterior trunk lean noted   Attempted stand from BSC>RW with Pt. Achieving 50% stand  Returned to seated position  Squat-pivot transfer BSC>EOB TOTAL A   Assist for lift/lower  Cues for safe hand placement and sequencing    Activities of Daily Living:  Found with urine and stool soiled pads and sheets with PCT notified  Soiled linens removed and clean linens replaced while Pt. Up to BSC  Toileting  with MOD A   Assist for thorough seated back mathew hygiene  Able to clean front mathew region seated  Purposeful posterior trunk lean seated with forward reach for task  MOD A X 3 trials for forward scoots toward edge of BSC for preparatory positioning for receiving assist with back mathew hygiene  Assist with lift/lower  Manual assist for BLE knee flex  MIN cues for safe hand placement    [facility] 6 Click ADL: 13    Treatment & Education:  Educated on role of OT, POC, functional transfer/ADL safety, review of call light, and importance of calling for assist as needed.        Patient left HOB elevated with all lines intact, call button in reach, nursing notified, and family present    GOALS:   Multidisciplinary Problems       Occupational Therapy Goals          Problem: Occupational Therapy    Goal Priority Disciplines Outcome Interventions   Occupational Therapy Goal     OT, PT/OT Progressing    Description: Goals to be met by: 5/3/25     Patient will increase functional independence with ADLs by performing:    UB dressing at Set up/SBA.  LB dressing at Set up/SBA.  Toileting at SBA.  Toilet transfer at SBA.   Grooming standing at sink at SBA.   Sponge bathing at Set up/SBA.                         DME Justifications:   Jojo's mobility limitation cannot be sufficiently resolved by the use of a cane. Her functional mobility deficit can be sufficiently resolved with the use of a Rolling Walker. Patient's mobility limitation significantly impairs their ability to participate in one of more activities of daily living.  The use of a RW will significantly improve the patient's ability to participate in MRADLS and the patient will use it on regular basis in the home.    Time Tracking:     OT Date of Treatment: 04/19/25  OT Start Time: 1504  OT Stop Time: 1554  OT Total Time (min): 50 min    Billable Minutes:Self Care/Home Management 28  Therapeutic Activity 22    OT/CAESAR: OT          4/19/2025

## 2025-04-19 NOTE — PT/OT/SLP PROGRESS
Physical Therapy      Patient Name:  Jojo Ayoub   MRN:  [id number]    Patient not seen today secondary to Other (Comment) (Pt fatigued following OT Tx.). Will follow-up 4/20/2025.

## 2025-04-19 NOTE — PLAN OF CARE
Problem: Adult Inpatient Plan of Care  Goal: Plan of Care Review  Outcome: Progressing  Goal: Patient-Specific Goal (Individualized)  Outcome: Progressing  Goal: Absence of Hospital-Acquired Illness or Injury  Outcome: Progressing  Goal: Optimal Comfort and Wellbeing  Outcome: Progressing  Goal: Readiness for Transition of Care  Outcome: Progressing     Problem: Diabetes Comorbidity  Goal: Blood Glucose Level Within Targeted Range  Outcome: Progressing     Problem: Wound  Goal: Optimal Coping  Outcome: Progressing  Goal: Optimal Functional Ability  Outcome: Progressing  Goal: Absence of Infection Signs and Symptoms  Outcome: Progressing  Goal: Improved Oral Intake  Outcome: Progressing  Goal: Optimal Pain Control and Function  Outcome: Progressing  Goal: Skin Health and Integrity  Outcome: Progressing  Goal: Optimal Wound Healing  Outcome: Progressing     Problem: Skin Injury Risk Increased  Goal: Skin Health and Integrity  Outcome: Progressing     Problem: Fall Injury Risk  Goal: Absence of Fall and Fall-Related Injury  Outcome: Progressing     Problem: UTI (Urinary Tract Infection)  Goal: Improved Infection Symptoms  Outcome: Progressing     Problem: Pain Acute  Goal: Optimal Pain Control and Function  Outcome: Progressing     Problem: Sepsis/Septic Shock  Goal: Optimal Coping  Outcome: Progressing  Goal: Absence of Bleeding  Outcome: Progressing  Goal: Blood Glucose Level Within Targeted Range  Outcome: Progressing  Goal: Absence of Infection Signs and Symptoms  Outcome: Progressing  Goal: Optimal Nutrition Intake  Outcome: Progressing     Problem: Comorbidity Management  Goal: Blood Pressure in Desired Range  Outcome: Progressing  Goal: Maintenance of Osteoarthritis Symptom Control  Outcome: Progressing

## 2025-04-20 LAB
ABSOLUTE EOSINOPHIL (OHS): 0.08 K/UL
ABSOLUTE MONOCYTE (OHS): 1.26 K/UL (ref 0.3–1)
ABSOLUTE NEUTROPHIL COUNT (OHS): 21.03 K/UL (ref 1.8–7.7)
ANION GAP (OHS): 7 MMOL/L (ref 8–16)
ANISOCYTOSIS BLD QL SMEAR: SLIGHT
BASOPHILS # BLD AUTO: 0.09 K/UL
BASOPHILS NFR BLD AUTO: 0.4 %
BUN SERPL-MCNC: 13 MG/DL (ref 8–23)
CALCIUM SERPL-MCNC: 9.3 MG/DL (ref 8.7–10.5)
CHLORIDE SERPL-SCNC: 98 MMOL/L (ref 95–110)
CO2 SERPL-SCNC: 23 MMOL/L (ref 23–29)
CREAT SERPL-MCNC: 1 MG/DL (ref 0.5–1.4)
ERYTHROCYTE [DISTWIDTH] IN BLOOD BY AUTOMATED COUNT: 14.4 % (ref 11.5–14.5)
GFR SERPLBLD CREATININE-BSD FMLA CKD-EPI: 55 ML/MIN/1.73/M2
GLUCOSE SERPL-MCNC: 95 MG/DL (ref 70–110)
HCT VFR BLD AUTO: 22.7 % (ref 37–48.5)
HGB BLD-MCNC: 7.9 GM/DL (ref 12–16)
HYPOCHROMIA BLD QL SMEAR: NORMAL
IMM GRANULOCYTES # BLD AUTO: 0.25 K/UL (ref 0–0.04)
IMM GRANULOCYTES NFR BLD AUTO: 1 % (ref 0–0.5)
LYMPHOCYTES # BLD AUTO: 1.76 K/UL (ref 1–4.8)
MAGNESIUM SERPL-MCNC: 1.6 MG/DL (ref 1.6–2.6)
MCH RBC QN AUTO: 31 PG (ref 27–31)
MCHC RBC AUTO-ENTMCNC: 34.8 G/DL (ref 32–36)
MCV RBC AUTO: 89 FL (ref 82–98)
NUCLEATED RBC (/100WBC) (OHS): 0 /100 WBC
OVALOCYTES BLD QL SMEAR: NORMAL
PHOSPHATE SERPL-MCNC: 3.2 MG/DL (ref 2.7–4.5)
PLATELET # BLD AUTO: 443 K/UL (ref 150–450)
PLATELET BLD QL SMEAR: NORMAL
PMV BLD AUTO: 8.2 FL (ref 9.2–12.9)
POCT GLUCOSE: 119 MG/DL (ref 70–110)
POCT GLUCOSE: 129 MG/DL (ref 70–110)
POCT GLUCOSE: 139 MG/DL (ref 70–110)
POIKILOCYTOSIS BLD QL SMEAR: SLIGHT
POTASSIUM SERPL-SCNC: 4.1 MMOL/L (ref 3.5–5.1)
RBC # BLD AUTO: 2.55 M/UL (ref 4–5.4)
RELATIVE EOSINOPHIL (OHS): 0.3 %
RELATIVE LYMPHOCYTE (OHS): 7.2 % (ref 18–48)
RELATIVE MONOCYTE (OHS): 5.1 % (ref 4–15)
RELATIVE NEUTROPHIL (OHS): 86 % (ref 38–73)
SODIUM SERPL-SCNC: 128 MMOL/L (ref 136–145)
WBC # BLD AUTO: 24.47 K/UL (ref 3.9–12.7)

## 2025-04-20 PROCEDURE — 83735 ASSAY OF MAGNESIUM: CPT | Performed by: HOSPITALIST

## 2025-04-20 PROCEDURE — 84100 ASSAY OF PHOSPHORUS: CPT | Performed by: HOSPITALIST

## 2025-04-20 PROCEDURE — 93005 ELECTROCARDIOGRAM TRACING: CPT

## 2025-04-20 PROCEDURE — 80048 BASIC METABOLIC PNL TOTAL CA: CPT | Performed by: HOSPITALIST

## 2025-04-20 PROCEDURE — 25000003 PHARM REV CODE 250: Performed by: PHYSICIAN ASSISTANT

## 2025-04-20 PROCEDURE — 63700000 PHARM REV CODE 250 ALT 637 W/O HCPCS: Performed by: INTERNAL MEDICINE

## 2025-04-20 PROCEDURE — A4216 STERILE WATER/SALINE, 10 ML: HCPCS | Performed by: PHYSICIAN ASSISTANT

## 2025-04-20 PROCEDURE — 85025 COMPLETE CBC W/AUTO DIFF WBC: CPT | Performed by: HOSPITALIST

## 2025-04-20 PROCEDURE — 94761 N-INVAS EAR/PLS OXIMETRY MLT: CPT

## 2025-04-20 PROCEDURE — 25000003 PHARM REV CODE 250: Performed by: HOSPITALIST

## 2025-04-20 PROCEDURE — 36415 COLL VENOUS BLD VENIPUNCTURE: CPT | Performed by: HOSPITALIST

## 2025-04-20 PROCEDURE — 63600175 PHARM REV CODE 636 W HCPCS: Performed by: STUDENT IN AN ORGANIZED HEALTH CARE EDUCATION/TRAINING PROGRAM

## 2025-04-20 PROCEDURE — 63600175 PHARM REV CODE 636 W HCPCS: Performed by: HOSPITALIST

## 2025-04-20 PROCEDURE — 97530 THERAPEUTIC ACTIVITIES: CPT | Mod: CQ

## 2025-04-20 PROCEDURE — 21400001 HC TELEMETRY ROOM

## 2025-04-20 PROCEDURE — 93010 ELECTROCARDIOGRAM REPORT: CPT | Mod: ,,, | Performed by: INTERNAL MEDICINE

## 2025-04-20 PROCEDURE — 99233 SBSQ HOSP IP/OBS HIGH 50: CPT | Mod: ,,, | Performed by: SURGERY

## 2025-04-20 PROCEDURE — 97116 GAIT TRAINING THERAPY: CPT | Mod: CQ

## 2025-04-20 RX ORDER — METOPROLOL TARTRATE 1 MG/ML
5 INJECTION, SOLUTION INTRAVENOUS ONCE
Status: COMPLETED | OUTPATIENT
Start: 2025-04-20 | End: 2025-04-20

## 2025-04-20 RX ORDER — METOPROLOL TARTRATE 25 MG/1
12.5 TABLET ORAL 2 TIMES DAILY
Status: DISCONTINUED | OUTPATIENT
Start: 2025-04-20 | End: 2025-04-23

## 2025-04-20 RX ORDER — MAGNESIUM SULFATE HEPTAHYDRATE 40 MG/ML
2 INJECTION, SOLUTION INTRAVENOUS ONCE
Status: COMPLETED | OUTPATIENT
Start: 2025-04-20 | End: 2025-04-20

## 2025-04-20 RX ADMIN — GABAPENTIN 400 MG: 400 CAPSULE ORAL at 02:04

## 2025-04-20 RX ADMIN — GABAPENTIN 400 MG: 400 CAPSULE ORAL at 09:04

## 2025-04-20 RX ADMIN — METOROPROLOL TARTRATE 5 MG: 5 INJECTION, SOLUTION INTRAVENOUS at 05:04

## 2025-04-20 RX ADMIN — GABAPENTIN 400 MG: 400 CAPSULE ORAL at 08:04

## 2025-04-20 RX ADMIN — ACETAMINOPHEN 1000 MG: 500 TABLET ORAL at 02:04

## 2025-04-20 RX ADMIN — PIPERACILLIN SODIUM AND TAZOBACTAM SODIUM 4.5 G: 4; .5 INJECTION, POWDER, FOR SOLUTION INTRAVENOUS at 08:04

## 2025-04-20 RX ADMIN — ACETAMINOPHEN 1000 MG: 500 TABLET ORAL at 09:04

## 2025-04-20 RX ADMIN — PIPERACILLIN SODIUM AND TAZOBACTAM SODIUM 4.5 G: 4; .5 INJECTION, POWDER, FOR SOLUTION INTRAVENOUS at 01:04

## 2025-04-20 RX ADMIN — MAGNESIUM SULFATE HEPTAHYDRATE 2 G: 40 INJECTION, SOLUTION INTRAVENOUS at 12:04

## 2025-04-20 RX ADMIN — PIPERACILLIN SODIUM AND TAZOBACTAM SODIUM 4.5 G: 4; .5 INJECTION, POWDER, FOR SOLUTION INTRAVENOUS at 05:04

## 2025-04-20 RX ADMIN — SODIUM CHLORIDE, PRESERVATIVE FREE 10 ML: 5 INJECTION INTRAVENOUS at 06:04

## 2025-04-20 RX ADMIN — AZITHROMYCIN DIHYDRATE 500 MG: 250 TABLET ORAL at 09:04

## 2025-04-20 RX ADMIN — SODIUM CHLORIDE, PRESERVATIVE FREE 10 ML: 5 INJECTION INTRAVENOUS at 10:04

## 2025-04-20 NOTE — PT/OT/SLP PROGRESS
Physical Therapy Treatment    Patient Name:  Jojo Ayoub   MRN:  [id number]    Recommendations:     Discharge Recommendations: High Intensity Therapy  Discharge Equipment Recommendations: to be determined by next level of care  Barriers to discharge: Decreased caregiver support    Assessment:     Jojo Ayoub is a 89 y.o. female admitted with a medical diagnosis of Sepsis.  She presents with the following impairments/functional limitations: weakness, impaired endurance, impaired self care skills, impaired functional mobility, gait instability, impaired balance, decreased coordination, decreased upper extremity function, decreased lower extremity function, decreased safety awareness, pain, decreased ROM, impaired skin, edema ;pt with fair mobility today, limited by L knee pain w/ amb., dec distance today, inc assistance req'd.    Rehab Prognosis: Good; patient would benefit from acute skilled PT services to address these deficits and reach maximum level of function.    Recent Surgery: * No surgery found *      Plan:     During this hospitalization, patient to be seen 5 x/week to address the identified rehab impairments via gait training, therapeutic activities, therapeutic exercises, neuromuscular re-education and progress toward the following goals:    Plan of Care Expires:  05/16/25    Subjective     Chief Complaint: L knee pain  Patient/Family Comments/goals: pt agreeable to session, multiple family members present.  Pain/Comfort:  Pain Rating 1: 8/10  Location - Side 1: Left  Location 1: knee  Pain Addressed 1: Reposition, Distraction, Cessation of Activity  Pain Rating Post-Intervention 1: 8/10      Objective:     Communicated with nurse prior to session.  Patient found HOB elevated with peripheral IV, PureWick, telemetry upon PT entry to room. Purewick not working properly, pt w/ 2 pads soaked w/ urine, nsg. Notified.    General Precautions: Standard, anti-coagulation medicine, diabetic, fall (strict  "I&O's)  Orthopedic Precautions: N/A  Braces:  (L knee brace for patella subluxation)  Respiratory Status: Room air     Functional Mobility:  Bed Mobility:     Scooting: total assistance, of 2 persons, and in supine  Supine to Sit: moderate assistance and maximal assistance  Sit to Supine: maximal assistance and at LE's  Transfers:     Sit to Stand:  moderate assistance and maximal assistance with rolling walker  Gait: amb'd ~6' x 2 w/ RW and modA, brace on L knee, needing seated rest b/w trials, poor posture, dec. Step length, narrow BRANDI      AM-PAC 6 CLICK MOBILITY  Turning over in bed (including adjusting bedclothes, sheets and blankets)?: 3  Sitting down on and standing up from a chair with arms (e.g., wheelchair, bedside commode, etc.): 2  Moving from lying on back to sitting on the side of the bed?: 2  Moving to and from a bed to a chair (including a wheelchair)?: 2  Need to walk in hospital room?: 2  Climbing 3-5 steps with a railing?: 1  Basic Mobility Total Score: 12       Treatment & Education:  Pt inst'd in seated LAQ"s x 5 ea.   Remv'd brace once back in bed and skin tears/breakdown noted around knee joint. Nsg. Notified.     Patient left HOB elevated with all lines intact, call button in reach, bed alarm on, nurse notified, and LLE elevated on pillow, family present. ..    GOALS:   Multidisciplinary Problems       Physical Therapy Goals          Problem: Physical Therapy    Goal Priority Disciplines Outcome Interventions   Physical Therapy Goal     PT, PT/OT Progressing    Description: Goals to be met by: 25    Patient will increase functional independence with mobility by performin. Supine<>sit with supervision and without use of HB features.   2. Sit<>stand with CGA with RW.  3. Transfer bed<>chair with Carmen and LRAD.  4. Gait x 25 feet with Carmen with RW.  5. Ascend/descend 5 step(s) with least restrictive assistive device and Carmen.                        DME Justifications:   Jojo's " mobility limitation cannot be sufficiently resolved by the use of a cane. Her functional mobility deficit can be sufficiently resolved with the use of a Rolling Walker. Patient's mobility limitation significantly impairs their ability to participate in one of more activities of daily living.  The use of a RW will significantly improve the patient's ability to participate in MRADLS and the patient will use it on regular basis in the home.    Time Tracking:     PT Received On: 04/20/25  PT Start Time: 1401     PT Stop Time: 1433  PT Total Time (min): 32 min     Billable Minutes: Gait Training 16 and Therapeutic Activity 16    Treatment Type: Treatment  PT/PTA: PTA     Number of PTA visits since last PT visit: 1 04/20/2025

## 2025-04-20 NOTE — NURSING
Dr. Hutson came in to check on the patient as I was going to recheck patients vitals, patient noted to be resting comfortably with family at the bedside, patients blood pressure improved and noted to be similar to what it has been- HR remains irregular, Stat EKG ordered and respiratory called, giving IV metoprolol

## 2025-04-20 NOTE — ASSESSMENT & PLAN NOTE
Reasonably controlled with current regimen.  Will continue with current regimen and continue to monitor.  Recent Labs   Lab 04/18/25  0851 04/18/25  1120 04/18/25  1538 04/18/25 1952 04/19/25  0731 04/19/25 1959   POCTGLUCOSE 112* 134* 115* 121* 99 139*

## 2025-04-20 NOTE — PROGRESS NOTES
[facility] - [facility] ([place])  General Surgery  Progress Note    Subjective:     Interval History:  Patient had no complaints.  No dysphagia    Post-Op Info:  * No surgery found *          Medications:  Continuous Infusions:  Scheduled Meds:   azithromycin  500 mg Oral QHS    gabapentin  400 mg Oral TID    magnesium sulfate 2 g IVPB  2 g Intravenous Once    piperacillin-tazobactam (Zosyn) IV (PEDS and ADULTS) (extended infusion is not appropriate)  4.5 g Intravenous Q8H    sodium chloride 0.9%  10 mL Intravenous Q8H     PRN Meds:  Current Facility-Administered Medications:     acetaminophen, 1,000 mg, Oral, Q8H PRN    dextrose 50%, 12.5 g, Intravenous, PRN    dextrose 50%, 25 g, Intravenous, PRN    glucagon (human recombinant), 1 mg, Intramuscular, PRN    glucose, 16 g, Oral, PRN    glucose, 24 g, Oral, PRN    insulin aspart U-100, 0-5 Units, Subcutaneous, QID (AC + HS) PRN    melatonin, 6 mg, Oral, Nightly PRN    naloxone, 0.02 mg, Intravenous, PRN    ondansetron, 4 mg, Oral, Q8H PRN    senna-docusate, 1 tablet, Oral, BID PRN    sodium chloride 0.9%, 10 mL, Intravenous, PRN     Objective:     Vital Signs (Most Recent):  Temp: 98 °F (36.7 °C) (04/20/25 1204)  Pulse: 80 (04/20/25 1204)  Resp: 18 (04/20/25 1204)  BP: (!) 119/58 (04/20/25 1204)  SpO2: 96 % (04/20/25 1204) Vital Signs (24h Range):  Temp:  [98 °F (36.7 °C)-100 °F (37.8 °C)] 98 °F (36.7 °C)  Pulse:  [] 80  Resp:  [16-18] 18  SpO2:  [91 %-96 %] 96 %  BP: ()/(55-66) 119/58       Intake/Output Summary (Last 24 hours) at 4/20/2025 1356  Last data filed at 4/19/2025 1445  Gross per 24 hour   Intake 240 ml   Output --   Net 240 ml       Physical Exam  Vaguely palpable 2 cm soft compressible lymph node more towards the subpectoral area    Significant Labs:  CBC:   Recent Labs   Lab 04/20/25  0635   WBC 24.47*   RBC 2.55*   HGB 7.9*   HCT 22.7*      MCV 89   MCH 31.0   MCHC 34.8     CMP:   Recent Labs   Lab 04/20/25  0635   CALCIUM 9.3   *    K 4.1   CO2 23   CL 98   BUN 13   CREATININE 1.0       Significant Diagnostics:  CT scans ultrasounds have all been reviewed    Assessment/Plan:     Active Diagnoses:    Diagnosis Date Noted POA    PRINCIPAL PROBLEM:  Sepsis [A41.9] 01/01/2023 Yes    Lower extremity edema [R60.0] 04/18/2025 Yes    Fever [R50.9] 04/18/2025 Yes    Mediastinal mass [J98.59] 04/18/2025 Yes    Pleural effusion [J90] 04/18/2025 Yes    Acute pain of left knee [M25.562] 04/18/2025 Yes    Hemoptysis [R04.2] 04/17/2025 No    Hyponatremia [E87.1] 04/16/2025 Yes    Acute on Chronic pain of left knee [M25.562, G89.29] 03/04/2022 Yes    Essential hypertension [I10] 02/28/2019 Yes    Hyperlipidemia [E78.5]  Yes    Type 2 diabetes mellitus, without long-term current use of insulin [E11.9] 02/01/2012 Yes      Problems Resolved During this Admission:   Large mediastinal mass-at this point possibly favors lymphoma  Questionable adenopathy left axilla  No other adenopathy evident on physical exam or on imaging    I have spent over 45 minutes discussion options with the patient and over 6 of her family members including her  and at least 1 daughter and son.  I have reviewed the actual CT scan images on a monitor with the patient and family to help them better understand its position and involvement in the mediastinum  Discussion involved modalities of obtaining a tissue diagnosis for this difficult to reach area.  These include Interventional Radiology, pulmonary with trans bronchial biopsy or General surgery with left axillary lymph node removal.  Axillary lymph node removal may not be productive if not involved with cancer.  Ultrasound of the left axilla shows some thickened cortex but the lymph node itself is soft and compressible which is unusual if lymphoma.  Biopsy of the mediastinal mass transbronchially or with IR is done with a needle and maybe inconclusive in lymphoma.  They understand that this is a difficult situation to obtain a  conclusive diagnosis.  She would also  need deep sedation and possible general anesthesia for any of the modalities.  I will ask hospital medicine tomorrow to try to gather the different specialties, namely pulmonary and IR, to come up with the best possible solution.  If we made her NPO after midnight tonight I could possibly add on lymph node excision late morning.      In total over 60 minutes were spent in chart review, documentation and review of results, and evaluation, treatment, and counseling of patient on the same day of service.          Ramon Johnson MD  General Surgery  [facility]  Med Surg ([place])

## 2025-04-20 NOTE — AI DETERIORATION ALERT
Artificial Intelligence Notification  [facility] ([place])  [address] [place] [id number]  Phone: [phone]    This documentation was triggered by an Artificial Intelligence Notification:    Admit Date: 2025   LOS: 5  Code Status: Full Code  : 1935  Age: 89 y.o.  Weight:   Wt Readings from Last 1 Encounters:   25 63.5 kg (140 lb)        Sex: female  Bed: 18/B318 A  MRN: [id number]  Attending Physician: Jakub Rodarte MD     Date of Alert: 2025  Time AI Alert Received: 17:20            Vitals:    25 1730   BP: 93/62   Pulse:    Resp: 18   Temp:      SpO2: (!) 93 %      Artificial Intelligence alert discussed with Provider:     Name: Dr. Jakub Rodarte   Date/Time of Provider Notification: 2025 17:40      Patient Condition: Patient sitting up eating dinner and visiting with family.  She has no complaints.  She has been mildly hypotensive since presentation and VS shows she has had bouts of tachycardia up to 130 before during this hospital stay, then reverting to a normal HR.  Family say she does not have a history of atrial fibrillation but it sounds irregularly irregular with HR ranging from 110-150.  Will get STAT EKG and give a dose of IV metoprolol, then re-assess.

## 2025-04-20 NOTE — PLAN OF CARE
Problem: Adult Inpatient Plan of Care  Goal: Plan of Care Review  Outcome: Progressing  Goal: Patient-Specific Goal (Individualized)  Outcome: Progressing  Goal: Absence of Hospital-Acquired Illness or Injury  Outcome: Progressing  Goal: Optimal Comfort and Wellbeing  Outcome: Progressing  Goal: Readiness for Transition of Care  Outcome: Progressing     Problem: Diabetes Comorbidity  Goal: Blood Glucose Level Within Targeted Range  Outcome: Progressing     Problem: Pain Acute  Goal: Optimal Pain Control and Function  Outcome: Progressing     Problem: Sepsis/Septic Shock  Goal: Optimal Coping  Outcome: Progressing

## 2025-04-20 NOTE — PROGRESS NOTES
"[facility] Medicine  Progress Note    Patient Name: Jojo Ayoub  MRN: [id number]  Patient Class: IP- Inpatient   Admission Date: 4/14/2025  Length of Stay: 5 days  Attending Physician: Jakub Rodarte MD  Primary Care Provider: Bahman Vincent Jr., MD        Subjective     Principal Problem:Sepsis        HPI:  Per Jo Danielle, PA-C:    "Ms. Jojo Ayoub is a 89 y.o. female, with PMH of T2DM, HTN, HLD, chronic constipation, OA w/ chronic left knee pain, anemia, who [place] [facility] [place] due to b/l LE swelling x "a few weeks." Her daughter syayes the leg swelling was first noted by her today. She additionally notes low blood pressure readings of 90/55 at home today. She endorses left knee pain, and states she feels like her left knee "dislocate" a few nights ago. She states she had a knee replacement of her left knee over 20 years ago. She denied fall/trauma, leg pain or redness or warmth. She denied dizziness, fatigue, fever, chills, sore throat, runny nose, congestion, shortness of breath, chest pain, palpitations, abdominal pain, nausea, vomiting, diarrhea, or urinary symptoms. She was evaluated in the ED with labs showing leukocytosis of 19K, with left shift and H&H of 9.9/28.0. A metabolic panel showed sodium of 121, potassium of 5.2, glucose was 94, with CO2 of 19, and anion gap of 7. A BNP was 181 without elevation of troponin. A UA showed nitrite positive UTI with 2+ leuk esterase, with 20 WBC, and many bacteria. A CXR showed bilateral hilar prominence, and mild bibasilar densities. An x-ray of the knee had a findings that might reporesent a Pellegrinin-Stieda lesion vs. An avulsive fracture and a suprapatellar effusion. She was treated in the ED with 1L NS and rocephin."    Overview/Hospital Course:  Patient is a 89 year-old woman history of hypertension, diabetes mellitus type 2, osteoarthritis of left knee status post remote left knee replacement who " presents with worsening left knee pain with swelling.  Patient walks with walker at home at baseline.  She reports she recently slid to ground from a chair at home a few weeks but denies any more recent fall or any other trauma.     Exam notable for significant bilateral lower extremity edema more so on the left side.  Laxity of the left knee.  Not tender to touch. Lungs clear.  Imaging concerning for possible avulsion fracture.  Orthopedic surgery service consult for evaluation and recommended Adamaris brace and physical therapy.    Ultrasound of lower extremity negative for clot.  Echocardiogram does not show evidence of heart failure.  Hyponatremia on presentation which is improving without intervention.    Patient with persistent elevated white count and now fever overnight.  Blood cultures ordered.  On empiric broad-spectrum antibiotics.    Interval History:     She has no real complaints for me.  +sputum production , scant blood    Thoracentesis attempted  4/18/25 but unable to aspirate fluid due to thick nature ( gelatinous)  Awaiting results US     CT 4/18/25   No evidence metastatic disease or lymphadenopathy in the abdomen or pelvis.     Today's Plan:  Cont abx empirically  Working on best approach to tissue dx - LN excision vs Transbronchial bx vs IR guided bx      Review of Systems   Constitutional:  Negative for chills and fever.   Respiratory:  Positive for cough. Negative for shortness of breath.    Cardiovascular:  Negative for chest pain.   Gastrointestinal:  Negative for abdominal pain, nausea and vomiting.   Genitourinary:  Negative for dysuria and frequency.     Objective:     Vital Signs (Most Recent):  Temp: 98.3 °F (36.8 °C) (04/20/25 0746)  Pulse: 79 (04/20/25 0746)  Resp: 16 (04/20/25 0443)  BP: (!) 119/57 (04/20/25 0746)  SpO2: 96 % (04/20/25 0746) Vital Signs (24h Range):  Temp:  [98.3 °F (36.8 °C)-100 °F (37.8 °C)] 98.3 °F (36.8 °C)  Pulse:  [] 79  Resp:  [16-18] 16  SpO2:  [91 %-96  %] 96 %  BP: ()/(55-66) 119/57     Weight: 63.5 kg (140 lb)  Body mass index is 24.03 kg/m².    Intake/Output Summary (Last 24 hours) at 4/20/2025 0927  Last data filed at 4/19/2025 1445  Gross per 24 hour   Intake 480 ml   Output --   Net 480 ml         Physical Exam  Constitutional:       General: She is not in acute distress.     Appearance: She is ill-appearing.   HENT:      Head: Atraumatic.   Eyes:      Conjunctiva/sclera: Conjunctivae normal.   Cardiovascular:      Rate and Rhythm: Normal rate and regular rhythm.      Heart sounds: Normal heart sounds. No murmur heard.  Pulmonary:      Effort: Pulmonary effort is normal.      Breath sounds: Rhonchi present. No wheezing.      Comments: Breathing comfortably been lung sounds courses with more prominent rhonchi in the left lower base that does not clear with cough.  Abdominal:      General: Bowel sounds are normal. There is no distension.      Palpations: Abdomen is soft.      Tenderness: There is no abdominal tenderness.   Musculoskeletal:         General: No deformity. Normal range of motion.      Cervical back: Neck supple.      Comments: Left knee not tender, swelling around the left knee and bilateral lower extremities much improved.   Neurological:      Mental Status: She is alert and oriented to person, place, and time.               Significant Labs: All pertinent labs within the past 24 hours have been reviewed.    Significant Imaging: I have reviewed all pertinent imaging results/findings within the past 24 hours.      Assessment & Plan  Mediastinal mass  4/18: Seen on CT, along with L  pleural effusion and axillary mass. Pulm attempted tap but unable to obtain fluid, will consult with IR. The family is considering axillary biopsy, a full US of the axilla is pending as surgery unable to palpate mass.     Sepsis  Hemoptysis  Presented with elevated white blood cell count and now with fever.  Unclear source.  UA suggestive of possible UTI but  without urinary symptoms.  Left knee not tender with good range of motion.  Left knee swelling improving.  Resolving URI symptoms however today reports onset scant hemoptysis and cough.  Inflammatory response not improving with empiric antibiotic therapy.  Blood culture no growth to date thus far.  Possible noninfectious source of inflammatory response.  Most common cause of hemoptysis airway irritation from pneumonia or upper respiratory infection.  However in light of advanced age concern for possible malignancy.    Hyponatremia  Prior history of hyponatremia.  Cause unclear.  Serum sodium improved without intervention.  TSH wnl.  Coristol speaks against adrenal insuffiencey.  Hypervolemic initially on exam however swelling seems serum improved on its own.  Urine sodium appropriately dilute.  Monitor.  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  Continue to monitor serum sodium level.  If trends down again reasonable to repeat urine studies.  Acute on Chronic pain of left knee  Brace and therapy per Orthopedic surgery.  Patient doing well with physical therapy.  Continue.  Swelling improving.  Type 2 diabetes mellitus, without long-term current use of insulin  Reasonably controlled with current regimen.  Will continue with current regimen and continue to monitor.  Recent Labs   Lab 04/18/25  0851 04/18/25  1120 04/18/25  1538 04/18/25  1952 04/19/25  0731 04/19/25 1959   POCTGLUCOSE 112* 134* 115* 121* 99 139*     Hyperlipidemia  Continue statin therapy.  Essential hypertension  Normotensive to mildly hypotensive.  Hold blood pressure medications.  Monitor.  VTE Risk Mitigation (From admission, onward)           Ordered     IP VTE HIGH RISK PATIENT  Once         04/17/25 1115     Place LIANNE hose  Until discontinued         04/17/25 1115     Place sequential compression device  Until discontinued         04/17/25 1115     Reason for No Pharmacological VTE Prophylaxis  Once        Question:  Reasons:   Answer:  Active Bleeding    04/17/25 1115                    Discharge Planning   FAY: 4/19/2025     Code Status: Full Code   Medical Readiness for Discharge Date:   Discharge Plan A: Rehab   Discharge Delays: None known at this time            Please place Justification for DME        Jakub Rodarte MD  Department of Hospital Medicine   [facility] ([place])

## 2025-04-20 NOTE — SUBJECTIVE & OBJECTIVE
Interval History:     She has no real complaints for me.  +sputum production , scant blood    Thoracentesis attempted  4/18/25 but unable to aspirate fluid due to thick nature ( gelatinous)  Awaiting results US     CT 4/18/25   No evidence metastatic disease or lymphadenopathy in the abdomen or pelvis.     Today's Plan:  Cont abx empirically  Working on best approach to tissue dx - LN excision vs Transbronchial bx vs IR guided bx      Review of Systems   Constitutional:  Negative for chills and fever.   Respiratory:  Positive for cough. Negative for shortness of breath.    Cardiovascular:  Negative for chest pain.   Gastrointestinal:  Negative for abdominal pain, nausea and vomiting.   Genitourinary:  Negative for dysuria and frequency.     Objective:     Vital Signs (Most Recent):  Temp: 98.3 °F (36.8 °C) (04/20/25 0746)  Pulse: 79 (04/20/25 0746)  Resp: 16 (04/20/25 0443)  BP: (!) 119/57 (04/20/25 0746)  SpO2: 96 % (04/20/25 0746) Vital Signs (24h Range):  Temp:  [98.3 °F (36.8 °C)-100 °F (37.8 °C)] 98.3 °F (36.8 °C)  Pulse:  [] 79  Resp:  [16-18] 16  SpO2:  [91 %-96 %] 96 %  BP: ()/(55-66) 119/57     Weight: 63.5 kg (140 lb)  Body mass index is 24.03 kg/m².    Intake/Output Summary (Last 24 hours) at 4/20/2025 0927  Last data filed at 4/19/2025 1445  Gross per 24 hour   Intake 480 ml   Output --   Net 480 ml         Physical Exam  Constitutional:       General: She is not in acute distress.     Appearance: She is ill-appearing.   HENT:      Head: Atraumatic.   Eyes:      Conjunctiva/sclera: Conjunctivae normal.   Cardiovascular:      Rate and Rhythm: Normal rate and regular rhythm.      Heart sounds: Normal heart sounds. No murmur heard.  Pulmonary:      Effort: Pulmonary effort is normal.      Breath sounds: Rhonchi present. No wheezing.      Comments: Breathing comfortably been lung sounds courses with more prominent rhonchi in the left lower base that does not clear with cough.  Abdominal:       General: Bowel sounds are normal. There is no distension.      Palpations: Abdomen is soft.      Tenderness: There is no abdominal tenderness.   Musculoskeletal:         General: No deformity. Normal range of motion.      Cervical back: Neck supple.      Comments: Left knee not tender, swelling around the left knee and bilateral lower extremities much improved.   Neurological:      Mental Status: She is alert and oriented to person, place, and time.               Significant Labs: All pertinent labs within the past 24 hours have been reviewed.    Significant Imaging: I have reviewed all pertinent imaging results/findings within the past 24 hours.

## 2025-04-21 ENCOUNTER — ANESTHESIA (OUTPATIENT)
Dept: SURGERY | Facility: OTHER | Age: 89
End: 2025-04-21
Payer: MEDICARE

## 2025-04-21 ENCOUNTER — ANESTHESIA EVENT (OUTPATIENT)
Dept: SURGERY | Facility: OTHER | Age: 89
End: 2025-04-21
Payer: MEDICARE

## 2025-04-21 PROBLEM — I48.0 PAROXYSMAL ATRIAL FIBRILLATION: Status: ACTIVE | Noted: 2025-04-21

## 2025-04-21 LAB
ABSOLUTE EOSINOPHIL (OHS): 0.04 K/UL
ABSOLUTE EOSINOPHIL (OHS): 0.17 K/UL
ABSOLUTE MONOCYTE (OHS): 0.89 K/UL (ref 0.3–1)
ABSOLUTE MONOCYTE (OHS): 0.99 K/UL (ref 0.3–1)
ABSOLUTE NEUTROPHIL COUNT (OHS): 24.41 K/UL (ref 1.8–7.7)
ABSOLUTE NEUTROPHIL COUNT (OHS): 27.89 K/UL (ref 1.8–7.7)
ANION GAP (OHS): 7 MMOL/L (ref 8–16)
ANION GAP (OHS): 8 MMOL/L (ref 8–16)
BASOPHILS # BLD AUTO: 0.08 K/UL
BASOPHILS # BLD AUTO: 0.09 K/UL
BASOPHILS NFR BLD AUTO: 0.3 %
BASOPHILS NFR BLD AUTO: 0.3 %
BUN SERPL-MCNC: 10 MG/DL (ref 8–23)
BUN SERPL-MCNC: 11 MG/DL (ref 8–23)
CALCIUM SERPL-MCNC: 9.2 MG/DL (ref 8.7–10.5)
CALCIUM SERPL-MCNC: 9.4 MG/DL (ref 8.7–10.5)
CHLORIDE SERPL-SCNC: 94 MMOL/L (ref 95–110)
CHLORIDE SERPL-SCNC: 95 MMOL/L (ref 95–110)
CO2 SERPL-SCNC: 23 MMOL/L (ref 23–29)
CO2 SERPL-SCNC: 23 MMOL/L (ref 23–29)
CREAT SERPL-MCNC: 0.9 MG/DL (ref 0.5–1.4)
CREAT SERPL-MCNC: 1 MG/DL (ref 0.5–1.4)
ERYTHROCYTE [DISTWIDTH] IN BLOOD BY AUTOMATED COUNT: 14.4 % (ref 11.5–14.5)
ERYTHROCYTE [DISTWIDTH] IN BLOOD BY AUTOMATED COUNT: 14.4 % (ref 11.5–14.5)
GFR SERPLBLD CREATININE-BSD FMLA CKD-EPI: 55 ML/MIN/1.73/M2
GFR SERPLBLD CREATININE-BSD FMLA CKD-EPI: >60 ML/MIN/1.73/M2
GLUCOSE SERPL-MCNC: 122 MG/DL (ref 70–110)
GLUCOSE SERPL-MCNC: 97 MG/DL (ref 70–110)
HCT VFR BLD AUTO: 24.8 % (ref 37–48.5)
HCT VFR BLD AUTO: 26 % (ref 37–48.5)
HGB BLD-MCNC: 8.3 GM/DL (ref 12–16)
HGB BLD-MCNC: 8.8 GM/DL (ref 12–16)
IMM GRANULOCYTES # BLD AUTO: 0.24 K/UL (ref 0–0.04)
IMM GRANULOCYTES # BLD AUTO: 0.25 K/UL (ref 0–0.04)
IMM GRANULOCYTES NFR BLD AUTO: 0.8 % (ref 0–0.5)
IMM GRANULOCYTES NFR BLD AUTO: 0.9 % (ref 0–0.5)
LYMPHOCYTES # BLD AUTO: 1.29 K/UL (ref 1–4.8)
LYMPHOCYTES # BLD AUTO: 1.56 K/UL (ref 1–4.8)
MAGNESIUM SERPL-MCNC: 1.6 MG/DL (ref 1.6–2.6)
MAGNESIUM SERPL-MCNC: 1.7 MG/DL (ref 1.6–2.6)
MCH RBC QN AUTO: 30.2 PG (ref 27–31)
MCH RBC QN AUTO: 30.6 PG (ref 27–31)
MCHC RBC AUTO-ENTMCNC: 33.5 G/DL (ref 32–36)
MCHC RBC AUTO-ENTMCNC: 33.8 G/DL (ref 32–36)
MCV RBC AUTO: 90 FL (ref 82–98)
MCV RBC AUTO: 90 FL (ref 82–98)
NUCLEATED RBC (/100WBC) (OHS): 0 /100 WBC
NUCLEATED RBC (/100WBC) (OHS): 0 /100 WBC
OHS QRS DURATION: 76 MS
OHS QTC CALCULATION: 438 MS
OSMOLALITY UR: 128 MOSM/KG (ref 50–1200)
PHOSPHATE SERPL-MCNC: 2.7 MG/DL (ref 2.7–4.5)
PLATELET # BLD AUTO: 391 K/UL (ref 150–450)
PLATELET # BLD AUTO: 461 K/UL (ref 150–450)
PMV BLD AUTO: 8.1 FL (ref 9.2–12.9)
PMV BLD AUTO: 8.3 FL (ref 9.2–12.9)
POCT GLUCOSE: 107 MG/DL (ref 70–110)
POCT GLUCOSE: 108 MG/DL (ref 70–110)
POCT GLUCOSE: 111 MG/DL (ref 70–110)
POCT GLUCOSE: 99 MG/DL (ref 70–110)
POTASSIUM SERPL-SCNC: 3.9 MMOL/L (ref 3.5–5.1)
POTASSIUM SERPL-SCNC: 3.9 MMOL/L (ref 3.5–5.1)
RBC # BLD AUTO: 2.75 M/UL (ref 4–5.4)
RBC # BLD AUTO: 2.88 M/UL (ref 4–5.4)
RELATIVE EOSINOPHIL (OHS): 0.1 %
RELATIVE EOSINOPHIL (OHS): 0.6 %
RELATIVE LYMPHOCYTE (OHS): 4.2 % (ref 18–48)
RELATIVE LYMPHOCYTE (OHS): 5.7 % (ref 18–48)
RELATIVE MONOCYTE (OHS): 2.9 % (ref 4–15)
RELATIVE MONOCYTE (OHS): 3.6 % (ref 4–15)
RELATIVE NEUTROPHIL (OHS): 88.9 % (ref 38–73)
RELATIVE NEUTROPHIL (OHS): 91.7 % (ref 38–73)
SODIUM SERPL-SCNC: 124 MMOL/L (ref 136–145)
SODIUM SERPL-SCNC: 126 MMOL/L (ref 136–145)
SODIUM UR-SCNC: 27 MMOL/L (ref 20–250)
WBC # BLD AUTO: 27.47 K/UL (ref 3.9–12.7)
WBC # BLD AUTO: 30.43 K/UL (ref 3.9–12.7)

## 2025-04-21 PROCEDURE — 25000003 PHARM REV CODE 250: Performed by: INTERNAL MEDICINE

## 2025-04-21 PROCEDURE — 83735 ASSAY OF MAGNESIUM: CPT | Performed by: NURSE PRACTITIONER

## 2025-04-21 PROCEDURE — 25000003 PHARM REV CODE 250: Performed by: NURSE PRACTITIONER

## 2025-04-21 PROCEDURE — 07B64ZX EXCISION OF LEFT AXILLARY LYMPHATIC, PERCUTANEOUS ENDOSCOPIC APPROACH, DIAGNOSTIC: ICD-10-PCS | Performed by: SURGERY

## 2025-04-21 PROCEDURE — 80048 BASIC METABOLIC PNL TOTAL CA: CPT | Performed by: NURSE PRACTITIONER

## 2025-04-21 PROCEDURE — 36415 COLL VENOUS BLD VENIPUNCTURE: CPT | Performed by: HOSPITALIST

## 2025-04-21 PROCEDURE — 63600175 PHARM REV CODE 636 W HCPCS: Performed by: NURSE ANESTHETIST, CERTIFIED REGISTERED

## 2025-04-21 PROCEDURE — 83735 ASSAY OF MAGNESIUM: CPT | Performed by: HOSPITALIST

## 2025-04-21 PROCEDURE — 25000003 PHARM REV CODE 250: Performed by: STUDENT IN AN ORGANIZED HEALTH CARE EDUCATION/TRAINING PROGRAM

## 2025-04-21 PROCEDURE — 37000008 HC ANESTHESIA 1ST 15 MINUTES: Performed by: SURGERY

## 2025-04-21 PROCEDURE — 63600175 PHARM REV CODE 636 W HCPCS: Performed by: SURGERY

## 2025-04-21 PROCEDURE — 80048 BASIC METABOLIC PNL TOTAL CA: CPT | Performed by: HOSPITALIST

## 2025-04-21 PROCEDURE — 71000033 HC RECOVERY, INTIAL HOUR: Performed by: SURGERY

## 2025-04-21 PROCEDURE — 36000707: Performed by: SURGERY

## 2025-04-21 PROCEDURE — 63600175 PHARM REV CODE 636 W HCPCS: Performed by: HOSPITALIST

## 2025-04-21 PROCEDURE — 36000706: Performed by: SURGERY

## 2025-04-21 PROCEDURE — A4216 STERILE WATER/SALINE, 10 ML: HCPCS | Performed by: PHYSICIAN ASSISTANT

## 2025-04-21 PROCEDURE — 99232 SBSQ HOSP IP/OBS MODERATE 35: CPT | Mod: GC,,, | Performed by: STUDENT IN AN ORGANIZED HEALTH CARE EDUCATION/TRAINING PROGRAM

## 2025-04-21 PROCEDURE — 25000003 PHARM REV CODE 250: Performed by: SURGERY

## 2025-04-21 PROCEDURE — 84100 ASSAY OF PHOSPHORUS: CPT | Performed by: HOSPITALIST

## 2025-04-21 PROCEDURE — 27000221 HC OXYGEN, UP TO 24 HOURS

## 2025-04-21 PROCEDURE — 85025 COMPLETE CBC W/AUTO DIFF WBC: CPT | Performed by: HOSPITALIST

## 2025-04-21 PROCEDURE — 63600175 PHARM REV CODE 636 W HCPCS: Performed by: NURSE PRACTITIONER

## 2025-04-21 PROCEDURE — 37000009 HC ANESTHESIA EA ADD 15 MINS: Performed by: SURGERY

## 2025-04-21 PROCEDURE — 88184 FLOWCYTOMETRY/ TC 1 MARKER: CPT | Performed by: SURGERY

## 2025-04-21 PROCEDURE — 25000003 PHARM REV CODE 250: Performed by: PHYSICIAN ASSISTANT

## 2025-04-21 PROCEDURE — 20000000 HC ICU ROOM

## 2025-04-21 PROCEDURE — 99232 SBSQ HOSP IP/OBS MODERATE 35: CPT | Mod: ,,, | Performed by: SURGERY

## 2025-04-21 PROCEDURE — 25000003 PHARM REV CODE 250: Performed by: NURSE ANESTHETIST, CERTIFIED REGISTERED

## 2025-04-21 PROCEDURE — 85025 COMPLETE CBC W/AUTO DIFF WBC: CPT | Performed by: NURSE PRACTITIONER

## 2025-04-21 PROCEDURE — 83935 ASSAY OF URINE OSMOLALITY: CPT | Performed by: INTERNAL MEDICINE

## 2025-04-21 PROCEDURE — 84300 ASSAY OF URINE SODIUM: CPT | Performed by: INTERNAL MEDICINE

## 2025-04-21 PROCEDURE — 88341 IMHCHEM/IMCYTCHM EA ADD ANTB: CPT | Mod: TC | Performed by: SURGERY

## 2025-04-21 PROCEDURE — 36415 COLL VENOUS BLD VENIPUNCTURE: CPT | Performed by: NURSE PRACTITIONER

## 2025-04-21 PROCEDURE — 25000003 PHARM REV CODE 250: Performed by: HOSPITALIST

## 2025-04-21 PROCEDURE — D9220A PRA ANESTHESIA: Mod: ,,, | Performed by: NURSE ANESTHETIST, CERTIFIED REGISTERED

## 2025-04-21 PROCEDURE — 94761 N-INVAS EAR/PLS OXIMETRY MLT: CPT

## 2025-04-21 RX ORDER — LIDOCAINE HYDROCHLORIDE AND EPINEPHRINE 10; 10 UG/ML; MG/ML
INJECTION, SOLUTION INFILTRATION; PERINEURAL
Status: DISCONTINUED | OUTPATIENT
Start: 2025-04-21 | End: 2025-04-21 | Stop reason: HOSPADM

## 2025-04-21 RX ORDER — MAGNESIUM SULFATE HEPTAHYDRATE 40 MG/ML
2 INJECTION, SOLUTION INTRAVENOUS ONCE
Status: COMPLETED | OUTPATIENT
Start: 2025-04-21 | End: 2025-04-21

## 2025-04-21 RX ORDER — CEFAZOLIN SODIUM 1 G/3ML
INJECTION, POWDER, FOR SOLUTION INTRAMUSCULAR; INTRAVENOUS
Status: DISCONTINUED | OUTPATIENT
Start: 2025-04-21 | End: 2025-04-21

## 2025-04-21 RX ORDER — PROCHLORPERAZINE EDISYLATE 5 MG/ML
5 INJECTION INTRAMUSCULAR; INTRAVENOUS EVERY 30 MIN PRN
Status: DISCONTINUED | OUTPATIENT
Start: 2025-04-21 | End: 2025-04-21

## 2025-04-21 RX ORDER — OXYCODONE HYDROCHLORIDE 5 MG/1
5 TABLET ORAL
Status: DISCONTINUED | OUTPATIENT
Start: 2025-04-21 | End: 2025-04-21

## 2025-04-21 RX ORDER — ONDANSETRON HYDROCHLORIDE 2 MG/ML
INJECTION, SOLUTION INTRAVENOUS
Status: DISCONTINUED | OUTPATIENT
Start: 2025-04-21 | End: 2025-04-21

## 2025-04-21 RX ORDER — DIGOXIN 0.25 MG/ML
250 INJECTION INTRAMUSCULAR; INTRAVENOUS ONCE
Status: DISCONTINUED | OUTPATIENT
Start: 2025-04-22 | End: 2025-04-23

## 2025-04-21 RX ORDER — LIDOCAINE HYDROCHLORIDE 10 MG/ML
INJECTION, SOLUTION INTRAVENOUS
Status: DISCONTINUED | OUTPATIENT
Start: 2025-04-21 | End: 2025-04-21

## 2025-04-21 RX ORDER — IBUPROFEN 400 MG/1
400 TABLET ORAL ONCE
Status: COMPLETED | OUTPATIENT
Start: 2025-04-21 | End: 2025-04-21

## 2025-04-21 RX ORDER — PROPOFOL 10 MG/ML
VIAL (ML) INTRAVENOUS
Status: DISCONTINUED | OUTPATIENT
Start: 2025-04-21 | End: 2025-04-21

## 2025-04-21 RX ORDER — PHENYLEPHRINE HYDROCHLORIDE 10 MG/ML
INJECTION INTRAVENOUS
Status: DISCONTINUED | OUTPATIENT
Start: 2025-04-21 | End: 2025-04-21

## 2025-04-21 RX ORDER — DEXMEDETOMIDINE HYDROCHLORIDE 100 UG/ML
INJECTION, SOLUTION INTRAVENOUS
Status: DISCONTINUED | OUTPATIENT
Start: 2025-04-21 | End: 2025-04-21

## 2025-04-21 RX ORDER — SODIUM CHLORIDE 9 MG/ML
INJECTION, SOLUTION INTRAVENOUS ONCE
Status: COMPLETED | OUTPATIENT
Start: 2025-04-21 | End: 2025-04-21

## 2025-04-21 RX ORDER — METOPROLOL TARTRATE 1 MG/ML
5 INJECTION, SOLUTION INTRAVENOUS ONCE
Status: COMPLETED | OUTPATIENT
Start: 2025-04-21 | End: 2025-04-21

## 2025-04-21 RX ORDER — METOPROLOL TARTRATE 1 MG/ML
INJECTION, SOLUTION INTRAVENOUS
Status: DISPENSED
Start: 2025-04-21 | End: 2025-04-22

## 2025-04-21 RX ORDER — HYDROMORPHONE HYDROCHLORIDE 2 MG/ML
0.4 INJECTION, SOLUTION INTRAMUSCULAR; INTRAVENOUS; SUBCUTANEOUS EVERY 5 MIN PRN
Status: DISCONTINUED | OUTPATIENT
Start: 2025-04-21 | End: 2025-04-21

## 2025-04-21 RX ORDER — MUPIROCIN 20 MG/G
OINTMENT TOPICAL 2 TIMES DAILY
Status: COMPLETED | OUTPATIENT
Start: 2025-04-21 | End: 2025-04-26

## 2025-04-21 RX ORDER — FENTANYL CITRATE 50 UG/ML
INJECTION, SOLUTION INTRAMUSCULAR; INTRAVENOUS
Status: DISCONTINUED | OUTPATIENT
Start: 2025-04-21 | End: 2025-04-21

## 2025-04-21 RX ORDER — HEPARIN SODIUM 5000 [USP'U]/ML
5000 INJECTION, SOLUTION INTRAVENOUS; SUBCUTANEOUS EVERY 8 HOURS
Status: DISCONTINUED | OUTPATIENT
Start: 2025-04-21 | End: 2025-04-21

## 2025-04-21 RX ORDER — GLUCAGON 1 MG
1 KIT INJECTION
Status: DISCONTINUED | OUTPATIENT
Start: 2025-04-21 | End: 2025-04-21

## 2025-04-21 RX ORDER — NOREPINEPHRINE BITARTRATE/D5W 4MG/250ML
0-3 PLASTIC BAG, INJECTION (ML) INTRAVENOUS CONTINUOUS
Status: DISCONTINUED | OUTPATIENT
Start: 2025-04-21 | End: 2025-04-25

## 2025-04-21 RX ORDER — PROPOFOL 10 MG/ML
VIAL (ML) INTRAVENOUS CONTINUOUS PRN
Status: DISCONTINUED | OUTPATIENT
Start: 2025-04-21 | End: 2025-04-21

## 2025-04-21 RX ORDER — SODIUM CHLORIDE 0.9 % (FLUSH) 0.9 %
3 SYRINGE (ML) INJECTION
Status: DISCONTINUED | OUTPATIENT
Start: 2025-04-21 | End: 2025-04-21

## 2025-04-21 RX ORDER — BUPIVACAINE HYDROCHLORIDE 2.5 MG/ML
INJECTION, SOLUTION EPIDURAL; INFILTRATION; INTRACAUDAL; PERINEURAL
Status: DISCONTINUED | OUTPATIENT
Start: 2025-04-21 | End: 2025-04-21 | Stop reason: HOSPADM

## 2025-04-21 RX ORDER — MEPERIDINE HYDROCHLORIDE 25 MG/ML
12.5 INJECTION INTRAMUSCULAR; INTRAVENOUS; SUBCUTANEOUS ONCE AS NEEDED
Status: DISCONTINUED | OUTPATIENT
Start: 2025-04-21 | End: 2025-04-21

## 2025-04-21 RX ADMIN — FENTANYL CITRATE 25 MCG: 50 INJECTION, SOLUTION INTRAMUSCULAR; INTRAVENOUS at 01:04

## 2025-04-21 RX ADMIN — LIDOCAINE HYDROCHLORIDE 50 MG: 10 INJECTION, SOLUTION INTRAVENOUS at 12:04

## 2025-04-21 RX ADMIN — GABAPENTIN 400 MG: 400 CAPSULE ORAL at 10:04

## 2025-04-21 RX ADMIN — ONDANSETRON 4 MG: 4 TABLET, ORALLY DISINTEGRATING ORAL at 09:04

## 2025-04-21 RX ADMIN — ACETAMINOPHEN 1000 MG: 500 TABLET ORAL at 04:04

## 2025-04-21 RX ADMIN — GLYCOPYRROLATE 0.2 MG: 0.2 INJECTION, SOLUTION INTRAMUSCULAR; INTRAVITREAL at 12:04

## 2025-04-21 RX ADMIN — MUPIROCIN: 20 OINTMENT TOPICAL at 09:04

## 2025-04-21 RX ADMIN — FENTANYL CITRATE 50 MCG: 50 INJECTION, SOLUTION INTRAMUSCULAR; INTRAVENOUS at 12:04

## 2025-04-21 RX ADMIN — PIPERACILLIN SODIUM AND TAZOBACTAM SODIUM 4.5 G: 4; .5 INJECTION, POWDER, FOR SOLUTION INTRAVENOUS at 01:04

## 2025-04-21 RX ADMIN — PHENYLEPHRINE HYDROCHLORIDE 100 MCG: 10 INJECTION INTRAVENOUS at 02:04

## 2025-04-21 RX ADMIN — PIPERACILLIN SODIUM AND TAZOBACTAM SODIUM 4.5 G: 4; .5 INJECTION, POWDER, FOR SOLUTION INTRAVENOUS at 10:04

## 2025-04-21 RX ADMIN — SODIUM CHLORIDE, PRESERVATIVE FREE 10 ML: 5 INJECTION INTRAVENOUS at 05:04

## 2025-04-21 RX ADMIN — METOPROLOL TARTRATE 12.5 MG: 25 TABLET, FILM COATED ORAL at 10:04

## 2025-04-21 RX ADMIN — SODIUM CHLORIDE 250 ML: 9 INJECTION, SOLUTION INTRAVENOUS at 09:04

## 2025-04-21 RX ADMIN — PIPERACILLIN SODIUM AND TAZOBACTAM SODIUM 4.5 G: 4; .5 INJECTION, POWDER, FOR SOLUTION INTRAVENOUS at 06:04

## 2025-04-21 RX ADMIN — SODIUM CHLORIDE: 0.9 INJECTION, SOLUTION INTRAVENOUS at 12:04

## 2025-04-21 RX ADMIN — DIGOXIN 250 MCG: 100 INJECTION, SOLUTION INTRAMUSCULAR; INTRAVENOUS at 07:04

## 2025-04-21 RX ADMIN — METOROPROLOL TARTRATE 5 MG: 5 INJECTION, SOLUTION INTRAVENOUS at 06:04

## 2025-04-21 RX ADMIN — IBUPROFEN 400 MG: 400 TABLET, FILM COATED ORAL at 06:04

## 2025-04-21 RX ADMIN — CEFAZOLIN 2 G: 330 INJECTION, POWDER, FOR SOLUTION INTRAMUSCULAR; INTRAVENOUS at 12:04

## 2025-04-21 RX ADMIN — DEXMEDETOMIDINE HYDROCHLORIDE 8 MCG: 100 INJECTION, SOLUTION, CONCENTRATE INTRAVENOUS at 12:04

## 2025-04-21 RX ADMIN — MAGNESIUM SULFATE HEPTAHYDRATE 2 G: 40 INJECTION, SOLUTION INTRAVENOUS at 09:04

## 2025-04-21 RX ADMIN — PROPOFOL 75 MCG/KG/MIN: 10 INJECTION, EMULSION INTRAVENOUS at 12:04

## 2025-04-21 RX ADMIN — METOROPROLOL TARTRATE 5 MG: 5 INJECTION, SOLUTION INTRAVENOUS at 07:04

## 2025-04-21 RX ADMIN — GABAPENTIN 400 MG: 400 CAPSULE ORAL at 04:04

## 2025-04-21 RX ADMIN — GABAPENTIN 400 MG: 400 CAPSULE ORAL at 09:04

## 2025-04-21 RX ADMIN — ONDANSETRON HYDROCHLORIDE 4 MG: 2 INJECTION INTRAMUSCULAR; INTRAVENOUS at 12:04

## 2025-04-21 RX ADMIN — PROPOFOL 30 MG: 10 INJECTION, EMULSION INTRAVENOUS at 12:04

## 2025-04-21 RX ADMIN — SODIUM CHLORIDE: 9 INJECTION, SOLUTION INTRAVENOUS at 10:04

## 2025-04-21 NOTE — PT/OT/SLP PROGRESS
Physical Therapy      Patient Name:  Jojo Ayoub   MRN:  [id number]    Patient not seen today secondary to Off the floor for procedure/surgery- in OR for lymph node bx. Will follow-up tomorrow.

## 2025-04-21 NOTE — PLAN OF CARE
Report received from RN.POC reviewed with the pt.RN assumed care.AAOX3.Room Air.Voided to the external female catheter.Pt seems anxious today.All due medication administered according to the MAR.No chest pain,SOB occurred during the night shift.Observation reviewed and charted.Care explained,No falls or injury occurred during the shift.No any  significant event undergo in the shift. Pt rest in the bed comfortably. Son was at the bed site.Flow sheets updated timely. Purposeful rounding done every 2 hourly. Daily Weight charted,IP/OP maintained and charted. Pt kept under observation through out the shift.     Problem: Adult Inpatient Plan of Care  Goal: Plan of Care Review  Outcome: Progressing  Goal: Patient-Specific Goal (Individualized)  Outcome: Progressing  Goal: Absence of Hospital-Acquired Illness or Injury  Outcome: Progressing  Goal: Optimal Comfort and Wellbeing  Outcome: Progressing  Goal: Readiness for Transition of Care  Outcome: Progressing     Problem: Diabetes Comorbidity  Goal: Blood Glucose Level Within Targeted Range  Outcome: Progressing     Problem: Wound  Goal: Optimal Coping  Outcome: Progressing  Goal: Optimal Functional Ability  Outcome: Progressing  Goal: Absence of Infection Signs and Symptoms  Outcome: Progressing  Goal: Improved Oral Intake  Outcome: Progressing  Goal: Optimal Pain Control and Function  Outcome: Progressing  Goal: Skin Health and Integrity  Outcome: Progressing  Goal: Optimal Wound Healing  Outcome: Progressing     Problem: Skin Injury Risk Increased  Goal: Skin Health and Integrity  Outcome: Progressing     Problem: Fall Injury Risk  Goal: Absence of Fall and Fall-Related Injury  Outcome: Progressing     Problem: UTI (Urinary Tract Infection)  Goal: Improved Infection Symptoms  Outcome: Progressing     Problem: Pain Acute  Goal: Optimal Pain Control and Function  Outcome: Progressing     Problem: Sepsis/Septic Shock  Goal: Optimal Coping  Outcome: Progressing  Goal:  Absence of Bleeding  Outcome: Progressing  Goal: Blood Glucose Level Within Targeted Range  Outcome: Progressing  Goal: Absence of Infection Signs and Symptoms  Outcome: Progressing  Goal: Optimal Nutrition Intake  Outcome: Progressing     Problem: Comorbidity Management  Goal: Blood Pressure in Desired Range  Outcome: Progressing  Goal: Maintenance of Osteoarthritis Symptom Control  Outcome: Progressing

## 2025-04-21 NOTE — ASSESSMENT & PLAN NOTE
Presented with elevated white blood cell count and now with fever.  Unclear source.  UA suggestive of possible UTI but without urinary symptoms.  Left knee not tender with good range of motion.  Left knee swelling improving.  Resolving URI symptoms however today reports onset scant hemoptysis and cough.  Inflammatory response not improving with empiric antibiotic therapy.  Blood culture no growth to date thus far.  Possible noninfectious source of inflammatory response.  Most common cause of hemoptysis airway irritation from pneumonia or upper respiratory infection.  However in light of advanced age concern for possible malignancy.      On zosyn  since 4/17  Presented with elevated white blood cell count and now with fever.  Unclear source.  UA suggestive of possible UTI but without urinary symptoms.  Left knee not tender with good range of motion.  Left knee swelling improving.  Resolving URI symptoms however today reports onset scant hemoptysis and cough.  Inflammatory response not improving with empiric antibiotic therapy.  Blood culture no growth to date thus far.  Possible noninfectious source of inflammatory response.  Most common cause of hemoptysis airway irritation from pneumonia or upper respiratory infection.  However in light of advanced age concern for possible malignancy.

## 2025-04-21 NOTE — PROGRESS NOTES
"[facility] Medicine  Progress Note    Patient Name: Jojo Ayoub  MRN: [id number]  Patient Class: IP- Inpatient   Admission Date: 4/14/2025  Length of Stay: 6 days  Attending Physician: Jakub Rodarte MD  Primary Care Provider: Bahman Vincent Jr., MD        Subjective     Principal Problem:Mediastinal mass        HPI:  Himanshu Danielle, PA-C:    "Ms. Jojo Ayoub is a 89 y.o. female, with PMH of T2DM, HTN, HLD, chronic constipation, OA w/ chronic left knee pain, anemia, who [place] [facility] [place]25 due to b/l LE swelling x "a few weeks." Her daughter syayes the leg swelling was first noted by her today. She additionally notes low blood pressure readings of 90/55 at home today. She endorses left knee pain, and states she feels like her left knee "dislocate" a few nights ago. She states she had a knee replacement of her left knee over 20 years ago. She denied fall/trauma, leg pain or redness or warmth. She denied dizziness, fatigue, fever, chills, sore throat, runny nose, congestion, shortness of breath, chest pain, palpitations, abdominal pain, nausea, vomiting, diarrhea, or urinary symptoms. She was evaluated in the ED with labs showing leukocytosis of 19K, with left shift and H&H of 9.9/28.0. A metabolic panel showed sodium of 121, potassium of 5.2, glucose was 94, with CO2 of 19, and anion gap of 7. A BNP was 181 without elevation of troponin. A UA showed nitrite positive UTI with 2+ leuk esterase, with 20 WBC, and many bacteria. A CXR showed bilateral hilar prominence, and mild bibasilar densities. An x-ray of the knee had a findings that might reporesent a Pellegrinin-Stieda lesion vs. An avulsive fracture and a suprapatellar effusion. She was treated in the ED with 1L NS and rocephin."    Overview/Hospital Course:  Patient is a 89 year-old woman history of hypertension, diabetes mellitus type 2, osteoarthritis of left knee status post remote left knee " replacement who presents with worsening left knee pain with swelling.  Patient walks with walker at home at baseline.  She reports she recently slid to ground from a chair at home a few weeks but denies any more recent fall or any other trauma.     Exam notable for significant bilateral lower extremity edema more so on the left side.  Laxity of the left knee.  Not tender to touch. Lungs clear.  Imaging concerning for possible avulsion fracture.  Orthopedic surgery service consult for evaluation and recommended Adamaris brace and physical therapy.    Ultrasound of lower extremity negative for clot.  Echocardiogram does not show evidence of heart failure.  Hyponatremia on presentation which is improving without intervention.    Patient with persistent elevated white count and now fever overnight.  Blood cultures ordered.  On empiric broad-spectrum antibiotics.    Interval History:     Went into a fib with RVR last night - asymptomatic  Responded to iv metoprolol  PO b-blocker started  This AM in sinus rhythm    30 min discussion with multiple family members. Discussed with pulmonary and Dr Johnson - will proceed with LN excision in the OR today.    Clinical suspicion remains for lymphoma.    Reports no dyshpagia        Review of Systems   Constitutional:  Negative for chills and fever.   Respiratory:  Positive for cough. Negative for shortness of breath.    Cardiovascular:  Negative for chest pain.   Gastrointestinal:  Negative for abdominal pain, nausea and vomiting.   Genitourinary:  Negative for dysuria and frequency.     Objective:     Vital Signs (Most Recent):  Temp: 98.4 °F (36.9 °C) (04/21/25 0754)  Pulse: 72 (04/21/25 0826)  Resp: 17 (04/21/25 0754)  BP: 115/63 (04/21/25 0754)  SpO2: (!) 94 % (04/21/25 0754) Vital Signs (24h Range):  Temp:  [98 °F (36.7 °C)-98.9 °F (37.2 °C)] 98.4 °F (36.9 °C)  Pulse:  [] 72  Resp:  [17-28] 17  SpO2:  [93 %-96 %] 94 %  BP: ()/(44-65) 115/63     Weight: 63.5 kg (139  lb 15.9 oz)  Body mass index is 24.03 kg/m².    Intake/Output Summary (Last 24 hours) at 4/21/2025 0945  Last data filed at 4/21/2025 0754  Gross per 24 hour   Intake 1149.53 ml   Output 700 ml   Net 449.53 ml         Physical Exam  Constitutional:       General: She is not in acute distress.     Appearance: She is not ill-appearing.   HENT:      Head: Atraumatic.   Eyes:      Conjunctiva/sclera: Conjunctivae normal.   Cardiovascular:      Rate and Rhythm: Normal rate and regular rhythm.      Heart sounds: Normal heart sounds. No murmur heard.  Pulmonary:      Effort: Pulmonary effort is normal.      Breath sounds: No wheezing or rhonchi.      Comments: Breathing comfortably been lung sounds courses with more prominent rhonchi in the left lower base that does not clear with cough.  Abdominal:      General: Bowel sounds are normal. There is no distension.      Palpations: Abdomen is soft.      Tenderness: There is no abdominal tenderness.   Musculoskeletal:         General: No deformity. Normal range of motion.      Cervical back: Neck supple.      Comments: Left knee not tender, swelling around the left knee and bilateral lower extremities much improved.   Neurological:      Mental Status: She is alert and oriented to person, place, and time.               Significant Labs: All pertinent labs within the past 24 hours have been reviewed.    Significant Imaging: I have reviewed all pertinent imaging results/findings within the past 24 hours.      Assessment & Plan  Mediastinal mass  4/18: Large (8.1 x 4.5 cm) subcarinal mass, possible lymphadenopathy. Additional mediastinal, hilar, and left axillary lymphadenopathy noted.      4/18/25 Pulmonary  attempted tap but unable to obtain fluid    4/20/25 - for excisional LN biopsy today. If unsuccessful or nondiagnostic  next step would by EBUS with transbronchial needle bx  Sepsis  Hemoptysis  Presented with elevated white blood cell count and now with fever.  Unclear  source.  UA suggestive of possible UTI but without urinary symptoms.  Left knee not tender with good range of motion.  Left knee swelling improving.  Resolving URI symptoms however today reports onset scant hemoptysis and cough.  Inflammatory response not improving with empiric antibiotic therapy.  Blood culture no growth to date thus far.  Possible noninfectious source of inflammatory response.  Most common cause of hemoptysis airway irritation from pneumonia or upper respiratory infection.  However in light of advanced age concern for possible malignancy.      On zosyn  since 4/17  Presented with elevated white blood cell count and now with fever.  Unclear source.  UA suggestive of possible UTI but without urinary symptoms.  Left knee not tender with good range of motion.  Left knee swelling improving.  Resolving URI symptoms however today reports onset scant hemoptysis and cough.  Inflammatory response not improving with empiric antibiotic therapy.  Blood culture no growth to date thus far.  Possible noninfectious source of inflammatory response.  Most common cause of hemoptysis airway irritation from pneumonia or upper respiratory infection.  However in light of advanced age concern for possible malignancy.    Hyponatremia  Prior history of hyponatremia.  Cause unclear.  Serum sodium improved without intervention.  TSH wnl.  Coristol speaks against adrenal insuffiencey.  Hypervolemic initially on exam however swelling seems serum improved on its own.  Urine sodium appropriately dilute.  Monitor.    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    Continue to monitor serum sodium level.    If trends down again reasonable to repeat urine studies.    4/20 -  ( trend is down)  Fluid restriction  Recheck urine studies  Acute on Chronic pain of left knee  Brace and therapy per Orthopedic surgery.  Patient doing well with physical therapy.  Continue.  Swelling improving.  Type 2 diabetes mellitus,  without long-term current use of insulin  Reasonably controlled with current regimen.  Will continue with current regimen and continue to monitor.  Recent Labs   Lab 04/19/25  0731 04/19/25  1959 04/20/25  1202 04/20/25  1714 04/20/25  2207 04/21/25  0750   POCTGLUCOSE 99 139* 119* 129* 139* 108     Hyperlipidemia  Continue statin therapy.  Essential hypertension  Normotensive to mildly hypotensive.  Hold blood pressure medications.  Monitor.  Lower extremity edema      Fever      Pleural effusion      Acute pain of left knee      Paroxysmal atrial fibrillation  Patient has paroxysmal (<7 days) atrial fibrillation. Patient is currently in sinus rhythm. [id number] Score: 4. The patients heart rate in the last 24 hours is as follows:  Pulse  Min: 69  Max: 143     Antiarrhythmics  metoprolol tartrate (LOPRESSOR) split tablet 12.5 mg, 2 times daily, Oral    Anticoagulants       Plan  - Replete lytes with a goal of K>4, Mg >2  - Patient is not anticoagulated due to anticipated surgical procedure  - Patient's afib is currently controlled  - continue daily low dose lopressor bid      VTE Risk Mitigation (From admission, onward)           Ordered     IP VTE HIGH RISK PATIENT  Once         04/17/25 1115     Place LIANNE hose  Until discontinued         04/17/25 1115     Place sequential compression device  Until discontinued         04/17/25 1115     Reason for No Pharmacological VTE Prophylaxis  Once        Question:  Reasons:  Answer:  Active Bleeding    04/17/25 1115                    Discharge Planning   FAY: 4/19/2025     Code Status: Full Code   Medical Readiness for Discharge Date:   Discharge Plan A: Rehab   Discharge Delays: None known at this time            Please place Justification for DME        Jakub Rodarte MD  Department of Hospital Medicine   [facility] ([place])

## 2025-04-21 NOTE — ANESTHESIA PREPROCEDURE EVALUATION
04/21/2025  Jojo Ayoub is a 89 y.o., female.      Pre-op Assessment    I have reviewed the Patient Summary Reports.     I have reviewed the Nursing Notes. I have reviewed the NPO Status.   I have reviewed the Medications.     Review of Systems  Anesthesia Hx:  No problems with previous Anesthesia             Denies Family Hx of Anesthesia complications.    Denies Personal Hx of Anesthesia complications.                    Social:  Non-Smoker       Hematology/Oncology:    Oncology Normal    -- Anemia:                                  EENT/Dental:  EENT/Dental Normal           Cardiovascular:  Exercise tolerance: good   Hypertension                                          Pulmonary:  Pulmonary Normal                       Renal/:  Renal/ Normal                 Musculoskeletal:         Spine Disorders: lumbar Chronic Pain           Neurological:  Neurology Normal                                      Endocrine:  Diabetes  Hyperthyroidism         Dermatological:  Skin Normal    Psych:  Psychiatric Normal                    Physical Exam  General: Well nourished, Cooperative, Oriented and Alert    Airway:  Mallampati: II / II  Mouth Opening: Normal  TM Distance: Normal  Neck ROM: Normal ROM    Dental:  Intact        Anesthesia Plan  Type of Anesthesia, risks & benefits discussed:    Anesthesia Type: MAC  Intra-op Monitoring Plan: Standard ASA Monitors  Post Op Pain Control Plan: multimodal analgesia  Induction:  IV  Airway Plan: Video and Direct  Informed Consent: Informed consent signed with the Patient and all parties understand the risks and agree with anesthesia plan.  All questions answered.   ASA Score: 3  Day of Surgery Review of History & Physical: H&P Update referred to the surgeon/provider.    Ready For Surgery From Anesthesia Perspective.     .

## 2025-04-21 NOTE — PROGRESS NOTES
[facility] ([place])  General Surgery  Progress Note    Subjective:     Interval History:  Overnight had RVR responded to metoprolol.  She is cleared for operative intervention.  I have discussed with Dr. Clinton various options.  While she is in the hospital it is been decided with the various specialties and the family that the best 1st course to obtain a diagnosis would be to obtain an lymph node  from the left axilla.  All are in agreement    We will go to OR today for left axillary lymph node excision    Post-Op Info:  Procedure(s) (LRB):  EXCISION, LYMPH NODE (Left)   * Day of Surgery *      Medications:  Continuous Infusions:  Scheduled Meds:   gabapentin  400 mg Oral TID    metoprolol tartrate  12.5 mg Oral BID    piperacillin-tazobactam (Zosyn) IV (PEDS and ADULTS) (extended infusion is not appropriate)  4.5 g Intravenous Q8H    sodium chloride 0.9%  10 mL Intravenous Q8H     PRN Meds:  Current Facility-Administered Medications:     acetaminophen, 1,000 mg, Oral, Q8H PRN    dextrose 50%, 12.5 g, Intravenous, PRN    dextrose 50%, 25 g, Intravenous, PRN    glucagon (human recombinant), 1 mg, Intramuscular, PRN    glucose, 16 g, Oral, PRN    glucose, 24 g, Oral, PRN    insulin aspart U-100, 0-5 Units, Subcutaneous, QID (AC + HS) PRN    melatonin, 6 mg, Oral, Nightly PRN    naloxone, 0.02 mg, Intravenous, PRN    ondansetron, 4 mg, Oral, Q8H PRN    senna-docusate, 1 tablet, Oral, BID PRN    sodium chloride 0.9%, 10 mL, Intravenous, PRN     Objective:     Vital Signs (Most Recent):  Temp: 99 °F (37.2 °C) (04/21/25 1057)  Pulse: 76 (04/21/25 1058)  Resp: 17 (04/21/25 1057)  BP: (!) 113/57 (04/21/25 1057)  SpO2: 95 % (04/21/25 1057) Vital Signs (24h Range):  Temp:  [98 °F (36.7 °C)-99 °F (37.2 °C)] 99 °F (37.2 °C)  Pulse:  [] 76  Resp:  [17-28] 17  SpO2:  [93 %-96 %] 95 %  BP: ()/(44-65) 113/57       Intake/Output Summary (Last 24 hours) at 4/21/2025 1230  Last data filed at 4/21/2025 1057  Gross  per 24 hour   Intake 1269.53 ml   Output 700 ml   Net 569.53 ml       Physical Exam  Vaguely palpable 2 cm soft compressible lymph node more towards the subpectoral area   Significant Labs:  CBC:   Recent Labs   Lab 04/21/25  0728   WBC 27.47*   RBC 2.75*   HGB 8.3*   HCT 24.8*      MCV 90   MCH 30.2   MCHC 33.5     CMP:   Recent Labs   Lab 04/21/25  0628   CALCIUM 9.4   *   K 3.9   CO2 23   CL 94*   BUN 11   CREATININE 0.9       Significant Diagnostics:  None    Assessment/Plan:     Active Diagnoses:    Diagnosis Date Noted POA    PRINCIPAL PROBLEM:  Mediastinal mass [J98.59] 04/18/2025 Yes    Paroxysmal atrial fibrillation [I48.0] 04/21/2025 No    Lower extremity edema [R60.0] 04/18/2025 Yes    Fever [R50.9] 04/18/2025 Yes    Pleural effusion [J90] 04/18/2025 Yes    Acute pain of left knee [M25.562] 04/18/2025 Yes    Hemoptysis [R04.2] 04/17/2025 No    Hyponatremia [E87.1] 04/16/2025 Yes    Sepsis [A41.9] 01/01/2023 Yes    Acute on Chronic pain of left knee [M25.562, G89.29] 03/04/2022 Yes    Essential hypertension [I10] 02/28/2019 Yes    Hyperlipidemia [E78.5]  Yes    Type 2 diabetes mellitus, without long-term current use of insulin [E11.9] 02/01/2012 Yes      Problems Resolved During this Admission:   Large mediastinal mass-at this point possibly favors lymphoma  Adenopathy left axilla  No other adenopathy evident on physical exam or on imaging    PLAN:  Left axillary lymph node excision with ultrasound guidance  We will go to OR today     The general risks of the operation were described to the patient and her family in detail and included on an informed consent sheet, which I reviewed with the patientand which the patient has signed.   Everybody understands that even if a lymph node is removed it may not have cancer or that another lymph node left behind could have cancer even though the 1 removed does not.  They understand that this is for diagnosis and not therapy.  The patient was offered the  opportunity to ask questions, on multiple occasions, and after continued discussion had none additional.  The patient is ready to proceed with the operation.  Call the office or report to the emergency department if symptoms worsen prior to the operation        Ramon Johnson MD  General Surgery  [facility] ([place])

## 2025-04-21 NOTE — ASSESSMENT & PLAN NOTE
Reasonably controlled with current regimen.  Will continue with current regimen and continue to monitor.  Recent Labs   Lab 04/19/25  0731 04/19/25  1959 04/20/25  1202 04/20/25  1714 04/20/25  2207 04/21/25  0750   POCTGLUCOSE 99 139* 119* 129* 139* 108

## 2025-04-21 NOTE — PROGRESS NOTES
"  [facility] Pulmonary Medicine Consult     Primary Attending:  Jakub Rodarte MD   Consultant Attending: Ayden Sanchez MD   Consultant Fellow: Alexx Lamb, HO-MARINE     4/14/2025   6       Chief Complaint/Reason for Consult      "Hemoptysis with large lung mass"     History of Present Illness      89-year-old female with osteoarthritis of left knee, dm 2, HTN low back pain presented for/15/25 for bilateral lower extremity for a few weeks.  On admission she had an elevated white count, moderately hyponatremic and BNP of 181.  Left knee x-ray concerning for an avulsion fracture of suprapatellar tendon with effusion.  She was put on empiric Rocephin was concern for infectious of unknown source.  Orthopedic surgery consulted regarding her left knee deemed it to be a lateral patellar subluxation and provided a brace and outpatient follow-up.  On hospital day 2 she developed a fever up to 101 with persistently elevated white count.  Her antibiotics were escalated to include vancomycin and azithromycin in addition to Rocephin.  Blood and urine cultures were obtained.  CT of her chest with contrast was obtained which showed an 8 x 4 cm subcarinal mass.  She also complained of some scant hemoptysis for which Pulmonary was consulted.    Daily Update:  4/21: NAEON.  Has been afebrile with stable vital signs GenSx planning excisional biopsy of the left axillary lymph nodes.          Review of Systems      Negative except as noted above     Medications and nursing orders have been reviewed      PMHx     Past Medical History:   Diagnosis Date    Arthritis     Diabetes mellitus with neurological manifestation     Essential hypertension 2/28/2019    High blood pressure     Hyperlipidemia     Urinary urgency        MEDS     Prior to Admission medications    Medication Sig Start Date End Date Taking? Authorizing Provider   acetaminophen (TYLENOL) 500 MG tablet Take 2 tablets (1,000 mg total) by mouth every 8 (eight) hours as needed for " Pain. 2/28/19  Yes Darya Robles MD   amLODIPine (NORVASC) 5 MG tablet Take 5 mg by mouth. 5/17/24  Yes Provider, Historical   aspirin ([employer] ASPIRIN) 81 MG chewable tablet Take 81 mg by mouth. 1 Tablet Oral Every day   Yes Provider, Historical   atorvastatin (LIPITOR) 10 MG tablet Take 10 mg by mouth once daily.   Yes Provider, Historical   carvediloL (COREG) 25 MG tablet TAKE 1 TABLET BY MOUTH TWICE A DAY 3/8/21  Yes Anselmo Torre MD   gabapentin (NEURONTIN) 800 MG tablet TAKE 1 TABLET (800 MG TOTAL) BY MOUTH 3 (THREE) TIMES DAILY. 5/12/20 4/15/25 Yes Christine Paul PA   GEMTESA 75 mg Tab Take 1 tablet by mouth. 5/10/24   Provider, Historical   metFORMIN (GLUCOPHAGE-XR) 500 MG ER 24hr tablet Take 1 tablet (500 mg total) by mouth daily with breakfast. 9/10/20 4/15/25  Anselmo Torre MD   nirmatrelvir-ritonavir (PAXLOVID) 300 mg (150 mg x 2)-100 mg copackaged tablets (EUA) Take 3 tablets by mouth 2 (two) times daily. Each dose contains 2 nirmatrelvir (pink tablets) and 1 ritonavir (white tablet). Take all 3 tablets together 6/28/24   Deonte Joshi MD   omeprazole (PRILOSEC) 20 MG capsule Take 20 mg by mouth. 6/26/24   Provider, Historical   ONETOUCH ULTRA BLUE TEST STRIP Strp TEST ONCE DAILY 8/27/18   Darya Robles MD   ONETOUCH ULTRASOFT LANCETS lancets USE TO TEST BLOOD SUGAR DAILY 9/7/18   Darya Robles MD       ALL     Review of patient's allergies indicates:  No Known Allergies    PSHx     Past Surgical History:   Procedure Laterality Date    EPIDURAL STEROID INJECTION N/A 1/31/2019    Procedure: INJECTION, STEROID, EPIDURAL, L3-L4 need consent;  Surgeon: Marlin Barber MD;  Location: [facility] PAIN [facility];  Service: Pain Management;  Laterality: N/A;    left knee replacement       low back surgery          FHx     Family History   Problem Relation Name Age of Onset    Diabetes Mother      Hypertension Mother      Diabetes Sister      Hypertension Sister       "Diabetes Maternal Uncle      Diabetes Maternal Grandmother         SHx     Social History     Socioeconomic History    Marital status:    Tobacco Use    Smoking status: Never    Smokeless tobacco: Never   Substance and Sexual Activity    Alcohol use: No    Drug use: No    Sexual activity: Yes     Partners: Male     Social Drivers of Health     Financial Resource Strain: Low Risk  (4/16/2025)    Overall Financial Resource Strain (CARDIA)     Difficulty of Paying Living Expenses: Not hard at all   Food Insecurity: No Food Insecurity (4/16/2025)    Hunger Vital Sign     Worried About Running Out of Food in the Last Year: Never true     Ran Out of Food in the Last Year: Never true   Transportation Needs: No Transportation Needs (4/15/2025)    PRAPARE - Transportation     Lack of Transportation (Medical): No     Lack of Transportation (Non-Medical): No   Physical Activity: Inactive (4/15/2025)    Exercise Vital Sign     Days of Exercise per Week: 0 days     Minutes of Exercise per Session: 0 min   Stress: No Stress Concern Present (4/16/2025)    [employer] - Occupational Stress Questionnaire     Feeling of Stress : Not at all   Housing Stability: Low Risk  (4/16/2025)    Housing Stability Vital Sign     Unable to Pay for Housing in the Last Year: No     Homeless in the Last Year: No       On Examination      Vital Signs:  Temp:  [98 °F (36.7 °C)-99 °F (37.2 °C)]   Pulse:  []   Resp:  [17-28]   BP: ()/(44-65)   SpO2:  [93 %-96 %]     No results found for: "HTIN", "WEIGHT"    Intake / Output:    Intake/Output Summary (Last 24 hours) at 4/21/2025 1107  Last data filed at 4/21/2025 0754  Gross per 24 hour   Intake 1149.53 ml   Output 700 ml   Net 449.53 ml       I have reviewed the vital trends as well as the documented I/Os     Physical Exam:  GEN:  Older woman resting in bed, NAD  HEENT: MMM, no scleral icterus, EOMI  NECK: Supple, midline trachea, no supraclavicular, " "anterior, posterior or submandibular LAD palpated  CV: RRR, no MRG, pulses equal and symmetric 2+ at radial  PULM:  CTAB  ABDOMEN: Soft, non-tender, non-distended, no rebound or guarding  SKIN: Warm, dry, intact, no rashes  MSK: No deformity, no clubbing, cyanosis, BLE pitting edema to lower shins  NEURO: awake and oriented and following commands, at neurologic baseline    Pocus:   Bilateral pleural effusions left greater than right.  Left effusion with debris some of which could be fibrin stranding suggesting complication      All recent labs and imaging studies have been reviewed    Recent Labs   Lab 04/19/25  0603 04/20/25  0635 04/21/25  0728   WBC 22.14* 24.47* 27.47*   HGB 9.2* 7.9* 8.3*   HCT 26.8* 22.7* 24.8*    443 391       Recent Labs   Lab 04/19/25  0604 04/20/25  0635 04/21/25  0628   * 128* 124*   K 4.4 4.1 3.9    98 94*   CO2 19* 23 23   BUN 14 13 11   CREATININE 0.9 1.0 0.9   CALCIUM 9.5 9.3 9.4   MG 1.6 1.6 1.7   PHOS 3.5 3.2 2.7       Recent Labs   Lab 04/14/25 2042   ALBUMIN 2.2*   BILITOT 0.2   AST 29   ALT 15   ALKPHOS 60       No results for input(s): "PROTIME", "PTT", "INR" in the last 168 hours.    Cardiac:   Recent Labs   Lab 04/14/25 2042   TROPONINI <0.006   *       FLP:   Lab Results   Component Value Date    CHOL 150 01/21/2020    HDL 48 01/21/2020    LDLCALC 83.8 01/21/2020    TRIG 91 01/21/2020    CHOLHDL 32.0 01/21/2020     DM:   Lab Results   Component Value Date    HGBA1C 5.7 (H) 04/15/2025    HGBA1C 5.6 01/01/2023    HGBA1C 6.0 (H) 01/21/2020    GLUF 135 (H) 02/11/2008    LDLCALC 83.8 01/21/2020    CREATININE 0.9 04/21/2025     Thyroid:   Lab Results   Component Value Date    TSH 0.594 04/15/2025    FREET4 1.13 01/21/2020     Anemia:   Lab Results   Component Value Date    IRON 56 06/03/2010    TIBC 394 06/03/2010    FERRITIN 73 12/28/2011    [id number] 884 03/03/2017    FOLATE >20.0 (A) 06/03/2010     Urinalysis:   Lab Results   Component Value Date    " LABURIN  04/14/2025     Multiple organisms isolated. None in predominance. Repeat if clinically necessary.    COLORU Yellow 04/14/2025    SPECGRAV 1.005 04/14/2025    NITRITE Positive (A) 04/14/2025    KETONESU Negative 01/02/2023    UROBILINOGEN Negative 04/14/2025       Pertinent findings include:    Lab Results   Component Value Date    CRP 0.6 07/27/2012    [id number] Not Detected 01/01/2023    FERRITIN 73 12/28/2011          Microbiology:  Microbiology Results (last 7 days)       Procedure Component Value Units Date/Time    Blood culture [[id number]]  (Normal) Collected: 04/16/25 1104    Order Status: Completed Specimen: Blood Updated: 04/21/25 0301     Blood Culture No Growth After 96 hours    AFB Culture & Smear [[id number]]     Order Status: Sent Specimen: Body Fluid     Culture, Body Fluid (Aerobic) w/ GS [[id number]]     Order Status: Sent Specimen: Body Fluid     MRSA Screen by PCR [[id number]]  (Normal) Collected: 04/17/25 1809    Order Status: Completed Specimen: Nasal Swab Updated: 04/18/25 0441     MRSA PCR Screen Not Detected    Culture, Respiratory with Gram Stain [[id number]]     Order Status: Sent Specimen: Respiratory from Sputum, Expectorated     Urine culture [[id number]] Collected: 04/14/25 2130    Order Status: Completed Specimen: Urine Updated: 04/16/25 1001     Urine Culture Multiple organisms isolated. None in predominance. Repeat if clinically necessary.            EKG/Echo:  Normal sinus rhythm   Normal EF, indeterminate diastolic function, no significant valvular and reality    Radiology:  CXR images reviewed   CT chest with contrast images reviewed by me and notable for:  -8 x 4 cm subcarinal mediastinal mass with area of central hypoattenuation concerning for necrosis also causing significant esophageal compression with proximal esophageal dilation  -bilateral mediastinal hilar lymphadenopathy  -Bilateral pleural effusions L> R, left appears to be upward tracking against  gravity  -basilar and right apical reticulation with honeycombing, bronchiectasis that is tubular and not proximally associated with fibrosis  -no consolidative process  -radiology also comments on mild left axillary LAD    I have personally and independently interpretted the following tests:    Labs / Images / Cultures     Assessment / Plan:     Jojo Ayoub admitted with lower extremity swelling and L knee subluxation.  Her course is c/b fever and leukocytosis of unknown etiology and new found 7.4cm mediastinal mass with bilateral mediastinal and hilar lymphadenopathy.    Mediastinal mass with associated lymphadenopathy  --Highest concern for mediastinal neoplasm, specifically lymphoma considering her left axillary lymphadenopathy.  Her fevers and leukocytosis may represent herpes symptoms.  Esophageal cancer also in the differential but no obvious risk factors for this.  Infection also in the differential but not seem as sick as would expect if this were to be mediastinitis.  These could also be coexisting.  Attempted bedside thoracentesis 4/18 unsuccessful  --General surgery taking for excisional biopsy of left axillary lymphadenopathy  --discussed that if lymph node biopsy is negative the next step would be to proceed with EBUS.  We will get her scheduled in advanced bronchoscopy clinic in anticipation of this need however if lymph node biopsy is positive then can cancel.  --Counseled that if this were neoplasm the treatment regimen would likely be quite involved and that her age and functional status would factor into whether this is something to be pursued or not, they were already keenly aware of this idea.    Leukocytosis/fever-improved  --potentially represent B symptoms if malignancy is present.  Given the esophageal compression and proximal abnormality she is at significant risk for aspiration however there is no obvious parenchymal consolidation nor is there any other obvious source of infection  however, the pleural fluid on the left does appear somewhat complicated on bedside ultrasound and sampling this to assess for infection as well as malignancy would be appropriate-awaiting to see if family is amenable to this.    --okay to stop Abx from a pulmonary standpoint as she has received a sufficient duration. If pleural space was infected then would not have expected her to defervesce in the absence of adequate drainage    3. Pulmonary reticulation and honeycombing  --no history of ILD.  No baseline pulmonary symptoms.  No other history of autoimmune disease.  No symptoms consistent with autoimmune disease other than her osteoarthritis.  This could be realted to chronic aspiration fibrosis. Pending the course of her mass could consider pursuing further workup in the outpatient setting.      This plan was rounded on with staff pulmonologist Dr. Kennedy    We will continue to follow with you, thank you for the opportunity to be involved in Mrs. Ayoub's care.    Please call or message directly through EPIC with any additional questions or concerns     Alexx Lamb MD  Pulmonary & Critical Care Fellow

## 2025-04-21 NOTE — CARE UPDATE
Notified by nursing that patient found to be in AFib with RVR to 150s.  Patient seen and examined at bedside.  Patient not complaining of chest pain, shortness of breath, nausea or vomiting.  Patient has received IV Lopressor 5 mg x 2 with no improvement in her rate.  Of note, patient found to be febrile to 100.3.  On she received Tylenol at 4:00 p.m. and Motrin was ordered.  Discussed with Cardiology and recommendations given    Plan  -IV digoxin 0.25mg x1  -we will transfer to ICU if rate does not improve  -daughter updated on plan of care  -anticoagulation held due to recent surgery- discussed with Dr Johnson

## 2025-04-21 NOTE — SUBJECTIVE & OBJECTIVE
Interval History:     Went into a fib with RVR last night - asymptomatic  Responded to iv metoprolol  PO b-blocker started  This AM in sinus rhythm    30 min discussion with multiple family members. Discussed with pulmonary and Dr Johnson - will proceed with LN excision in the OR today.    Clinical suspicion remains for lymphoma.    Reports no dyshpagia        Review of Systems   Constitutional:  Negative for chills and fever.   Respiratory:  Positive for cough. Negative for shortness of breath.    Cardiovascular:  Negative for chest pain.   Gastrointestinal:  Negative for abdominal pain, nausea and vomiting.   Genitourinary:  Negative for dysuria and frequency.     Objective:     Vital Signs (Most Recent):  Temp: 98.4 °F (36.9 °C) (04/21/25 0754)  Pulse: 72 (04/21/25 0826)  Resp: 17 (04/21/25 0754)  BP: 115/63 (04/21/25 0754)  SpO2: (!) 94 % (04/21/25 0754) Vital Signs (24h Range):  Temp:  [98 °F (36.7 °C)-98.9 °F (37.2 °C)] 98.4 °F (36.9 °C)  Pulse:  [] 72  Resp:  [17-28] 17  SpO2:  [93 %-96 %] 94 %  BP: ()/(44-65) 115/63     Weight: 63.5 kg (139 lb 15.9 oz)  Body mass index is 24.03 kg/m².    Intake/Output Summary (Last 24 hours) at 4/21/2025 0940  Last data filed at 4/21/2025 0754  Gross per 24 hour   Intake 1149.53 ml   Output 700 ml   Net 449.53 ml         Physical Exam  Constitutional:       General: She is not in acute distress.     Appearance: She is not ill-appearing.   HENT:      Head: Atraumatic.   Eyes:      Conjunctiva/sclera: Conjunctivae normal.   Cardiovascular:      Rate and Rhythm: Normal rate and regular rhythm.      Heart sounds: Normal heart sounds. No murmur heard.  Pulmonary:      Effort: Pulmonary effort is normal.      Breath sounds: No wheezing or rhonchi.      Comments: Breathing comfortably been lung sounds courses with more prominent rhonchi in the left lower base that does not clear with cough.  Abdominal:      General: Bowel sounds are normal. There is no distension.       Palpations: Abdomen is soft.      Tenderness: There is no abdominal tenderness.   Musculoskeletal:         General: No deformity. Normal range of motion.      Cervical back: Neck supple.      Comments: Left knee not tender, swelling around the left knee and bilateral lower extremities much improved.   Neurological:      Mental Status: She is alert and oriented to person, place, and time.               Significant Labs: All pertinent labs within the past 24 hours have been reviewed.    Significant Imaging: I have reviewed all pertinent imaging results/findings within the past 24 hours.

## 2025-04-21 NOTE — BRIEF OP NOTE
[facility] ([place])  Brief Operative Note    SUMMARY     Surgery Date: 4/21/2025     Surgeons and Role:     * Ramon Johnson MD - Primary    Assisting Surgeon: None    Pre-op Diagnosis:  Mediastinal mass [J98.59]  Axillary adenopathy [R59.0]    Post-op Diagnosis:  Post-Op Diagnosis Codes:     * Mediastinal mass [J98.59]     * Axillary adenopathy [R59.0]    Procedure(s) (LRB):  EXCISION, LYMPH NODE (Left) axilla    Anesthesia: Local MAC    Implants:  * No implants in log *    Operative Findings:  2.5 cm purplish brown lymph node excised from the subpectoral space.  Intraoperative ultrasound and morphology matched the lymph nodes in question on preoperative ultrasound in the subpectoral space    Estimated Blood Loss: * No values recorded between 4/21/2025 12:47 PM and 4/21/2025  2:40 PM *  Minimal less than 5 cc  Estimated Blood Loss has been documented.         Specimens:   Specimen (24h ago, onward)       Start     Ordered    04/21/25 1428  Specimen to Pathology General Surgery  RELEASE UPON ORDERING        References:    Click here for ordering Quick Tip   Question:  Release to patient  Answer:  Immediate    04/21/25 1428                  ID Type Source Tests Collected by Time Destination   1 : Left axillary subpectoral lymph node Tissue Lymph Node SPECIMEN TO PATHOLOGY Ramon Johnson MD 4/21/2025 1428    A : Left axillary subpectoral lymph node Tissue Lymph Node LEUKEMIA/LYMPHOMA SCREEN - LYMPH NODE Ramon Johnson MD 4/21/2025 1348        [id number]

## 2025-04-21 NOTE — TRANSFER OF CARE
"Anesthesia Transfer of Care Note    Patient: Jojo Ayoub    Procedure(s) Performed: Procedure(s) (LRB):  EXCISION, LYMPH NODE (Left)    Patient location: PACU    Transport from OR: Transported from OR on 2-3 L/min O2 by NC with adequate spontaneous ventilation    Post pain: adequate analgesia    Post assessment: no apparent anesthetic complications    Post vital signs: stable    Level of consciousness: awake and alert    Nausea/Vomiting: no nausea/vomiting    Complications: none    Transfer of care protocol was followed      Last vitals: Visit Vitals  BP (!) 158/72 (BP Location: Right leg, Patient Position: Lying)   Pulse 78   Temp 36.7 °C (98 °F) (Temporal)   Resp 16   Ht 5' 4" (1.626 m)   Wt 63.5 kg (139 lb 15.9 oz)   SpO2 (!) 94%   Breastfeeding No   BMI 24.03 kg/m²     "

## 2025-04-21 NOTE — PLAN OF CARE
Medicare Message     Important Message from Medicare regarding Discharge Appeal Rights Explained to patient/caregiver; Signed/date by patient/caregiver Explained to patient/caregiver; Signed/date by patient/caregiver   Date IMM was signed 4/15/2025 4/21/2025   Time IMM was signed [phone]

## 2025-04-21 NOTE — PLAN OF CARE
Patient went to OR today for lymph node biopsy, came back with small incision under L armpit, moderate discomfort noted and pain meds given as needed. 4 eyes completed   Problem: Adult Inpatient Plan of Care  Goal: Plan of Care Review  Outcome: Progressing  Goal: Patient-Specific Goal (Individualized)  Outcome: Progressing  Goal: Absence of Hospital-Acquired Illness or Injury  Outcome: Progressing  Goal: Optimal Comfort and Wellbeing  Outcome: Progressing  Goal: Readiness for Transition of Care  Outcome: Progressing     Problem: Diabetes Comorbidity  Goal: Blood Glucose Level Within Targeted Range  Outcome: Progressing     Problem: Wound  Goal: Optimal Coping  Outcome: Progressing  Goal: Optimal Functional Ability  Outcome: Progressing  Goal: Absence of Infection Signs and Symptoms  Outcome: Progressing  Goal: Improved Oral Intake  Outcome: Progressing  Goal: Optimal Pain Control and Function  Outcome: Progressing  Goal: Skin Health and Integrity  Outcome: Progressing  Goal: Optimal Wound Healing  Outcome: Progressing     Problem: Skin Injury Risk Increased  Goal: Skin Health and Integrity  Outcome: Progressing     Problem: Fall Injury Risk  Goal: Absence of Fall and Fall-Related Injury  Outcome: Progressing     Problem: UTI (Urinary Tract Infection)  Goal: Improved Infection Symptoms  Outcome: Progressing     Problem: Pain Acute  Goal: Optimal Pain Control and Function  Outcome: Progressing     Problem: Sepsis/Septic Shock  Goal: Optimal Coping  Outcome: Progressing  Goal: Absence of Bleeding  Outcome: Progressing  Goal: Blood Glucose Level Within Targeted Range  Outcome: Progressing  Goal: Absence of Infection Signs and Symptoms  Outcome: Progressing  Goal: Optimal Nutrition Intake  Outcome: Progressing     Problem: Comorbidity Management  Goal: Blood Pressure in Desired Range  Outcome: Progressing  Goal: Maintenance of Osteoarthritis Symptom Control  Outcome: Progressing

## 2025-04-21 NOTE — ANESTHESIA POSTPROCEDURE EVALUATION
Anesthesia Post Evaluation    Patient: Jojo Ayoub    Procedure(s) Performed: Procedure(s) (LRB):  EXCISION, LYMPH NODE (Left)    Final Anesthesia Type: general      Patient location during evaluation: PACU  Patient participation: Yes- Able to Participate  Level of consciousness: awake and alert  Post-procedure vital signs: reviewed and stable  Pain management: adequate  Airway patency: patent    PONV status at discharge: No PONV  Anesthetic complications: no      Cardiovascular status: blood pressure returned to baseline  Respiratory status: unassisted  Hydration status: euvolemic  Follow-up not needed.              Vitals Value Taken Time   /75 04/21/25 15:01   Temp 37.2 °C (98.9 °F) 04/21/25 15:00   Pulse 76 04/21/25 15:07   Resp 16 04/21/25 15:00   SpO2 96 % 04/21/25 15:07   Vitals shown include unfiled device data.      Event Time   Out of Recovery 04/21/2025 15:13:24         Pain/Brayden Score: Pain Rating Prior to Med Admin: 6 (4/20/2025  9:34 PM)  Pain Rating Post Med Admin: 0 (4/20/2025  8:00 PM)  Brayden Score: 8 (4/21/2025  3:01 PM)

## 2025-04-21 NOTE — ASSESSMENT & PLAN NOTE
Bed: ED12  Expected date:   Expected time:   Means of arrival:   Comments:  [place]   Continue statin therapy.

## 2025-04-21 NOTE — ASSESSMENT & PLAN NOTE
Prior history of hyponatremia.  Cause unclear.  Serum sodium improved without intervention.  TSH wnl.  Coristol speaks against adrenal insuffiencey.  Hypervolemic initially on exam however swelling seems serum improved on its own.  Urine sodium appropriately dilute.  Monitor.    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    Continue to monitor serum sodium level.    If trends down again reasonable to repeat urine studies.    4/20 -  ( trend is down)  Fluid restriction  Recheck urine studies

## 2025-04-21 NOTE — CONSULTS
[facility] - Med Surg ([place])  Wound Care    Patient Name:  Jojo Ayoub   MRN:  [id number]  Date: 4/21/2025  Diagnosis: Mediastinal mass    History:     Past Medical History:   Diagnosis Date    Arthritis     Diabetes mellitus with neurological manifestation     Essential hypertension 2/28/2019    High blood pressure     Hyperlipidemia     Urinary urgency        Social History[1]    Precautions:     Allergies as of 04/14/2025    (No Active Allergies)       [facility] Assessment Details/Treatment     89 year-old woman history of hypertension, diabetes mellitus type 2, osteoarthritis of left knee status post remote left knee replacement who presents with worsening left knee pain with swelling. Patient walks with walker at home at baseline. She reports she recently slid to ground from a chair at home a few weeks but denies any more recent fall or any other trauma. Exam notable for significant bilateral lower extremity edema more so on the left side. Laxity of the left knee. Not tender to touch. Lungs clear. Imaging concerning for possible avulsion fracture. Orthopedic surgery service consult for evaluation and recommended Adamaris brace and physical therapy. US of LLE - for clot. Elevate WBC and on empiric broad spectrum antibiotics.  Wound care consulted for breakdown to left knee from knee brace.   Numerous family members present in the room.  Already seeing pt for her MASD to sacrum.WOC nurse noted scattered purple discolorations to left knee mirroring knee brace. The purple discoloration have open and closed blistering of skin. These MDRSI from knee brace were cleansed with Vashe and dressed with mepilex border dressings. Brace is open while she is lying in bed.   Dr Johnson arrived at bedside to obtain consent for surgical procedure. Family asking appropriate questions and voicing concerns.   Unable to assess sacral area at this time , but will follow up later in the week.     04/21/25 1206        Wound 04/21/25 1206  Pressure Injury Left anterior;medial;lateral Knee   Date First Assessed/Time First Assessed: 04/21/25 1206   Present on Original Admission: No  Primary Wound Type: Pressure Injury  Side: Left  Orientation: anterior;medial;lateral  Location: Knee   Wound Image      Pressure Injury Stage DTPI   Dressing Appearance Open to air   Drainage Amount None   Drainage Characteristics/Odor No odor   Appearance Pink;Purple;Maroon;Dry;Blistered   Tissue loss description Partial thickness   Periwound Area Redness   Wound Edges Other (see comments)  (scattered open and closed discoloration with blistering.)   Wound Length (cm) 5 cm   Wound Width (cm) 1 cm  (cresent shaped purple blister mirroring brace)   Wound Surface Area (cm^2) 3.93 cm^2   Care Cleansed with:;Wound cleanser   Dressing Applied;Silicone;Foam  (mepilex border dressings applied to cushion.)     04/21/2025         [1]   Social History  Socioeconomic History    Marital status:    Tobacco Use    Smoking status: Never    Smokeless tobacco: Never   Substance and Sexual Activity    Alcohol use: No    Drug use: No    Sexual activity: Yes     Partners: Male     Social Drivers of Health     Financial Resource Strain: Low Risk  (4/16/2025)    Overall Financial Resource Strain (CARDIA)     Difficulty of Paying Living Expenses: Not hard at all   Food Insecurity: No Food Insecurity (4/16/2025)    Hunger Vital Sign     Worried About Running Out of Food in the Last Year: Never true     Ran Out of Food in the Last Year: Never true   Transportation Needs: No Transportation Needs (4/15/2025)    PRAPARE - Transportation     Lack of Transportation (Medical): No     Lack of Transportation (Non-Medical): No   Physical Activity: Inactive (4/15/2025)    Exercise Vital Sign     Days of Exercise per Week: 0 days     Minutes of Exercise per Session: 0 min   Stress: No Stress Concern Present (4/16/2025)    [employer] - Occupational Stress Questionnaire      Feeling of Stress : Not at all   Housing Stability: Low Risk  (4/16/2025)    Housing Stability Vital Sign     Unable to Pay for Housing in the Last Year: No     Homeless in the Last Year: No

## 2025-04-21 NOTE — ASSESSMENT & PLAN NOTE
4/18: Large (8.1 x 4.5 cm) subcarinal mass, possible lymphadenopathy. Additional mediastinal, hilar, and left axillary lymphadenopathy noted.      4/18/25 Pulmonary  attempted tap but unable to obtain fluid    4/20/25 - for excisional LN biopsy today. If unsuccessful or nondiagnostic  next step would by EBUS with transbronchial needle bx

## 2025-04-21 NOTE — PLAN OF CARE
04/21/25 1124   Discharge Reassessment   Assessment Type Discharge Planning Reassessment   Did the patient's condition or plan change since previous assessment? No   Discharge Plan discussed with: Patient   Communicated FAY with patient/caregiver Date not available/Unable to determine   Discharge Plan A Rehab  (Pending referral with [facility])   DME Needed Upon Discharge  none   Why the patient remains in the hospital Requires continued medical care   Post-Acute Status   Post-Acute Authorization Placement   Post-Acute Placement Status Pending medical clearance/testing       LN excision in the OR today. Pending IPR.

## 2025-04-21 NOTE — PT/OT/SLP PROGRESS
Occupational Therapy      Patient Name:  Jojo Ayoub   MRN:  [id number]    Patient not seen today secondary to Off the floor for procedure/surgery. Will follow-up later today as schedule allows.    4/21/2025

## 2025-04-21 NOTE — ASSESSMENT & PLAN NOTE
Patient has paroxysmal (<7 days) atrial fibrillation. Patient is currently in sinus rhythm. [id number] Score: 4. The patients heart rate in the last 24 hours is as follows:  Pulse  Min: 69  Max: 143     Antiarrhythmics  metoprolol tartrate (LOPRESSOR) split tablet 12.5 mg, 2 times daily, Oral    Anticoagulants       Plan  - Replete lytes with a goal of K>4, Mg >2  - Patient is not anticoagulated due to anticipated surgical procedure  - Patient's afib is currently controlled  - continue daily low dose lopressor bid

## 2025-04-21 NOTE — OP NOTE
[facility] Surg ([place])  Operative Note      Surgery Date: 4/21/2025      Surgeons and Role:     * Ramon Johnson MD - Primary     Assisting Surgeon: None     Pre-op Diagnosis:  Mediastinal mass [J98.59]  Axillary adenopathy [R59.0]     Post-op Diagnosis:  Post-Op Diagnosis Codes:     * Mediastinal mass [J98.59]     * Axillary adenopathy [R59.0]     Procedure(s) (LRB):  EXCISION, LYMPH NODE (Left) axilla subpectoral   Intraoperative ultrasound guidance    Anesthesia: Local MAC     Implants:  * No implants in log *     Operative Findings:  2.5 cm dark purplish brown lymph node excised from the subpectoral space.  Intraoperative ultrasound and morphology matched the lymph nodes in question on preoperative ultrasound in the subpectoral space     Estimated Blood Loss: * No values recorded between 4/21/2025 12:47 PM and 4/21/2025  2:40 PM *  Minimal less than 5 cc  Estimated Blood Loss has been documented.         Specimens:  Left axillary subpectoral node      DRAINS: None.     COMPLICATIONS: None.       The patient was identified in Preoperative Holding and  brought back to the Operating Room. Placed supine on the operating table and padded and tucked appropriately.  IV sedation was started an appropriate monitoring devices were in place.  A time-out was performed and all team members present agreed this was the correct procedure on the correct patient. We also confirmed administration of appropriate preoperative antibiotics.    Left chest axilla and shoulder girdle were prepped and draped in sterile fashion.  Ultrasound was used to identify a lymph node matching the preoperative images in the subpectoral space.  Local anesthesia was injected.  Incision was made in the axilla from the edge of the pectoralis laterally.  Taken down through the subcutaneous fat and the axillo-pectoral fascia with electrocautery.  Once into the axillary fat blunt dissection with ultrasound guidance was used to identify the lymph node  in question.  This was located medial to the lateral edge of the pectoralis muscle.  It is in the subpectoral space.  Neurovascular structures were avoided.  The lymph node was grasped and retracted into the dissected space.  Blunt dissection precedes division of the connective tissue and lymphatics with LigaSure device.  This completely amputated the lymph node.  It was immediately sent fresh to pathology.  Pathology team will save some of the tissue for flow cytometry and send the rest for permanent.  Entire subpectoral space was ultrasounded.  The entire axilla was ultrasound it as well.  No other lymph nodes could be identified with ultrasound or by palpation/visualization.  Wound is irrigated.  Axilla pectoral fascia is lightly approximated with a running 3-0 Vicryl away from neurovascular structures.  Dermis was closed with interrupted 3-0 Vicryl and the skin was closed with a running 4-0 Vicryl subcuticular stitch.  Steri-Strips were placed.  Patient tolerated procedure very well and was allowed become more alert and transferred to the PACU in stable condition     The patient tolerated the procedure very well and was allowed to become more alert and was transferred to the recovery room in stable condition.

## 2025-04-21 NOTE — PROGRESS NOTES
Pt awake and alert at baseline.  VSS on 2L/m. Pt denies pain at this time.  Dressing C/D/I. Report called to primary nurse Tamia.  Daughter updated by phone.  Telemetry monitor in place.  Transport requested.

## 2025-04-22 ENCOUNTER — TELEPHONE (OUTPATIENT)
Dept: PULMONOLOGY | Facility: CLINIC | Age: 89
End: 2025-04-22
Payer: MEDICARE

## 2025-04-22 PROBLEM — I48.91 ATRIAL FIBRILLATION WITH RVR: Status: ACTIVE | Noted: 2025-04-22

## 2025-04-22 PROBLEM — R53.81 DEBILITY: Status: ACTIVE | Noted: 2025-04-22

## 2025-04-22 PROBLEM — R65.10 SIRS (SYSTEMIC INFLAMMATORY RESPONSE SYNDROME): Status: ACTIVE | Noted: 2023-01-01

## 2025-04-22 LAB
ABSOLUTE EOSINOPHIL (OHS): 0.06 K/UL
ABSOLUTE MONOCYTE (OHS): 0.98 K/UL (ref 0.3–1)
ABSOLUTE NEUTROPHIL COUNT (OHS): 29.6 K/UL (ref 1.8–7.7)
ANION GAP (OHS): 9 MMOL/L (ref 8–16)
BACTERIA BLD CULT: NORMAL
BASOPHILS # BLD AUTO: 0.11 K/UL
BASOPHILS NFR BLD AUTO: 0.3 %
BUN SERPL-MCNC: 12 MG/DL (ref 8–23)
CALCIUM SERPL-MCNC: 9 MG/DL (ref 8.7–10.5)
CHLORIDE SERPL-SCNC: 99 MMOL/L (ref 95–110)
CO2 SERPL-SCNC: 18 MMOL/L (ref 23–29)
CREAT SERPL-MCNC: 1.2 MG/DL (ref 0.5–1.4)
ERYTHROCYTE [DISTWIDTH] IN BLOOD BY AUTOMATED COUNT: 14.6 % (ref 11.5–14.5)
GFR SERPLBLD CREATININE-BSD FMLA CKD-EPI: 44 ML/MIN/1.73/M2
GLUCOSE SERPL-MCNC: 102 MG/DL (ref 70–110)
HCT VFR BLD AUTO: 24.1 % (ref 37–48.5)
HGB BLD-MCNC: 8 GM/DL (ref 12–16)
IGA SERPL-MCNC: 215 MG/DL (ref 40–350)
IGG SERPL-MCNC: 2271 MG/DL (ref 650–1600)
IGM SERPL-MCNC: 56 MG/DL (ref 50–300)
IMM GRANULOCYTES # BLD AUTO: 0.33 K/UL (ref 0–0.04)
IMM GRANULOCYTES NFR BLD AUTO: 1 % (ref 0–0.5)
LAB FLOW CYTOMETRY ANTIBODIES ANALYZED (OHS): NORMAL
LAB FLOW CYTOMETRY COMMENT (OHS): NORMAL
LAB FLOW CYTOMETRY INTERPRETATION (OHS): NORMAL
LABORATORY COMMENT REPORT: NORMAL
LYMPHOCYTES # BLD AUTO: 1.61 K/UL (ref 1–4.8)
MAGNESIUM SERPL-MCNC: 2.2 MG/DL (ref 1.6–2.6)
MCH RBC QN AUTO: 30 PG (ref 27–31)
MCHC RBC AUTO-ENTMCNC: 33.2 G/DL (ref 32–36)
MCV RBC AUTO: 90 FL (ref 82–98)
NUCLEATED RBC (/100WBC) (OHS): 0 /100 WBC
OHS QRS DURATION: 80 MS
OHS QTC CALCULATION: 401 MS
PHOSPHATE SERPL-MCNC: 3.4 MG/DL (ref 2.7–4.5)
PLATELET # BLD AUTO: 384 K/UL (ref 150–450)
PMV BLD AUTO: 8.9 FL (ref 9.2–12.9)
POCT GLUCOSE: 108 MG/DL (ref 70–110)
POCT GLUCOSE: 115 MG/DL (ref 70–110)
POCT GLUCOSE: 158 MG/DL (ref 70–110)
POCT GLUCOSE: 97 MG/DL (ref 70–110)
POTASSIUM SERPL-SCNC: 4.5 MMOL/L (ref 3.5–5.1)
RBC # BLD AUTO: 2.67 M/UL (ref 4–5.4)
RELATIVE EOSINOPHIL (OHS): 0.2 %
RELATIVE LYMPHOCYTE (OHS): 4.9 % (ref 18–48)
RELATIVE MONOCYTE (OHS): 3 % (ref 4–15)
RELATIVE NEUTROPHIL (OHS): 90.6 % (ref 38–73)
SODIUM SERPL-SCNC: 126 MMOL/L (ref 136–145)
WBC # BLD AUTO: 32.69 K/UL (ref 3.9–12.7)

## 2025-04-22 PROCEDURE — 82784 ASSAY IGA/IGD/IGG/IGM EACH: CPT | Performed by: STUDENT IN AN ORGANIZED HEALTH CARE EDUCATION/TRAINING PROGRAM

## 2025-04-22 PROCEDURE — 88184 FLOWCYTOMETRY/ TC 1 MARKER: CPT | Performed by: STUDENT IN AN ORGANIZED HEALTH CARE EDUCATION/TRAINING PROGRAM

## 2025-04-22 PROCEDURE — 85025 COMPLETE CBC W/AUTO DIFF WBC: CPT | Performed by: SURGERY

## 2025-04-22 PROCEDURE — 80048 BASIC METABOLIC PNL TOTAL CA: CPT | Performed by: SURGERY

## 2025-04-22 PROCEDURE — 25000003 PHARM REV CODE 250: Performed by: HOSPITALIST

## 2025-04-22 PROCEDURE — A4216 STERILE WATER/SALINE, 10 ML: HCPCS | Performed by: SURGERY

## 2025-04-22 PROCEDURE — 36415 COLL VENOUS BLD VENIPUNCTURE: CPT | Performed by: SURGERY

## 2025-04-22 PROCEDURE — 99291 CRITICAL CARE FIRST HOUR: CPT | Mod: GC,,, | Performed by: STUDENT IN AN ORGANIZED HEALTH CARE EDUCATION/TRAINING PROGRAM

## 2025-04-22 PROCEDURE — 27000221 HC OXYGEN, UP TO 24 HOURS

## 2025-04-22 PROCEDURE — 83735 ASSAY OF MAGNESIUM: CPT | Performed by: SURGERY

## 2025-04-22 PROCEDURE — 25000003 PHARM REV CODE 250: Performed by: SURGERY

## 2025-04-22 PROCEDURE — 20000000 HC ICU ROOM

## 2025-04-22 PROCEDURE — 84165 PROTEIN E-PHORESIS SERUM: CPT | Performed by: STUDENT IN AN ORGANIZED HEALTH CARE EDUCATION/TRAINING PROGRAM

## 2025-04-22 PROCEDURE — 36415 COLL VENOUS BLD VENIPUNCTURE: CPT | Performed by: STUDENT IN AN ORGANIZED HEALTH CARE EDUCATION/TRAINING PROGRAM

## 2025-04-22 PROCEDURE — 51798 US URINE CAPACITY MEASURE: CPT

## 2025-04-22 PROCEDURE — 94761 N-INVAS EAR/PLS OXIMETRY MLT: CPT

## 2025-04-22 PROCEDURE — 83521 IG LIGHT CHAINS FREE EACH: CPT | Performed by: STUDENT IN AN ORGANIZED HEALTH CARE EDUCATION/TRAINING PROGRAM

## 2025-04-22 PROCEDURE — 63600175 PHARM REV CODE 636 W HCPCS: Performed by: SURGERY

## 2025-04-22 PROCEDURE — 84100 ASSAY OF PHOSPHORUS: CPT | Performed by: SURGERY

## 2025-04-22 PROCEDURE — 99222 1ST HOSP IP/OBS MODERATE 55: CPT | Mod: ,,, | Performed by: INTERNAL MEDICINE

## 2025-04-22 PROCEDURE — 99231 SBSQ HOSP IP/OBS SF/LOW 25: CPT | Mod: ,,, | Performed by: SURGERY

## 2025-04-22 RX ADMIN — PIPERACILLIN SODIUM AND TAZOBACTAM SODIUM 4.5 G: 4; .5 INJECTION, POWDER, FOR SOLUTION INTRAVENOUS at 01:04

## 2025-04-22 RX ADMIN — PIPERACILLIN SODIUM AND TAZOBACTAM SODIUM 4.5 G: 4; .5 INJECTION, POWDER, FOR SOLUTION INTRAVENOUS at 08:04

## 2025-04-22 RX ADMIN — METOPROLOL TARTRATE 12.5 MG: 25 TABLET, FILM COATED ORAL at 08:04

## 2025-04-22 RX ADMIN — SODIUM CHLORIDE, PRESERVATIVE FREE 10 ML: 5 INJECTION INTRAVENOUS at 10:04

## 2025-04-22 RX ADMIN — SODIUM CHLORIDE, PRESERVATIVE FREE 10 ML: 5 INJECTION INTRAVENOUS at 05:04

## 2025-04-22 RX ADMIN — GABAPENTIN 400 MG: 400 CAPSULE ORAL at 08:04

## 2025-04-22 RX ADMIN — GABAPENTIN 400 MG: 400 CAPSULE ORAL at 02:04

## 2025-04-22 RX ADMIN — MUPIROCIN: 20 OINTMENT TOPICAL at 08:04

## 2025-04-22 RX ADMIN — APIXABAN 5 MG: 2.5 TABLET, FILM COATED ORAL at 08:04

## 2025-04-22 NOTE — NURSING
Patient HR in 150s and jumping around- respiratory called for stat EKG, Dr. Rodarte and Hafsa Garza notified, EKG showed Afib RVR 5 mg of metoprolol IV given, no response, another 5 given, pt found to have a fever- tylenol given. Digoxin ordered and given, still no response. Pt being transferred to ICU

## 2025-04-22 NOTE — EICU
"Intervention Initiated From:  COR / EICU    Mc intervened regarding:  Rounding (Video assessment)    Virtual ICU Admission    Admit Date: 2025  LOS: 6  Code Status: Full Code   : 1935  Bed: BICU 05/BICU 05A:     Diagnosis: Mediastinal mass    Patient  has a past medical history of Arthritis, Diabetes mellitus with neurological manifestation, Essential hypertension, High blood pressure, Hyperlipidemia, and Urinary urgency.    Last VS: BP (!) 86/45 (BP Location: Right arm, Patient Position: Lying)   Pulse 95   Temp 100.3 °F (37.9 °C)   Resp 20   Ht 5' 4" (1.626 m)   Wt 63.5 kg (139 lb 15.9 oz)   SpO2 100%   Breastfeeding No   BMI 24.03 kg/m²       VICU Review    VICU nurse assessment :  [place] completed, LDA documentation reconciliation completed, Sign and held orders reviewed/released, and VTE prophylaxis review        Nursing orders placed : IP RALPH Peripheral IV Access          "

## 2025-04-22 NOTE — ASSESSMENT & PLAN NOTE
-Noted history of pAFIB  -Had several episodes of short live self converting afib rvr earlier in stay  -Overnight 4/21-4/22 again developed afib rvr resistent to metoprolol so transferred to ICU  -Cardiology consulted and input appreciated.  Converted to NSR after 1 dose of IV digoxin.  Required levophed transiently but bp now elevated a bit off pressors.  -Echo 4/14/25 showed normal EF and no severe valvular lesions  -Continue metoprolol 12.5mb bid  -Will need oral anticoagulation.  Discussed with general surgery and ok from their end to start eliquis 5mg bid.  -Keep K > 4 and Mag>2  -Monitor in ICU one more night to ensure stability

## 2025-04-22 NOTE — PROGRESS NOTES
[facility] - Intensive Care [facility]  General Surgery  Progress Note    Subjective:     Interval History:  From a surgical standpoint doing very well postop day 1 status post left axillary lymph node excision.  She went into AFib with RVR last night.  She is back in sinus.  She really reports very little pain at the left axilla.    Okay to start Eliquis for prophylaxis this evening    Post-Op Info:  Procedure(s) (LRB):  EXCISION, LYMPH NODE (Left)   1 Day Post-Op      Medications:  Continuous Infusions:   NORepinephrine bitartrate-D5W  0-3 mcg/kg/min Intravenous Continuous   Held at 04/21/25 2330     Scheduled Meds:   digoxin  250 mcg Intravenous Once    gabapentin  400 mg Oral TID    metoprolol tartrate  12.5 mg Oral BID    mupirocin   Nasal BID    sodium chloride 0.9%  10 mL Intravenous Q8H     PRN Meds:  Current Facility-Administered Medications:     acetaminophen, 1,000 mg, Oral, Q8H PRN    dextrose 50%, 12.5 g, Intravenous, PRN    dextrose 50%, 25 g, Intravenous, PRN    glucagon (human recombinant), 1 mg, Intramuscular, PRN    glucose, 16 g, Oral, PRN    glucose, 24 g, Oral, PRN    insulin aspart U-100, 0-5 Units, Subcutaneous, QID (AC + HS) PRN    melatonin, 6 mg, Oral, Nightly PRN    naloxone, 0.02 mg, Intravenous, PRN    ondansetron, 4 mg, Oral, Q8H PRN    senna-docusate, 1 tablet, Oral, BID PRN    sodium chloride 0.9%, 10 mL, Intravenous, PRN     Objective:     Vital Signs (Most Recent):  Temp: 98 °F (36.7 °C) (04/22/25 1100)  Pulse: 64 (04/22/25 1200)  Resp: 19 (04/22/25 1200)  BP: (!) 112/57 (04/22/25 1200)  SpO2: 100 % (04/22/25 1200) Vital Signs (24h Range):  Temp:  [97.7 °F (36.5 °C)-100.3 °F (37.9 °C)] 98 °F (36.7 °C)  Pulse:  [] 64  Resp:  [10-36] 19  SpO2:  [94 %-100 %] 100 %  BP: ()/(35-84) 112/57       Intake/Output Summary (Last 24 hours) at 4/22/2025 1207  Last data filed at 4/22/2025 0300  Gross per 24 hour   Intake 300 ml   Output 1250 ml   Net -950 ml       Physical Exam  Left  axilla Steri-Strips dressing in place.  No swelling or bruising.  No sign of hematoma    Significant Labs:  CBC:   Recent Labs   Lab 04/22/25  0352   WBC 32.69*   RBC 2.67*   HGB 8.0*   HCT 24.1*      MCV 90   MCH 30.0   MCHC 33.2     CMP:   Recent Labs   Lab 04/22/25  0352   CALCIUM 9.0   *   K 4.5   CO2 18*   CL 99   BUN 12   CREATININE 1.2       Significant Diagnostics:  None    Assessment/Plan:     Active Diagnoses:    Diagnosis Date Noted POA    PRINCIPAL PROBLEM:  Mediastinal mass [J98.59] 04/18/2025 Yes    Paroxysmal atrial fibrillation [I48.0] 04/21/2025 No    Lower extremity edema [R60.0] 04/18/2025 Yes    Fever [R50.9] 04/18/2025 Yes    Pleural effusion [J90] 04/18/2025 Yes    Acute pain of left knee [M25.562] 04/18/2025 Yes    Hemoptysis [R04.2] 04/17/2025 No    Hyponatremia [E87.1] 04/16/2025 Yes    Sepsis [A41.9] 01/01/2023 Yes    Acute on Chronic pain of left knee [M25.562, G89.29] 03/04/2022 Yes    Essential hypertension [I10] 02/28/2019 Yes    Hyperlipidemia [E78.5]  Yes    Type 2 diabetes mellitus, without long-term current use of insulin [E11.9] 02/01/2012 Yes      Problems Resolved During this Admission:   No sign of bleeding or hematoma from left axillary excision yesterday  Okay to start Eliquis for prophylaxis this evening  Await pathology results      Ramon Johnson MD  General Surgery  [facility] - Intensive Care ([place])

## 2025-04-22 NOTE — SUBJECTIVE & OBJECTIVE
Interval History: Developed afib rvr last night not responsive to metoprolol and transferred to ICU.  Has now converted back to NSR.  In good spirits and denies pain.  All questions answered and patient had no further complaints.    Objective:     Vital Signs (Most Recent):  Temp: 98 °F (36.7 °C) (04/22/25 1100)  Pulse: 69 (04/22/25 1301)  Resp: 20 (04/22/25 1301)  BP: (!) 148/65 (04/22/25 1301)  SpO2: 95 % (04/22/25 1301) Vital Signs (24h Range):  Temp:  [97.7 °F (36.5 °C)-100.3 °F (37.9 °C)] 98 °F (36.7 °C)  Pulse:  [] 69  Resp:  [10-36] 20  SpO2:  [95 %-100 %] 95 %  BP: ()/(35-84) 148/65     Weight: 63.5 kg (139 lb 15.9 oz)  Body mass index is 24.03 kg/m².    Intake/Output Summary (Last 24 hours) at 4/22/2025 1441  Last data filed at 4/22/2025 0300  Gross per 24 hour   Intake --   Output 1250 ml   Net -1250 ml         Physical Exam  Constitutional:       General: She is not in acute distress.     Appearance: She is ill-appearing. She is not diaphoretic.   HENT:      Head: Atraumatic.      Mouth/Throat:      Mouth: Mucous membranes are moist.   Eyes:      Extraocular Movements: Extraocular movements intact.   Cardiovascular:      Rate and Rhythm: Normal rate and regular rhythm.      Heart sounds: Normal heart sounds. No murmur heard.  Pulmonary:      Effort: Pulmonary effort is normal.      Breath sounds: No wheezing or rhonchi.      Comments: Breathing comfortably been lung sounds courses with more prominent rhonchi in the left lower base that does not clear with cough.  Abdominal:      General: Bowel sounds are normal. There is no distension.      Palpations: Abdomen is soft.      Tenderness: There is no abdominal tenderness.   Musculoskeletal:         General: No deformity. Normal range of motion.      Cervical back: Normal range of motion.      Comments: Left knee not tender, swelling around the left knee and bilateral lower extremities much improved.   Skin:     General: Skin is warm.       Comments: Left axillary incision appears c/d/i   Neurological:      Mental Status: She is alert and oriented to person, place, and time.             Significant Labs: All pertinent labs within the past 24 hours have been reviewed.    Significant Imaging: I have reviewed all pertinent imaging results/findings within the past 24 hours.

## 2025-04-22 NOTE — ASSESSMENT & PLAN NOTE
-History noted and had some leg swelling which has improved  -Doppler US of leg did not show any DVT.  -Orthopedic surgery consulted and input appreciated  -Continue brace and therapy  -Follow-up with Dr. Malagon in outpatient setting after discharge  -PT/OT consulted and recommend high intensity therapy at discharge.

## 2025-04-22 NOTE — PT/OT/SLP PROGRESS
Occupational Therapy      Patient Name:  Jojo Ayoub   MRN:  [id number]    Patient not seen today secondary to  (Pt transferred to ICU.). Will follow-up once therapy orders reconciled.   4/22/2025

## 2025-04-22 NOTE — PROGRESS NOTES
"  [facility] Pulmonary Medicine Consult     Primary Attending:  Ramon Colon MD   Consultant Attending: Ayden Sanchez MD   Consultant Fellow: Alexx Lamb, HO-IV     4/14/2025   7       Chief Complaint/Reason for Consult      "Hemoptysis with large lung mass"     History of Present Illness      89-year-old female with osteoarthritis of left knee, dm 2, HTN low back pain presented for/15/25 for bilateral lower extremity for a few weeks.  On admission she had an elevated white count, moderately hyponatremic and BNP of 181.  Left knee x-ray concerning for an avulsion fracture of suprapatellar tendon with effusion.  She was put on empiric Rocephin was concern for infectious of unknown source.  Orthopedic surgery consulted regarding her left knee deemed it to be a lateral patellar subluxation and provided a brace and outpatient follow-up.  On hospital day 2 she developed a fever up to 101 with persistently elevated white count.  Her antibiotics were escalated to include vancomycin and azithromycin in addition to Rocephin.  Blood and urine cultures were obtained.  CT of her chest with contrast was obtained which showed an 8 x 4 cm subcarinal mass.  She also complained of some scant hemoptysis for which Pulmonary was consulted.    Daily Update:  4/21: NAEON.  Has been afebrile with stable vital signs GenSx planning excisional biopsy of the left axillary lymph nodes.   4/22:  Successful excisional biopsy of left axillary lymph nodes by General surgery yesterday.  Yesterday evening experienced AFib with rapid ventricular response, metoprolol attempted, patient became hypotensive and moved to ICU for transient vasopressor requirement.  Given digoxin at the recommendation of Cardiology and she is back in normal sinus rhythm and off vasopressors this morning.  No complaints from the patient at this time       Review of Systems      Negative except as noted above     Medications and nursing orders have been reviewed      On " "Examination      Vital Signs:  Temp:  [97.7 °F (36.5 °C)-100.3 °F (37.9 °C)]   Pulse:  []   Resp:  [10-36]   BP: ()/(35-84)   SpO2:  [94 %-100 %]     No results found for: "HTIN", "WEIGHT"    Intake / Output:    Intake/Output Summary (Last 24 hours) at 4/22/2025 1149  Last data filed at 4/22/2025 0300  Gross per 24 hour   Intake 300 ml   Output 1250 ml   Net -950 ml       I have reviewed the vital trends as well as the documented I/Os     Physical Exam:  GEN:  Older woman resting in bed, NAD  HEENT: MMM, no scleral icterus, EOMI  NECK: Supple, midline trachea, no supraclavicular, anterior, posterior or submandibular LAD palpated  CV: RRR, no MRG, pulses equal and symmetric 2+ at radial  PULM:  CTAB  ABDOMEN: Soft, non-tender, non-distended, no rebound or guarding  SKIN: Warm, dry, intact, no rashes, surgical incision to left axilla   MSK: No deformity, no clubbing, cyanosis, BLE pitting edema to lower shins  NEURO: awake and oriented and following commands, at neurologic baseline    Pocus:   Bilateral pleural effusions left greater than right.  Left effusion with debris some of which could be fibrin stranding suggesting complication      All recent labs and imaging studies have been reviewed    Recent Labs   Lab 04/21/25  0728 04/21/25 1907 04/22/25  0352   WBC 27.47* 30.43* 32.69*   HGB 8.3* 8.8* 8.0*   HCT 24.8* 26.0* 24.1*    461* 384       Recent Labs   Lab 04/20/25  0635 04/21/25  0628 04/21/25  1907 04/21/25 1911 04/22/25  0352   * 124* 126*  --  126*   K 4.1 3.9 3.9  --  4.5   CL 98 94* 95  --  99   CO2 23 23 23  --  18*   BUN 13 11 10  --  12   CREATININE 1.0 0.9 1.0  --  1.2   CALCIUM 9.3 9.4 9.2  --  9.0   MG 1.6 1.7  --  1.6 2.2   PHOS 3.2 2.7  --   --  3.4       No results for input(s): "PROT", "ALBUMIN", "BILITOT", "AST", "ALT", "ALKPHOS" in the last 168 hours.      No results for input(s): "PROTIME", "PTT", "INR" in the last 168 hours.    Cardiac:   No results for input(s): " ""TROPONINI", "CKTOTAL", "CKMB", "BNP" in the last 168 hours.      FLP:   Lab Results   Component Value Date    CHOL 150 01/21/2020    HDL 48 01/21/2020    LDLCALC 83.8 01/21/2020    TRIG 91 01/21/2020    CHOLHDL 32.0 01/21/2020     DM:   Lab Results   Component Value Date    HGBA1C 5.7 (H) 04/15/2025    HGBA1C 5.6 01/01/2023    HGBA1C 6.0 (H) 01/21/2020    GLUF 135 (H) 02/11/2008    LDLCALC 83.8 01/21/2020    CREATININE 1.2 04/22/2025     Thyroid:   Lab Results   Component Value Date    TSH 0.594 04/15/2025    FREET4 1.13 01/21/2020     Anemia:   Lab Results   Component Value Date    IRON 56 06/03/2010    TIBC 394 06/03/2010    FERRITIN 73 12/28/2011    NVYGTKYV29 884 03/03/2017    FOLATE >20.0 (A) 06/03/2010     Urinalysis:   Lab Results   Component Value Date    LABURIN  04/14/2025     Multiple organisms isolated. None in predominance. Repeat if clinically necessary.    COLORU Yellow 04/14/2025    SPECGRAV 1.005 04/14/2025    NITRITE Positive (A) 04/14/2025    KETONESU Negative 01/02/2023    UROBILINOGEN Negative 04/14/2025       Pertinent findings include:    Lab Results   Component Value Date    CRP 0.6 07/27/2012    [id number] Not Detected 01/01/2023    FERRITIN 73 12/28/2011          Microbiology:  Microbiology Results (last 7 days)       Procedure Component Value Units Date/Time    Blood culture [[id number]]  (Normal) Collected: 04/16/25 1104    Order Status: Completed Specimen: Blood Updated: 04/22/25 0303     Blood Culture No Growth After 5 Days    AFB Culture & Smear [[id number]]     Order Status: Sent Specimen: Body Fluid     Culture, Body Fluid (Aerobic) w/ GS [[id number]]     Order Status: Sent Specimen: Body Fluid     MRSA Screen by PCR [[id number]]  (Normal) Collected: 04/17/25 1809    Order Status: Completed Specimen: Nasal Swab Updated: 04/18/25 0441     MRSA PCR Screen Not Detected    Culture, Respiratory with Gram Stain [[id number]]     Order Status: Sent Specimen: Respiratory from Sputum, " Expectorated     Urine culture [[id number]] Collected: 04/14/25 2130    Order Status: Completed Specimen: Urine Updated: 04/16/25 1001     Urine Culture Multiple organisms isolated. None in predominance. Repeat if clinically necessary.            EKG/Echo:  Normal sinus rhythm   Normal EF, indeterminate diastolic function, no significant valvular and reality    Radiology:  CXR images reviewed   CT chest with contrast images reviewed by me and notable for:  -8 x 4 cm subcarinal mediastinal mass with area of central hypoattenuation concerning for necrosis also causing significant esophageal compression with proximal esophageal dilation  -bilateral mediastinal hilar lymphadenopathy  -Bilateral pleural effusions L> R, left appears to be upward tracking against gravity  -basilar and right apical reticulation with honeycombing, bronchiectasis that is tubular and not proximally associated with fibrosis  -no consolidative process  -radiology also comments on mild left axillary LAD    I have personally and independently interpretted the following tests:    Labs / Images / Cultures     Assessment / Plan:     Jojo Ayoub admitted with lower extremity swelling and L knee subluxation.  Her course is c/b fever and leukocytosis of unknown etiology and new found 7.4cm mediastinal mass with bilateral mediastinal and hilar lymphadenopathy.    Mediastinal mass with associated lymphadenopathy  --Highest concern for mediastinal neoplasm, specifically lymphoma considering her left axillary lymphadenopathy.  Her fevers and leukocytosis may represent herpes symptoms.  Esophageal cancer also in the differential but no obvious risk factors for this.  Infection also in the differential but not seem as sick as would expect if this were to be mediastinitis.  These could also be coexisting.  Attempted bedside thoracentesis 4/18 unsuccessful  --status post left axillary excisional lymph node biopsy, follow up path  --discussed that if lymph  node biopsy is negative the next step would be to proceed with EBUS.  We will get her scheduled in advanced bronchoscopy clinic in anticipation of this need however if lymph node biopsy is positive then can cancel. May benefit from discussion at Tumor conference.   --Counseled that if this were neoplasm the treatment regimen would likely be quite involved and that her age and functional status would factor into whether this is something to be pursued or not, they were already keenly aware of this idea.    Leukocytosis/fever-improved  --potentially represent B symptoms if malignancy is present.  Given the esophageal compression and proximal abnormality she is at significant risk for aspiration however there is no obvious parenchymal consolidation nor is there any other obvious source of infection however, the pleural fluid on the left does appear somewhat complicated on bedside ultrasound and sampling this to assess for infection as well as malignancy would be appropriate-awaiting to see if family is amenable to this.    --stopping Abx at this point as have lower suspicion for infxn at this time. -  --working up other potential oncologic causes with serum flow cytometry, SPEP, serum free light chains, immunoglobulins    3. Pulmonary reticulation and honeycombing  --no history of ILD.  No baseline pulmonary symptoms.  No other history of autoimmune disease.  No symptoms consistent with autoimmune disease other than her osteoarthritis.  This could be realted to chronic aspiration fibrosis. Pending the course of her mass could consider pursuing further workup in the outpatient setting.      This plan was rounded on with staff pulmonologist Dr. Kennedy    We will continue to follow with you, thank you for the opportunity to be involved in Mrs. Ayoub's care.    Please call or message directly through EPIC with any additional questions or concerns     Alexx Lamb MD  Pulmonary & Critical Care Fellow

## 2025-04-22 NOTE — PLAN OF CARE
Problem: Adult Inpatient Plan of Care  Goal: Plan of Care Review  Outcome: Progressing  Flowsheets (Taken 4/22/2025 0543)  Plan of Care Reviewed With: patient     Problem: Diabetes Comorbidity  Goal: Blood Glucose Level Within Targeted Range  Outcome: Progressing  Intervention: Monitor and Manage Glycemia  Flowsheets (Taken 4/22/2025 0543)  Glycemic Management: blood glucose monitored     Problem: Wound  Goal: Absence of Infection Signs and Symptoms  Outcome: Progressing  Intervention: Prevent or Manage Infection  Flowsheets (Taken 4/22/2025 0543)  Fever Reduction/Comfort Measures:   lightweight bedding   lightweight clothing  Infection Management: aseptic technique maintained  Isolation Precautions:   precautions maintained   protective     Problem: Skin Injury Risk Increased  Goal: Skin Health and Integrity  Outcome: Progressing  Intervention: Optimize Skin Protection  Flowsheets (Taken 4/22/2025 0543)  Pressure Reduction Techniques: frequent weight shift encouraged  Pressure Reduction Devices:   positioning supports utilized   pressure-redistributing mattress utilized   foam padding utilized  Skin Protection:   transparent dressing maintained   silicone foam dressing in place  Activity Management: Rolling - L1  Head of Bed (HOB) Positioning: HOB at 30-45 degrees     Problem: Fall Injury Risk  Goal: Absence of Fall and Fall-Related Injury  Outcome: Progressing  Intervention: Identify and Manage Contributors  Flowsheets (Taken 4/22/2025 0543)  Self-Care Promotion: BADL personal objects within reach  Medication Review/Management: medications reviewed  Intervention: Promote Injury-Free Environment  Flowsheets (Taken 4/22/2025 0543)  Safety Promotion/Fall Prevention:   assistive device/personal item within reach   Fall Risk signage in place   Fall Risk reviewed with patient/family   medications reviewed   pulse ox   side rails raised x 3     Problem: Pain Acute  Goal: Optimal Pain Control and Function  Outcome:  Progressing  Intervention: Develop Pain Management Plan  Flowsheets (Taken 4/22/2025 0543)  Pain Management Interventions:   care clustered   quiet environment facilitated   pillow support provided  Intervention: Prevent or Manage Pain  Flowsheets (Taken 4/22/2025 0543)  Sleep/Rest Enhancement: awakenings minimized  Medication Review/Management: medications reviewed

## 2025-04-22 NOTE — PLAN OF CARE
04/22/25 1519   Rounds   Attendance Provider;   Discharge Plan A Rehab  (Pending referrral with [facility])   Why the patient remains in the hospital Requires continued medical care   Transition of Care Barriers None

## 2025-04-22 NOTE — ASSESSMENT & PLAN NOTE
-BP normal to mildly elevated after having been transiently hypotensive last night  -Continue metoprolol for afib.

## 2025-04-22 NOTE — TELEPHONE ENCOUNTER
----- Message from Cliff Kennedy MD sent at 4/21/2025  6:07 PM CDT -----  Regarding: Appointment for EBUS  Lizabeth, can we please get this patient set up with [facility] or [facility] for EBUS? .  Thanks!

## 2025-04-22 NOTE — EICU
Intervention Initiated From:  [facility] / EICU    Mc intervened regarding:  Rounding (Video assessment)  Virtual ICU Quality Rounds    Admit Date: 4/14/2025  Hospital Day: 7    ICU Day: 15h    24H Vital Sign Range:  Temp:  [97.7 °F (36.5 °C)-100.3 °F (37.9 °C)]   Pulse:  []   Resp:  [10-36]   BP: ()/(35-84)   SpO2:  [94 %-100 %]     VICU Surveillance Screening      LDA reconciliation : Yes

## 2025-04-22 NOTE — ASSESSMENT & PLAN NOTE
-On admit Na 121 and now is 126  -Noted history of hyponatremia.    -TSH wnl.  Coristol speaks against adrenal insuffiencey.    Hypervolemic initially on exam however swelling seems serum improved on its own - as did sodium.    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    -Continue fluid restriction  -Repeat BMP in AM

## 2025-04-22 NOTE — PT/OT/SLP PROGRESS
Physical Therapy      Patient Name:  Jojo Ayoub   MRN:  [id number]    Patient not seen today secondary to Transfer to ICU, await clearance. Will need PT/OT orders reconciled to continue w/ treatments.

## 2025-04-22 NOTE — PROGRESS NOTES
"[facility], [place] Intensive Care [facility] Medicine  Progress Note    Patient Name: Jojo Ayoub  MRN: [id number]  Patient Class: IP- Inpatient   Admission Date: 4/14/2025  Length of Stay: 7 days  Attending Physician: Ramon Colon MD  Primary Care Provider: Bahman Vincent Jr., MD        Subjective     Principal Problem:Mediastinal mass        HPI:  Per Jo Danielle, PA-C:    "Ms. Jojo Ayoub is a 89 y.o. female, with PMH of T2DM, HTN, HLD, chronic constipation, OA w/ chronic left knee pain, anemia, who presented to [facility] ED on 4/15/25 due to b/l LE swelling x "a few weeks." Her daughter syayes the leg swelling was first noted by her today. She additionally notes low blood pressure readings of 90/55 at home today. She endorses left knee pain, and states she feels like her left knee "dislocate" a few nights ago. She states she had a knee replacement of her left knee over 20 years ago. She denied fall/trauma, leg pain or redness or warmth. She denied dizziness, fatigue, fever, chills, sore throat, runny nose, congestion, shortness of breath, chest pain, palpitations, abdominal pain, nausea, vomiting, diarrhea, or urinary symptoms. She was evaluated in the ED with labs showing leukocytosis of 19K, with left shift and H&H of 9.9/28.0. A metabolic panel showed sodium of 121, potassium of 5.2, glucose was 94, with CO2 of 19, and anion gap of 7. A BNP was 181 without elevation of troponin. A UA showed nitrite positive UTI with 2+ leuk esterase, with 20 WBC, and many bacteria. A CXR showed bilateral hilar prominence, and mild bibasilar densities. An x-ray of the knee had a findings that might reporesent a Pellegrinin-Stieda lesion vs. An avulsive fracture and a suprapatellar effusion. She was treated in the ED with 1L NS and rocephin."    Overview/Hospital Course:  Patient is a 89 year-old woman history of hypertension, diabetes mellitus type 2, osteoarthritis of left knee status post remote left knee " replacement who presents with worsening left knee pain with swelling.  Patient walks with walker at home at baseline.  She reports she recently slid to ground from a chair at home a few weeks but denies any more recent fall or any other trauma.     Exam notable for significant bilateral lower extremity edema more so on the left side.  Laxity of the left knee.  Not tender to touch. Lungs clear.  Imaging concerning for possible avulsion fracture.  Orthopedic surgery service consult for evaluation and recommended Adamaris brace and physical therapy.    Ultrasound of lower extremity negative for clot.  Echocardiogram does not show evidence of heart failure.  Hyponatremia on presentation which is improving without intervention.    Patient with persistent elevated white count and now fever overnight.  Blood cultures ordered.  On empiric broad-spectrum antibiotics.    Interval History: Developed afib rvr last night not responsive to metoprolol and transferred to ICU.  Has now converted back to NSR.  In good spirits and denies pain.  All questions answered and patient had no further complaints.    Objective:     Vital Signs (Most Recent):  Temp: 98 °F (36.7 °C) (04/22/25 1100)  Pulse: 69 (04/22/25 1301)  Resp: 20 (04/22/25 1301)  BP: (!) 148/65 (04/22/25 1301)  SpO2: 95 % (04/22/25 1301) Vital Signs (24h Range):  Temp:  [97.7 °F (36.5 °C)-100.3 °F (37.9 °C)] 98 °F (36.7 °C)  Pulse:  [] 69  Resp:  [10-36] 20  SpO2:  [95 %-100 %] 95 %  BP: ()/(35-84) 148/65     Weight: 63.5 kg (139 lb 15.9 oz)  Body mass index is 24.03 kg/m².    Intake/Output Summary (Last 24 hours) at 4/22/2025 1441  Last data filed at 4/22/2025 0300  Gross per 24 hour   Intake --   Output 1250 ml   Net -1250 ml         Physical Exam  Constitutional:       General: She is not in acute distress.     Appearance: She is ill-appearing. She is not diaphoretic.   HENT:      Head: Atraumatic.      Mouth/Throat:      Mouth: Mucous membranes are moist.    Eyes:      Extraocular Movements: Extraocular movements intact.   Cardiovascular:      Rate and Rhythm: Normal rate and regular rhythm.      Heart sounds: Normal heart sounds. No murmur heard.  Pulmonary:      Effort: Pulmonary effort is normal.      Breath sounds: No wheezing or rhonchi.      Comments: Breathing comfortably been lung sounds courses with more prominent rhonchi in the left lower base that does not clear with cough.  Abdominal:      General: Bowel sounds are normal. There is no distension.      Palpations: Abdomen is soft.      Tenderness: There is no abdominal tenderness.   Musculoskeletal:         General: No deformity. Normal range of motion.      Cervical back: Normal range of motion.      Comments: Left knee not tender, swelling around the left knee and bilateral lower extremities much improved.   Skin:     General: Skin is warm.      Comments: Left axillary incision appears c/d/i   Neurological:      Mental Status: She is alert and oriented to person, place, and time.             Significant Labs: All pertinent labs within the past 24 hours have been reviewed.    Significant Imaging: I have reviewed all pertinent imaging results/findings within the past 24 hours.      Assessment & Plan  Mediastinal mass  SIRS (systemic inflammatory response syndrome)  Hemoptysis  -Admitted to inpatient status  Presented with leg swelling and on admit had elevated white blood cell count and subsequently fever.  Procal negative.  -UA suggestive of possible UTI but without urinary symptoms.    -Left knee with acute on chronic pain but not significantly tender and with good range of motion.    -Earlier in stay noted URI symptoms and reports of scant hemoptysis and cough.    -CT Chest with contrast 4/17 showed a large (8.1 x 4.5 cm) subcarinal mass, possible lymphadenopathy.  Additional mediastinal, hilar, and left axillary lymphadenopathy noted. Mild/moderate sized pleural effusions with bibasilar atelectasis.   Moderate chronic interstitial/ fibrotic lung changes.  -Blood cultures negative.  Urine cultures with no predominant organism  -Pulmonology and general surgery consulted and input appreciated.    -Pulm attempted thoracentesis 4/18 but not able to obtain fluid.  -Taken by surgery to OR on 4/20 for excisional LN biopsy.  Await pathology.  If non-diagnostic next step would be outpatient EBUS with transbronchial needle biopsy.  -Noted ongoing workup by pulmonology - flow cytometry, SPEP, free light chains and immunoglobulins.  Paroxysmal atrial fibrillation  Atrial fibrillation with RVR  -Noted history of pAFIB  -Had several episodes of short live self converting afib rvr earlier in stay  -Overnight 4/21-4/22 again developed afib rvr resistent to metoprolol so transferred to ICU  -Cardiology consulted and input appreciated.  Converted to NSR after 1 dose of IV digoxin.  Required levophed transiently but bp now elevated a bit off pressors.  -Echo 4/14/25 showed normal EF and no severe valvular lesions  -Continue metoprolol 12.5mb bid  -Will need oral anticoagulation.  Discussed with general surgery and ok from their end to start eliquis 5mg bid.  -Keep K > 4 and Mag>2  -Monitor in ICU one more night to ensure stability  Hyponatremia  -On admit Na 121 and now is 126  -Noted history of hyponatremia.    -TSH wnl.  Coristol speaks against adrenal insuffiencey.    Hypervolemic initially on exam however swelling seems serum improved on its own - as did sodium.    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    -Continue fluid restriction  -Repeat BMP in AM  Acute on Chronic pain of left knee  Lower extremity edema  Debility  -History noted and had some leg swelling which has improved  -Doppler US of leg did not show any DVT.  -Orthopedic surgery consulted and input appreciated  -Continue brace and therapy  -Follow-up with Dr. Malagon in outpatient setting after  discharge  -PT/OT consulted and recommend high intensity therapy at discharge.  Type 2 diabetes mellitus, without long-term current use of insulin  -A1c 5.7 now  -Home regimen includes metformin - held during hospitalization  -Blood sugars are reasonably controlled  -Continue SSI ac/hs  Hyperlipidemia  -Continue statin   Essential hypertension  -BP normal to mildly elevated after having been transiently hypotensive last night  -Continue metoprolol for afib.  VTE Risk Mitigation (From admission, onward)           Ordered     IP VTE HIGH RISK PATIENT  Once         04/17/25 1115     Place LIANNE hose  Until discontinued         04/17/25 1115     Place sequential compression device  Until discontinued         04/17/25 1115     Reason for No Pharmacological VTE Prophylaxis  Once        Question:  Reasons:  Answer:  Active Bleeding    04/17/25 1115                    Discharge Planning   FAY: 4/24/2025     Code Status: Full Code   Medical Readiness for Discharge Date:   Discharge Plan A: Rehab (Pending referral with [facility])   Discharge Delays: None known at this time      35 minutes critical care time      Ramon Colon MD  Department of Hospital Medicine   [facility] - Intensive [facility]

## 2025-04-22 NOTE — CONSULTS
Cardiology Consult  4/22/2025  9:51 AM    Attending Cardiologist: Raulito Lares M.D.  Primary Care Provider: Bahman Vincent Jr., MD  Chief Complaint/Reason For Consultation:  new onset AF      Problem list  Problem List[1]    CC:  Left knee pain    HPI:  Jojo Ayoub is a 89 y.o.year-old female with past medical history of hypertension, diabetes, hyperlipidemia, osteoarthritis with chronic left knee pain, anemia who was admitted on April 15th with left leg swelling and hypotension with systolic blood pressure of 90.  During this hospitalization, workup was negative for DVT.  Patient had lymph node biopsy done yesterday.  Last night she went into atrial fibrillation with rapid ventricular response.  She was transferred to the ICU.  She was asymptomatic.  She was given 1 dose of IV digoxin 0.25mg.  She is now back in sinus rhythm.  Daughter and  are at the bedside.  Patient denies any chest pain or shortness of breath.  Patient denies any recent falls.  She denies any TIA or CVA.  She denies any bleeding.    Medications  Current Medications[2]   Prior to Admission medications    Medication Sig Start Date End Date Taking? Authorizing Provider   acetaminophen (TYLENOL) 500 MG tablet Take 2 tablets (1,000 mg total) by mouth every 8 (eight) hours as needed for Pain. 2/28/19  Yes Darya Robles MD   amLODIPine (NORVASC) 5 MG tablet Take 5 mg by mouth. 5/17/24  Yes Provider, Historical   aspirin (HE[employer] ASPIRIN) 81 MG chewable tablet Take 81 mg by mouth. 1 Tablet Oral Every day   Yes Provider, Historical   atorvastatin (LIPITOR) 10 MG tablet Take 10 mg by mouth once daily.   Yes Provider, Historical   carvediloL (COREG) 25 MG tablet TAKE 1 TABLET BY MOUTH TWICE A DAY 3/8/21  Yes Anselmo Torre MD   gabapentin (NEURONTIN) 800 MG tablet TAKE 1 TABLET (800 MG TOTAL) BY MOUTH 3 (THREE) TIMES DAILY. 5/12/20 4/15/25 Yes Christine Paul PA   GEMTESA 75 mg Tab Take 1 tablet by  mouth. 5/10/24   Provider, Historical   metFORMIN (GLUCOPHAGE-XR) 500 MG ER 24hr tablet Take 1 tablet (500 mg total) by mouth daily with breakfast. 9/10/20 4/15/25  Anselmo Torre MD   nirmatrelvir-ritonavir (PAXLOVID) 300 mg (150 mg x 2)-100 mg copackaged tablets (EUA) Take 3 tablets by mouth 2 (two) times daily. Each dose contains 2 nirmatrelvir (pink tablets) and 1 ritonavir (white tablet). Take all 3 tablets together 6/28/24   Deonte Joshi MD   omeprazole (PRILOSEC) 20 MG capsule Take 20 mg by mouth. 6/26/24   Provider, Historical   ONETOUCH ULTRA BLUE TEST STRIP Strp TEST ONCE DAILY 8/27/18   Darya Robles MD   ONETOUCH ULTRASOFT LANCETS lancets USE TO TEST BLOOD SUGAR DAILY 9/7/18   Darya Robles MD         History  Past Medical History:   Diagnosis Date    Arthritis     Diabetes mellitus with neurological manifestation     Essential hypertension 2/28/2019    High blood pressure     Hyperlipidemia     Urinary urgency      Past Surgical History:   Procedure Laterality Date    EKG 12-LEAD  4/21/2025    EPIDURAL STEROID INJECTION N/A 1/31/2019    Procedure: INJECTION, STEROID, EPIDURAL, L3-L4 need consent;  Surgeon: Marlin Barber MD;  Location: [facility];  Service: Pain Management;  Laterality: N/A;    left knee replacement       low back surgery        Social History[3]      Allergies  Review of patient's allergies indicates:  No Known Allergies      Review of Systems   Review of Systems   Cardiovascular: Negative.    Respiratory: Negative.           Physical Exam  Wt Readings from Last 1 Encounters:   04/21/25 63.5 kg (139 lb 15.9 oz)     BP Readings from Last 3 Encounters:   04/22/25 (!) 110/56   06/28/24 (!) 145/76   01/03/23 (!) 124/57     Pulse Readings from Last 1 Encounters:   04/22/25 68     Body mass index is 24.03 kg/m².    Physical Exam  Vitals reviewed.   Constitutional:       Appearance: She is well-developed.   HENT:      Head: Atraumatic.   Eyes:      General: No  "scleral icterus.  Neck:      Vascular: Normal carotid pulses. No carotid bruit, hepatojugular reflux or JVD.   Cardiovascular:      Rate and Rhythm: Normal rate and regular rhythm.      Chest Wall: PMI is not displaced.      Pulses: Intact distal pulses.           Carotid pulses are 2+ on the right side and 2+ on the left side.       Radial pulses are 2+ on the right side and 2+ on the left side.        Dorsalis pedis pulses are 2+ on the right side and 2+ on the left side.      Heart sounds: Normal heart sounds, S1 normal and S2 normal. No murmur heard.     No friction rub.   Pulmonary:      Effort: Pulmonary effort is normal. No respiratory distress.      Breath sounds: Normal breath sounds. No stridor. No wheezing or rales.   Chest:      Chest wall: No tenderness.   Abdominal:      General: Bowel sounds are normal.      Palpations: Abdomen is soft.   Musculoskeletal:      Cervical back: Neck supple. No edema.   Skin:     General: Skin is warm and dry.      Nails: There is no clubbing.   Neurological:      Mental Status: She is alert and oriented to person, place, and time.   Psychiatric:         Behavior: Behavior normal.         Thought Content: Thought content normal.           Laboratory:  Trended Lab Data:  Recent Labs   Lab 04/21/25  0728 04/21/25  1907 04/22/25  0352   WBC 27.47* 30.43* 32.69*   HGB 8.3* 8.8* 8.0*   HCT 24.8* 26.0* 24.1*    461* 384       Recent Labs   Lab 04/20/25  0635 04/21/25  0628 04/21/25  1907 04/21/25  1911 04/22/25  0352   * 124* 126*  --  126*   K 4.1 3.9 3.9  --  4.5   CL 98 94* 95  --  99   CO2 23 23 23  --  18*   BUN 13 11 10  --  12   CALCIUM 9.3 9.4 9.2  --  9.0   MG 1.6 1.7  --  1.6 2.2   PHOS 3.2 2.7  --   --  3.4       No results for input(s): "PROT", "ALBUMIN", "BILITOT", "AST", "ALT", "ALKPHOS" in the last 168 hours.    No results for input(s): "PROTIME", "PTT", "INR" in the last 168 hours.    Cardiac: No results for input(s): "TROPONINI", "CKTOTAL", "CKMB", " ""BNP" in the last 168 hours.    FLP:   Lab Results   Component Value Date    CHOL 150 01/21/2020    HDL 48 01/21/2020    LDLCALC 83.8 01/21/2020    TRIG 91 01/21/2020    CHOLHDL 32.0 01/21/2020     DM:   Lab Results   Component Value Date    HGBA1C 5.7 (H) 04/15/2025    HGBA1C 5.6 01/01/2023    HGBA1C 6.0 (H) 01/21/2020    GLUF 135 (H) 02/11/2008    LDLCALC 83.8 01/21/2020    CREATININE 1.2 04/22/2025     Thyroid:   Lab Results   Component Value Date    TSH 0.594 04/15/2025    FREET4 1.13 01/21/2020     Anemia:   Lab Results   Component Value Date    IRON 56 06/03/2010    TIBC 394 06/03/2010    FERRITIN 73 12/28/2011    DNOOOQEF84 884 03/03/2017    FOLATE >20.0 (A) 06/03/2010     Urinalysis:   Lab Results   Component Value Date    LABURIN  04/14/2025     Multiple organisms isolated. None in predominance. Repeat if clinically necessary.    COLORU Yellow 04/14/2025    SPECGRAV 1.005 04/14/2025    NITRITE Positive (A) 04/14/2025    KETONESU Negative 01/02/2023    UROBILINOGEN Negative 04/14/2025     @    Other Results:  EKG (my interpretation):    TELEMETRY:  Sinus 69    Echo:   Results for orders placed or performed during the hospital encounter of 04/14/25   Echo   Result Value Ref Range    BSA 1.66 m2    LVIDd 3.7 3.5 - 6.0 cm    LV Systolic Volume 19 mL    LV Systolic Volume Index 11.4 mL/m2    LVIDs 2.4 2.1 - 4.0 cm    LV Diastolic Volume 59 mL    LV Diastolic Volume Index 35.54 mL/m2    Left Ventricular End Systolic Volume by Teichholz Method 19.25 mL    Left Ventricular End Diastolic Volume by Teichholz Method 59.41 mL    IVS 0.6 0.6 - 1.1 cm    LVOT diameter 2.0 cm    LVOT area 3.1 cm2    FS 35.1 28 - 44 %    Left Ventricle Relative Wall Thickness 0.32 cm    PW 0.6 0.6 - 1.1 cm    LV mass 56.3 g    LV Mass Index 33.9 g/m2    MV Peak E Gerson 0.83 m/s    TDI LATERAL 0.08 m/s    TDI SEPTAL 0.06 m/s    E/E' ratio 12 m/s    MV Peak A Gerson 1.01 m/s    TR Max Gerson 2.8 m/s    E/A ratio 0.82     IVRT 57 msec    E wave " deceleration time 202 msec    LV SEPTAL E/E' RATIO 13.8 m/s    LV LATERAL E/E' RATIO 10.4 m/s    PV Peak S Gerson 0.77 m/s    PV Peak D Gerson 0.41 m/s    Pulm vein S/D ratio 1.88     LA size 2.8 cm    Left Atrium Minor Axis 4.7 cm    Left Atrium Major Axis 5.1 cm    RA Major Axis 4.62 cm    AV regurgitation pressure 1/2 time 355 ms    AR Max Gerson 3.68 m/s    AV mean gradient 6 mmHg    AV peak gradient 10 mmHg    Ao peak gerson 1.6 m/s    Ao VTI 36.7 cm    Mr max gerson 4.13 m/s    MV stenosis pressure 1/2 time 58.57 ms    MV valve area p 1/2 method 3.76 cm2    Triscuspid Valve Regurgitation Peak Gradient 31 mmHg    PV PEAK VELOCITY 0.91 m/s    PV peak gradient 3 mmHg    IVC diameter 1.79 cm    Mean e' 0.07 m/s    ZLVIDS -1.45     ZLVIDD -2.29     TV resting pulmonary artery pressure 34 mmHg    RV TB RVSP 6 mmHg    Est. RA pres 3 mmHg    Narrative      Left Ventricle: The left ventricle is normal in size. Normal wall   thickness. There is normal systolic function with a visually estimated   ejection fraction of 55 - 60%. Global longitudinal strain is normal;   19.6%.    Right Ventricle: The right ventricle is normal in size. Wall thickness   is normal. Systolic function is normal.    Aortic Valve: There is mild to moderate aortic regurgitation.    Tricuspid Valve: There is mild regurgitation.    IVC/SVC: Normal venous pressure at 3 mmHg.         Radiology:  CT Abdomen Pelvis With IV Contrast Routine Oral Contrast  Result Date: 4/19/2025  EXAMINATION: CT ABDOMEN PELVIS without IV CONTRAST CLINICAL HISTORY: Metastatic disease evaluation;LE swelling, concern for possible inguinal nodes, has mediastinal and axillary mass. full survey; TECHNIQUE: Axial images of the abdomen and pelvis were acquired without the use of intravenous contrast.  1000 cc of Omnipaque 9 oral contrast was administered.  Coronal and sagittal reconstructions were also obtained COMPARISON: CT chest from 04/17/2025. FINDINGS: Visualized portion of the thorax:  Redemonstration of partial visualization of subcarinal mass, better evaluated on prior CT from 04/17/2025.  No cardiomegaly.  Moderate calcific atherosclerosis.  Trace pericardial effusion.  Visualized portions of the lungs demonstrates small bilateral pleural effusions.  Interlobular septal thickening with reticulations and fibrotic changes suggestive of interstitial lung disease. Liver: Normal in size and contour.  4.1 x 5.2 cm hypodensity near the dome of the liver consistent with a cyst.  No additional focal hepatic lesions. Gallbladder: Not well visualized possibly decompressed or absent. Bile Ducts: No evidence of dilated ducts. Pancreas: No mass or peripancreatic fat stranding. Spleen: Unremarkable. Stomach and duodenum: Unremarkable. Adrenals: Unremarkable. Kidneys/ Ureters: Normal in size and location. Bilateral renal hypodensities consistent with cysts.  No hydronephrosis or nephrolithiasis. No ureteral dilatation. Bladder: No evidence of wall thickening. Reproductive organs: Unremarkable. Bowel/Mesentery: Small bowel is normal in caliber with no evidence of obstruction. No evidence of inflammation or wall thickening.  Colon demonstrates no focal wall thickening. Lymph nodes: No lymphadenapathy. Vasculature: No aneurysm. Mild calcific atherosclerosis. Abdominal wall:  Body wall anasarca. Bones: No acute fracture.  Postsurgical changes L4-5.  Hardware and alignment appear intact.  No suspicious osseous lesions.  Dense sclerosis of the SI joints bilaterally.     No evidence metastatic disease or lymphadenopathy in the abdomen or pelvis. Partial visualization subcarinal mediastinal mass better evaluated on recent CT of the chest from 04/17/2025. Partial visualization of bilateral small pleural effusions and interstitial lung disease better evaluated on recent CT of the chest from 04/17/2025. Electronically signed by: Balaji Goldman MD Date:    04/19/2025 Time:    14:25    US Soft Tissue Axilla, Left  Result  Date: 4/19/2025  EXAMINATION: US SOFT TISSUE AXILLA, LEFT CLINICAL HISTORY: measure number and size of lymph nodes; TECHNIQUE: Ultrasound of the left axilla COMPARISON: CT chest 04/17/2025 FINDINGS: At least 4 lymph nodes are visualized, the largest of which measures 2.0 x 0.9 x 1.8 cm which is borderline caliber for pathologic enlargement.  There is some cortical thickening present and a normal reniform shape is difficult to appreciate with certainty on at least 1 of the lymph nodes.  Comparison with the CT scan shows that these are actually subpectoral lymph nodes.     Approximately 4 borderline enlarged left subpectoral lymph nodes which could be neoplastic in nature given this patient's large subcarinal mass seen on CT.. Electronically signed by: Ayden Negron Jr Date:    04/19/2025 Time:    10:19    X-Ray Chest AP Portable  Result Date: 4/19/2025  EXAMINATION: XR CHEST AP PORTABLE CLINICAL HISTORY: air in left pleural space; TECHNIQUE: Single frontal view of the chest was performed. COMPARISON: Plain film from 04/14/2025 and CT chest from 04/17/2025. FINDINGS: Cardiac silhouette is enlarged similar to prior exam.  Prominence of the pulmonary vasculature.  Coarse interstitial lung markings with a lower lobe predominance.  Small bilateral pleural effusions.  No pneumothorax is visualized.  Advanced degenerative changes of the right shoulder.     As above. Electronically signed by: Balaji Goldman MD Date:    04/19/2025 Time:    08:36    CT Chest With Contrast  Result Date: 4/17/2025  EXAMINATION: CT CHEST WITH CONTRAST CLINICAL HISTORY: Hemoptysis; TECHNIQUE: Low dose axial images, sagittal and coronal reformations were obtained from the thoracic inlet to the lung bases following the IV administration of 75 mL of Omnipaque 350. COMPARISON: None FINDINGS: Lungs: There is prominent peripheral dependent interstitial thickening, concerning for chronic interstitial/fibrotic changes.  Honeycombing present.   Patient respiratory motion artifact limits assessment.  There is mild bibasilar atelectasis with mild/moderate pleural effusions.  The main tracheobronchial tree is clear. Mediastinum: Extensive lymphadenopathy.  Subcarinal mass measures 8.1 by 4.5 cm (series 2, image 55).  Right hilar lymphadenopathy measures 1.9 cm (series 6, image 197).  Right paratracheal lymphadenopathy noted.  Heart size within normal limits.  Trace pericardial effusion.  Thoracic aorta is normal caliber.  Moderate scattered calcific atherosclerosis. Upper abdomen (visualized portion): Large liver cyst in the dome. Chest wall/axilla.  Mild left axillary lymphadenopathy. Bones: No marrow replacement process.     Large (8.1 x 4.5 cm) subcarinal mass, possible lymphadenopathy.  Additional mediastinal, hilar, and left axillary lymphadenopathy noted. Mild/moderate sized pleural effusions with bibasilar atelectasis. Moderate chronic interstitial/ fibrotic lung changes. This report was flagged in Epic as abnormal. Electronically signed by: Alexandre Posada MD Date:    04/17/2025 Time:    13:40    US Lower Extremity Veins Bilateral  Result Date: 4/15/2025  EXAMINATION: US LOWER EXTREMITY VEINS BILATERAL CLINICAL HISTORY: Swelling, Rule out DVT; TECHNIQUE: Duplex and color flow Doppler evaluation of the bilateral lower extremity veins was performed. COMPARISON: None FINDINGS: No evidence of clot involving the bilateral common femoral, greater saphenous, femoral, popliteal, peroneal, anterior and posterior tibial veins.  All venous structures demonstrate normal respiratory phasicity and augment adequately.  No evidence of soft tissue mass or Baker's cyst.     No evidence of lower extremity deep venous thrombosis. Electronically signed by: Rodolfo Workman MD Date:    04/15/2025 Time:    17:36    Echo  Result Date: 4/15/2025    Left Ventricle: The left ventricle is normal in size. Normal wall thickness. There is normal systolic function with a visually  estimated ejection fraction of 55 - 60%. Global longitudinal strain is normal; 19.6%.   Right Ventricle: The right ventricle is normal in size. Wall thickness is normal. Systolic function is normal.   Aortic Valve: There is mild to moderate aortic regurgitation.   Tricuspid Valve: There is mild regurgitation.   IVC/SVC: Normal venous pressure at 3 mmHg.     X-Ray Chest AP Portable  Result Date: 4/14/2025  EXAMINATION: AP PORTABLE CHEST CLINICAL HISTORY: Chest Pain; TECHNIQUE: AP portable chest radiograph was submitted. COMPARISON: 01/01/2023 FINDINGS: AP portable chest radiograph demonstrates a cardiac silhouette within normal limits.  There is bilateral hilar prominence, which may be due to adenopathy, infiltrates, pulmonary vasculature, and less likely masses.  Mild bibasilar densities are seen, which may be related to atelectatic change or infiltrates.  There is no pneumothorax or large pleural effusion.  There are calcify thoracic lymph nodes.     As above described. Electronically signed by: Cyndie Holley Date:    04/14/2025 Time:    22:03    X-Ray Knee 3 View Left  Result Date: 4/14/2025  EXAMINATION: THREE VIEWS OF THE LEFT KNEE CLINICAL HISTORY: Pain in left knee TECHNIQUE: AP, oblique, and lateral view of the left knee COMPARISON: 01/01/2023 FINDINGS: Left knee arthroplasty is present.  Three views of the left knee demonstrate a curvilinear calcification/ossification adjacent to the medial femoral condyle paralleling the femoral cortex.  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  An avulsive fracture may have a similar appearance.  Correlate with the site of pain or history of trauma.  Moderate suprapatellar effusion is present.  The bones are osteopenic.  Vascular calcifications are present.     As above described. Electronically signed by: Cyndie Holley Date:    04/14/2025 Time:    21:55    X-Ray Chest PA And Lateral  Result Date: 3/26/2025  [facility] XR CHEST AP PA LATERAL 2 VW on 3/26/2025  13:35 CDT Clinical history: CHRONIC COUGH Comparison: Chest radiograph 2/13/2025, 5/21/2024. Findings: LINES: None. LUNGS: No acute airspace disease. Unchanged chronic interstitial coarsening. No pleural effusion. No pneumothorax. MEDIASTINUM: Unchanged cardiomediastinal silhouette. Unchanged mediastinal calcified lymph nodes. OSSEOUS STRUCTURES: No acute osseous abnormality. Multilevel thoracic spondylosis. UPPER ABDOMEN: No acute abnormality.  OTHER: None.    No interval change with no acute cardiopulmonary abnormality. Electronically Signed By: Ayden Reece MD, 3/26/2025 13:39 CDT        Current Medications:     Infusions:   NORepinephrine bitartrate-D5W  0-3 mcg/kg/min Intravenous Continuous   Held at 04/21/25 2330        Scheduled:   digoxin  250 mcg Intravenous Once    gabapentin  400 mg Oral TID    metoprolol tartrate  12.5 mg Oral BID    mupirocin   Nasal BID    sodium chloride 0.9%  10 mL Intravenous Q8H        PRN:    Current Facility-Administered Medications:     acetaminophen, 1,000 mg, Oral, Q8H PRN    dextrose 50%, 12.5 g, Intravenous, PRN    dextrose 50%, 25 g, Intravenous, PRN    glucagon (human recombinant), 1 mg, Intramuscular, PRN    glucose, 16 g, Oral, PRN    glucose, 24 g, Oral, PRN    insulin aspart U-100, 0-5 Units, Subcutaneous, QID (AC + HS) PRN    melatonin, 6 mg, Oral, Nightly PRN    naloxone, 0.02 mg, Intravenous, PRN    ondansetron, 4 mg, Oral, Q8H PRN    senna-docusate, 1 tablet, Oral, BID PRN    sodium chloride 0.9%, 10 mL, Intravenous, PRN      Assessment and Plan:  PAF, back in sinus.  [id number] = 5 (female, > 75, HTN, DM)  -discussed with patient and family regarding her risk of embolic CVA given paroxysmal atrial fibrillation her CHADS-VASc score 5.  Recommend anticoagulation once cleared by surgery and monitor her anemia closely.  Patient, daughter and  understand the risks and benefits of oral anticoagulation and opted to take anticoagulation for CVA prophylaxis.   Start Eliquis 5mg BID once cleared by Dr. Johnson.  Discussed with Dr Colon.    Thank you for allowing me to participate in the care of Jojo Ayoub.      Raulito Lares MD, F.A.C.C, F.S.C.A.I.    Disclaimer: This document was created using voice recognition software (getupp Direct). Although it may be edited, this document may contain errors related to incorrect recognition of the spoken word. Please call the physician if clarification is needed.          [1]   Patient Active Problem List  Diagnosis    Type 2 diabetes mellitus, without long-term current use of insulin    Osteoarthritis of knee    Abnormality of gait    Congenital musculoskeletal deformity of spine    Counseling on injury prevention    Hallux valgus (acquired)    Other symptoms referable to back    Spinal stenosis, unspecified region other than cervical    Long term (current) use of anticoagulants    Other specified prophylactic or treatment measure    S/P knee replacement    Hyperlipidemia    Urinary incontinence, mixed    Nocturia more than twice per night    Chronic constipation    Incomplete emptying of bladder    Other intervertebral disc disorders, lumbar region    Cervical stenosis of spine    Chronic low back pain without sciatica    Ulnar neuropathy of right upper extremity    Essential hypertension    Gait instability    Weakness of both lower extremities    Chronic bilateral low back pain without sciatica    Subclinical hyperthyroidism    Acute on Chronic pain of left knee    Impaired functional mobility, balance, gait, and endurance    Decreased range of motion of both knees    Sepsis    Leukocytosis    Weight loss, unintentional    Anemia    Hyponatremia    Hemoptysis    Lower extremity edema    Fever    Mediastinal mass    Pleural effusion    Acute pain of left knee    Paroxysmal atrial fibrillation   [2]   Current Facility-Administered Medications   Medication Dose Route Frequency Provider Last Rate Last Admin     acetaminophen tablet 1,000 mg  1,000 mg Oral Q8H PRN Ramon Johnson MD   1,000 mg at 04/21/25 1617    dextrose 50% injection 12.5 g  12.5 g Intravenous PRN Ramon Johnson MD        dextrose 50% injection 25 g  25 g Intravenous PRN Ramon Johnson MD        digoxin injection 250 mcg  250 mcg Intravenous Once Raulito Lares MD        gabapentin capsule 400 mg  400 mg Oral TID Ramon Johnson MD   400 mg at 04/22/25 0812    glucagon (human recombinant) injection 1 mg  1 mg Intramuscular PRN Ramon Johnson MD        glucose chewable tablet 16 g  16 g Oral Ramon Chavarria MD        glucose chewable tablet 24 g  24 g Oral PRN Ramon Johnson MD        insulin aspart U-100 pen 0-5 Units  0-5 Units Subcutaneous QID (AC + HS) Ramon Chavarria MD        melatonin tablet 6 mg  6 mg Oral Nightly PRN Ramon Johnson MD   6 mg at 04/18/25 2031    metoprolol tartrate (LOPRESSOR) split tablet 12.5 mg  12.5 mg Oral BID Ramon Johnson MD   12.5 mg at 04/22/25 0809    mupirocin 2 % ointment   Nasal BID Jakub Rodarte MD   Given at 04/22/25 0810    naloxone 0.4 mg/mL injection 0.02 mg  0.02 mg Intravenous PRN Ramon Johnson MD        NORepinephrine 4 mg in dextrose 5% 250 mL infusion (premix)  0-3 mcg/kg/min Intravenous Continuous Raulito Lares MD   Held at 04/21/25 2330    ondansetron disintegrating tablet 4 mg  4 mg Oral Q8H PRN Ramon Johnson MD   4 mg at 04/21/25 0959    senna-docusate 8.6-50 mg per tablet 1 tablet  1 tablet Oral BID PRRamon Price MD        sodium chloride 0.9% flush 10 mL  10 mL Intravenous PRRamon Price MD        sodium chloride 0.9% flush 10 mL  10 mL Intravenous Q8H Ramon Johnson MD   10 mL at 04/22/25 0551   [3]   Social History  Socioeconomic History    Marital status:    Tobacco Use    Smoking status: Never    Smokeless tobacco: Never   Substance and Sexual Activity    Alcohol use: No    Drug use: No    Sexual activity: Yes     Partners: Male     Social Drivers of  Health     Financial Resource Strain: Low Risk  (4/16/2025)    Overall Financial Resource Strain (CARDIA)     Difficulty of Paying Living Expenses: Not hard at all   Food Insecurity: No Food Insecurity (4/16/2025)    Hunger Vital Sign     Worried About Running Out of Food in the Last Year: Never true     Ran Out of Food in the Last Year: Never true   Transportation Needs: No Transportation Needs (4/15/2025)    PRAPARE - Transportation     Lack of Transportation (Medical): No     Lack of Transportation (Non-Medical): No   Physical Activity: Inactive (4/15/2025)    Exercise Vital Sign     Days of Exercise per Week: 0 days     Minutes of Exercise per Session: 0 min   Stress: No Stress Concern Present (4/16/2025)    [place]in[place] [facility] - Occupational Stress Questionnaire     Feeling of Stress : Not at all   Housing Stability: Low Risk  (4/16/2025)    Housing Stability Vital Sign     Unable to Pay for Housing in the Last Year: No     Homeless in the Last Year: No

## 2025-04-22 NOTE — ASSESSMENT & PLAN NOTE
-Admitted to inpatient status  Presented with leg swelling and on admit had elevated white blood cell count and subsequently fever.  Procal negative.  -UA suggestive of possible UTI but without urinary symptoms.    -Left knee with acute on chronic pain but not significantly tender and with good range of motion.    -Earlier in stay noted URI symptoms and reports of scant hemoptysis and cough.    -CT Chest with contrast 4/17 showed a large (8.1 x 4.5 cm) subcarinal mass, possible lymphadenopathy.  Additional mediastinal, hilar, and left axillary lymphadenopathy noted. Mild/moderate sized pleural effusions with bibasilar atelectasis.  Moderate chronic interstitial/ fibrotic lung changes.  -Blood cultures negative.  Urine cultures with no predominant organism  -Pulmonology and general surgery consulted and input appreciated.    -Pulm attempted thoracentesis 4/18 but not able to obtain fluid.  -Taken by surgery to OR on 4/20 for excisional LN biopsy.  Await pathology.  If non-diagnostic next step would be outpatient EBUS with transbronchial needle biopsy.  -Noted ongoing workup by pulmonology - flow cytometry, SPEP, free light chains and immunoglobulins.

## 2025-04-22 NOTE — ASSESSMENT & PLAN NOTE
-A1c 5.7 now  -Home regimen includes metformin - held during hospitalization  -Blood sugars are reasonably controlled  -Continue SSI ac/hs

## 2025-04-22 NOTE — EICU
EICU BRIEF INITIAL EVALUATION:    Code Status: Full Code     HISTORY:      89-year-old woman admitted to the ICU with rapid atrial fibrillation.  Underwent axillary lymph node excisional biopsy today.    History of type 2 diabetes, hyperlipidemia, hypertension, spinal stenosis, subclinical hyperthyroidism, arthritis    DVT prophylaxis SCDs .  Would consider starting chemoprophylaxis if patient not ambulating in the morning  GI prophylaxis not indicated    BP 89/53 (65), P 135-AFib, R 24, O2 sat 100  Camera not functional.  Per RN resting comfortably, lungs are clear patient awake and mentating appropriately    Echocardiogram done 04/15/2025 with normal LV function, size and wall thickness.  EF 55-60%, indeterminate diastolic function.  Normal RV size and function.  Mild to moderate aortic regurg and mild tricuspid regurg IVC pressure 3      CAMERA ASSESSMENT: Yes      TELEMETRY: Reviewed      NOTES: Reviewed     LABS: Reviewed      IMAGING: Personally reviewed.      DISCUSSED with bedside nurse        ASSESSMENT AND PLAN:       Rapid atrial fibrillation-did not respond to metoprolol and became hypotensive.  Digoxin ordered by Cardiology.  250 mL saline bolus being given for marginal blood pressure.  RN advised to give 250 mL bolus as quickly as possible and if lungs remain clear will give additional bolus    Hypomagnesemia-received magnesium sulfate yesterday but magnesium still 1.6.  Magnesium sulfate 2 g IV piggyback ordered      I have reviewed the plan of care for the patient and agree with the plan of care unless noted otherwise above.      JESUS Cobb MD  eICU Attending  [phone]    This report has been created through the use of M-Modal dictation software. Typographical and content errors may occur with this process. While efforts are made to detect and correct such errors, in some cases errors will persist. For this reason, wording in this document should be considered in the proper context and not  strictly verbatim.

## 2025-04-23 PROBLEM — E44.0 MODERATE PROTEIN-CALORIE MALNUTRITION: Status: ACTIVE | Noted: 2025-04-23

## 2025-04-23 PROBLEM — Z71.89 COUNSELING REGARDING ADVANCE CARE PLANNING AND GOALS OF CARE: Status: ACTIVE | Noted: 2025-04-23

## 2025-04-23 LAB
ABSOLUTE EOSINOPHIL (OHS): 0.07 K/UL
ABSOLUTE MONOCYTE (OHS): 1.48 K/UL (ref 0.3–1)
ABSOLUTE NEUTROPHIL COUNT (OHS): 37.47 K/UL (ref 1.8–7.7)
ALBUMIN SERPL BCP-MCNC: 1.6 G/DL (ref 3.5–5.2)
ALBUMIN, SPE (OHS): 1.9 G/DL (ref 3.35–5.55)
ALP SERPL-CCNC: 62 UNIT/L (ref 40–150)
ALPHA 1 GLOB (OHS): 0.52 GM/DL (ref 0.17–0.41)
ALPHA 2 GLOB (OHS): 0.77 GM/DL (ref 0.43–0.99)
ALT SERPL W/O P-5'-P-CCNC: 11 UNIT/L (ref 10–44)
ANION GAP (OHS): 7 MMOL/L (ref 8–16)
AST SERPL-CCNC: 29 UNIT/L (ref 11–45)
BASOPHILS # BLD AUTO: 0.14 K/UL
BASOPHILS NFR BLD AUTO: 0.3 %
BETA GLOB (OHS): 0.67 GM/DL (ref 0.5–1.1)
BILIRUB SERPL-MCNC: 0.2 MG/DL (ref 0.1–1)
BUN SERPL-MCNC: 15 MG/DL (ref 8–23)
CALCIUM SERPL-MCNC: 9.3 MG/DL (ref 8.7–10.5)
CHLORIDE SERPL-SCNC: 99 MMOL/L (ref 95–110)
CO2 SERPL-SCNC: 20 MMOL/L (ref 23–29)
CREAT SERPL-MCNC: 1.2 MG/DL (ref 0.5–1.4)
CYCLIC CITRULLINATED PEPTIDE (CCP) (OHS): 2.6 U/ML
ERYTHROCYTE [DISTWIDTH] IN BLOOD BY AUTOMATED COUNT: 14.6 % (ref 11.5–14.5)
GAMMA GLOBULIN (OHS): 1.95 GM/DL (ref 0.67–1.58)
GFR SERPLBLD CREATININE-BSD FMLA CKD-EPI: 44 ML/MIN/1.73/M2
GLUCOSE SERPL-MCNC: 82 MG/DL (ref 70–110)
HCT VFR BLD AUTO: 27.7 % (ref 37–48.5)
HCV AB SERPL QL IA: NEGATIVE
HGB BLD-MCNC: 9.2 GM/DL (ref 12–16)
HIV 1+2 AB+HIV1 P24 AG SERPL QL IA: NEGATIVE
IMM GRANULOCYTES # BLD AUTO: 0.56 K/UL (ref 0–0.04)
IMM GRANULOCYTES NFR BLD AUTO: 1.3 % (ref 0–0.5)
KAPPA LC FREE SER-MCNC: 1.21 MG/L (ref 0.26–1.65)
KAPPA LC FREE/LAMBDA FREE SER: 12.85 MG/DL (ref 0.33–1.94)
LAB FLOW CYTOMETRY ANTIBODIES ANALYZED (OHS): NORMAL
LAB FLOW CYTOMETRY COMMENT (OHS): NORMAL
LAB FLOW CYTOMETRY INTERPRETATION (OHS): NORMAL
LABORATORY COMMENT REPORT: NORMAL
LAMBDA LC FREE SERPL-MCNC: 10.62 MG/DL (ref 0.57–2.63)
LYMPHOCYTES # BLD AUTO: 2.04 K/UL (ref 1–4.8)
MAGNESIUM SERPL-MCNC: 1.9 MG/DL (ref 1.6–2.6)
MCH RBC QN AUTO: 30.1 PG (ref 27–31)
MCHC RBC AUTO-ENTMCNC: 33.2 G/DL (ref 32–36)
MCV RBC AUTO: 91 FL (ref 82–98)
NUCLEATED RBC (/100WBC) (OHS): 0 /100 WBC
PATHOLOGIST REVIEW - SPE (OHS): NORMAL
PLATELET # BLD AUTO: 431 K/UL (ref 150–450)
PMV BLD AUTO: 8.2 FL (ref 9.2–12.9)
POCT GLUCOSE: 117 MG/DL (ref 70–110)
POCT GLUCOSE: 127 MG/DL (ref 70–110)
POTASSIUM SERPL-SCNC: 4.2 MMOL/L (ref 3.5–5.1)
PROT SERPL-MCNC: 5.8 GM/DL (ref 6–8.4)
PROT SERPL-MCNC: 6.7 GM/DL (ref 6–8.4)
RBC # BLD AUTO: 3.06 M/UL (ref 4–5.4)
RELATIVE EOSINOPHIL (OHS): 0.2 %
RELATIVE LYMPHOCYTE (OHS): 4.9 % (ref 18–48)
RELATIVE MONOCYTE (OHS): 3.5 % (ref 4–15)
RELATIVE NEUTROPHIL (OHS): 89.8 % (ref 38–73)
RHEUMATOID FACT SERPL-ACNC: 155 IU/ML
SODIUM SERPL-SCNC: 126 MMOL/L (ref 136–145)
WBC # BLD AUTO: 41.76 K/UL (ref 3.9–12.7)

## 2025-04-23 PROCEDURE — 25000003 PHARM REV CODE 250: Performed by: SURGERY

## 2025-04-23 PROCEDURE — 97530 THERAPEUTIC ACTIVITIES: CPT

## 2025-04-23 PROCEDURE — 94761 N-INVAS EAR/PLS OXIMETRY MLT: CPT

## 2025-04-23 PROCEDURE — A4216 STERILE WATER/SALINE, 10 ML: HCPCS | Performed by: SURGERY

## 2025-04-23 PROCEDURE — 11000001 HC ACUTE MED/SURG PRIVATE ROOM

## 2025-04-23 PROCEDURE — 80053 COMPREHEN METABOLIC PANEL: CPT | Performed by: HOSPITALIST

## 2025-04-23 PROCEDURE — 36415 COLL VENOUS BLD VENIPUNCTURE: CPT | Performed by: HOSPITALIST

## 2025-04-23 PROCEDURE — 85025 COMPLETE CBC W/AUTO DIFF WBC: CPT | Performed by: HOSPITALIST

## 2025-04-23 PROCEDURE — 97168 OT RE-EVAL EST PLAN CARE: CPT

## 2025-04-23 PROCEDURE — 86038 ANTINUCLEAR ANTIBODIES: CPT | Performed by: STUDENT IN AN ORGANIZED HEALTH CARE EDUCATION/TRAINING PROGRAM

## 2025-04-23 PROCEDURE — 25000003 PHARM REV CODE 250: Performed by: NURSE PRACTITIONER

## 2025-04-23 PROCEDURE — 99232 SBSQ HOSP IP/OBS MODERATE 35: CPT | Mod: ,,, | Performed by: INTERNAL MEDICINE

## 2025-04-23 PROCEDURE — 36415 COLL VENOUS BLD VENIPUNCTURE: CPT | Performed by: STUDENT IN AN ORGANIZED HEALTH CARE EDUCATION/TRAINING PROGRAM

## 2025-04-23 PROCEDURE — 99024 POSTOP FOLLOW-UP VISIT: CPT | Mod: POP,,, | Performed by: SURGERY

## 2025-04-23 PROCEDURE — 97164 PT RE-EVAL EST PLAN CARE: CPT

## 2025-04-23 PROCEDURE — 86431 RHEUMATOID FACTOR QUANT: CPT | Performed by: STUDENT IN AN ORGANIZED HEALTH CARE EDUCATION/TRAINING PROGRAM

## 2025-04-23 PROCEDURE — 63600175 PHARM REV CODE 636 W HCPCS: Performed by: HOSPITALIST

## 2025-04-23 PROCEDURE — 82787 IGG 1 2 3 OR 4 EACH: CPT | Performed by: STUDENT IN AN ORGANIZED HEALTH CARE EDUCATION/TRAINING PROGRAM

## 2025-04-23 PROCEDURE — 87389 HIV-1 AG W/HIV-1&-2 AB AG IA: CPT | Performed by: STUDENT IN AN ORGANIZED HEALTH CARE EDUCATION/TRAINING PROGRAM

## 2025-04-23 PROCEDURE — 99223 1ST HOSP IP/OBS HIGH 75: CPT | Mod: 25,,, | Performed by: STUDENT IN AN ORGANIZED HEALTH CARE EDUCATION/TRAINING PROGRAM

## 2025-04-23 PROCEDURE — 63600175 PHARM REV CODE 636 W HCPCS: Performed by: NURSE PRACTITIONER

## 2025-04-23 PROCEDURE — 86803 HEPATITIS C AB TEST: CPT | Performed by: STUDENT IN AN ORGANIZED HEALTH CARE EDUCATION/TRAINING PROGRAM

## 2025-04-23 PROCEDURE — 25000003 PHARM REV CODE 250: Performed by: PHYSICIAN ASSISTANT

## 2025-04-23 PROCEDURE — 86200 CCP ANTIBODY: CPT | Performed by: STUDENT IN AN ORGANIZED HEALTH CARE EDUCATION/TRAINING PROGRAM

## 2025-04-23 PROCEDURE — 25000003 PHARM REV CODE 250: Performed by: HOSPITALIST

## 2025-04-23 PROCEDURE — 99497 ADVNCD CARE PLAN 30 MIN: CPT | Mod: 25,,, | Performed by: STUDENT IN AN ORGANIZED HEALTH CARE EDUCATION/TRAINING PROGRAM

## 2025-04-23 PROCEDURE — 83735 ASSAY OF MAGNESIUM: CPT | Performed by: HOSPITALIST

## 2025-04-23 RX ORDER — DILTIAZEM HYDROCHLORIDE 5 MG/ML
0.35 INJECTION INTRAVENOUS ONCE
Status: DISCONTINUED | OUTPATIENT
Start: 2025-04-23 | End: 2025-04-26 | Stop reason: HOSPADM

## 2025-04-23 RX ORDER — MAGNESIUM SULFATE 1 G/100ML
1 INJECTION INTRAVENOUS ONCE
Status: COMPLETED | OUTPATIENT
Start: 2025-04-23 | End: 2025-04-23

## 2025-04-23 RX ORDER — DILTIAZEM HCL 1 MG/ML
0-15 INJECTION, SOLUTION INTRAVENOUS CONTINUOUS
Status: DISCONTINUED | OUTPATIENT
Start: 2025-04-23 | End: 2025-04-25

## 2025-04-23 RX ORDER — METOPROLOL TARTRATE 1 MG/ML
5 INJECTION, SOLUTION INTRAVENOUS ONCE
Status: DISCONTINUED | OUTPATIENT
Start: 2025-04-23 | End: 2025-04-23

## 2025-04-23 RX ORDER — METOPROLOL TARTRATE 1 MG/ML
5 INJECTION, SOLUTION INTRAVENOUS EVERY 5 MIN PRN
Status: COMPLETED | OUTPATIENT
Start: 2025-04-23 | End: 2025-04-23

## 2025-04-23 RX ORDER — METOPROLOL TARTRATE 25 MG/1
12.5 TABLET ORAL EVERY 8 HOURS
Status: DISCONTINUED | OUTPATIENT
Start: 2025-04-23 | End: 2025-04-26 | Stop reason: HOSPADM

## 2025-04-23 RX ORDER — DIGOXIN 0.25 MG/ML
250 INJECTION INTRAMUSCULAR; INTRAVENOUS ONCE
Status: DISCONTINUED | OUTPATIENT
Start: 2025-04-23 | End: 2025-04-23

## 2025-04-23 RX ORDER — DIGOXIN 0.25 MG/ML
250 INJECTION INTRAMUSCULAR; INTRAVENOUS ONCE
Status: COMPLETED | OUTPATIENT
Start: 2025-04-24 | End: 2025-04-24

## 2025-04-23 RX ORDER — METOPROLOL TARTRATE 1 MG/ML
2.5 INJECTION, SOLUTION INTRAVENOUS
Status: COMPLETED | OUTPATIENT
Start: 2025-04-23 | End: 2025-04-23

## 2025-04-23 RX ORDER — DIGOXIN 0.25 MG/ML
250 INJECTION INTRAMUSCULAR; INTRAVENOUS ONCE
Status: COMPLETED | OUTPATIENT
Start: 2025-04-23 | End: 2025-04-23

## 2025-04-23 RX ADMIN — METOROPROLOL TARTRATE 2.5 MG: 5 INJECTION, SOLUTION INTRAVENOUS at 12:04

## 2025-04-23 RX ADMIN — METOROPROLOL TARTRATE 5 MG: 5 INJECTION, SOLUTION INTRAVENOUS at 08:04

## 2025-04-23 RX ADMIN — SODIUM CHLORIDE, PRESERVATIVE FREE 10 ML: 5 INJECTION INTRAVENOUS at 07:04

## 2025-04-23 RX ADMIN — SODIUM CHLORIDE, PRESERVATIVE FREE 10 ML: 5 INJECTION INTRAVENOUS at 09:04

## 2025-04-23 RX ADMIN — GABAPENTIN 400 MG: 400 CAPSULE ORAL at 08:04

## 2025-04-23 RX ADMIN — METOPROLOL TARTRATE 12.5 MG: 25 TABLET, FILM COATED ORAL at 09:04

## 2025-04-23 RX ADMIN — METOPROLOL TARTRATE 12.5 MG: 25 TABLET, FILM COATED ORAL at 08:04

## 2025-04-23 RX ADMIN — APIXABAN 5 MG: 2.5 TABLET, FILM COATED ORAL at 08:04

## 2025-04-23 RX ADMIN — MUPIROCIN: 20 OINTMENT TOPICAL at 08:04

## 2025-04-23 RX ADMIN — MAGNESIUM SULFATE IN DEXTROSE 1 G: 10 INJECTION, SOLUTION INTRAVENOUS at 07:04

## 2025-04-23 RX ADMIN — DIGOXIN 250 MCG: 0.25 INJECTION INTRAMUSCULAR; INTRAVENOUS at 09:04

## 2025-04-23 RX ADMIN — GABAPENTIN 400 MG: 400 CAPSULE ORAL at 03:04

## 2025-04-23 RX ADMIN — SODIUM CHLORIDE, PRESERVATIVE FREE 10 ML: 5 INJECTION INTRAVENOUS at 03:04

## 2025-04-23 NOTE — PROGRESS NOTES
"    Cardiology Progress Note    4/23/2025  9:20 AM    Subjective/Interim:      No complaints.  Remains in sinus.  Started Eliquis.     Objective:   24-hour Vitals:  Temp:  [97.8 °F (36.6 °C)-98.7 °F (37.1 °C)] 98.3 °F (36.8 °C)  Pulse:  [] 81  Resp:  [10-27] 23  SpO2:  [95 %-100 %] 100 %  BP: ()/(50-70) 110/57    Physical Examination:  Vitals: BP (!) 110/57   Pulse 81   Temp 98.3 °F (36.8 °C) (Oral)   Resp (!) 23   Ht 5' 4" (1.626 m)   Wt 63.5 kg (139 lb 15.9 oz)   SpO2 100%   Breastfeeding No   BMI 24.03 kg/m²     Physical Exam  Vitals reviewed.   Constitutional:       Appearance: She is well-developed.   HENT:      Head: Atraumatic.   Eyes:      General: No scleral icterus.  Neck:      Vascular: Normal carotid pulses. No carotid bruit, hepatojugular reflux or JVD.   Cardiovascular:      Rate and Rhythm: Normal rate and regular rhythm.      Chest Wall: PMI is not displaced.      Pulses: Intact distal pulses.           Carotid pulses are 2+ on the right side and 2+ on the left side.       Radial pulses are 2+ on the right side and 2+ on the left side.        Dorsalis pedis pulses are 2+ on the right side and 2+ on the left side.      Heart sounds: Normal heart sounds, S1 normal and S2 normal. No murmur heard.     No friction rub.   Pulmonary:      Effort: Pulmonary effort is normal. No respiratory distress.      Breath sounds: Normal breath sounds. No stridor. No wheezing or rales.   Chest:      Chest wall: No tenderness.   Abdominal:      General: Bowel sounds are normal.      Palpations: Abdomen is soft.   Musculoskeletal:      Cervical back: Neck supple. No edema.   Skin:     General: Skin is warm and dry.      Nails: There is no clubbing.   Neurological:      Mental Status: She is alert and oriented to person, place, and time.   Psychiatric:         Behavior: Behavior normal.         Thought Content: Thought content normal.           Intake/Output Summary (Last 24 hours) at 4/23/2025 " "0920  Last data filed at 4/23/2025 0709  Gross per 24 hour   Intake 45.84 ml   Output 1720 ml   Net -1674.16 ml       Laboratory:  Trended Lab Data:  Recent Labs   Lab 04/21/25  1907 04/22/25  0352 04/23/25  0412   WBC 30.43* 32.69* 41.76*   HGB 8.8* 8.0* 9.2*   HCT 26.0* 24.1* 27.7*   * 384 431       Recent Labs   Lab 04/20/25  0635 04/21/25  0628 04/21/25 1907 04/21/25  1911 04/22/25  0352 04/23/25  0412   * 124* 126*  --  126* 126*   K 4.1 3.9 3.9  --  4.5 4.2   CL 98 94* 95  --  99 99   CO2 23 23 23  --  18* 20*   BUN 13 11 10  --  12 15   CALCIUM 9.3 9.4 9.2  --  9.0 9.3   MG 1.6 1.7  --  1.6 2.2 1.9   PHOS 3.2 2.7  --   --  3.4  --        Recent Labs   Lab 04/23/25 0412   ALBUMIN 1.6*   BILITOT 0.2   AST 29   ALT 11   ALKPHOS 62       No results for input(s): "PROTIME", "PTT", "INR" in the last 168 hours.    Cardiac: No results for input(s): "TROPONINI", "CKTOTAL", "CKMB", "BNP" in the last 168 hours.    FLP:   Lab Results   Component Value Date    CHOL 150 01/21/2020    HDL 48 01/21/2020    LDLCALC 83.8 01/21/2020    TRIG 91 01/21/2020    CHOLHDL 32.0 01/21/2020     DM:   Lab Results   Component Value Date    HGBA1C 5.7 (H) 04/15/2025    HGBA1C 5.6 01/01/2023    HGBA1C 6.0 (H) 01/21/2020    GLUF 135 (H) 02/11/2008    LDLCALC 83.8 01/21/2020    CREATININE 1.2 04/23/2025     Thyroid:   Lab Results   Component Value Date    TSH 0.594 04/15/2025    FREET4 1.13 01/21/2020     Anemia:   Lab Results   Component Value Date    IRON 56 06/03/2010    TIBC 394 06/03/2010    FERRITIN 73 12/28/2011    CAJQGQXO00 884 03/03/2017    FOLATE >20.0 (A) 06/03/2010     Urinalysis:   Lab Results   Component Value Date    LABURIN  04/14/2025     Multiple organisms isolated. None in predominance. Repeat if clinically necessary.    COLORU Yellow 04/14/2025    SPECGRAV 1.005 04/14/2025    NITRITE Positive (A) 04/14/2025    KETONESU Negative 01/02/2023    UROBILINOGEN Negative 04/14/2025     @      Other Results:  EKG (my " interpretation):    TELEMETRY:  sinus     Echo:   Results for orders placed or performed during the hospital encounter of 04/14/25   Echo   Result Value Ref Range    BSA 1.66 m2    LVIDd 3.7 3.5 - 6.0 cm    LV Systolic Volume 19 mL    LV Systolic Volume Index 11.4 mL/m2    LVIDs 2.4 2.1 - 4.0 cm    LV Diastolic Volume 59 mL    LV Diastolic Volume Index 35.54 mL/m2    Left Ventricular End Systolic Volume by Teichholz Method 19.25 mL    Left Ventricular End Diastolic Volume by Teichholz Method 59.41 mL    IVS 0.6 0.6 - 1.1 cm    LVOT diameter 2.0 cm    LVOT area 3.1 cm2    FS 35.1 28 - 44 %    Left Ventricle Relative Wall Thickness 0.32 cm    PW 0.6 0.6 - 1.1 cm    LV mass 56.3 g    LV Mass Index 33.9 g/m2    MV Peak E Gerson 0.83 m/s    TDI LATERAL 0.08 m/s    TDI SEPTAL 0.06 m/s    E/E' ratio 12 m/s    MV Peak A Gerson 1.01 m/s    TR Max Gerson 2.8 m/s    E/A ratio 0.82     IVRT 57 msec    E wave deceleration time 202 msec    LV SEPTAL E/E' RATIO 13.8 m/s    LV LATERAL E/E' RATIO 10.4 m/s    PV Peak S Gerson 0.77 m/s    PV Peak D Gerson 0.41 m/s    Pulm vein S/D ratio 1.88     LA size 2.8 cm    Left Atrium Minor Axis 4.7 cm    Left Atrium Major Axis 5.1 cm    RA Major Axis 4.62 cm    AV regurgitation pressure 1/2 time 355 ms    AR Max Gerson 3.68 m/s    AV mean gradient 6 mmHg    AV peak gradient 10 mmHg    Ao peak gerson 1.6 m/s    Ao VTI 36.7 cm    Mr max gerson 4.13 m/s    MV stenosis pressure 1/2 time 58.57 ms    MV valve area p 1/2 method 3.76 cm2    Triscuspid Valve Regurgitation Peak Gradient 31 mmHg    PV PEAK VELOCITY 0.91 m/s    PV peak gradient 3 mmHg    IVC diameter 1.79 cm    Mean e' 0.07 m/s    ZLVIDS -1.45     ZLVIDD -2.29     TV resting pulmonary artery pressure 34 mmHg    RV TB RVSP 6 mmHg    Est. RA pres 3 mmHg    Narrative      Left Ventricle: The left ventricle is normal in size. Normal wall   thickness. There is normal systolic function with a visually estimated   ejection fraction of 55 - 60%. Global longitudinal  strain is normal;   19.6%.    Right Ventricle: The right ventricle is normal in size. Wall thickness   is normal. Systolic function is normal.    Aortic Valve: There is mild to moderate aortic regurgitation.    Tricuspid Valve: There is mild regurgitation.    IVC/SVC: Normal venous pressure at 3 mmHg.         Radiology:  CT Abdomen Pelvis With IV Contrast Routine Oral Contrast  Result Date: 4/19/2025  EXAMINATION: CT ABDOMEN PELVIS without IV CONTRAST CLINICAL HISTORY: Metastatic disease evaluation;LE swelling, concern for possible inguinal nodes, has mediastinal and axillary mass. full survey; TECHNIQUE: Axial images of the abdomen and pelvis were acquired without the use of intravenous contrast.  1000 cc of Omnipaque 9 oral contrast was administered.  Coronal and sagittal reconstructions were also obtained COMPARISON: CT chest from 04/17/2025. FINDINGS: Visualized portion of the thorax: Redemonstration of partial visualization of subcarinal mass, better evaluated on prior CT from 04/17/2025.  No cardiomegaly.  Moderate calcific atherosclerosis.  Trace pericardial effusion.  Visualized portions of the lungs demonstrates small bilateral pleural effusions.  Interlobular septal thickening with reticulations and fibrotic changes suggestive of interstitial lung disease. Liver: Normal in size and contour.  4.1 x 5.2 cm hypodensity near the dome of the liver consistent with a cyst.  No additional focal hepatic lesions. Gallbladder: Not well visualized possibly decompressed or absent. Bile Ducts: No evidence of dilated ducts. Pancreas: No mass or peripancreatic fat stranding. Spleen: Unremarkable. Stomach and duodenum: Unremarkable. Adrenals: Unremarkable. Kidneys/ Ureters: Normal in size and location. Bilateral renal hypodensities consistent with cysts.  No hydronephrosis or nephrolithiasis. No ureteral dilatation. Bladder: No evidence of wall thickening. Reproductive organs: Unremarkable. Bowel/Mesentery: Small bowel is  normal in caliber with no evidence of obstruction. No evidence of inflammation or wall thickening.  Colon demonstrates no focal wall thickening. Lymph nodes: No lymphadenapathy. Vasculature: No aneurysm. Mild calcific atherosclerosis. Abdominal wall:  Body wall anasarca. Bones: No acute fracture.  Postsurgical changes L4-5.  Hardware and alignment appear intact.  No suspicious osseous lesions.  Dense sclerosis of the SI joints bilaterally.     No evidence metastatic disease or lymphadenopathy in the abdomen or pelvis. Partial visualization subcarinal mediastinal mass better evaluated on recent CT of the chest from 04/17/2025. Partial visualization of bilateral small pleural effusions and interstitial lung disease better evaluated on recent CT of the chest from 04/17/2025. Electronically signed by: Balaji Goldman MD Date:    04/19/2025 Time:    14:25    US Soft Tissue Axilla, Left  Result Date: 4/19/2025  EXAMINATION: US SOFT TISSUE AXILLA, LEFT CLINICAL HISTORY: measure number and size of lymph nodes; TECHNIQUE: Ultrasound of the left axilla COMPARISON: CT chest 04/17/2025 FINDINGS: At least 4 lymph nodes are visualized, the largest of which measures 2.0 x 0.9 x 1.8 cm which is borderline caliber for pathologic enlargement.  There is some cortical thickening present and a normal reniform shape is difficult to appreciate with certainty on at least 1 of the lymph nodes.  Comparison with the CT scan shows that these are actually subpectoral lymph nodes.     Approximately 4 borderline enlarged left subpectoral lymph nodes which could be neoplastic in nature given this patient's large subcarinal mass seen on CT.. Electronically signed by: Ayden Negron Jr Date:    04/19/2025 Time:    10:19    X-Ray Chest AP Portable  Result Date: 4/19/2025  EXAMINATION: XR CHEST AP PORTABLE CLINICAL HISTORY: air in left pleural space; TECHNIQUE: Single frontal view of the chest was performed. COMPARISON: Plain film from 04/14/2025  and CT chest from 04/17/2025. FINDINGS: Cardiac silhouette is enlarged similar to prior exam.  Prominence of the pulmonary vasculature.  Coarse interstitial lung markings with a lower lobe predominance.  Small bilateral pleural effusions.  No pneumothorax is visualized.  Advanced degenerative changes of the right shoulder.     As above. Electronically signed by: Balaji Goldman MD Date:    04/19/2025 Time:    08:36    CT Chest With Contrast  Result Date: 4/17/2025  EXAMINATION: CT CHEST WITH CONTRAST CLINICAL HISTORY: Hemoptysis; TECHNIQUE: Low dose axial images, sagittal and coronal reformations were obtained from the thoracic inlet to the lung bases following the IV administration of 75 mL of Omnipaque 350. COMPARISON: None FINDINGS: Lungs: There is prominent peripheral dependent interstitial thickening, concerning for chronic interstitial/fibrotic changes.  Honeycombing present.  Patient respiratory motion artifact limits assessment.  There is mild bibasilar atelectasis with mild/moderate pleural effusions.  The main tracheobronchial tree is clear. Mediastinum: Extensive lymphadenopathy.  Subcarinal mass measures 8.1 by 4.5 cm (series 2, image 55).  Right hilar lymphadenopathy measures 1.9 cm (series 6, image 197).  Right paratracheal lymphadenopathy noted.  Heart size within normal limits.  Trace pericardial effusion.  Thoracic aorta is normal caliber.  Moderate scattered calcific atherosclerosis. Upper abdomen (visualized portion): Large liver cyst in the dome. Chest wall/axilla.  Mild left axillary lymphadenopathy. Bones: No marrow replacement process.     Large (8.1 x 4.5 cm) subcarinal mass, possible lymphadenopathy.  Additional mediastinal, hilar, and left axillary lymphadenopathy noted. Mild/moderate sized pleural effusions with bibasilar atelectasis. Moderate chronic interstitial/ fibrotic lung changes. This report was flagged in Epic as abnormal. Electronically signed by: Alexandre Posada MD  Date:    04/17/2025 Time:    13:40    US Lower Extremity Veins Bilateral  Result Date: 4/15/2025  EXAMINATION: US LOWER EXTREMITY VEINS BILATERAL CLINICAL HISTORY: Swelling, Rule out DVT; TECHNIQUE: Duplex and color flow Doppler evaluation of the bilateral lower extremity veins was performed. COMPARISON: None FINDINGS: No evidence of clot involving the bilateral common femoral, greater saphenous, femoral, popliteal, peroneal, anterior and posterior tibial veins.  All venous structures demonstrate normal respiratory phasicity and augment adequately.  No evidence of soft tissue mass or Baker's cyst.     No evidence of lower extremity deep venous thrombosis. Electronically signed by: Rodolfo Workman MD Date:    04/15/2025 Time:    17:36    Echo  Result Date: 4/15/2025    Left Ventricle: The left ventricle is normal in size. Normal wall thickness. There is normal systolic function with a visually estimated ejection fraction of 55 - 60%. Global longitudinal strain is normal; 19.6%.   Right Ventricle: The right ventricle is normal in size. Wall thickness is normal. Systolic function is normal.   Aortic Valve: There is mild to moderate aortic regurgitation.   Tricuspid Valve: There is mild regurgitation.   IVC/SVC: Normal venous pressure at 3 mmHg.     X-Ray Chest AP Portable  Result Date: 4/14/2025  EXAMINATION: AP PORTABLE CHEST CLINICAL HISTORY: Chest Pain; TECHNIQUE: AP portable chest radiograph was submitted. COMPARISON: 01/01/2023 FINDINGS: AP portable chest radiograph demonstrates a cardiac silhouette within normal limits.  There is bilateral hilar prominence, which may be due to adenopathy, infiltrates, pulmonary vasculature, and less likely masses.  Mild bibasilar densities are seen, which may be related to atelectatic change or infiltrates.  There is no pneumothorax or large pleural effusion.  There are calcify thoracic lymph nodes.     As above described. Electronically signed by: Cyndie Holley  Date:    04/14/2025 Time:    22:03    X-Ray Knee 3 View Left  Result Date: 4/14/2025  EXAMINATION: THREE VIEWS OF THE LEFT KNEE CLINICAL HISTORY: Pain in left knee TECHNIQUE: AP, oblique, and lateral view of the left knee COMPARISON: 01/01/2023 FINDINGS: Left knee arthroplasty is present.  Three views of the left knee demonstrate a curvilinear calcification/ossification adjacent to the medial femoral condyle paralleling the femoral cortex.  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  An avulsive fracture may have a similar appearance.  Correlate with the site of pain or history of trauma.  Moderate suprapatellar effusion is present.  The bones are osteopenic.  Vascular calcifications are present.     As above described. Electronically signed by: Cyndie Holley Date:    04/14/2025 Time:    21:55    X-Ray Chest PA And Lateral  Result Date: 3/26/2025  [facility] [place] XR CHEST AP PA LATERAL 2 VW on 3/26/2025 13:35 CDT Clinical history: CHRONIC COUGH Comparison: Chest radiograph 2/13/2025, 5/21/2024. Findings: LINES: None. LUNGS: No acute airspace disease. Unchanged chronic interstitial coarsening. No pleural effusion. No pneumothorax. MEDIASTINUM: Unchanged cardiomediastinal silhouette. Unchanged mediastinal calcified lymph nodes. OSSEOUS STRUCTURES: No acute osseous abnormality. Multilevel thoracic spondylosis. UPPER ABDOMEN: No acute abnormality.  OTHER: None.    No interval change with no acute cardiopulmonary abnormality. Electronically Signed By: Ayden Reece MD, 3/26/2025 13:39 CDT        Current Medications:     Infusions:   NORepinephrine bitartrate-D5W  0-3 mcg/kg/min Intravenous Continuous   Held at 04/21/25 2330        Scheduled:   apixaban  5 mg Oral BID    gabapentin  400 mg Oral TID    metoprolol tartrate  12.5 mg Oral BID    mupirocin   Nasal BID    sodium chloride 0.9%  10 mL Intravenous Q8H        PRN:    Current Facility-Administered Medications:     acetaminophen, 1,000 mg, Oral, Q8H PRN     dextrose 50%, 12.5 g, Intravenous, PRN    dextrose 50%, 25 g, Intravenous, PRN    glucagon (human recombinant), 1 mg, Intramuscular, PRN    glucose, 16 g, Oral, PRN    glucose, 24 g, Oral, PRN    insulin aspart U-100, 0-5 Units, Subcutaneous, QID (AC + HS) PRN    melatonin, 6 mg, Oral, Nightly PRN    naloxone, 0.02 mg, Intravenous, PRN    ondansetron, 4 mg, Oral, Q8H PRN    senna-docusate, 1 tablet, Oral, BID PRN    sodium chloride 0.9%, 10 mL, Intravenous, PRN     Assessment and Plan:     Jojo Ayoub is a 89 y.o.female with  PAF, back in sinus.  [id number] = 5 (female, > 75, HTN, DM)  -discussed with patient and family regarding her risk of embolic CVA given paroxysmal atrial fibrillation her CHADS-VASc score 5.  Recommend anticoagulation once cleared by surgery and monitor her anemia closely.  Patient, daughter and  understand the risks and benefits of oral anticoagulation and opted to take anticoagulation for CVA prophylaxis.  Start Eliquis 5mg BID once cleared by Dr. Johnson.  Discussed with Dr Colon.  -4/23:  remains in sinus.  Eliquis started.  Continue metoprolol.            Raulito Lares MD        Disclaimer: This document was created using voice recognition software ([employer]). Although it may be edited, this document may contain errors related to incorrect recognition of the spoken word. Please call the physician if clarification is needed.

## 2025-04-23 NOTE — CONSULTS
[facility] ([place])  Adult Nutrition  Consult Note    SUMMARY       Recommendations  - Boost Glucose Control 1x daily   - Continue consistent carbohydrate diet   - Encourage intake of meals   - Monitor weight/labs   - RD to monitor and follow up    Goals: Pt will meet >50% EEN by RD follow up  Nutrition Goal Status: new  Communication of RD Recs:  (POC)    Nutrition Discharge Planning    Nutrition Discharge Planning: Therapeutic diet (comments)  Therapeutic diet (comments): Consistent carbohydrate    Assessment and Plan  Nutrition Problem  Inadequate energy intake    Related to (etiology):   sepsis    Signs and Symptoms (as evidenced by):   Nausea causing decreased intake and mediastinal mass    Interventions  (treatment strategy):  Collaboration of care with other providers  Commercial beverages  CHO modified diet    Nutrition Diagnosis Status:   New    Reason for Assessment - seen 4/22    Reason For Assessment: pressure injury/ wound, length of stay  Diagnosis:  (Mediastinal mass)  General Information Comments: Pt admitted for mediastinal mass s/p 4/20 for excisional LN biopsy. Diet currently at consisent carbohydrate diet. Tolerated >50% of lunch, stating it is a lot of food. Pt not as nauseated and feeling a little better at RD visit.  per pt. Weight hx does not reflect weight loss. Discussed the addition of ONS in the setting of decreased appetite. Pt would like to have 1xdaily. PIV. Guzman 16-sacral, knee, L axilla. Did not assess NFPE. RD to monitor for signs of malnutrition at follow up    Nutrition Related Social Determinants of Health: SDOH: Adequate food in home environment  Social Drivers of Health     Food Insecurity: No Food Insecurity (4/16/2025)    Hunger Vital Sign     Worried About Running Out of Food in the Last Year: Never true     Ran Out of Food in the Last Year: Never true     Nutrition/Diet History    Spiritual, Cultural Beliefs, Mosque Practices, Values that Affect  "Care: no  Food Allergies: NKFA  Factors Affecting Nutritional Intake: decreased appetite, nausea/vomiting    Anthropometrics    Height: 5' 4" (162.6 cm)  Height (inches): 64 in  Height Method: Stated  Weight: 63.5 kg (139 lb 15.9 oz)  Weight (lb): 139.99 lb  Weight Method: Bed Scale  Ideal Body Weight (IBW), Female: 120 lb  % Ideal Body Weight, Female (lb): 116.66 %  BMI (Calculated): 24  BMI Grade: 18.5-24.9 - normal       Lab/Procedures/Meds    Pertinent Labs Reviewed: reviewed  CBC:  Recent Labs   Lab 04/23/25  0412   WBC 41.76*   HGB 9.2*   HCT 27.7*        CMP:  Recent Labs   Lab 04/23/25  0412   GLUCOSE 82   CALCIUM 9.3   ALBUMIN 1.6*   *   K 4.2   CO2 20*   CL 99   BUN 15   CREATININE 1.2   ALKPHOS 62   ALT 11   AST 29   BILITOT 0.2     Pertinent Medications Reviewed: reviewed  Scheduled Meds:   apixaban  5 mg Oral BID    gabapentin  400 mg Oral TID    metoprolol tartrate  12.5 mg Oral BID    mupirocin   Nasal BID    sodium chloride 0.9%  10 mL Intravenous Q8H     Continuous Infusions:   NORepinephrine bitartrate-D5W  0-3 mcg/kg/min Intravenous Continuous   Held at 04/21/25 2330     PRN Meds:.  Current Facility-Administered Medications:     acetaminophen, 1,000 mg, Oral, Q8H PRN    dextrose 50%, 12.5 g, Intravenous, PRN    dextrose 50%, 25 g, Intravenous, PRN    glucagon (human recombinant), 1 mg, Intramuscular, PRN    glucose, 16 g, Oral, PRN    glucose, 24 g, Oral, PRN    insulin aspart U-100, 0-5 Units, Subcutaneous, QID (AC + HS) PRN    melatonin, 6 mg, Oral, Nightly PRN    naloxone, 0.02 mg, Intravenous, PRN    ondansetron, 4 mg, Oral, Q8H PRN    senna-docusate, 1 tablet, Oral, BID PRN    sodium chloride 0.9%, 10 mL, Intravenous, PRN    Estimated/Assessed Needs    Weight Used For Calorie Calculations: 63.5 kg (139 lb 15.9 oz)  Energy Calorie Requirements (kcal): 6771-5353  Energy Need Method: Kcal/kg (25-30)  Protein Requirements: 66-76g (1-1.2 g/kg)  Weight Used For Protein Calculations: " 63.5 kg (139 lb 15.9 oz)  Fluid Requirements (mL): 1 mL/kcal  Estimated Fluid Requirement Method: RDA Method  RDA Method (mL): 1586  CHO Requirement: 200g    Nutrition Prescription Ordered    Current Diet Order: Consistent carbohdyrate    Evaluation of Received Nutrient/Fluid Intake    I/O: +1.2L since admit  Energy Calories Required: not meeting needs  Protein Required: not meeting needs  Fluid Required: not meeting needs  Comments: LBM: 4/14  Tolerance: tolerating  % Intake of Estimated Energy Needs: 25 - 50 %  % Meal Intake: 25 - 50 %    Nutrition Risk    Level of Risk/Frequency of Follow-up: moderate     Monitor and Evaluation    Monitor and Evaluation: Carbohydrate intake, Diet order, Weight, Electrolyte and renal panel, Glucose/endocrine profile, Inflammatory profile, Nutrition focused physical findings, Skin     Nutrition Follow-Up    RD Follow-up?: Yes    Lavinia Conde RDN, LDN

## 2025-04-23 NOTE — EICU
Virtual ICU Quality Rounds    Admit Date: 4/14/2025  Hospital Day: 8    ICU Day: 1d 15h    24H Vital Sign Range:  Temp:  [97.8 °F (36.6 °C)-98.7 °F (37.1 °C)]   Pulse:  []   Resp:  [16-27]   BP: ()/(50-70)   SpO2:  [95 %-100 %]     VICU Surveillance Screening    LDA reconciliation : Yes

## 2025-04-23 NOTE — ASSESSMENT & PLAN NOTE
-Noted history of pAFIB  -Had several episodes of short live self converting afib rvr earlier in stay  -Overnight 4/21-4/22 again developed afib rvr resistent to metoprolol so transferred to ICU  -Cardiology consulted and input appreciated.  Converted to NSR after 1 dose of IV digoxin.  Required levophed transiently but bp now elevated a bit off pressors.  -Echo 4/14/25 showed normal EF and no severe valvular lesions  -Discussed with general surgery and ok from their end and started eliquis 5mg bid on 4/22.  -Still had episode of afib rvr overnight which converted to NSR spontaneously.  -Discussed with Dr. Lares and will change metoprolol to 12.5mb q8h  -Keep K > 4 and Mag>2  -Monitor in ICU one more night to ensure stability

## 2025-04-23 NOTE — PT/OT/SLP RE-EVAL
"Physical Therapy Re-evaluation    Patient Name:  Jojo Ayoub   MRN:  [id number]    Recommendations:     Discharge Recommendations: High Intensity Therapy  Discharge Equipment Recommendations: walker, rolling, wheelchair, to be determined by next level of care, hospital bed   Barriers to discharge: Inaccessible home and Decreased caregiver support    Assessment:     Jojo Ayoub is a 89 y.o. female admitted with a medical diagnosis of Mediastinal mass - initially admitted for Chronic pain of left knee with ortho MD reporting "lateral patellar subluxation left knee" with rec for marybel brace for patellar tracking. Pmhx pertinent for L TKA ("years ago"), arthritis, DM, and HTN.  She presents with the following impairments/functional limitations: weakness, impaired endurance, impaired self care skills, impaired functional mobility, gait instability, impaired balance, impaired cognition, decreased coordination, decreased upper extremity function, decreased lower extremity function, decreased safety awareness, decreased ROM, edema, orthopedic precautions.    Patient re-evaluated once cleared by MD after t/f to ICU for afib RVR, demo's slight decline in level of (I) with functional mobility since last therapy visit 4/20. Continues to have good effort towards suggested mobility tasks. Rec for dc to HIT.    Prior to admission pt was modified independent with mobility and self-care and there is expectation of returning to prior level of function to maintain independence avoiding readmission. Pt is at high risk of unplanned readmission due to fall risk and lack of 24 hour caregiver in prior setting. The lower level of care cannot provide total interdisciplinary approach needed. Pt is able to tolerate 3 hours of daily therapy. Pt is pleasant and motivated to return to prior level of function.     Rehab Prognosis:  Good; patient would benefit from acute skilled PT services to address these deficits and reach maximum level " of function.      Recent Surgery: Procedure(s) (LRB):  EXCISION, LYMPH NODE (Left) 2 Days Post-Op    Plan:     During this hospitalization, patient to be seen 5 x/week to address the above listed problems via gait training, therapeutic activities, therapeutic exercises, neuromuscular re-education  Plan of Care Expires:  05/16/25  Plan of Care Reviewed with: patient, spouse    Subjective     Communicated with FAUSTO Ferrari prior to session.  Patient found HOB elevated with peripheral IV, telemetry, blood pressure cuff, pulse ox (continuous), PureWick upon PT entry to room, agreeable to evaluation.      Chief Complaint: weakness with minimal walking, worried about her knee subluxing again  Patient comments/goals: Spouse present and encouraging pt; Patient agreeable to re-evaluation.  Pain/Comfort:  Pain Rating 1:  (denies significant pain)    Patients cultural, spiritual, Episcopalian conflicts given the current situation: no      Objective:     Patient found with: peripheral IV, telemetry, blood pressure cuff, pulse ox (continuous), PureWick     General Precautions: Standard, fall, diabetic, anti-coagulation medicine  Orthopedic Precautions: N/A  Braces:  (L brace for patellar subluxation)  Respiratory Status: Room air    Patient donned non slip socks and gait belt for OOB mobility.    Exams:  Cognition:   Patient is oriented to person, place, situation.  Pt follows approximately 50% of one step commands.    Mood: Very pleasant and cooperative, talkative, lethargic  Safety Awareness: Impaired  Musculoskeletal:  BMI: 24.03  Posture:  Forward head, rounded shoulders  LE ROM/Strength:   R ROM: WFL  L ROM: WFL, notable patellar laxity  R Strength:   Knee extension: 4/5  Dorsiflexion: 4/5   L Strength:   Knee extension: 4/5  Dorsiflexion: 4/5   Neuromuscular:  Coordination/Tone/Reflexes: No impairments identified with functional mobility. No formal testing performed.    Balance:   Static sitting: Good  Static standing: Poor,  posterior lean able to correct with cueing but requires continuous hands on assistance  Visual-vestibular: No impairments identified with functional mobility. No formal testing performed.  Integument: Dressings to L knee (see wound care) - per wound care ok to don brace if DTI are covered by [employer]  Cardiopulmonary:  Vital signs:   Pre (supine): /72 HR 80  During (sitting): /60 HR 80  Post (sitting): /58 HR 94    Functional Mobility:  Bed Mobility:     Bridging: total assistance  Supine to Sit: moderate assistance  Sit to Supine: maximal assistance  Transfers:     Sit to Stand:  moderate assistance and maximal assistance with rolling walker  3x from EOB  MaxA 1sta nd 3rd time, modA 2nd time  Repeated cueing for foot placement and hand placement, requiring manual assistance to correctly set up for increased (I)  Gait: x4 ft with modA regressing to totalA due to onset of LE weakness.   Slow gait with decreased step length (B), decreased stance time of LLE, and forward flexion of trunk.   With onset of weakness, pt unable to continue stepping even to return to bed requiring totalA to get back on EOB.    AM-PAC 6 CLICK MOBILITY  Total Score:12       Treatment and Education:   Assistance with donning brace to L knee (ACE wrap donned before wrap due to no clarity regarding safe use of brace with skin injuries prior to session)  Cueing for transfers  PT educated patient and spouse re:   PT plan of care/role of PT  Safety with OOB mobility  Use of DME, HB features  Discharge disposition    Pt and spouse with verbalized understanding       Patient left HOB elevated with all lines intact, call button in reach, and spouse present.    GOALS:   Multidisciplinary Problems       Physical Therapy Goals          Problem: Physical Therapy    Goal Priority Disciplines Outcome Interventions   Physical Therapy Goal     PT, PT/OT Progressing    Description: Goals to be met by: 5/16/25    Patient will increase  functional independence with mobility by performin. Supine<>sit with supervision and without use of HB features.   2. Sit<>stand with CGA with RW.  3. Transfer bed<>chair with Carmen and LRAD.  4. Gait x 25 feet with Carmen with RW.  5. Ascend/descend 5 step(s) with least restrictive assistive device and Carmen.                        DME Justifications:  Jojo requires a hospital bed due to her requiring positioning of the body in ways not feasible with an ordinary bed due to limited ability and cannot independently make changes in body position without the use of the bed.The positioning of the body cannot be sufficiently resolved by the use of pillows and wedges.  Jojo Ayoub has a mobility limitation that significantly impairs her ability to participate in one or more mobility related activities of daily living (MRADLs) such as toileting, feeding, dressing, grooming, and bathing in customary locations in the home.  The mobility limitation cannot be sufficiently resolved by the use of a cane or walker.   The use of a manual wheelchair will significantly improve the patients ability to participate in MRADLS and the patient will use it on regular basis in the home.  Jojo Ayoub has expressed her willingness to use a manual wheelchair in the home. Patients upper body strength is sufficient for propulsion.  She also has a caregiver who is available, willing, and able to provide assistance with the wheelchair when needed.     Jojo's mobility limitation cannot be sufficiently resolved by the use of a cane. Her functional mobility deficit can be sufficiently resolved with the use of a Rolling Walker. Patient's mobility limitation significantly impairs their ability to participate in one of more activities of daily living.  The use of a RW will significantly improve the patient's ability to participate in MRADLS and the patient will use it on regular basis in the home.    History:     Past Medical History:    Diagnosis Date    Arthritis     Diabetes mellitus with neurological manifestation     Essential hypertension 2/28/2019    High blood pressure     Hyperlipidemia     Urinary urgency        Past Surgical History:   Procedure Laterality Date    EKG 12-LEAD  4/21/2025    EPIDURAL STEROID INJECTION N/A 1/31/2019    Procedure: INJECTION, STEROID, EPIDURAL, L3-L4 need consent;  Surgeon: Marlin Barber MD;  Location: [facility] PAIN [facility];  Service: Pain Management;  Laterality: N/A;    left knee replacement       low back surgery       SURGICAL REMOVAL OF LYMPH NODE Left 4/21/2025    Procedure: EXCISION, LYMPH NODE;  Surgeon: Ramon Johnson MD;  Location: [facility] OR;  Service: General;  Laterality: Left;       Time Tracking:     PT Received On: 04/23/25  PT Start Time: 1106     PT Stop Time: 1134  PT Total Time (min): 28 min     Overlap with OT for portions of session due to complex nature of patient, tolerance for therapeutic modalities, and safety with mobility to decrease fall risk for patient and caregiver injury requiring two skilled therapists to provide interventions.    Billable Minutes: Re-eval 15 and Therapeutic Activity 13      04/23/2025

## 2025-04-23 NOTE — CONSULTS
"[facility] - [facility] ([place])  Palliative Medicine  Consult Note    Patient Name: Jojo Ayoub  MRN: [id number]  Admission Date: 4/14/2025  Hospital Length of Stay: 8 days  Code Status: Full Code   Attending Provider: Ramon Colon MD  Consulting Provider: Aramis Mcdowell MD  Primary Care Physician: Bahman Vincent Jr., MD  Principal Problem:Mediastinal mass    Patient information was obtained from patient, spouse/SO, relative(s), past medical records, and primary team.      Inpatient consult to Palliative Care  Consult performed by: Aramis Mcdowell MD  Consult ordered by: Ramon Colon MD  Reason for consult: advanced care planning and support        Assessment/Plan:     Pulmonary  Hemoptysis  - noted   - likely in setting of mediastinal mass    Cardiac/Vascular  Atrial fibrillation with RVR  - noted  - reason for current ICU admission  - management/optimization per cardiology/primary team. Now rate controlled     Oncology  * Mediastinal mass  - CT chest noted "Large (8.1 x 4.5 cm) subcarinal mass, possible lymphadenopathy.  Additional mediastinal, hilar, and left axillary lymphadenopathy noted. Mild/moderate sized pleural effusions with bibasilar atelectasis. Moderate chronic interstitial/ fibrotic lung changes."  - patient with occasional hemoptysis/cough, but otherwise without any major adverse symptoms at this time  - workup on going per pulm/primary team. Noted concern per pulm that continued expansion of mass may cause increased compression on esophagus.   - patient/family aware of significant concern for cancer, with workup on going to attempt to diagnose primary/etiology  - noted concerning continued rise of WBC, ? lymphoma  - noted s/p excisional LN biopsy. Await pathology. Noted plan that if non-diagnostic next step would be outpatient EBUS with transbronchial needle biopsy.   - patient/family hopeful for clear answers to guide next step decision making  - involvement of heme/onc is per " primary team    Endocrine  Moderate protein-calorie malnutrition  - noted RD following  - noted albumin 1.6  - she feels her appetite is appropriate    Hyponatremia  - noted  - management per primary team    Orthopedic  Acute on Chronic pain of left knee  - noted hx knee replacement  - noted per orthopedic surgery concern for lateral patellar subluxation left knee with plan for brace    Palliative Care  Counseling regarding advance care planning and goals of care  4/23/2025:  - chart reviewed in depth  - discussed with primary team and pccm  - patient seen at bedside along with palliative RN Radha  - patient joined at bedside by her  Samuel and daughter Micheal   - introduced selves and role   - patient shared more about herself and her life  - her and Samuel have been  65 years. 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 They enjoy having large family gatherings  - patient had been doing well till few weeks before this admission, as she started getting more weak/tired.   - she uses walker to get around  - daughter noted patient had already been a night owl, but last few weeks started sleeping earlier as she was tired  - she denies concerns with appetite at this time. Eating without difficulty  - no significant adverse symptoms from mediastinal mass/LAD, except occasional hemoptysis/occasional cough.   - overall in good spirits   - patient is aware of the concerns surrounding her medical on rylie, including the concern for cancer  - she and the family are hopeful for getting some clear answers to guide next step decision making  - they are aware of the LN bx path still pending and the reality as shared by pulm team  "that she may require outpatient EBUS for further diagnostic efforts  - they shared the difficulty with the unknown. Acknowledged the difficulty with having that uncertainty/unknown lingering. Shared reality that despite the teams not knowing exactly what is going on, there is certainly something concerning going on  - shared importance of taking things one day and one step at a time  - patient shared when asked that she is not the "worry" type. Seems the  is more so the worrier in the relationship  - however on that note she did share her concern/dislike with hearing the word cancer and the concern that came along with that  - shared that being scared/worried is absolutely appropriate given everything she is going through and the unknown she is facing  - shared the role of the medical teams in supporting her along the journey the best we all can and ensuring transparency/open and honest conversations  - reminded her she is the captain of the ship and the one making the decisions no matter what  - she was appreciative   - she and her family shared more about themselves and their life together  - provided emotional support and listening ear   - shared we will continue to check in while she is admitted and shared role of outpatient/home palliative team on discharge, which they were agreeable/appreciative of. They welcome as much support as possible along this journey.   - updated primary team/pccm regarding conversation  [] continue current diagnostic efforts to get some answers, which will thereby guide next step decision making  [] we will continue to check in  []  Samuel is default next of kin surrogate medical decision maker. Did not discuss HCPOA wishes at this first visit.   [] did not address code status at this time    Other  Debility  - noted use of walker prior to admission  - continued functional decline given prolonged hospitalization  - PT/OT following        Thank you for your consult. I " "will follow-up with patient. Please contact us if you have any additional questions.    Subjective:     HPI:   Per HPI: "Ms. Jojo Ayoub is a 89 y.o. female, with PMH of T2DM, HTN, HLD, chronic constipation, OA w/ chronic left knee pain, anemia, who [place] [facility] [place] due to b/l LE swelling x "a few weeks." Her daughter syayes the leg swelling was first noted by her today. She additionally notes low blood pressure readings of 90/55 at home today. She endorses left knee pain, and states she feels like her left knee "dislocate" a few nights ago. She states she had a knee replacement of her left knee over 20 years ago. She denied fall/trauma, leg pain or redness or warmth. She denied dizziness, fatigue, fever, chills, sore throat, runny nose, congestion, shortness of breath, chest pain, palpitations, abdominal pain, nausea, vomiting, diarrhea, or urinary symptoms. She was evaluated in the ED with labs showing leukocytosis of 19K, with left shift and H&H of 9.9/28.0. A metabolic panel showed sodium of 121, potassium of 5.2, glucose was 94, with CO2 of 19, and anion gap of 7. A BNP was 181 without elevation of troponin. A UA showed nitrite positive UTI with 2+ leuk esterase, with 20 WBC, and many bacteria. A CXR showed bilateral hilar prominence, and mild bibasilar densities. An x-ray of the knee had a findings that might reporesent a Pellegrinin-Stieda lesion vs. An avulsive fracture and a suprapatellar effusion. She was treated in the ED with 1L NS and rocephin."     Overview/Hospital Course:  "Patient is a 89 year-old woman history of hypertension, diabetes mellitus type 2, osteoarthritis of left knee status post remote left knee replacement who presents with worsening left knee pain with swelling.  Patient walks with walker at home at baseline.  She reports she recently slid to ground from a chair at home a few weeks but denies any more recent fall or any other trauma. Exam notable for significant " "bilateral lower extremity edema more so on the left side.  Laxity of the left knee.  Not tender to touch. Lungs clear.  Imaging concerning for possible avulsion fracture.  Orthopedic surgery service consult for evaluation and recommended Adamaris brace and physical therapy. Ultrasound of lower extremity negative for clot.  Echocardiogram does not show evidence of heart failure.  Hyponatremia on presentation which is improving without intervention. Patient with persistent elevated white count and now fever overnight.  Blood cultures ordered.  On empiric broad-spectrum antibiotics."    Palliative medicine consulted for assistance with ACP and support     Hospital Course:  No notes on file      Past Medical History:   Diagnosis Date    Arthritis     Diabetes mellitus with neurological manifestation     Essential hypertension 2/28/2019    High blood pressure     Hyperlipidemia     Urinary urgency        Past Surgical History:   Procedure Laterality Date    EKG 12-LEAD  4/21/2025    EPIDURAL STEROID INJECTION N/A 1/31/2019    Procedure: INJECTION, STEROID, EPIDURAL, L3-L4 need consent;  Surgeon: Marlin Barber MD;  Location: [facility];  Service: Pain Management;  Laterality: N/A;    left knee replacement       low back surgery       SURGICAL REMOVAL OF LYMPH NODE Left 4/21/2025    Procedure: EXCISION, LYMPH NODE;  Surgeon: Ramon Johnson MD;  Location: [facility] OR;  Service: General;  Laterality: Left;       Review of patient's allergies indicates:  No Known Allergies    Medications:  Continuous Infusions:   NORepinephrine bitartrate-D5W  0-3 mcg/kg/min Intravenous Continuous   Held at 04/21/25 2330     Scheduled Meds:   apixaban  5 mg Oral BID    gabapentin  400 mg Oral TID    metoprolol tartrate  12.5 mg Oral BID    mupirocin   Nasal BID    sodium chloride 0.9%  10 mL Intravenous Q8H     PRN Meds:  Current Facility-Administered Medications:     acetaminophen, 1,000 mg, Oral, Q8H PRN    dextrose 50%, 12.5 g, Intravenous, " PRN    dextrose 50%, 25 g, Intravenous, PRN    glucagon (human recombinant), 1 mg, Intramuscular, PRN    glucose, 16 g, Oral, PRN    glucose, 24 g, Oral, PRN    insulin aspart U-100, 0-5 Units, Subcutaneous, QID (AC + HS) PRN    melatonin, 6 mg, Oral, Nightly PRN    naloxone, 0.02 mg, Intravenous, PRN    ondansetron, 4 mg, Oral, Q8H PRN    senna-docusate, 1 tablet, Oral, BID PRN    sodium chloride 0.9%, 10 mL, Intravenous, PRN    Family History       Problem Relation (Age of Onset)    Diabetes Mother, Sister, Maternal Uncle, Maternal Grandmother    Hypertension Mother, Sister          Tobacco Use    Smoking status: Never    Smokeless tobacco: Never   Substance and Sexual Activity    Alcohol use: No    Drug use: No    Sexual activity: Yes     Partners: Male       Objective:     Vital Signs (Most Recent):  Temp: 98.3 °F (36.8 °C) (04/23/25 0701)  Pulse: 77 (04/23/25 1000)  Resp: (!) 21 (04/23/25 1000)  BP: (!) 104/57 (04/23/25 1000)  SpO2: 100 % (04/23/25 1000) Vital Signs (24h Range):  Temp:  [97.8 °F (36.6 °C)-98.7 °F (37.1 °C)] 98.3 °F (36.8 °C)  Pulse:  [] 77  Resp:  [10-27] 21  SpO2:  [95 %-100 %] 100 %  BP: ()/(50-70) 104/57     Weight: 63.5 kg (139 lb 15.9 oz)  Body mass index is 24.03 kg/m².       Physical Exam  Vitals and nursing note reviewed.   Constitutional:       General: She is not in acute distress.  HENT:      Head: Normocephalic and atraumatic.   Eyes:      Extraocular Movements: Extraocular movements intact.   Pulmonary:      Effort: No respiratory distress.   Neurological:      Mental Status: She is alert.      Comments: Awake, alert, conversant            Review of Symptoms        Living Arrangements:  Lives with family    Psychosocial/Cultural:   See Palliative Psychosocial Note: Yes  - lives with  Samuel. 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 Enjoys singing and being  social  - uses walker to get around  **Primary  to Follow**  Palliative Care  Consult: No        Advance Care Planning  Advance Directives:   Medical Power of :  Samuel would be next of kin surrogate medical decision maker.      Decision Making:  Patient answered questions  Goals of Care: The patient endorses that what is most important right now is to focus on getting clearer answers regarding medical on rylie.     Accordingly, we have decided that the best plan to meet the patient's goals includes continuing with treatment         Significant Labs: reviewed  CBC:   Recent Labs   Lab 04/23/25  0412   WBC 41.76*   HGB 9.2*   HCT 27.7*   MCV 91        BMP:  Recent Labs   Lab 04/23/25 0412   *   K 4.2   CL 99   CO2 20*   BUN 15   CREATININE 1.2   CALCIUM 9.3   MG 1.9     LFT:  Lab Results   Component Value Date    AST 29 04/23/2025    ALKPHOS 62 04/23/2025    BILITOT 0.2 04/23/2025     Albumin:   Albumin   Date Value Ref Range Status   04/23/2025 1.6 (L) 3.5 - 5.2 g/dL Final   01/01/2023 2.6 (L) 3.5 - 5.2 g/dL Final     Protein:   Total Protein   Date Value Ref Range Status   01/01/2023 7.1 6.0 - 8.4 g/dL Final     Lactic acid:   Lab Results   Component Value Date    LACTATE 0.7 04/15/2025    LACTATE 1.2 04/14/2025       Significant Imaging: reviewed    70 minutes face to face time in discussion of symptom assessment, coordination of care, exploring burden/ benefit of various approaches of treatment and discharge planning.  This also includes non-face to face time preparing to see the patient (eg, review of tests/imaging), obtaining and/or reviewing separately obtained history, documenting clinical information in the electronic or other health record, independently interpreting results and communicating results to the patient/family/caregiver, or care coordinator.     16 minutes ACP time spent: goals of care, emotional support, formulating goals and communication  of prognosis      Aramis Mcdowell MD  Palliative Medicine  [facility] - Intensive Care ([place])

## 2025-04-23 NOTE — ASSESSMENT & PLAN NOTE
- noted  - reason for current ICU admission  - management/optimization per cardiology/primary team. Now rate controlled

## 2025-04-23 NOTE — ASSESSMENT & PLAN NOTE
-History noted and had some leg swelling which has improved  -Doppler US of leg did not show any DVT.  -Orthopedic surgery consulted and input appreciated  -Continue brace and therapy - wound care says ok to use her brace we just need to keep the area padded to protect the skin.  -Follow-up with Dr. Malagon in outpatient setting after discharge  -PT/OT consulted and recommend high intensity therapy at discharge.

## 2025-04-23 NOTE — ASSESSMENT & PLAN NOTE
-Admitted to inpatient status  Presented with leg swelling and on admit had elevated white blood cell count and subsequently fever.  Procal negative.  -UA suggestive of possible UTI but without urinary symptoms.    -Left knee with acute on chronic pain but not significantly tender and with good range of motion.    -Earlier in stay noted URI symptoms and reports of scant hemoptysis and cough.    -CT Chest with contrast 4/17 showed a large (8.1 x 4.5 cm) subcarinal mass, possible lymphadenopathy.  Additional mediastinal, hilar, and left axillary lymphadenopathy noted. Mild/moderate sized pleural effusions with bibasilar atelectasis.  Moderate chronic interstitial/ fibrotic lung changes.  -Blood cultures negative.  Urine cultures with no predominant organism  -Pulmonology and general surgery consulted and input appreciated.    -Pulm attempted thoracentesis 4/18 but not able to obtain fluid.  -Taken by surgery to OR on 4/20 for excisional LN biopsy.  Await pathology.    -Discussed next steps with pulmonology if path is non-diagnostic.  Initially thought EBUS with transbronchial needle biopsy - however per Dr. Schwartz who would perform that as outpatient, EGD with EUS likely best next step.  Will discuss with [facility] at [facility] to determine how best to arrange if needed.  -WBC continues to rise, but she is afebrile without evidence of infection at this time.  Noted ongoing workup by pulmonology - flow cytometry, SPEP, free light chains, immunoglobulins, chet, rf, ccp

## 2025-04-23 NOTE — PLAN OF CARE
Parveen ([facility]) updated patient may be ready for discharge in 1-2 days - continue to monitor cardiac rhythm

## 2025-04-23 NOTE — PROGRESS NOTES
[facility] Intensive Care ([place])  Wound Care    Patient Name:  Jojo Ayoub   MRN:  [id number]  Date: 4/23/2025  Diagnosis: Mediastinal mass    History:     Past Medical History:   Diagnosis Date    Arthritis     Diabetes mellitus with neurological manifestation     Essential hypertension 2/28/2019    High blood pressure     Hyperlipidemia     Urinary urgency        Social History[1]    Precautions:     Allergies as of 04/14/2025    (No Active Allergies)       WO Assessment Details/Treatment     Repeat wound care consult received. Wound care already following patient with recent noted dated 4/21. Wound care orders already in place. Attempted to see patient today, Palliative Care in room with patient and family. Will follow up at another time.     04/23/2025         [1]   Social History  Socioeconomic History    Marital status:    Tobacco Use    Smoking status: Never    Smokeless tobacco: Never   Substance and Sexual Activity    Alcohol use: No    Drug use: No    Sexual activity: Yes     Partners: Male     Social Drivers of Health     Financial Resource Strain: Low Risk  (4/16/2025)    Overall Financial Resource Strain (CARDIA)     Difficulty of Paying Living Expenses: Not hard at all   Food Insecurity: No Food Insecurity (4/16/2025)    Hunger Vital Sign     Worried About Running Out of Food in the Last Year: Never true     Ran Out of Food in the Last Year: Never true   Transportation Needs: No Transportation Needs (4/15/2025)    PRAPARE - Transportation     Lack of Transportation (Medical): No     Lack of Transportation (Non-Medical): No   Physical Activity: Inactive (4/15/2025)    Exercise Vital Sign     Days of Exercise per Week: 0 days     Minutes of Exercise per Session: 0 min   Stress: No Stress Concern Present (4/16/2025)    [place] [employer] [employer] - Occupational Stress Questionnaire     Feeling of Stress : Not at all   Housing Stability: Low Risk  (4/16/2025)    Housing Stability  Vital Sign     Unable to Pay for Housing in the Last Year: No     Homeless in the Last Year: No

## 2025-04-23 NOTE — HPI
"Per HPI: "Ms. Jojo Ayoub is a 89 y.o. female, with PMH of T2DM, HTN, HLD, chronic constipation, OA w/ chronic left knee pain, anemia, who presented to [facility] ED on 4/15/25 due to b/l LE swelling x "a few weeks." Her daughter syayes the leg swelling was first noted by her today. She additionally notes low blood pressure readings of 90/55 at home today. She endorses left knee pain, and states she feels like her left knee "dislocate" a few nights ago. She states she had a knee replacement of her left knee over 20 years ago. She denied fall/trauma, leg pain or redness or warmth. She denied dizziness, fatigue, fever, chills, sore throat, runny nose, congestion, shortness of breath, chest pain, palpitations, abdominal pain, nausea, vomiting, diarrhea, or urinary symptoms. She was evaluated in the ED with labs showing leukocytosis of 19K, with left shift and H&H of 9.9/28.0. A metabolic panel showed sodium of 121, potassium of 5.2, glucose was 94, with CO2 of 19, and anion gap of 7. A BNP was 181 without elevation of troponin. A UA showed nitrite positive UTI with 2+ leuk esterase, with 20 WBC, and many bacteria. A CXR showed bilateral hilar prominence, and mild bibasilar densities. An x-ray of the knee had a findings that might reporesent a Pellegrinin-Stieda lesion vs. An avulsive fracture and a suprapatellar effusion. She was treated in the ED with 1L NS and rocephin."     Overview/Hospital Course:  "Patient is a 89 year-old woman history of hypertension, diabetes mellitus type 2, osteoarthritis of left knee status post remote left knee replacement who presents with worsening left knee pain with swelling.  Patient walks with walker at home at baseline.  She reports she recently slid to ground from a chair at home a few weeks but denies any more recent fall or any other trauma. Exam notable for significant bilateral lower extremity edema more so on the left side.  Laxity of the left knee.  Not tender to touch. Lungs " "clear.  Imaging concerning for possible avulsion fracture.  Orthopedic surgery service consult for evaluation and recommended Adamaris brace and physical therapy. Ultrasound of lower extremity negative for clot.  Echocardiogram does not show evidence of heart failure.  Hyponatremia on presentation which is improving without intervention. Patient with persistent elevated white count and now fever overnight.  Blood cultures ordered.  On empiric broad-spectrum antibiotics."    Palliative medicine consulted for assistance with ACP and support   "

## 2025-04-23 NOTE — PROGRESS NOTES
[facility] Intensive Care ([place])  Wound Care    Patient Name:  Jojo Ayoub   MRN:  [id number]  Date: 4/23/2025  Diagnosis: Mediastinal mass    History:     Past Medical History:   Diagnosis Date    Arthritis     Diabetes mellitus with neurological manifestation     Essential hypertension 2/28/2019    High blood pressure     Hyperlipidemia     Urinary urgency        Social History[1]    Precautions:     Allergies as of 04/14/2025    (No Active Allergies)       WO Assessment Details/Treatment     Wound care follow up:     Met with patient. Sacrum almost resolved with new pink epithelium. Wound to left knee healing. Purple discoloration to left medial and lateral knee fading as well. Completed wound care as ordered and covered the entire knee with 2 large Mepliex dressings to protect it from any further pressure/friction of the knee brace. Nursing and MD team notified. Wound care will follow.                   04/23/2025         [1]   Social History  Socioeconomic History    Marital status:    Tobacco Use    Smoking status: Never    Smokeless tobacco: Never   Substance and Sexual Activity    Alcohol use: No    Drug use: No    Sexual activity: Yes     Partners: Male     Social Drivers of Health     Financial Resource Strain: Low Risk  (4/16/2025)    Overall Financial Resource Strain (CARDIA)     Difficulty of Paying Living Expenses: Not hard at all   Food Insecurity: No Food Insecurity (4/16/2025)    Hunger Vital Sign     Worried About Running Out of Food in the Last Year: Never true     Ran Out of Food in the Last Year: Never true   Transportation Needs: No Transportation Needs (4/15/2025)    PRAPARE - Transportation     Lack of Transportation (Medical): No     Lack of Transportation (Non-Medical): No   Physical Activity: Inactive (4/15/2025)    Exercise Vital Sign     Days of Exercise per Week: 0 days     Minutes of Exercise per Session: 0 min   Stress: No Stress Concern Present (4/16/2025)    [place]er  [place] [employer] Occupational [employer] - Occupational Stress Questionnaire     Feeling of Stress : Not at all   Housing Stability: Low Risk  (4/16/2025)    Housing Stability Vital Sign     Unable to Pay for Housing in the Last Year: No     Homeless in the Last Year: No

## 2025-04-23 NOTE — PROGRESS NOTES
"  [facility] Pulmonary Medicine Consult     Primary Attending:  Ramon Colon MD   Consultant Attending: Ayden Sanchez MD   Consultant Fellow: Alexx Lamb, HO-IV     4/14/2025   8       Chief Complaint/Reason for Consult      "Hemoptysis with large lung mass"     History of Present Illness      89-year-old female with osteoarthritis of left knee, dm 2, HTN low back pain presented for/15/25 for bilateral lower extremity for a few weeks.  On admission she had an elevated white count, moderately hyponatremic and BNP of 181.  Left knee x-ray concerning for an avulsion fracture of suprapatellar tendon with effusion.  She was put on empiric Rocephin was concern for infectious of unknown source.  Orthopedic surgery consulted regarding her left knee deemed it to be a lateral patellar subluxation and provided a brace and outpatient follow-up.  On hospital day 2 she developed a fever up to 101 with persistently elevated white count.  Her antibiotics were escalated to include vancomycin and azithromycin in addition to Rocephin.  Blood and urine cultures were obtained.  CT of her chest with contrast was obtained which showed an 8 x 4 cm subcarinal mass.  She also complained of some scant hemoptysis for which Pulmonary was consulted.    Daily Update:  4/21: NAEON.  Has been afebrile with stable vital signs GenSx planning excisional biopsy of the left axillary lymph nodes.   4/22:  Successful excisional biopsy of left axillary lymph nodes by General surgery yesterday.  Yesterday evening experienced AFib with rapid ventricular response, metoprolol attempted, patient became hypotensive and moved to ICU for transient vasopressor requirement.  Given digoxin at the recommendation of Cardiology and she is back in normal sinus rhythm and off vasopressors this morning.  No complaints from the patient at this time  4/23:  No acute events overnight.  Patient without complaints this morning.  Hemodynamically stable in normal sinus rhythm.     " "  Review of Systems      Negative except as noted above     Medications and nursing orders have been reviewed      On Examination      Vital Signs:  Temp:  [97.8 °F (36.6 °C)-98.7 °F (37.1 °C)]   Pulse:  []   Resp:  [15-27]   BP: ()/(50-70)   SpO2:  [95 %-100 %]     No results found for: "HTIN", "WEIGHT"    Intake / Output:    Intake/Output Summary (Last 24 hours) at 4/23/2025 1230  Last data filed at 4/23/2025 0709  Gross per 24 hour   Intake 45.84 ml   Output 1720 ml   Net -1674.16 ml       I have reviewed the vital trends as well as the documented I/Os     Physical Exam:  GEN:  Older woman resting in bed, NAD  HEENT: MMM, no scleral icterus, EOMI  NECK: Supple, midline trachea, no supraclavicular, anterior, posterior or submandibular LAD palpated  CV: RRR, no MRG, pulses equal and symmetric 2+ at radial  PULM:  Nondistressed on room air  ABDOMEN: Soft, non-tender, non-distended, no rebound or guarding  SKIN: Warm, dry, intact, no rashes, surgical incision to left axilla   MSK: No deformity, no clubbing, cyanosis, BLE pitting edema to lower shins  NEURO: awake and oriented and following commands, at neurologic baseline    Pocus:   Bilateral pleural effusions left greater than right.  Left effusion with debris some of which could be fibrin stranding suggesting complication      All recent labs and imaging studies have been reviewed    Recent Labs   Lab 04/21/25 1907 04/22/25  0352 04/23/25  0412   WBC 30.43* 32.69* 41.76*   HGB 8.8* 8.0* 9.2*   HCT 26.0* 24.1* 27.7*   * 384 431       Recent Labs   Lab 04/20/25  0635 04/21/25  0628 04/21/25 1907 04/21/25  1911 04/22/25  0352 04/23/25  0412   * 124* 126*  --  126* 126*   K 4.1 3.9 3.9  --  4.5 4.2   CL 98 94* 95  --  99 99   CO2 23 23 23  --  18* 20*   BUN 13 11 10  --  12 15   CREATININE 1.0 0.9 1.0  --  1.2 1.2   CALCIUM 9.3 9.4 9.2  --  9.0 9.3   MG 1.6 1.7  --  1.6 2.2 1.9   PHOS 3.2 2.7  --   --  3.4  --        Recent Labs   Lab " "04/23/25  0412   ALBUMIN 1.6*   BILITOT 0.2   AST 29   ALT 11   ALKPHOS 62         No results for input(s): "PROTIME", "PTT", "INR" in the last 168 hours.    Cardiac:   No results for input(s): "TROPONINI", "CKTOTAL", "CKMB", "BNP" in the last 168 hours.      FLP:   Lab Results   Component Value Date    CHOL 150 01/21/2020    HDL 48 01/21/2020    LDLCALC 83.8 01/21/2020    TRIG 91 01/21/2020    CHOLHDL 32.0 01/21/2020     DM:   Lab Results   Component Value Date    HGBA1C 5.7 (H) 04/15/2025    HGBA1C 5.6 01/01/2023    HGBA1C 6.0 (H) 01/21/2020    GLUF 135 (H) 02/11/2008    LDLCALC 83.8 01/21/2020    CREATININE 1.2 04/23/2025     Thyroid:   Lab Results   Component Value Date    TSH 0.594 04/15/2025    FREET4 1.13 01/21/2020     Anemia:   Lab Results   Component Value Date    IRON 56 06/03/2010    TIBC 394 06/03/2010    FERRITIN 73 12/28/2011    AXYEMSRA93 884 03/03/2017    FOLATE >20.0 (A) 06/03/2010     Urinalysis:   Lab Results   Component Value Date    LABURIN  04/14/2025     Multiple organisms isolated. None in predominance. Repeat if clinically necessary.    COLORU Yellow 04/14/2025    SPECGRAV 1.005 04/14/2025    NITRITE Positive (A) 04/14/2025    KETONESU Negative 01/02/2023    UROBILINOGEN Negative 04/14/2025       Pertinent findings include:    Lab Results   Component Value Date    CRP 0.6 07/27/2012    [id number] Not Detected 01/01/2023    FERRITIN 73 12/28/2011          Microbiology:  Microbiology Results (last 7 days)       Procedure Component Value Units Date/Time    Blood culture [[id number]]  (Normal) Collected: 04/16/25 1104    Order Status: Completed Specimen: Blood Updated: 04/22/25 0303     Blood Culture No Growth After 5 Days    AFB Culture & Smear [[id number]]     Order Status: Sent Specimen: Body Fluid     Culture, Body Fluid (Aerobic) w/ GS [[id number]]     Order Status: Sent Specimen: Body Fluid     MRSA Screen by PCR [[id number]]  (Normal) Collected: 04/17/25 6622    Order Status: Completed " Specimen: Nasal Swab Updated: 04/18/25 0441     MRSA PCR Screen Not Detected    Culture, Respiratory with Gram Stain [[id number]]     Order Status: Sent Specimen: Respiratory from Sputum, Expectorated             EKG/Echo:  Normal sinus rhythm   Normal EF, indeterminate diastolic function, no significant valvular and reality    Radiology:  CXR images reviewed   CT chest with contrast images reviewed by me and notable for:  -8 x 4 cm subcarinal mediastinal mass with area of central hypoattenuation concerning for necrosis also causing significant esophageal compression with proximal esophageal dilation  -bilateral mediastinal hilar lymphadenopathy  -Bilateral pleural effusions L> R, left appears to be upward tracking against gravity  -basilar and right apical reticulation with honeycombing, bronchiectasis that is tubular and not proximally associated with fibrosis  -no consolidative process  -radiology also comments on mild left axillary LAD    I have personally and independently interpretted the following tests:    Labs / Images / Cultures     Assessment / Plan:     Jojo Ayoub admitted with lower extremity swelling and L knee subluxation.  Her course is c/b fever and leukocytosis of unknown etiology and new found 7.4cm mediastinal mass with bilateral mediastinal and hilar lymphadenopathy.    Mediastinal mass with associated lymphadenopathy  --Highest concern for mediastinal neoplasm, specifically lymphoma considering her left axillary lymphadenopathy.  Her fevers and leukocytosis may represent B symptoms.  Esophageal cancer also in the differential but no obvious risk factors for this.  Meet some criteria for Castleman's disease as well Infection also in the differential but not seem as sick as would expect if this were to be mediastinitis.  These could also be coexisting.  Attempted bedside thoracentesis 4/18 unsuccessful  --status post left axillary excisional lymph node biopsy, flow cytometry negative,  follow up path  --discussed that if lymph node biopsy is negative would need further sampling.  Originally discussed advanced bronchoscopy however after discussion with bronchoscopy highest consideration for highest yield would actually be EGD with EUS.  Considering the possibility this could be done as an inpatient however would have to transfer her to main [place]    --Counseled that if this were neoplasm the treatment regimen would likely be quite involved and that her age and functional status would factor into whether this is something to be pursued or not, they were already keenly aware of this idea.    Leukocytosis/fever-improved  Hypergammaglobulinemia-IgG  --potentially represent B symptoms if malignancy is present.  Received prolonged course of antibiotics and white count has continued to climb despite with no obvious source of infection.  Antibiotics stopped 4/22.  --HIV and hepatitis-C are negative.  Flow cytometry negative.  Free light chains ratio preserved.  SPEP, JOSY, RF and CCP are pending, IgG subtypes pending.      4. Pulmonary reticulation and honeycombing  --no history of ILD.  No baseline pulmonary symptoms.  No other history of autoimmune disease.  No symptoms consistent with autoimmune disease other than her osteoarthritis.  This could be realted to chronic aspiration fibrosis.   -- JOSY panel, Rf and CCP pending    This plan was rounded on with staff pulmonologist Dr. Kennedy    We will continue to follow with you, thank you for the opportunity to be involved in Mrs. Ayoub's care.    Please call or message directly through EPIC with any additional questions or concerns     Alexx Lamb MD  Pulmonary & Critical Care Fellow

## 2025-04-23 NOTE — ASSESSMENT & PLAN NOTE
- noted hx knee replacement  - noted per orthopedic surgery concern for lateral patellar subluxation left knee with plan for brace

## 2025-04-23 NOTE — ASSESSMENT & PLAN NOTE
-Nutrition consulted. Most recent weight and BMI monitored-   -Measurements:  Wt Readings from Last 1 Encounters:   04/22/25 63.5 kg (139 lb 15.9 oz)   Body mass index is 24.03 kg/m².  -Patient has been screened and assessed by RD.  -Interventions/Recommendations (treatment strategy):  - Boost Glucose Control 1x daily - Continue consistent carbohydrate diet - Encourage intake of meals - Monitor weight/labs - RD to monitor and follow up

## 2025-04-23 NOTE — EICU
Intervention Initiated From:  COR / DIVINEU    Mc intervened regarding:  Rounding (Video assessment)    VICU Night Rounds Checklist  24H Vital Sign Range:  Temp:  [97.7 °F (36.5 °C)-98.9 °F (37.2 °C)]   Pulse:  []   Resp:  [10-36]   BP: ()/(41-67)   SpO2:  [95 %-100 %]     Video rounds and LDA reconciliation

## 2025-04-23 NOTE — PROGRESS NOTES
"[facility] Intensive Care [facility] Medicine  Progress Note    Patient Name: Jojo Ayoub  MRN: [id number]  Patient Class: IP- Inpatient   Admission Date: 4/14/2025  Length of Stay: 8 days  Attending Physician: Ramon Colon MD  Primary Care Provider: Bahman Vincent Jr., MD        Subjective     Principal Problem:Mediastinal mass        HPI:  Per Jo Danielle, PA-C:    "Ms. Jojo Ayoub is a 89 y.o. female, with PMH of T2DM, HTN, HLD, chronic constipation, OA w/ chronic left knee pain, anemia, who [place] [facility] [place]25 due to b/l LE swelling x "a few weeks." Her daughter syayes the leg swelling was first noted by her today. She additionally notes low blood pressure readings of 90/55 at home today. She endorses left knee pain, and states she feels like her left knee "dislocate" a few nights ago. She states she had a knee replacement of her left knee over 20 years ago. She denied fall/trauma, leg pain or redness or warmth. She denied dizziness, fatigue, fever, chills, sore throat, runny nose, congestion, shortness of breath, chest pain, palpitations, abdominal pain, nausea, vomiting, diarrhea, or urinary symptoms. She was evaluated in the ED with labs showing leukocytosis of 19K, with left shift and H&H of 9.9/28.0. A metabolic panel showed sodium of 121, potassium of 5.2, glucose was 94, with CO2 of 19, and anion gap of 7. A BNP was 181 without elevation of troponin. A UA showed nitrite positive UTI with 2+ leuk esterase, with 20 WBC, and many bacteria. A CXR showed bilateral hilar prominence, and mild bibasilar densities. An x-ray of the knee had a findings that might reporesent a Pellegrinin-Stieda lesion vs. An avulsive fracture and a suprapatellar effusion. She was treated in the ED with 1L NS and rocephin."    Overview/Hospital Course:  Patient is a 89 year-old woman history of hypertension, diabetes mellitus type 2, osteoarthritis of left knee status post remote left knee " replacement who presents with worsening left knee pain with swelling.  Patient walks with walker at home at baseline.  She reports she recently slid to ground from a chair at home a few weeks but denies any more recent fall or any other trauma.     Exam notable for significant bilateral lower extremity edema more so on the left side.  Laxity of the left knee.  Not tender to touch. Lungs clear.  Imaging concerning for possible avulsion fracture.  Orthopedic surgery service consult for evaluation and recommended Adamaris brace and physical therapy.    Ultrasound of lower extremity negative for clot.  Echocardiogram does not show evidence of heart failure.  Hyponatremia on presentation which is improving without intervention.    Patient with persistent elevated white count and now fever overnight.  Blood cultures ordered.  On empiric broad-spectrum antibiotics.    Interval History: Again had episode of afib rvr which was self limited and now back in NSR.  States she is feeling well.  Daughter and  are at bedside.  All questions answered and patient had no further complaints.    Objective:     Vital Signs (Most Recent):  Temp: 98.4 °F (36.9 °C) (04/23/25 1101)  Pulse: 76 (04/23/25 1200)  Resp: (!) 33 (04/23/25 1200)  BP: 122/60 (04/23/25 1200)  SpO2: 96 % (04/23/25 1200) Vital Signs (24h Range):  Temp:  [97.8 °F (36.6 °C)-98.7 °F (37.1 °C)] 98.4 °F (36.9 °C)  Pulse:  [] 76  Resp:  [15-33] 33  SpO2:  [95 %-100 %] 96 %  BP: ()/(50-70) 122/60     Weight: 63.5 kg (139 lb 15.9 oz)  Body mass index is 24.03 kg/m².    Intake/Output Summary (Last 24 hours) at 4/23/2025 1403  Last data filed at 4/23/2025 0709  Gross per 24 hour   Intake 45.84 ml   Output 1720 ml   Net -1674.16 ml         Physical Exam  Constitutional:       General: She is not in acute distress.     Appearance: She is ill-appearing. She is not diaphoretic.   HENT:      Head: Atraumatic.      Mouth/Throat:      Mouth: Mucous membranes are moist.    Eyes:      Extraocular Movements: Extraocular movements intact.   Cardiovascular:      Rate and Rhythm: Normal rate and regular rhythm.      Heart sounds: Normal heart sounds. No murmur heard.  Pulmonary:      Effort: Pulmonary effort is normal.      Breath sounds: No wheezing or rhonchi.      Comments: Breathing comfortably been lung sounds courses with more prominent rhonchi in the left lower base that does not clear with cough.  Abdominal:      General: Bowel sounds are normal. There is no distension.      Palpations: Abdomen is soft.      Tenderness: There is no abdominal tenderness.   Musculoskeletal:         General: No deformity. Normal range of motion.      Cervical back: Normal range of motion.      Comments: Left knee not tender, swelling around the left knee and bilateral lower extremities much improved.   Skin:     General: Skin is warm.      Comments: Left axillary incision appears c/d/i   Neurological:      Mental Status: She is alert and oriented to person, place, and time.             Significant Labs: All pertinent labs within the past 24 hours have been reviewed.    Significant Imaging: I have reviewed all pertinent imaging results/findings within the past 24 hours.        Assessment & Plan  Mediastinal mass  SIRS (systemic inflammatory response syndrome)  Hemoptysis  -Admitted to inpatient status  Presented with leg swelling and on admit had elevated white blood cell count and subsequently fever.  Procal negative.  -UA suggestive of possible UTI but without urinary symptoms.    -Left knee with acute on chronic pain but not significantly tender and with good range of motion.    -Earlier in stay noted URI symptoms and reports of scant hemoptysis and cough.    -CT Chest with contrast 4/17 showed a large (8.1 x 4.5 cm) subcarinal mass, possible lymphadenopathy.  Additional mediastinal, hilar, and left axillary lymphadenopathy noted. Mild/moderate sized pleural effusions with bibasilar atelectasis.   Moderate chronic interstitial/ fibrotic lung changes.  -Blood cultures negative.  Urine cultures with no predominant organism  -Pulmonology and general surgery consulted and input appreciated.    -Pulm attempted thoracentesis 4/18 but not able to obtain fluid.  -Taken by surgery to OR on 4/20 for excisional LN biopsy.  Await pathology.    -Discussed next steps with pulmonology if path is non-diagnostic.  Initially thought EBUS with transbronchial needle biopsy - however per Dr. Schwartz who would perform that as outpatient, EGD with EUS likely best next step.  Will discuss with [facility] at [facility] to determine how best to arrange if needed.  -WBC continues to rise, but she is afebrile without evidence of infection at this time.  Noted ongoing workup by pulmonology - flow cytometry, SPEP, free light chains, immunoglobulins, chet, rf, ccp  Paroxysmal atrial fibrillation  Atrial fibrillation with RVR  -Noted history of pAFIB  -Had several episodes of short live self converting afib rvr earlier in stay  -Overnight 4/21-4/22 again developed afib rvr resistent to metoprolol so transferred to ICU  -Cardiology consulted and input appreciated.  Converted to NSR after 1 dose of IV digoxin.  Required levophed transiently but bp now elevated a bit off pressors.  -Echo 4/14/25 showed normal EF and no severe valvular lesions  -Discussed with general surgery and ok from their end and started eliquis 5mg bid on 4/22.  -Still had episode of afib rvr overnight which converted to NSR spontaneously.  -Discussed with Dr. Lares and will change metoprolol to 12.5mb q8h  -Keep K > 4 and Mag>2  -Monitor in ICU one more night to ensure stability  Hyponatremia  -On admit Na 121 and now is stable at 126  -Noted history of hyponatremia.    -TSH wnl.  Coristol speaks against adrenal insuffiencey.    Hypervolemic initially on exam however swelling seems serum improved on its own - as did sodium.    -Urine Na appropriately dilute  -Etiology unclear - suspect  degree of SIADH related to lung pathology.    -Continue fluid restriction  -Repeat BMP in AM  Acute on Chronic pain of left knee  Lower extremity edema  Debility  -History noted and had some leg swelling which has improved  -Doppler US of leg did not show any DVT.  -Orthopedic surgery consulted and input appreciated  -Continue brace and therapy - wound care says ok to use her brace we just need to keep the area padded to protect the skin.  -Follow-up with Dr. Malagon in outpatient setting after discharge  -PT/OT consulted and recommend high intensity therapy at discharge.  Type 2 diabetes mellitus, without long-term current use of insulin  -A1c 5.7 now  -Home regimen includes metformin - held during hospitalization  -Blood sugars are reasonably controlled  -Continue SSI ac/hs  Hyperlipidemia  -Continue statin   Essential hypertension  -BP normal to mildly elevated after having been transiently hypotensive at times  -Adjust metoprolol dosing for better rate control as above.  Moderate protein-calorie malnutrition  -Nutrition consulted. Most recent weight and BMI monitored-   -Measurements:  Wt Readings from Last 1 Encounters:   04/22/25 63.5 kg (139 lb 15.9 oz)   Body mass index is 24.03 kg/m².  -Patient has been screened and assessed by RD.  -Interventions/Recommendations (treatment strategy):  - Boost Glucose Control 1x daily - Continue consistent carbohydrate diet - Encourage intake of meals - Monitor weight/labs - RD to monitor and follow up  Counseling regarding advance care planning and goals of care  -Consulted palliative care to initiate ACP discussions  VTE Risk Mitigation (From admission, onward)           Ordered     apixaban tablet 5 mg  2 times daily         04/22/25 1511     IP VTE HIGH RISK PATIENT  Once         04/17/25 1115     Place LIANNE hose  Until discontinued         04/17/25 1115     Place sequential compression device  Until discontinued         04/17/25 1115     Reason for No Pharmacological VTE  Prophylaxis  Once        Question:  Reasons:  Answer:  Active Bleeding    04/17/25 1115                    Discharge Planning   FAY: 4/24/2025     Code Status: Full Code   Medical Readiness for Discharge Date:   Discharge Plan A: Rehab (Pending referrral with [facility])   Discharge Delays: None known at this time    Ramon Colon MD  Department of Hospital Medicine   [facility] ([facility]

## 2025-04-23 NOTE — PLAN OF CARE
Recommendations  - Boost Glucose Control 1x daily   - Continue consistent carbohydrate diet   - Encourage intake of meals   - Monitor weight/labs   - RD to monitor and follow up    Goals: Pt will meet >50% EEN by RD follow up  Nutrition Goal Status: new  Communication of RD Recs:  (POC)

## 2025-04-23 NOTE — ASSESSMENT & PLAN NOTE
- noted use of walker prior to admission  - continued functional decline given prolonged hospitalization  - PT/OT following

## 2025-04-23 NOTE — PLAN OF CARE
Problem: Adult Inpatient Plan of Care  Goal: Plan of Care Review  Outcome: Progressing  Goal: Patient-Specific Goal (Individualized)  Outcome: Progressing  Goal: Absence of Hospital-Acquired Illness or Injury  Outcome: Progressing  Goal: Optimal Comfort and Wellbeing  Outcome: Progressing  Goal: Readiness for Transition of Care  Outcome: Progressing     Problem: Diabetes Comorbidity  Goal: Blood Glucose Level Within Targeted Range  Outcome: Progressing     Problem: Wound  Goal: Optimal Coping  Outcome: Progressing  Goal: Optimal Functional Ability  Outcome: Progressing  Goal: Absence of Infection Signs and Symptoms  Outcome: Progressing  Goal: Improved Oral Intake  Outcome: Progressing  Goal: Optimal Pain Control and Function  Outcome: Progressing  Goal: Skin Health and Integrity  Outcome: Progressing  Goal: Optimal Wound Healing  Outcome: Progressing     Problem: Skin Injury Risk Increased  Goal: Skin Health and Integrity  Outcome: Progressing     Problem: Fall Injury Risk  Goal: Absence of Fall and Fall-Related Injury  Outcome: Progressing     Problem: UTI (Urinary Tract Infection)  Goal: Improved Infection Symptoms  Outcome: Progressing     Problem: Pain Acute  Goal: Optimal Pain Control and Function  Outcome: Progressing     Problem: Sepsis/Septic Shock  Goal: Optimal Coping  Outcome: Progressing  Goal: Absence of Bleeding  Outcome: Progressing  Goal: Blood Glucose Level Within Targeted Range  Outcome: Progressing  Goal: Absence of Infection Signs and Symptoms  Outcome: Progressing  Goal: Optimal Nutrition Intake  Outcome: Progressing     Problem: Comorbidity Management  Goal: Blood Pressure in Desired Range  Outcome: Progressing  Goal: Maintenance of Osteoarthritis Symptom Control  Outcome: Progressing     Problem: Coping Ineffective  Goal: Effective Coping  Outcome: Progressing

## 2025-04-23 NOTE — ASSESSMENT & PLAN NOTE
"- CT chest noted "Large (8.1 x 4.5 cm) subcarinal mass, possible lymphadenopathy.  Additional mediastinal, hilar, and left axillary lymphadenopathy noted. Mild/moderate sized pleural effusions with bibasilar atelectasis. Moderate chronic interstitial/ fibrotic lung changes."  - patient with occasional hemoptysis/cough, but otherwise without any major adverse symptoms at this time  - workup on going per pulm/primary team. Noted concern per pulm that continued expansion of mass may cause increased compression on esophagus.   - patient/family aware of significant concern for cancer, with workup on going to attempt to diagnose primary/etiology  - noted concerning continued rise of WBC, ? lymphoma  - noted s/p excisional LN biopsy. Await pathology. Noted plan that if non-diagnostic next step would be outpatient EBUS with transbronchial needle biopsy.   - patient/family hopeful for clear answers to guide next step decision making  - involvement of heme/onc is per primary team  "

## 2025-04-23 NOTE — ASSESSMENT & PLAN NOTE
-BP normal to mildly elevated after having been transiently hypotensive at times  -Adjust metoprolol dosing for better rate control as above.

## 2025-04-23 NOTE — PT/OT/SLP PROGRESS
Occupational Therapy      Patient Name:  Jojo Ayoub   MRN:  [id number]    Patient not seen today secondary to  (Palliative Care in room with pt and family.). Will follow-up later today.    4/23/2025

## 2025-04-23 NOTE — ASSESSMENT & PLAN NOTE
-Admitted to inpatient status  Presented with leg swelling and on admit had elevated white blood cell count and subsequently fever.  Procal negative.  -UA suggestive of possible UTI but without urinary symptoms.    -Left knee with acute on chronic pain but not significantly tender and with good range of motion.    -Earlier in stay noted URI symptoms and reports of scant hemoptysis and cough.    -CT Chest with contrast 4/17 showed a large (8.1 x 4.5 cm) subcarinal mass, possible lymphadenopathy.  Additional mediastinal, hilar, and left axillary lymphadenopathy noted. Mild/moderate sized pleural effusions with bibasilar atelectasis.  Moderate chronic interstitial/ fibrotic lung changes.  -Blood cultures negative.  Urine cultures with no predominant organism  -Pulmonology and general surgery consulted and input appreciated.    -Pulm attempted thoracentesis 4/18 but not able to obtain fluid.  -Taken by surgery to OR on 4/20 for excisional LN biopsy.  Await pathology.    -Discussed next steps with pulmonology if path is non-diagnostic.  Initially thought EBUS with transbronchial needle biopsy - however per Dr. Schwartz who would perform that as outpatient, EGD with EUS likely best next step.  Will discuss with [facility] at [facility] to determine how best to arrange if needed.  -WBC continues to rise, but she is afebrile without evidence of infection at this time.  Noted ongoing workup by pulmonology - flow cytometry, SPEP, free light chains, immunoglobulins, chet, rf, ccp

## 2025-04-23 NOTE — CHAPLAIN
"   04/23/25 1550   Clinical Encounter Type   Visit Type Initial Visit   Visit Category Critical Care   Visited With Patient   Length of Visit 30 Minutes   Patient Spiritual Encounters   Care Provided Compassionate presence;Reflective listening   Patient Coping Accepting   Comments - Patient this patient states that she comes from "a praying family" so she i swell cared for spiritually and reports no spiritual care needs at this time       "

## 2025-04-23 NOTE — ASSESSMENT & PLAN NOTE
"4/23/2025:  - chart reviewed in depth  - discussed with primary team and pccm  - patient seen at bedside along with palliative RN Radha  - patient joined at bedside by her  Samuel and daughter Micehal   - introduced selves and role   - patient shared more about herself and her life  - her and Samuel have been  65 years. 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 They enjoy having large family gatherings  - patient had been doing well till few weeks before this admission, as she started getting more weak/tired.   - she uses walker to get around  - daughter noted patient had already been a night owl, but last few weeks started sleeping earlier as she was tired  - she denies concerns with appetite at this time. Eating without difficulty  - no significant adverse symptoms from mediastinal mass/LAD, except occasional hemoptysis/occasional cough.   - overall in good spirits   - patient is aware of the concerns surrounding her medical on rylie, including the concern for cancer  - she and the family are hopeful for getting some clear answers to guide next step decision making  - they are aware of the LN bx path still pending and the reality as shared by pulm team that she may require outpatient EBUS for further diagnostic efforts  - they shared the difficulty with the unknown. Acknowledged the difficulty with having that uncertainty/unknown lingering. Shared reality that despite the teams not knowing exactly what is going on, there is certainly something concerning going on  - shared importance of taking things one day and one step at a time  - patient shared when asked that she is not the "worry" type. Seems the  is " more so the worrier in the relationship  - however on that note she did share her concern/dislike with hearing the word cancer and the concern that came along with that  - shared that being scared/worried is absolutely appropriate given everything she is going through and the unknown she is facing  - shared the role of the medical teams in supporting her along the journey the best we all can and ensuring transparency/open and honest conversations  - reminded her she is the captain of the ship and the one making the decisions no matter what  - she was appreciative   - she and her family shared more about themselves and their life together  - provided emotional support and listening ear   - shared we will continue to check in while she is admitted and shared role of outpatient/home palliative team on discharge, which they were agreeable/appreciative of. They welcome as much support as possible along this journey.   - updated primary team/pccm regarding conversation  [] continue current diagnostic efforts to get some answers, which will thereby guide next step decision making  [] we will continue to check in  []  Samuel is default next of kin surrogate medical decision maker. Did not discuss HCPOA wishes at this first visit.   [] did not address code status at this time

## 2025-04-23 NOTE — PROGRESS NOTES
Postop day 2 status post left axillary lymph node excision    Steri-Strips are intact in the left axilla.  No sign of hematoma seroma    There are no sutures to remove.  With her debilitated state, no reason to follow up with me as an outpatient unless there is a wound issue.    Awaiting path results    I have discussed everything with her family and they have no further questions  No other general surgical options to offer.  Please re-consult as needed

## 2025-04-23 NOTE — PLAN OF CARE
Problem: Occupational Therapy  Goal: Occupational Therapy Goal  Description: Re-evaluation on 4/23/2025 with goals to be met by: 5/17/25     Patient will increase functional independence with ADLs by performing:    UB dressing at Min A.  LB dressing at Mod A.  Toileting at Mod A using BSC.  Toilet transfer at Min A.   Grooming at Min A.   Sponge bathing at Mod A.    Outcome: Ongoing  Goals revised today during re-evaluation.  Recommend High Intensity Therapy.

## 2025-04-23 NOTE — PT/OT/SLP RE-EVAL
Occupational Therapy   Re-evaluation    Name: Jojo Ayoub  MRN: [id number]  Admitting Diagnosis:  Mediastinal mass  Recent Surgery: Procedure(s) (LRB):  EXCISION, LYMPH NODE (Left) 2 Days Post-Op    Recommendations:     Discharge Recommendations: High Intensity Therapy  Discharge Equipment Recommendations: walker, rolling, wheelchair, to be determined by next level of care  Barriers to discharge:   (Current functional level)    Assessment:     Jojo Ayoub is a 89 y.o. female with a medical diagnosis of Mediastinal mass.  She presents in ICU with the following performance deficits affecting function are weakness, impaired endurance, impaired self care skills, impaired functional mobility, impaired sensation, gait instability, impaired balance, decreased ROM, orthopedic precautions, pain, impaired joint extensibility, decreased safety awareness, decreased lower extremity function, decreased upper extremity function, edema, decreased coordination, impaired cognition, impaired cardiopulmonary response to activity.      Rehab Prognosis:  Good; patient would benefit from acute skilled OT services to address these deficits and reach maximum level of function.       Plan:     Patient to be seen 5 x/week to address the above listed problems via self-care/home management, therapeutic activities, therapeutic exercises  Plan of Care Expires: 05/17/25  Plan of Care Reviewed with: patient, spouse    Subjective     Chief Complaint: Pt expressing concerns about her left patella subluxing, fear of falling  Patient/Family stated goals: Pt agreeable to OT session with encouragement of her   Communicated with: nurse prior to session.  Pain/Comfort:  Pain Rating 1:  (Some left knee pain but not interfering with pt's ability to partcipate in re-evaluation.)    Objective:     Communicated with: nurseSonam, prior to session.  Patient found in ICU room with family present, HOB elevated with: blood pressure cuff,  telemetry, pulse ox (continuous), peripheral IV, PureWick upon OT entry to room.    General Precautions: Standard, diabetic, fall, anti-coagulation medicine  Orthopedic Precautions: N/A  Braces:  (Left knee brace for patella subluxation)  Respiratory Status: Room air    Occupational Performance:    Bed Mobility:    Supine to sit: Mod A, extended time  Sit to supine: Max A   Scooting to HOB: Total A    Functional Mobility/Transfers:  Sit to stand: Mod A x 1 and Min A x 1 with RW then Mod A x 2 with RW second time  Functional Mobility: Mod A with RW regressing to Max A x 2 with RW, Bed needed to be unlocked and brought up behind pt to not have to mobilize much farther to sit.  Pt reporting weakness suddenly.  White dry erase board updated for pt to get up only with therapy at this time.    Activities of Daily Living:  Toileting: Using female external catheter to suction,  not safe to get up to BSC with nursing at this time  LB Dressing: Total A for donning  socks, Total A for donning left knee brace    Cognitive/Visual Perceptual:  Cognitive/Psychosocial Skills:     -       Oriented to: Person, Place, and Situation   -       Follows Commands/attention:Needing verbal cues and tactile cues for following simple commands,  increased assist to follow commands from past OT sessions.  -       Communication: clear/fluent  -       Memory: Impaired  -       Safety awareness/insight to disability: impaired   -       Mood/Affect/Coping skills/emotional control: Cooperative and Pleasant    Physical Exam:  UE Function: Grossly 3+/5 strength throughout  On 4/21/25, had left axillary lymph node excision for pathology.  Pt with mediastinal mass.  Sitting Balance: SBA sitting EOB  Standing: Poor, posterior lean  Poor standing tolerance      AMPA 6 Click:  [facility] Total Score: 12    Treatment & Education:  Role of OT, POC, safety, discharge recs    Patient left HOB elevated with all lines intact, call button in reach, nurse notified,  and  present    GOALS:   Multidisciplinary Problems       Occupational Therapy Goals          Problem: Occupational Therapy    Goal Priority Disciplines Outcome Interventions   Occupational Therapy Goal     OT, PT/OT Ongoing    Description: Re-evaluation on 4/23/2025 with goals to be met by: 5/17/25     Patient will increase functional independence with ADLs by performing:    UB dressing at Min A.  LB dressing at Mod A.  Toileting at Mod A using BSC.  Toilet transfer at Min A.   Grooming at Min A.   Sponge bathing at Mod A.                         DME Justifications:  Jojo requires a hospital bed due to her requiring positioning of the body in ways not feasible with an ordinary bed due to limited ability and cannot independently make changes in body position without the use of the bed.The positioning of the body cannot be sufficiently resolved by the use of pillows and wedges.  Jojo Ayoub has a mobility limitation that significantly impairs her ability to participate in one or more mobility related activities of daily living (MRADLs) such as toileting, feeding, dressing, grooming, and bathing in customary locations in the home.  The mobility limitation cannot be sufficiently resolved by the use of a cane or walker.   The use of a manual wheelchair will significantly improve the patients ability to participate in MRADLS and the patient will use it on regular basis in the home.  Jojo Ayoub has expressed her willingness to use a manual wheelchair in the home. Patients upper body strength is sufficient for propulsion.  She also has a caregiver who is available, willing, and able to provide assistance with the wheelchair when needed.     Jojo's mobility limitation cannot be sufficiently resolved by the use of a cane. Her functional mobility deficit can be sufficiently resolved with the use of a Rolling Walker. Patient's mobility limitation significantly impairs their ability to participate in one  of more activities of daily living.  The use of a RW will significantly improve the patient's ability to participate in MRADLS and the patient will use it on regular basis in the home.    History:     Past Medical History:   Diagnosis Date    Arthritis     Diabetes mellitus with neurological manifestation     Essential hypertension 2/28/2019    High blood pressure     Hyperlipidemia     Urinary urgency          Past Surgical History:   Procedure Laterality Date    EKG 12-LEAD  4/21/2025    EPIDURAL STEROID INJECTION N/A 1/31/2019    Procedure: INJECTION, STEROID, EPIDURAL, L3-L4 need consent;  Surgeon: Marlin Barber MD;  Location: [facility] PAIN [facility]GT;  Service: Pain Management;  Laterality: N/A;    left knee replacement       low back surgery       SURGICAL REMOVAL OF LYMPH NODE Left 4/21/2025    Procedure: EXCISION, LYMPH NODE;  Surgeon: Ramon Johnson MD;  Location: [facility] OR;  Service: General;  Laterality: Left;       Time Tracking:     OT Date of Treatment: 04/23/25  OT Start Time: 1106  OT Stop Time: 1136  OT Total Time (min): 30 min    Billable Minutes:Re-eval 25 4/23/2025

## 2025-04-23 NOTE — SUBJECTIVE & OBJECTIVE
Interval History: Again had episode of afib rvr which was self limited and now back in NSR.  States she is feeling well.  Daughter and  are at bedside.  All questions answered and patient had no further complaints.    Objective:     Vital Signs (Most Recent):  Temp: 98.4 °F (36.9 °C) (04/23/25 1101)  Pulse: 76 (04/23/25 1200)  Resp: (!) 33 (04/23/25 1200)  BP: 122/60 (04/23/25 1200)  SpO2: 96 % (04/23/25 1200) Vital Signs (24h Range):  Temp:  [97.8 °F (36.6 °C)-98.7 °F (37.1 °C)] 98.4 °F (36.9 °C)  Pulse:  [] 76  Resp:  [15-33] 33  SpO2:  [95 %-100 %] 96 %  BP: ()/(50-70) 122/60     Weight: 63.5 kg (139 lb 15.9 oz)  Body mass index is 24.03 kg/m².    Intake/Output Summary (Last 24 hours) at 4/23/2025 1403  Last data filed at 4/23/2025 0709  Gross per 24 hour   Intake 45.84 ml   Output 1720 ml   Net -1674.16 ml         Physical Exam  Constitutional:       General: She is not in acute distress.     Appearance: She is ill-appearing. She is not diaphoretic.   HENT:      Head: Atraumatic.      Mouth/Throat:      Mouth: Mucous membranes are moist.   Eyes:      Extraocular Movements: Extraocular movements intact.   Cardiovascular:      Rate and Rhythm: Normal rate and regular rhythm.      Heart sounds: Normal heart sounds. No murmur heard.  Pulmonary:      Effort: Pulmonary effort is normal.      Breath sounds: No wheezing or rhonchi.      Comments: Breathing comfortably been lung sounds courses with more prominent rhonchi in the left lower base that does not clear with cough.  Abdominal:      General: Bowel sounds are normal. There is no distension.      Palpations: Abdomen is soft.      Tenderness: There is no abdominal tenderness.   Musculoskeletal:         General: No deformity. Normal range of motion.      Cervical back: Normal range of motion.      Comments: Left knee not tender, swelling around the left knee and bilateral lower extremities much improved.   Skin:     General: Skin is warm.       Comments: Left axillary incision appears c/d/i   Neurological:      Mental Status: She is alert and oriented to person, place, and time.             Significant Labs: All pertinent labs within the past 24 hours have been reviewed.    Significant Imaging: I have reviewed all pertinent imaging results/findings within the past 24 hours.

## 2025-04-23 NOTE — ASSESSMENT & PLAN NOTE
-On admit Na 121 and now is stable at 126  -Noted history of hyponatremia.    -TSH wnl.  Coristol speaks against adrenal insuffiencey.    Hypervolemic initially on exam however swelling seems serum improved on its own - as did sodium.    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    -Continue fluid restriction  -Repeat BMP in AM

## 2025-04-23 NOTE — SUBJECTIVE & OBJECTIVE
Past Medical History:   Diagnosis Date    Arthritis     Diabetes mellitus with neurological manifestation     Essential hypertension 2/28/2019    High blood pressure     Hyperlipidemia     Urinary urgency        Past Surgical History:   Procedure Laterality Date    EKG 12-LEAD  4/21/2025    EPIDURAL STEROID INJECTION N/A 1/31/2019    Procedure: INJECTION, STEROID, EPIDURAL, L3-L4 need consent;  Surgeon: Marlin Barber MD;  Location: [facility] PAIN MGT;  Service: Pain Management;  Laterality: N/A;    left knee replacement       low back surgery       SURGICAL REMOVAL OF LYMPH NODE Left 4/21/2025    Procedure: EXCISION, LYMPH NODE;  Surgeon: Ramon Johnson MD;  Location: [facility] OR;  Service: General;  Laterality: Left;       Review of patient's allergies indicates:  No Known Allergies    Medications:  Continuous Infusions:   NORepinephrine bitartrate-D5W  0-3 mcg/kg/min Intravenous Continuous   Held at 04/21/25 2330     Scheduled Meds:   apixaban  5 mg Oral BID    gabapentin  400 mg Oral TID    metoprolol tartrate  12.5 mg Oral BID    mupirocin   Nasal BID    sodium chloride 0.9%  10 mL Intravenous Q8H     PRN Meds:  Current Facility-Administered Medications:     acetaminophen, 1,000 mg, Oral, Q8H PRN    dextrose 50%, 12.5 g, Intravenous, PRN    dextrose 50%, 25 g, Intravenous, PRN    glucagon (human recombinant), 1 mg, Intramuscular, PRN    glucose, 16 g, Oral, PRN    glucose, 24 g, Oral, PRN    insulin aspart U-100, 0-5 Units, Subcutaneous, QID (AC + HS) PRN    melatonin, 6 mg, Oral, Nightly PRN    naloxone, 0.02 mg, Intravenous, PRN    ondansetron, 4 mg, Oral, Q8H PRN    senna-docusate, 1 tablet, Oral, BID PRN    sodium chloride 0.9%, 10 mL, Intravenous, PRN    Family History       Problem Relation (Age of Onset)    Diabetes Mother, Sister, Maternal Uncle, Maternal Grandmother    Hypertension Mother, Sister          Tobacco Use    Smoking status: Never    Smokeless tobacco: Never   Substance and Sexual Activity     Alcohol use: No    Drug use: No    Sexual activity: Yes     Partners: Male       Objective:     Vital Signs (Most Recent):  Temp: 98.3 °F (36.8 °C) (04/23/25 0701)  Pulse: 77 (04/23/25 1000)  Resp: (!) 21 (04/23/25 1000)  BP: (!) 104/57 (04/23/25 1000)  SpO2: 100 % (04/23/25 1000) Vital Signs (24h Range):  Temp:  [97.8 °F (36.6 °C)-98.7 °F (37.1 °C)] 98.3 °F (36.8 °C)  Pulse:  [] 77  Resp:  [10-27] 21  SpO2:  [95 %-100 %] 100 %  BP: ()/(50-70) 104/57     Weight: 63.5 kg (139 lb 15.9 oz)  Body mass index is 24.03 kg/m².       Physical Exam  Vitals and nursing note reviewed.   Constitutional:       General: She is not in acute distress.  HENT:      Head: Normocephalic and atraumatic.   Eyes:      Extraocular Movements: Extraocular movements intact.   Pulmonary:      Effort: No respiratory distress.   Neurological:      Mental Status: She is alert.      Comments: Awake, alert, conversant            Review of Symptoms        Living Arrangements:  Lives with family    Psychosocial/Cultural:   See Palliative Psychosocial Note: Yes  - lives with  Samuel. 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 Enjoys singing and being social  - uses walker to get around  **Primary  to Follow**  Palliative Care  Consult: No        Advance Care Planning   Advance Directives:   Medical Power of :  Samuel would be next of kin surrogate medical decision maker.      Decision Making:  Patient answered questions  Goals of Care: The patient endorses that what is most important right now is to focus on getting clearer answers regarding medical on rylie.     Accordingly, we have decided that the best plan to meet the patient's goals includes continuing with treatment         Significant Labs: reviewed  CBC:   Recent Labs   Lab 04/23/25  0412   WBC 41.76*   HGB 9.2*   HCT 27.7*   MCV 91         BMP:  Recent Labs   Lab 04/23/25  0412   *   K 4.2   CL 99   CO2 20*   BUN 15   CREATININE 1.2   CALCIUM 9.3   MG 1.9     LFT:  Lab Results   Component Value Date    AST 29 04/23/2025    ALKPHOS 62 04/23/2025    BILITOT 0.2 04/23/2025     Albumin:   Albumin   Date Value Ref Range Status   04/23/2025 1.6 (L) 3.5 - 5.2 g/dL Final   01/01/2023 2.6 (L) 3.5 - 5.2 g/dL Final     Protein:   Total Protein   Date Value Ref Range Status   01/01/2023 7.1 6.0 - 8.4 g/dL Final     Lactic acid:   Lab Results   Component Value Date    LACTATE 0.7 04/15/2025    LACTATE 1.2 04/14/2025       Significant Imaging: reviewed

## 2025-04-23 NOTE — PROGRESS NOTES
[facility] ([place])  Adult Nutrition  Progress Note    SUMMARY       Recommendations  - Boost Glucose Control 1x daily   - Continue consistent carbohydrate diet   - Encourage intake of meals   - Monitor weight/labs   - RD to monitor and follow up    Goals: Pt will meet >50% EEN by RD follow up  Nutrition Goal Status: new  Communication of RD Recs:  (POC)    Nutrition Discharge Planning    Nutrition Discharge Planning: Therapeutic diet (comments)  Therapeutic diet (comments): Consistent carbohydrate    Assessment and Plan  Nutrition Problem  Inadequate energy intake    Related to (etiology):   sepsis    Signs and Symptoms (as evidenced by):   Nausea causing decreased intake and mediastinal mass    Interventions  (treatment strategy):  Collaboration of care with other providers  Commercial beverages  CHO modified diet    Nutrition Diagnosis Status:   New    Reason for Assessment    Reason For Assessment: pressure injury/ wound, length of stay  Diagnosis:  (Mediastinal mass)  General Information Comments: Pt admitted for mediastinal mass s/p 4/20 for excisional LN biopsy. Diet currently at consisent carbohydrate diet. Tolerated >50% of lunch, stating it is a lot of food. Pt not as nauseated and feeling a little better at RD visit.  per pt. Weight hx does not reflect weight loss. Discussed the addition of ONS in the setting of decreased appetite. Pt would like to have 1xdaily. PIV. Guzman 16-sacral, knee, L axilla. Did not assess NFPE. RD to monitor for signs of malnutrition at follow up    Nutrition Related Social Determinants of Health: SDOH: Adequate food in home environment  Social Drivers of Health     Food Insecurity: No Food Insecurity (4/16/2025)    Hunger Vital Sign     Worried About Running Out of Food in the Last Year: Never true     Ran Out of Food in the Last Year: Never true     Nutrition/Diet History    Spiritual, Cultural Beliefs, Orthodoxy Practices, Values that Affect Care:  "no  Food Allergies: NKFA  Factors Affecting Nutritional Intake: decreased appetite, nausea/vomiting    Anthropometrics    Height: 5' 4" (162.6 cm)  Height (inches): 64 in  Height Method: Stated  Weight: 63.5 kg (139 lb 15.9 oz)  Weight (lb): 139.99 lb  Weight Method: Bed Scale  Ideal Body Weight (IBW), Female: 120 lb  % Ideal Body Weight, Female (lb): 116.66 %  BMI (Calculated): 24  BMI Grade: 18.5-24.9 - normal       Lab/Procedures/Meds    Pertinent Labs Reviewed: reviewed  CBC:  Recent Labs   Lab 04/22/25  0352   WBC 32.69*   HGB 8.0*   HCT 24.1*        CMP:  Recent Labs   Lab 04/22/25  0352   GLUCOSE 102   CALCIUM 9.0   *   K 4.5   CO2 18*   CL 99   BUN 12   CREATININE 1.2     Pertinent Medications Reviewed: reviewed  Scheduled Meds:   apixaban  5 mg Oral BID    digoxin  250 mcg Intravenous Once    gabapentin  400 mg Oral TID    metoprolol tartrate  12.5 mg Oral BID    mupirocin   Nasal BID    sodium chloride 0.9%  10 mL Intravenous Q8H     Continuous Infusions:   NORepinephrine bitartrate-D5W  0-3 mcg/kg/min Intravenous Continuous   Held at 04/21/25 2330     PRN Meds:.  Current Facility-Administered Medications:     acetaminophen, 1,000 mg, Oral, Q8H PRN    dextrose 50%, 12.5 g, Intravenous, PRN    dextrose 50%, 25 g, Intravenous, PRN    glucagon (human recombinant), 1 mg, Intramuscular, PRN    glucose, 16 g, Oral, PRN    glucose, 24 g, Oral, PRN    insulin aspart U-100, 0-5 Units, Subcutaneous, QID (AC + HS) PRN    melatonin, 6 mg, Oral, Nightly PRN    naloxone, 0.02 mg, Intravenous, PRN    ondansetron, 4 mg, Oral, Q8H PRN    senna-docusate, 1 tablet, Oral, BID PRN    sodium chloride 0.9%, 10 mL, Intravenous, PRN    Estimated/Assessed Needs    Weight Used For Calorie Calculations: 63.5 kg (139 lb 15.9 oz)  Energy Calorie Requirements (kcal): 4976-4841  Energy Need Method: Kcal/kg (25-30)  Protein Requirements: 66-76g (1-1.2 g/kg)  Weight Used For Protein Calculations: 63.5 kg (139 lb 15.9 " oz)  Fluid Requirements (mL): 1 mL/kcal  Estimated Fluid Requirement Method: RDA Method  RDA Method (mL): 1586  CHO Requirement: 200g    Nutrition Prescription Ordered    Current Diet Order: Consistent carbohdyrate    Evaluation of Received Nutrient/Fluid Intake    I/O: +1.2L since admit  Energy Calories Required: not meeting needs  Protein Required: not meeting needs  Fluid Required: not meeting needs  Comments: LBM: 4/14  Tolerance: tolerating  % Intake of Estimated Energy Needs: 25 - 50 %  % Meal Intake: 25 - 50 %    Nutrition Risk    Level of Risk/Frequency of Follow-up: moderate     Monitor and Evaluation    Monitor and Evaluation: Carbohydrate intake, Diet order, Weight, Electrolyte and renal panel, Glucose/endocrine profile, Inflammatory profile, Nutrition focused physical findings, Skin     Nutrition Follow-Up    RD Follow-up?: Yes    Lavinia Conde RDN, LDN

## 2025-04-24 PROBLEM — Z66 DNR (DO NOT RESUSCITATE): Chronic | Status: ACTIVE | Noted: 2025-04-24

## 2025-04-24 PROBLEM — Z66 DNR (DO NOT RESUSCITATE): Status: ACTIVE | Noted: 2025-04-24

## 2025-04-24 LAB
ABSOLUTE EOSINOPHIL (OHS): 0.01 K/UL
ABSOLUTE MONOCYTE (OHS): 1.11 K/UL (ref 0.3–1)
ABSOLUTE NEUTROPHIL COUNT (OHS): 26.54 K/UL (ref 1.8–7.7)
ALBUMIN SERPL BCP-MCNC: 1.5 G/DL (ref 3.5–5.2)
ALP SERPL-CCNC: 64 UNIT/L (ref 40–150)
ALT SERPL W/O P-5'-P-CCNC: 13 UNIT/L (ref 10–44)
ANA (OHS): NORMAL
ANION GAP (OHS): 7 MMOL/L (ref 8–16)
AST SERPL-CCNC: 29 UNIT/L (ref 11–45)
BASOPHILS # BLD AUTO: 0.06 K/UL
BASOPHILS NFR BLD AUTO: 0.2 %
BILIRUB SERPL-MCNC: 0.3 MG/DL (ref 0.1–1)
BUN SERPL-MCNC: 17 MG/DL (ref 8–23)
CALCIUM SERPL-MCNC: 9.1 MG/DL (ref 8.7–10.5)
CHLORIDE SERPL-SCNC: 96 MMOL/L (ref 95–110)
CO2 SERPL-SCNC: 24 MMOL/L (ref 23–29)
CREAT SERPL-MCNC: 1 MG/DL (ref 0.5–1.4)
DHEA SERPL-MCNC: NORMAL
ERYTHROCYTE [DISTWIDTH] IN BLOOD BY AUTOMATED COUNT: 14.6 % (ref 11.5–14.5)
ESTROGEN SERPL-MCNC: NORMAL PG/ML
GFR SERPLBLD CREATININE-BSD FMLA CKD-EPI: 55 ML/MIN/1.73/M2
GLUCOSE SERPL-MCNC: 97 MG/DL (ref 70–110)
HCT VFR BLD AUTO: 24.8 % (ref 37–48.5)
HGB BLD-MCNC: 8.5 GM/DL (ref 12–16)
IMM GRANULOCYTES # BLD AUTO: 0.29 K/UL (ref 0–0.04)
IMM GRANULOCYTES NFR BLD AUTO: 1 % (ref 0–0.5)
INSULIN SERPL-ACNC: NORMAL U[IU]/ML
LAB AP CLINICAL INFORMATION: NORMAL
LAB AP GROSS DESCRIPTION: NORMAL
LAB AP PERFORMING LOCATION(S): NORMAL
LAB AP REPORT FOOTNOTES: NORMAL
LYMPHOCYTES # BLD AUTO: 1.78 K/UL (ref 1–4.8)
MAGNESIUM SERPL-MCNC: 2 MG/DL (ref 1.6–2.6)
MCH RBC QN AUTO: 30.7 PG (ref 27–31)
MCHC RBC AUTO-ENTMCNC: 34.3 G/DL (ref 32–36)
MCV RBC AUTO: 90 FL (ref 82–98)
NUCLEATED RBC (/100WBC) (OHS): 0 /100 WBC
PLATELET # BLD AUTO: 424 K/UL (ref 150–450)
PMV BLD AUTO: 8.2 FL (ref 9.2–12.9)
POCT GLUCOSE: 101 MG/DL (ref 70–110)
POCT GLUCOSE: 107 MG/DL (ref 70–110)
POCT GLUCOSE: 86 MG/DL (ref 70–110)
POTASSIUM SERPL-SCNC: 3.9 MMOL/L (ref 3.5–5.1)
PROT SERPL-MCNC: 6.3 GM/DL (ref 6–8.4)
RBC # BLD AUTO: 2.77 M/UL (ref 4–5.4)
RELATIVE EOSINOPHIL (OHS): 0 %
RELATIVE LYMPHOCYTE (OHS): 6 % (ref 18–48)
RELATIVE MONOCYTE (OHS): 3.7 % (ref 4–15)
RELATIVE NEUTROPHIL (OHS): 89.1 % (ref 38–73)
SODIUM SERPL-SCNC: 127 MMOL/L (ref 136–145)
T3RU NFR SERPL: NORMAL %
WBC # BLD AUTO: 29.79 K/UL (ref 3.9–12.7)

## 2025-04-24 PROCEDURE — 25000003 PHARM REV CODE 250: Performed by: INTERNAL MEDICINE

## 2025-04-24 PROCEDURE — 99498 ADVNCD CARE PLAN ADDL 30 MIN: CPT | Mod: ,,, | Performed by: STUDENT IN AN ORGANIZED HEALTH CARE EDUCATION/TRAINING PROGRAM

## 2025-04-24 PROCEDURE — 83735 ASSAY OF MAGNESIUM: CPT | Performed by: HOSPITALIST

## 2025-04-24 PROCEDURE — 99233 SBSQ HOSP IP/OBS HIGH 50: CPT | Mod: GC,,, | Performed by: STUDENT IN AN ORGANIZED HEALTH CARE EDUCATION/TRAINING PROGRAM

## 2025-04-24 PROCEDURE — 99497 ADVNCD CARE PLAN 30 MIN: CPT | Mod: 25,,, | Performed by: STUDENT IN AN ORGANIZED HEALTH CARE EDUCATION/TRAINING PROGRAM

## 2025-04-24 PROCEDURE — 99900035 HC TECH TIME PER 15 MIN (STAT)

## 2025-04-24 PROCEDURE — 27000221 HC OXYGEN, UP TO 24 HOURS

## 2025-04-24 PROCEDURE — 25000003 PHARM REV CODE 250: Performed by: SURGERY

## 2025-04-24 PROCEDURE — 87070 CULTURE OTHR SPECIMN AEROBIC: CPT | Performed by: NURSE PRACTITIONER

## 2025-04-24 PROCEDURE — 11000001 HC ACUTE MED/SURG PRIVATE ROOM

## 2025-04-24 PROCEDURE — 99233 SBSQ HOSP IP/OBS HIGH 50: CPT | Mod: 25,,, | Performed by: STUDENT IN AN ORGANIZED HEALTH CARE EDUCATION/TRAINING PROGRAM

## 2025-04-24 PROCEDURE — 85025 COMPLETE CBC W/AUTO DIFF WBC: CPT | Performed by: HOSPITALIST

## 2025-04-24 PROCEDURE — 94640 AIRWAY INHALATION TREATMENT: CPT

## 2025-04-24 PROCEDURE — 63600175 PHARM REV CODE 636 W HCPCS: Performed by: INTERNAL MEDICINE

## 2025-04-24 PROCEDURE — 25000242 PHARM REV CODE 250 ALT 637 W/ HCPCS: Performed by: NURSE PRACTITIONER

## 2025-04-24 PROCEDURE — 36415 COLL VENOUS BLD VENIPUNCTURE: CPT | Performed by: HOSPITALIST

## 2025-04-24 PROCEDURE — 25000003 PHARM REV CODE 250: Performed by: NURSE PRACTITIONER

## 2025-04-24 PROCEDURE — 80053 COMPREHEN METABOLIC PANEL: CPT | Performed by: HOSPITALIST

## 2025-04-24 PROCEDURE — 94761 N-INVAS EAR/PLS OXIMETRY MLT: CPT

## 2025-04-24 PROCEDURE — 25000003 PHARM REV CODE 250: Performed by: HOSPITALIST

## 2025-04-24 PROCEDURE — A4216 STERILE WATER/SALINE, 10 ML: HCPCS | Performed by: SURGERY

## 2025-04-24 PROCEDURE — 99232 SBSQ HOSP IP/OBS MODERATE 35: CPT | Mod: ,,, | Performed by: INTERNAL MEDICINE

## 2025-04-24 RX ORDER — POTASSIUM CHLORIDE 20 MEQ/1
20 TABLET, EXTENDED RELEASE ORAL ONCE
Status: COMPLETED | OUTPATIENT
Start: 2025-04-24 | End: 2025-04-24

## 2025-04-24 RX ORDER — IPRATROPIUM BROMIDE AND ALBUTEROL SULFATE 2.5; .5 MG/3ML; MG/3ML
3 SOLUTION RESPIRATORY (INHALATION) EVERY 4 HOURS PRN
Status: DISCONTINUED | OUTPATIENT
Start: 2025-04-24 | End: 2025-04-26 | Stop reason: HOSPADM

## 2025-04-24 RX ORDER — DIGOXIN 125 MCG
0.12 TABLET ORAL DAILY
Status: DISCONTINUED | OUTPATIENT
Start: 2025-04-24 | End: 2025-04-26 | Stop reason: HOSPADM

## 2025-04-24 RX ADMIN — MUPIROCIN: 20 OINTMENT TOPICAL at 08:04

## 2025-04-24 RX ADMIN — APIXABAN 5 MG: 2.5 TABLET, FILM COATED ORAL at 08:04

## 2025-04-24 RX ADMIN — GABAPENTIN 400 MG: 400 CAPSULE ORAL at 08:04

## 2025-04-24 RX ADMIN — SODIUM CHLORIDE, PRESERVATIVE FREE 10 ML: 5 INJECTION INTRAVENOUS at 04:04

## 2025-04-24 RX ADMIN — METOPROLOL TARTRATE 12.5 MG: 25 TABLET, FILM COATED ORAL at 09:04

## 2025-04-24 RX ADMIN — DIGOXIN 250 MCG: 250 INJECTION, SOLUTION INTRAMUSCULAR; INTRAVENOUS at 02:04

## 2025-04-24 RX ADMIN — GABAPENTIN 400 MG: 400 CAPSULE ORAL at 04:04

## 2025-04-24 RX ADMIN — IPRATROPIUM BROMIDE AND ALBUTEROL SULFATE 3 ML: 2.5; .5 SOLUTION RESPIRATORY (INHALATION) at 08:04

## 2025-04-24 RX ADMIN — SODIUM CHLORIDE, PRESERVATIVE FREE 10 ML: 5 INJECTION INTRAVENOUS at 05:04

## 2025-04-24 RX ADMIN — GUAIFENESIN AND DEXTROMETHORPHAN HYDROBROMIDE 1 TABLET: 600; 30 TABLET, EXTENDED RELEASE ORAL at 08:04

## 2025-04-24 RX ADMIN — METOPROLOL TARTRATE 12.5 MG: 25 TABLET, FILM COATED ORAL at 04:04

## 2025-04-24 RX ADMIN — POTASSIUM CHLORIDE 20 MEQ: 1500 TABLET, EXTENDED RELEASE ORAL at 08:04

## 2025-04-24 RX ADMIN — DIGOXIN 0.12 MG: 125 TABLET ORAL at 12:04

## 2025-04-24 RX ADMIN — SODIUM CHLORIDE, PRESERVATIVE FREE 10 ML: 5 INJECTION INTRAVENOUS at 09:04

## 2025-04-24 RX ADMIN — METOPROLOL TARTRATE 12.5 MG: 25 TABLET, FILM COATED ORAL at 05:04

## 2025-04-24 NOTE — ASSESSMENT & PLAN NOTE
-Noted history of pAFIB  -Had several episodes of short live self converting afib rvr earlier in stay  -Overnight 4/21-4/22 again developed afib rvr resistent to metoprolol so transferred to ICU  -Cardiology consulted and input appreciated.  Converted to NSR after 1 dose of IV digoxin.  Required levophed transiently but bp now elevated a bit off pressors.  -Echo 4/14/25 showed normal EF and no severe valvular lesions  -Discussed with general surgery and ok from their end and started eliquis 5mg bid on 4/22.  -Has had multiple nocturnal episodes of afib rvr.  Last night did not convert until given digoxin 0.125mg iv x 2.  Today remains in NSR  -Discussed with Dr. Lares.  Continue metoprol and agree with addition of oral digoxin  -Keep K > 4 and Mag>2  -Monitor in ICU one more night to ensure stability

## 2025-04-24 NOTE — SUBJECTIVE & OBJECTIVE
Medications:  Continuous Infusions:   dilTIAZem  0-15 mg/hr Intravenous Continuous        NORepinephrine bitartrate-D5W  0-3 mcg/kg/min Intravenous Continuous   Held at 04/21/25 2330     Scheduled Meds:   apixaban  5 mg Oral BID    digoxin  0.125 mg Oral Daily    diltiaZEM  0.35 mg/kg Intravenous Once    gabapentin  400 mg Oral TID    metoprolol tartrate  12.5 mg Oral Q8H    mupirocin   Nasal BID    sodium chloride 0.9%  10 mL Intravenous Q8H     PRN Meds:  Current Facility-Administered Medications:     acetaminophen, 1,000 mg, Oral, Q8H PRN    dextrose 50%, 12.5 g, Intravenous, PRN    dextrose 50%, 25 g, Intravenous, PRN    glucagon (human recombinant), 1 mg, Intramuscular, PRN    glucose, 16 g, Oral, PRN    glucose, 24 g, Oral, PRN    insulin aspart U-100, 0-5 Units, Subcutaneous, QID (AC + HS) PRN    melatonin, 6 mg, Oral, Nightly PRN    naloxone, 0.02 mg, Intravenous, PRN    ondansetron, 4 mg, Oral, Q8H PRN    senna-docusate, 1 tablet, Oral, BID PRN    sodium chloride 0.9%, 10 mL, Intravenous, PRN    Objective:     Vital Signs (Most Recent):  Temp: 98.5 °F (36.9 °C) (04/24/25 0301)  Pulse: 75 (04/24/25 1227)  Resp: 19 (04/24/25 0848)  BP: (!) 108/56 (04/24/25 0601)  SpO2: 97 % (04/24/25 0848) Vital Signs (24h Range):  Temp:  [98 °F (36.7 °C)-98.5 °F (36.9 °C)] 98.5 °F (36.9 °C)  Pulse:  [] 75  Resp:  [15-60] 19  SpO2:  [90 %-99 %] 97 %  BP: ()/(52-69) 108/56     Weight: 63.5 kg (139 lb 15.9 oz)  Body mass index is 24.03 kg/m².       Physical Exam  Vitals and nursing note reviewed.   Constitutional:       Appearance: She is ill-appearing.      Comments: More tired appearing today   Eyes:      Extraocular Movements: Extraocular movements intact.   Pulmonary:      Effort: No respiratory distress.      Comments: NC in place  Neurological:      Mental Status: She is alert.      Comments: More tired today, otherwise awake+alert+conversant            Review of Symptoms        Living Arrangements:  Lives  with family    Psychosocial/Cultural:   See Palliative Psychosocial Note: Yes  - lives with  Samuel. 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 Enjoys singing and being social  - uses walker to get around  **Primary  to Follow**  Palliative Care  Consult: No        Advance Care Planning   Advance Directives:   Do Not Resuscitate Status: Yes    Medical Power of :  Samuel would be next of kin surrogate medical decision maker.      Decision Making:  Patient answered questions  Goals of Care: The patient endorses that what is most important right now is to focus on getting more information and optimization of condition, but with limits to certain invasive interventions.     Accordingly, we have decided that the best plan to meet the patient's goals includes continuing with treatment and discussions.          Significant Labs: reviewed  CBC:   Recent Labs   Lab 04/24/25  0244   WBC 29.79*   HGB 8.5*   HCT 24.8*   MCV 90        BMP:  Recent Labs   Lab 04/24/25  0244   *   K 3.9   CL 96   CO2 24   BUN 17   CREATININE 1.0   CALCIUM 9.1   MG 2.0     LFT:  Lab Results   Component Value Date    AST 29 04/24/2025    ALKPHOS 64 04/24/2025    BILITOT 0.3 04/24/2025     Albumin:   Albumin   Date Value Ref Range Status   04/24/2025 1.5 (L) 3.5 - 5.2 g/dL Final   01/01/2023 2.6 (L) 3.5 - 5.2 g/dL Final     Protein:   Total Protein   Date Value Ref Range Status   01/01/2023 7.1 6.0 - 8.4 g/dL Final     Lactic acid:   Lab Results   Component Value Date    LACTATE 0.7 04/15/2025    LACTATE 1.2 04/14/2025       Significant Imaging: reviewed

## 2025-04-24 NOTE — PROGRESS NOTES
"[facility] - Intensive Care ([facility]  Palliative Medicine  Progress Note    Patient Name: Jojo Ayoub  MRN: [id number]  Admission Date: 4/14/2025  Hospital Length of Stay: 9 days  Code Status: DNR   Attending Provider: Ramon Colon MD  Consulting Provider: Aramis Mcdowell MD  Primary Care Physician: Bahman Vincent Jr., MD  Principal Problem:Mediastinal mass    Patient information was obtained from patient, spouse/SO, relative(s), past medical records, and primary team.      Assessment/Plan:     Pulmonary  Hemoptysis  - noted   - likely in setting of mediastinal mass    Cardiac/Vascular  Atrial fibrillation with RVR  - noted. Had another episode overnight  - reason for current ICU admission  - management/optimization per cardiology/primary team.      Oncology  * Mediastinal mass  - CT chest noted "Large (8.1 x 4.5 cm) subcarinal mass, possible lymphadenopathy.  Additional mediastinal, hilar, and left axillary lymphadenopathy noted. Mild/moderate sized pleural effusions with bibasilar atelectasis. Moderate chronic interstitial/ fibrotic lung changes."  - patient with occasional hemoptysis/cough, but otherwise without any major adverse symptoms at this time  - noted path stating "Benign lymph node with sinus histiocytosis. No evidence of lymphoproliferative disorder or other metastatic malignancies." "Taken together, there is no evidence of lymphoproliferative disorder or other metastatic malignancies. Biopsy of mediastinal mass is recommended." Primary team/pulmonary team made aware  - family to discuss next steps together tonight regarding possibility of further diagnostic efforts (?EBUS), as per their discussion with primary team following path result.    - patient/family aware of significant concern for oncological on rylie regardless of negative LN bx results as above.   - elevated WBC. ? lymphoma  - noted plan for heme/onc consultation per primary team  - patient/family shared no desire for surgery or " chemotherapy, but shared given lack of clarity with diagnosis, unclear to us till definitive diagnosis established if things like immunotherapy/radiation even if palliative in nature would be an option or not, which may be better tolerated/helpful for QOL.   - unfortunate situation given concern for oncological on rylie, but difficulty with establishing definitive diagnosis, during which time, patients condition continues to deteriorate overall. Has had continued functional/nutritional decline fairly quickly this week, which family has noted too. Hopefully with oncology input, there may be better guidance of next step options for patient/family.     Endocrine  Moderate protein-calorie malnutrition  - noted RD following  - noted albumin 1.6  - she feels her appetite is appropriate, but family feels has declined further.     Hyponatremia  - noted  - management per primary team    Orthopedic  Acute on Chronic pain of left knee  - noted hx knee replacement  - noted per orthopedic surgery concern for lateral patellar subluxation left knee with plan for brace    Palliative Care  DNR (do not resuscitate)  - noted as per discussion     Counseling regarding advance care planning and goals of care  4/24/2025:  - chart reviewed in depth  - discussed with primary team, bedside nurse and pccm  - patient seen at bedside along with palliative RN Radha   - patient joined by her   - they remembered us from yesterday  - patient tired, thereby allowed her to rest further  - we made plans to revisit later. She had welcomed discussions with her children.   - we re-visited later in day and were stopped in hallway by her son Guzman   - we went to conference room to talk further. He got his sister Micheal on phone  - they shared concern with her functional decline  - we discussed the concern in depth in setting of her significant medical on rylie   - acknowledged no definitive diagnosis yet, but certainly concerning likely  oncological on rylie which likely play a role in continued decline of condition.   - discussed concern/risk of delirium and what it entails too  - provided emotional support and listening ear. Answered all questions to best of ability  - shortly afterwards they got their brother Renny on the phone too  - updated him regarding conversation with Guzman/Micheal as outlined above  - he had similar questions/concerns which we answered to best of our ability  - after this proceeded back to room and discussed further with patient/ Samuel and Guzman (son). Micheal was on phone for parts of conversation too  - Updated patient/Samuel regarding some of questions/concerns their children had in our conversation  - Patient admits to feeling increasingly weak  - she may be up for trying to sit in bedside chair   - they have awareness of concerns regarding medical on rylie  - patient is aware of oncological concerns despite no definitive diagnosis confirmed as of yet   - /patient shared that regardless of what diagnosis is confirmed, they had discussed no desire for surgery/chemotherapy. Guzman confirmed this and more about the family discussion earlier in week.   - she shared she would not want overly invasive interventions and wishes to avoid prolonged suffering. Family supportive and does not want her to have to go through that either  - we shared the reality/concern that while awaiting hopes for definitive diagnosis, patient may very likely continue to decline as she has been. Shared preciousness of time and thinking through next step decision making, with big picture concern in mind that if this is very well oncological in nature as suspected by the medical teams, pursuing something like inpatient rehab or such may be kaila time away from family. Shared with patient that if at anytime she decided this has been enough, she is in chart of decision of when to go home and there are options for support if ever  decides that. She stated understanding.   - they welcomed oncology input/expertise to help guide next step decision making. Made them aware primary team had placed consult  - discussed code status with patient to which she made clear her wishes for a peaceful/natural passing when her time ultimately comes. Aligning with DNR/DNI.   - Micaela joined via phone for part of conversation   - provided emotional support and listening ear   - answered all questions to best of ability   - updated primary team/pccm extensively  - of note: path returned later after visit, as noted in mediastinal mass section of note. Primary team updated them on this front. Family to discuss further as a family and with pulm and possibly onc to guide next step decision making regarding pursuit of further diagnostic efforts such as EBUS.   [] onc input pending  [] we will continue to check in. ? Next step decision regarding further diagnostic efforts.   []  Samuel is default next of kin surrogate medical decision maker. Very well supported by their children. We can see how much love they all have for one another.   [] DNR/DNI as per patient wishes    Other  Debility  - noted use of walker prior to admission  - continued functional decline given prolonged hospitalization and concerning medastinal mass possible playing a role too.   - extensive conversations with patient/family on this front, exploring the various causes of her functional decline in setting of her severe medical on rylie.   - PT/OT following. Nursing to assist patient with sitting in bedside chair today if she is up for it.         I will follow-up with patient. Please contact us if you have any additional questions.    Subjective:     Chief Complaint:   Chief Complaint   Patient presents with    Leg Swelling     BL LE edema, also reporting L knee pain and hypermobility.     Hypotension     BP today 90/55. In /62. Pt denies dizziness of fatigue. AAO x4.       HPI:  "  Per HPI: "Ms. Jojo Ayoub is a 89 y.o. female, with PMH of T2DM, HTN, HLD, chronic constipation, OA w/ chronic left knee pain, anemia, who [place] [facility] [place] due to b/l LE swelling x "a few weeks." Her daughter syayes the leg swelling was first noted by her today. She additionally notes low blood pressure readings of 90/55 at home today. She endorses left knee pain, and states she feels like her left knee "dislocate" a few nights ago. She states she had a knee replacement of her left knee over 20 years ago. She denied fall/trauma, leg pain or redness or warmth. She denied dizziness, fatigue, fever, chills, sore throat, runny nose, congestion, shortness of breath, chest pain, palpitations, abdominal pain, nausea, vomiting, diarrhea, or urinary symptoms. She was evaluated in the ED with labs showing leukocytosis of 19K, with left shift and H&H of 9.9/28.0. A metabolic panel showed sodium of 121, potassium of 5.2, glucose was 94, with CO2 of 19, and anion gap of 7. A BNP was 181 without elevation of troponin. A UA showed nitrite positive UTI with 2+ leuk esterase, with 20 WBC, and many bacteria. A CXR showed bilateral hilar prominence, and mild bibasilar densities. An x-ray of the knee had a findings that might reporesent a Pellegrinin-Stieda lesion vs. An avulsive fracture and a suprapatellar effusion. She was treated in the ED with 1L NS and rocephin."     Overview/Hospital Course:  "Patient is a 89 year-old woman history of hypertension, diabetes mellitus type 2, osteoarthritis of left knee status post remote left knee replacement who presents with worsening left knee pain with swelling.  Patient walks with walker at home at baseline.  She reports she recently slid to ground from a chair at home a few weeks but denies any more recent fall or any other trauma. Exam notable for significant bilateral lower extremity edema more so on the left side.  Laxity of the left knee.  Not tender to touch. " "Lungs clear.  Imaging concerning for possible avulsion fracture.  Orthopedic surgery service consult for evaluation and recommended Adamaris brace and physical therapy. Ultrasound of lower extremity negative for clot.  Echocardiogram does not show evidence of heart failure.  Hyponatremia on presentation which is improving without intervention. Patient with persistent elevated white count and now fever overnight.  Blood cultures ordered.  On empiric broad-spectrum antibiotics."    Palliative medicine consulted for assistance with ACP and support     Hospital Course:  No notes on file    Medications:  Continuous Infusions:   dilTIAZem  0-15 mg/hr Intravenous Continuous        NORepinephrine bitartrate-D5W  0-3 mcg/kg/min Intravenous Continuous   Held at 04/21/25 2330     Scheduled Meds:   apixaban  5 mg Oral BID    digoxin  0.125 mg Oral Daily    diltiaZEM  0.35 mg/kg Intravenous Once    gabapentin  400 mg Oral TID    metoprolol tartrate  12.5 mg Oral Q8H    mupirocin   Nasal BID    sodium chloride 0.9%  10 mL Intravenous Q8H     PRN Meds:  Current Facility-Administered Medications:     acetaminophen, 1,000 mg, Oral, Q8H PRN    dextrose 50%, 12.5 g, Intravenous, PRN    dextrose 50%, 25 g, Intravenous, PRN    glucagon (human recombinant), 1 mg, Intramuscular, PRN    glucose, 16 g, Oral, PRN    glucose, 24 g, Oral, PRN    insulin aspart U-100, 0-5 Units, Subcutaneous, QID (AC + HS) PRN    melatonin, 6 mg, Oral, Nightly PRN    naloxone, 0.02 mg, Intravenous, PRN    ondansetron, 4 mg, Oral, Q8H PRN    senna-docusate, 1 tablet, Oral, BID PRN    sodium chloride 0.9%, 10 mL, Intravenous, PRN    Objective:     Vital Signs (Most Recent):  Temp: 98.5 °F (36.9 °C) (04/24/25 0301)  Pulse: 75 (04/24/25 1227)  Resp: 19 (04/24/25 0848)  BP: (!) 108/56 (04/24/25 0601)  SpO2: 97 % (04/24/25 0848) Vital Signs (24h Range):  Temp:  [98 °F (36.7 °C)-98.5 °F (36.9 °C)] 98.5 °F (36.9 °C)  Pulse:  [] 75  Resp:  [15-60] 19  SpO2:  [90 " %-99 %] 97 %  BP: ()/(52-69) 108/56     Weight: 63.5 kg (139 lb 15.9 oz)  Body mass index is 24.03 kg/m².       Physical Exam  Vitals and nursing note reviewed.   Constitutional:       Appearance: She is ill-appearing.      Comments: More tired appearing today   Eyes:      Extraocular Movements: Extraocular movements intact.   Pulmonary:      Effort: No respiratory distress.      Comments: NC in place  Neurological:      Mental Status: She is alert.      Comments: More tired today, otherwise awake+alert+conversant            Review of Symptoms        Living Arrangements:  Lives with family    Psychosocial/Cultural:   See Palliative Psychosocial Note: Yes  - lives with  Samuel. 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 Enjoys singing and being social  - uses walker to get around  **Primary  to Follow**  Palliative Care  Consult: No        Advance Care Planning  Advance Directives:   Do Not Resuscitate Status: Yes    Medical Power of :  Samuel would be next of kin surrogate medical decision maker.      Decision Making:  Patient answered questions  Goals of Care: The patient endorses that what is most important right now is to focus on getting more information and optimization of condition, but with limits to certain invasive interventions.     Accordingly, we have decided that the best plan to meet the patient's goals includes continuing with treatment and discussions.          Significant Labs: reviewed  CBC:   Recent Labs   Lab 04/24/25 0244   WBC 29.79*   HGB 8.5*   HCT 24.8*   MCV 90        BMP:  Recent Labs   Lab 04/24/25  0244   *   K 3.9   CL 96   CO2 24   BUN 17   CREATININE 1.0   CALCIUM 9.1   MG 2.0     LFT:  Lab Results   Component Value Date    AST 29 04/24/2025    ALKPHOS 64 04/24/2025    BILITOT 0.3 04/24/2025     Albumin:   Albumin    Date Value Ref Range Status   04/24/2025 1.5 (L) 3.5 - 5.2 g/dL Final   01/01/2023 2.6 (L) 3.5 - 5.2 g/dL Final     Protein:   Total Protein   Date Value Ref Range Status   01/01/2023 7.1 6.0 - 8.4 g/dL Final     Lactic acid:   Lab Results   Component Value Date    LACTATE 0.7 04/15/2025    LACTATE 1.2 04/14/2025       Significant Imaging: reviewed    35 minutes face to face time in discussion of symptom assessment, coordination of care, exploring burden/ benefit of various approaches of treatment and discharge planning.  This also includes non-face to face time preparing to see the patient (eg, review of tests/imaging), obtaining and/or reviewing separately obtained history, documenting clinical information in the electronic or other health record, independently interpreting results and communicating results to the patient/family/caregiver, or care coordinator.     80 minutes ACP time spent: goals of care, emotional support, formulating goals and communication of prognosis      Aramis Mcdowell MD  Palliative Medicine  [facility] Intensive [facility] ([place])

## 2025-04-24 NOTE — PROGRESS NOTES
"[facility] Intensive Care [facility]  Progress Note    Patient Name: Jojo Ayoub  MRN: [id number]  Patient Class: IP- Inpatient   Admission Date: 4/14/2025  Length of Stay: 9 days  Attending Physician: Ramon Colon MD  Primary Care Provider: Bahman Vincent Jr., MD        Subjective     Principal Problem:Mediastinal mass        HPI:  Per Jo Danielle, PA-C:    "Ms. Jojo Ayoub is a 89 y.o. female, with PMH of T2DM, HTN, HLD, chronic constipation, OA w/ chronic left knee pain, anemia, who presented to [facility] ED on 4/15/25 due to b/l LE swelling x "a few weeks." Her daughter syayes the leg swelling was first noted by her today. She additionally notes low blood pressure readings of 90/55 at home today. She endorses left knee pain, and states she feels like her left knee "dislocate" a few nights ago. She states she had a knee replacement of her left knee over 20 years ago. She denied fall/trauma, leg pain or redness or warmth. She denied dizziness, fatigue, fever, chills, sore throat, runny nose, congestion, shortness of breath, chest pain, palpitations, abdominal pain, nausea, vomiting, diarrhea, or urinary symptoms. She was evaluated in the ED with labs showing leukocytosis of 19K, with left shift and H&H of 9.9/28.0. A metabolic panel showed sodium of 121, potassium of 5.2, glucose was 94, with CO2 of 19, and anion gap of 7. A BNP was 181 without elevation of troponin. A UA showed nitrite positive UTI with 2+ leuk esterase, with 20 WBC, and many bacteria. A CXR showed bilateral hilar prominence, and mild bibasilar densities. An x-ray of the knee had a findings that might reporesent a Pellegrinin-Stieda lesion vs. An avulsive fracture and a suprapatellar effusion. She was treated in the ED with 1L NS and rocephin."    Overview/Hospital Course:  Patient is a 89 year-old woman history of hypertension, diabetes mellitus type 2, osteoarthritis of left knee status post remote left knee " replacement who presents with worsening left knee pain with swelling.  Patient walks with walker at home at baseline.  She reports she recently slid to ground from a chair at home a few weeks but denies any more recent fall or any other trauma.     Exam notable for significant bilateral lower extremity edema more so on the left side.  Laxity of the left knee.  Not tender to touch. Lungs clear.  Imaging concerning for possible avulsion fracture.  Orthopedic surgery service consult for evaluation and recommended Adamaris brace and physical therapy.    Ultrasound of lower extremity negative for clot.  Echocardiogram does not show evidence of heart failure.  Hyponatremia on presentation which is improving without intervention.    Patient with persistent elevated white count and now fever overnight.  Blood cultures ordered.  On empiric broad-spectrum antibiotics.    Interval History: Again had episode of afib rvr, but not responsive to metoprolol.  After iv digoxin 0.25mg x 1 she has converted to NSR.  Appears frail, but upbeat.  Denies pain and sob.   at bedside.  All questions answered and patient had no further complaints.    Objective:     Vital Signs (Most Recent):  Temp: 98.5 °F (36.9 °C) (04/24/25 0301)  Pulse: 75 (04/24/25 1227)  Resp: 19 (04/24/25 0848)  BP: (!) 108/56 (04/24/25 0601)  SpO2: 97 % (04/24/25 0848) Vital Signs (24h Range):  Temp:  [98 °F (36.7 °C)-98.5 °F (36.9 °C)] 98.5 °F (36.9 °C)  Pulse:  [] 75  Resp:  [15-60] 19  SpO2:  [90 %-99 %] 97 %  BP: ()/(52-69) 108/56     Weight: 63.5 kg (139 lb 15.9 oz)  Body mass index is 24.03 kg/m².    Intake/Output Summary (Last 24 hours) at 4/24/2025 1255  Last data filed at 4/24/2025 0811  Gross per 24 hour   Intake 94.5 ml   Output 950 ml   Net -855.5 ml         Physical Exam  Constitutional:       General: She is not in acute distress.     Appearance: She is ill-appearing. She is not diaphoretic.   HENT:      Head: Atraumatic.       Mouth/Throat:      Mouth: Mucous membranes are moist.   Eyes:      Extraocular Movements: Extraocular movements intact.   Cardiovascular:      Rate and Rhythm: Normal rate and regular rhythm.      Heart sounds: Normal heart sounds. No murmur heard.  Pulmonary:      Effort: Pulmonary effort is normal.      Breath sounds: No wheezing or rhonchi.   Abdominal:      General: Bowel sounds are normal. There is no distension.      Palpations: Abdomen is soft.      Tenderness: There is no abdominal tenderness.   Musculoskeletal:         General: No deformity. Normal range of motion.      Cervical back: Normal range of motion.      Comments: Left knee not tender, swelling around the left knee and bilateral lower extremities much improved.   Skin:     General: Skin is warm.      Comments: Left axillary incision appears c/d/i   Neurological:      Mental Status: She is alert and oriented to person, place, and time.      Comments: Significant diffuse weakness           Significant Labs: All pertinent labs within the past 24 hours have been reviewed.    Significant Imaging: I have reviewed all pertinent imaging results/findings within the past 24 hours.      Assessment & Plan  Mediastinal mass  SIRS (systemic inflammatory response syndrome)  Hemoptysis  -Admitted to inpatient status  Presented with leg swelling and on admit had elevated white blood cell count and subsequently fever.  Procal negative.  -UA suggestive of possible UTI but without urinary symptoms.    -Left knee with acute on chronic pain but not significantly tender and with good range of motion.    -Earlier in stay noted URI symptoms and reports of scant hemoptysis and cough.    -CT Chest with contrast 4/17 showed a large (8.1 x 4.5 cm) subcarinal mass, possible lymphadenopathy.  Additional mediastinal, hilar, and left axillary lymphadenopathy noted. Mild/moderate sized pleural effusions with bibasilar atelectasis.  Moderate chronic interstitial/ fibrotic lung  changes.  -Blood cultures negative.  Urine cultures with no predominant organism  -Pulmonology and general surgery consulted and input appreciated.    -Pulm attempted thoracentesis 4/18 but not able to obtain fluid.  -Taken by surgery to OR on 4/20 for excisional LN biopsy.  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 -- No evidence of lymphoproliferative disorder or other metastatic malignancies.   -Per discussion swith pulmonology, next step for non-diagnostic LN biopsy would be EGD with EUS.  Discussed with AES at [facility] (Dr. Ramon Espana) and he reports that if path non-diagnostic to secure chat him and they can usually get EGD/EUS scheduled within a week as outpatient - alternatively may be able to as inpatient but would require transfer to [facility]).  Discussed this with her daughter Micheal today - who is concerned patient may not be strong enough to go through this procedure.  She will discuss with family tonight and are hoping to speak with pulmonology team tomorrow.  -Remains afebrile and today WBC has improved to 29k.  No evidence of infection at this time.  Noted ongoing workup by pulmonology - flow cytometry, SPEP, free light chains, immunoglobulins, chet, rf, ccp  -Will consult hematology - oncology to discuss with family.  Noted plans for IPR at discharge, however palliative feels that may be a waste of kaila time family could be spending together.  Might hospice be appropriate?  Paroxysmal atrial fibrillation  Atrial fibrillation with RVR  -Noted history of pAFIB  -Had several episodes of short live self converting afib rvr earlier in stay  -Overnight 4/21-4/22 again developed afib rvr resistent to metoprolol so transferred to ICU  -Cardiology consulted and input appreciated.  Converted to NSR after 1 dose of IV digoxin.  Required levophed transiently but bp now elevated a bit off pressors.  -Echo 4/14/25 showed normal EF and no severe valvular lesions  -Discussed with  general surgery and ok from their end and started eliquis 5mg bid on 4/22.  -Has had multiple nocturnal episodes of afib rvr.  Last night did not convert until given digoxin 0.125mg iv x 2.  Today remains in NSR  -Discussed with Dr. Lares.  Continue metoprol and agree with addition of oral digoxin  -Keep K > 4 and Mag>2  -Monitor in ICU one more night to ensure stability  Hyponatremia  -On admit Na 121 and now is stable at 127  -Noted history of hyponatremia.    -TSH wnl.  Coristol speaks against adrenal insuffiencey.    Hypervolemic initially on exam however swelling seems serum improved on its own - as did sodium.    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    -Continue fluid restriction  -Repeat BMP in AM  Acute on Chronic pain of left knee  Lower extremity edema  Debility  -History noted and had some leg swelling which has improved  -Doppler US of leg did not show any DVT.  -Orthopedic surgery consulted and input appreciated  -Continue brace and therapy - wound care says ok to use her brace we just need to keep the area padded to protect the skin.  -Follow-up with Dr. Malagon in outpatient setting after discharge  -PT/OT consulted and recommend high intensity therapy at discharge.  Type 2 diabetes mellitus, without long-term current use of insulin  -A1c 5.7 now  -Home regimen includes metformin - held during hospitalization  -Blood sugars are reasonably controlled  -Continue SSI ac/hs  Hyperlipidemia  -Continue statin   Essential hypertension  -BP normal to mildly elevated after having been transiently hypotensive at times  -Adjusted metoprolol dosing for better rate control as above.  Moderate protein-calorie malnutrition  -Nutrition consulted. Most recent weight and BMI monitored-   -Measurements:  Wt Readings from Last 1 Encounters:   04/22/25 63.5 kg (139 lb 15.9 oz)   Body mass index is 24.03 kg/m².  -Patient has been screened and assessed by  RD.  -Interventions/Recommendations (treatment strategy):  - Boost Glucose Control 1x daily - Continue consistent carbohydrate diet - Encourage intake of meals - Monitor weight/labs - RD to monitor and follow up  Counseling regarding advance care planning and goals of care  DNR (do not resuscitate)  -Consulted palliative care to initiate ACP discussions and input appreciated  -Patient has elected for DNR status.  -Family awaits meeting with heme/onc to discuss diagnostic efforts and suspicion of lymphoma.  Patient and family have stated she would not want any surgery or chemotherapy - but perhaps immunotherapy or radiation may be helpful and well tolerated.  -I agree with palliative care that spending kaila time in inpatient rehab may be wasted time that they could be spending together.  Difficult situation  VTE Risk Mitigation (From admission, onward)           Ordered     apixaban tablet 5 mg  2 times daily         04/22/25 1511     IP VTE HIGH RISK PATIENT  Once         04/17/25 1115     Place LIANNE hose  Until discontinued         04/17/25 1115     Place sequential compression device  Until discontinued         04/17/25 1115     Reason for No Pharmacological VTE Prophylaxis  Once        Question:  Reasons:  Answer:  Active Bleeding    04/17/25 1115                    Discharge Planning   FAY: 4/24/2025     Code Status: DNR   Medical Readiness for Discharge Date:   Discharge Plan A: Rehab (Pending referrral with [facility])   Discharge Delays: None known at this time            Please place Justification for DME        Ramon Colon MD  Department of Hospital Medicine   [facility] [place], operated by [facility] ([place])

## 2025-04-24 NOTE — ASSESSMENT & PLAN NOTE
-Admitted to inpatient status  Presented with leg swelling and on admit had elevated white blood cell count and subsequently fever.  Procal negative.  -UA suggestive of possible UTI but without urinary symptoms.    -Left knee with acute on chronic pain but not significantly tender and with good range of motion.    -Earlier in stay noted URI symptoms and reports of scant hemoptysis and cough.    -CT Chest with contrast 4/17 showed a large (8.1 x 4.5 cm) subcarinal mass, possible lymphadenopathy.  Additional mediastinal, hilar, and left axillary lymphadenopathy noted. Mild/moderate sized pleural effusions with bibasilar atelectasis.  Moderate chronic interstitial/ fibrotic lung changes.  -Blood cultures negative.  Urine cultures with no predominant organism  -Pulmonology and general surgery consulted and input appreciated.    -Pulm attempted thoracentesis 4/18 but not able to obtain fluid.  -Taken by surgery to OR on 4/20 for excisional LN biopsy.  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 -- No evidence of lymphoproliferative disorder or other metastatic malignancies.   -Per discussion swith pulmonology, next step for non-diagnostic LN biopsy would be EGD with EUS.  Discussed with [facility] at [facility] ([place] Ramon Espana) and he reports that if path non-diagnostic to secure chat him and they can usually get EGD/EUS scheduled within a week as outpatient - alternatively may be able to as inpatient but would require [place] to [facility]).  Discussed this with her daughter Micheal today - who is concerned patient may not be strong enough to go through this procedure.  She will discuss with family tonight and are hoping to speak with pulmonology team tomorrow.  -Remains afebrile and today WBC has improved to 29k.  No evidence of infection at this time.  Noted ongoing workup by pulmonology - flow cytometry, SPEP, free light chains, immunoglobulins, chet, rf, ccp  -Will consult hematology - oncology to  discuss with family.  Noted plans for IPR at discharge, however palliative feels that may be a waste of kaila time family could be spending together.  Might hospice be appropriate?

## 2025-04-24 NOTE — EICU
Intervention Initiated From:  COR / DIVINEU    Mc intervened regarding:  Rounding (Video assessment)    VICU Night Rounds Checklist  24H Vital Sign Range:  Temp:  [98.3 °F (36.8 °C)-98.4 °F (36.9 °C)]   Pulse:  []   Resp:  [15-60]   BP: ()/(52-69)   SpO2:  [91 %-100 %]     Video rounds and LDA reconciliation

## 2025-04-24 NOTE — ASSESSMENT & PLAN NOTE
- noted RD following  - noted albumin 1.6  - she feels her appetite is appropriate, but family feels has declined further.

## 2025-04-24 NOTE — ASSESSMENT & PLAN NOTE
- noted use of walker prior to admission  - continued functional decline given prolonged hospitalization and concerning medastinal mass possible playing a role too.   - extensive conversations with patient/family on this front, exploring the various causes of her functional decline in setting of her severe medical on rylie.   - PT/OT following. Nursing to assist patient with sitting in bedside chair today.

## 2025-04-24 NOTE — PLAN OF CARE
Problem: Adult Inpatient Plan of Care  Goal: Absence of Hospital-Acquired Illness or Injury  Outcome: Progressing  Intervention: Identify and Manage Fall Risk  Flowsheets (Taken 4/24/2025 1817)  Safety Promotion/Fall Prevention:   assistive device/personal item within reach   bed alarm set   pulse ox   lighting adjusted   medications reviewed   instructed to call staff for mobility  Intervention: Prevent Skin Injury  Flowsheets (Taken 4/24/2025 1817)  Body Position:   left   right   weight shifting  Skin Protection:   incontinence pads utilized   transparent dressing maintained  Device Skin Pressure Protection:   pressure points protected   absorbent pad utilized/changed  Intervention: Prevent and Manage VTE (Venous Thromboembolism) Risk  Flowsheets (Taken 4/24/2025 1817)  VTE Prevention/Management: ROM (active) performed  Intervention: Prevent Infection  Flowsheets (Taken 4/24/2025 1817)  Infection Prevention:   environmental surveillance performed   equipment surfaces disinfected   hand hygiene promoted   visitors restricted/screened   single patient room provided   rest/sleep promoted   personal protective equipment utilized

## 2025-04-24 NOTE — ASSESSMENT & PLAN NOTE
-Admitted to inpatient status  Presented with leg swelling and on admit had elevated white blood cell count and subsequently fever.  Procal negative.  -UA suggestive of possible UTI but without urinary symptoms.    -Left knee with acute on chronic pain but not significantly tender and with good range of motion.    -Earlier in stay noted URI symptoms and reports of scant hemoptysis and cough.    -CT Chest with contrast 4/17 showed a large (8.1 x 4.5 cm) subcarinal mass, possible lymphadenopathy.  Additional mediastinal, hilar, and left axillary lymphadenopathy noted. Mild/moderate sized pleural effusions with bibasilar atelectasis.  Moderate chronic interstitial/ fibrotic lung changes.  -Blood cultures negative.  Urine cultures with no predominant organism  -Pulmonology and general surgery consulted and input appreciated.    -Pulm attempted thoracentesis 4/18 but not able to obtain fluid.  -Taken by surgery to OR on 4/20 for excisional LN biopsy.  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 -- No evidence of lymphoproliferative disorder or other metastatic malignancies.   -Per discussion swith pulmonology, next step for non-diagnostic LN biopsy would be EGD with EUS.  Discussed with AES at [facility] (Dr. Ramon Espana) and he reports that if path non-diagnostic to secure chat him and they can usually get EGD/EUS scheduled within a week as outpatient - alternatively may be able to as inpatient but would require transfer to [facility]).  Discussed this with her daughter Micheal today - who is concerned patient may not be strong enough to go through this procedure.  She will discuss with family tonight and are hoping to speak with pulmonology team tomorrow.  -Remains afebrile and today WBC has improved to 29k.  No evidence of infection at this time.  Noted ongoing workup by pulmonology - flow cytometry, SPEP, free light chains, immunoglobulins, chet, rf, ccp  -Will consult hematology - oncology to  discuss with family.  Noted plans for IPR at discharge, however palliative feels that may be a waste of kaila time family could be spending together.  Might hospice be appropriate?

## 2025-04-24 NOTE — ASSESSMENT & PLAN NOTE
- noted. Had another episode overnight  - reason for current ICU admission  - management/optimization per cardiology/primary team.

## 2025-04-24 NOTE — EICU
Virtual ICU Quality Rounds    Admit Date: 4/14/2025  Hospital Day: 9    ICU Day: 2d 11h    24H Vital Sign Range:  Temp:  [98.3 °F (36.8 °C)-98.4 °F (36.9 °C)]   Pulse:  []   Resp:  [15-60]   BP: ()/(52-69)   SpO2:  [91 %-100 %]     VICU Surveillance Screening

## 2025-04-24 NOTE — ASSESSMENT & PLAN NOTE
-Consulted palliative care to initiate ACP discussions and input appreciated  -Patient has elected for DNR status.  -Family awaits meeting with heme/onc to discuss diagnostic efforts and suspicion of lymphoma.  Patient and family have stated she would not want any surgery or chemotherapy - but perhaps immunotherapy or radiation may be helpful and well tolerated.  -I agree with palliative care that spending kaila time in inpatient rehab may be wasted time that they could be spending together.  Difficult situation

## 2025-04-24 NOTE — ASSESSMENT & PLAN NOTE
"- CT chest noted "Large (8.1 x 4.5 cm) subcarinal mass, possible lymphadenopathy.  Additional mediastinal, hilar, and left axillary lymphadenopathy noted. Mild/moderate sized pleural effusions with bibasilar atelectasis. Moderate chronic interstitial/ fibrotic lung changes."  - patient with occasional hemoptysis/cough, but otherwise without any major adverse symptoms at this time  - noted path stating "Benign lymph node with sinus histiocytosis. No evidence of lymphoproliferative disorder or other metastatic malignancies." "Taken together, there is no evidence of lymphoproliferative disorder or other metastatic malignancies. Biopsy of mediastinal mass is recommended." Primary team/pulmonary team made aware  - family to discuss next steps together tonight regarding possibility of further diagnostic efforts (?EBUS), as per their discussion with primary team following path result.    - patient/family aware of significant concern for oncological on rylie regardless of negative LN bx results as above.   - elevated WBC. ? lymphoma  - noted plan for heme/onc consultation per primary team  - patient/family shared no desire for surgery or chemotherapy, but shared given lack of clarity with diagnosis, unclear to us till definitive diagnosis established if things like immunotherapy/radiation even if palliative in nature would be an option or not, which may be better tolerated/helpful for QOL.   - unfortunate situation given concern for oncological on rylie, but difficulty with establishing definitive diagnosis, during which time, patients condition continues to deteriorate overall. Has had continued functional/nutritional decline fairly quickly this week, which family has noted too. Hopefully with oncology input, there may be better guidance of next step options for patient/family.   "

## 2025-04-24 NOTE — ASSESSMENT & PLAN NOTE
-Admitted to inpatient status  Presented with leg swelling and on admit had elevated white blood cell count and subsequently fever.  Procal negative.  -UA suggestive of possible UTI but without urinary symptoms.    -Left knee with acute on chronic pain but not significantly tender and with good range of motion.    -Earlier in stay noted URI symptoms and reports of scant hemoptysis and cough.    -CT Chest with contrast 4/17 showed a large (8.1 x 4.5 cm) subcarinal mass, possible lymphadenopathy.  Additional mediastinal, hilar, and left axillary lymphadenopathy noted. Mild/moderate sized pleural effusions with bibasilar atelectasis.  Moderate chronic interstitial/ fibrotic lung changes.  -Blood cultures negative.  Urine cultures with no predominant organism  -Pulmonology and general surgery consulted and input appreciated.    -Pulm attempted thoracentesis 4/18 but not able to obtain fluid.  -Taken by surgery to OR on 4/20 for excisional LN biopsy.  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 -- No evidence of lymphoproliferative disorder or other metastatic malignancies.   -Per discussion swith pulmonology, next step for non-diagnostic LN biopsy would be EGD with EUS.  Discussed with AES at [facility] (Dr. Ramon Espana) and he reports that if path non-diagnostic to secure chat him and they can usually get EGD/EUS scheduled within a week as outpatient - alternatively may be able to as inpatient but would require transfer to [facility]).  Discussed this with her daughter Micheal today - who is concerned patient may not be strong enough to go through this procedure.  She will discuss with family tonight and are hoping to speak with pulmonology team tomorrow.  -Remains afebrile and today WBC has improved to 29k.  No evidence of infection at this time.  Noted ongoing workup by pulmonology - flow cytometry, SPEP, free light chains, immunoglobulins, chet, rf, ccp  -Will consult hematology - oncology to  discuss with family.  Noted plans for IPR at discharge, however palliative feels that may be a waste of kaila time family could be spending together.  Might hospice be appropriate?

## 2025-04-24 NOTE — SUBJECTIVE & OBJECTIVE
Interval History: Again had episode of afib rvr, but not responsive to metoprolol.  After iv digoxin 0.25mg x 1 she has converted to NSR.  Appears frail, but upbeat.  Denies pain and sob.   at bedside.  All questions answered and patient had no further complaints.    Objective:     Vital Signs (Most Recent):  Temp: 98.5 °F (36.9 °C) (04/24/25 0301)  Pulse: 75 (04/24/25 1227)  Resp: 19 (04/24/25 0848)  BP: (!) 108/56 (04/24/25 0601)  SpO2: 97 % (04/24/25 0848) Vital Signs (24h Range):  Temp:  [98 °F (36.7 °C)-98.5 °F (36.9 °C)] 98.5 °F (36.9 °C)  Pulse:  [] 75  Resp:  [15-60] 19  SpO2:  [90 %-99 %] 97 %  BP: ()/(52-69) 108/56     Weight: 63.5 kg (139 lb 15.9 oz)  Body mass index is 24.03 kg/m².    Intake/Output Summary (Last 24 hours) at 4/24/2025 1255  Last data filed at 4/24/2025 0811  Gross per 24 hour   Intake 94.5 ml   Output 950 ml   Net -855.5 ml         Physical Exam  Constitutional:       General: She is not in acute distress.     Appearance: She is ill-appearing. She is not diaphoretic.   HENT:      Head: Atraumatic.      Mouth/Throat:      Mouth: Mucous membranes are moist.   Eyes:      Extraocular Movements: Extraocular movements intact.   Cardiovascular:      Rate and Rhythm: Normal rate and regular rhythm.      Heart sounds: Normal heart sounds. No murmur heard.  Pulmonary:      Effort: Pulmonary effort is normal.      Breath sounds: No wheezing or rhonchi.   Abdominal:      General: Bowel sounds are normal. There is no distension.      Palpations: Abdomen is soft.      Tenderness: There is no abdominal tenderness.   Musculoskeletal:         General: No deformity. Normal range of motion.      Cervical back: Normal range of motion.      Comments: Left knee not tender, swelling around the left knee and bilateral lower extremities much improved.   Skin:     General: Skin is warm.      Comments: Left axillary incision appears c/d/i   Neurological:      Mental Status: She is alert and  oriented to person, place, and time.      Comments: Significant diffuse weakness           Significant Labs: All pertinent labs within the past 24 hours have been reviewed.    Significant Imaging: I have reviewed all pertinent imaging results/findings within the past 24 hours.

## 2025-04-24 NOTE — ASSESSMENT & PLAN NOTE
-BP normal to mildly elevated after having been transiently hypotensive at times  -Adjusted metoprolol dosing for better rate control as above.

## 2025-04-24 NOTE — ASSESSMENT & PLAN NOTE
-On admit Na 121 and now is stable at 127  -Noted history of hyponatremia.    -TSH wnl.  Coristol speaks against adrenal insuffiencey.    Hypervolemic initially on exam however swelling seems serum improved on its own - as did sodium.    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    -Continue fluid restriction  -Repeat BMP in AM

## 2025-04-24 NOTE — PT/OT/SLP PROGRESS
"Physical Therapy   Patient Not Seen    Jojo Ayoub  MRN: [id number]    Attempted to see patient today for PT; however, pt refusing at this time stating "maybe awhile later". Will check back at a later date/time as schedule allows. Cont per PT POC    "

## 2025-04-24 NOTE — ASSESSMENT & PLAN NOTE
4/24/2025:  - chart reviewed in depth  - discussed with primary team, bedside nurse and pccm  - patient seen at bedside along with palliative RN Radha   - patient joined by her   - they remembered us from yesterday  - patient tired, thereby allowed her to rest further  - we made plans to revisit later. She had welcomed discussions with her children.   - we re-visited later in day and were stopped in hallway by her son Guzman   - we went to conference room to talk further. 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   - discussed concern/risk of delirium and what it entails too  - provided emotional support and listening ear. Answered all questions to best of ability  - shortly afterwards they got their brother Renny on the phone too  - updated him regarding conversation with Guzman/Saeidozzie as outlined above  - he had similar questions/concerns which we answered to best of our ability  - after this proceeded back to room and discussed further with patient/ Samuel and Guzman (son). Micheal was on phone for parts of conversation too  - Updated patient/Samuel regarding some of questions/concerns their children had in our conversation  - Patient admits to feeling increasingly weak  - she may be up for trying to sit in bedside chair   - they have awareness of concerns regarding medical on rylie  - patient is aware of oncological concerns despite no definitive diagnosis confirmed as of yet   - /patient shared that regardless of what diagnosis is confirmed, they had discussed no desire for surgery/chemotherapy. Guzman confirmed this and more about the family discussion earlier in week.   - she shared she would not want  overly invasive interventions and wishes to avoid prolonged suffering. Family supportive and does not want her to have to go through that either  - we shared the reality/concern that while awaiting hopes for definitive diagnosis, patient may very likely continue to decline as she has been. Shared preciousness of time and thinking through next step decision making, with big picture concern in mind that if this is very well oncological in nature as suspected by the medical teams, pursuing something like inpatient rehab or such may be kaila time away from family. Shared with patient that if at anytime she decided this has been enough, she is in chart of decision of when to go home and there are options for support if ever decides that. She stated understanding.   - they welcomed oncology input/expertise to help guide next step decision making. Made them aware primary team had placed consult  - discussed code status with patient to which she made clear her wishes for a peaceful/natural passing when her time ultimately comes. Aligning with DNR/DNI.   - Micaela joined via phone for part of conversation   - provided emotional support and listening ear   - answered all questions to best of ability   - updated primary team/pccm  - of note: path returned later after visit, as noted in mediastinal mass section of note. Primary team updated them on this front. Family to discuss further as a family and with pulm and possibly onc to guide next step decision making regarding pursuit of further diagnostic efforts such as EBUS.   [] onc input pending  [] we will continue to check in  []  Samuel is default next of kin surrogate medical decision maker. Very well supported by their children.   [] DNR/DNI as per patient wishes

## 2025-04-25 ENCOUNTER — TELEPHONE (OUTPATIENT)
Dept: PULMONOLOGY | Facility: CLINIC | Age: 89
End: 2025-04-25
Payer: MEDICARE

## 2025-04-25 LAB
ABSOLUTE EOSINOPHIL (OHS): 0.05 K/UL
ABSOLUTE MONOCYTE (OHS): 1.2 K/UL (ref 0.3–1)
ABSOLUTE NEUTROPHIL COUNT (OHS): 29.54 K/UL (ref 1.8–7.7)
ALBUMIN SERPL BCP-MCNC: 1.4 G/DL (ref 3.5–5.2)
ALP SERPL-CCNC: 61 UNIT/L (ref 40–150)
ALT SERPL W/O P-5'-P-CCNC: 16 UNIT/L (ref 10–44)
ANION GAP (OHS): 5 MMOL/L (ref 8–16)
ANISOCYTOSIS BLD QL SMEAR: SLIGHT
AST SERPL-CCNC: 33 UNIT/L (ref 11–45)
BASOPHILS # BLD AUTO: 0.06 K/UL
BASOPHILS NFR BLD AUTO: 0.2 %
BILIRUB SERPL-MCNC: 0.3 MG/DL (ref 0.1–1)
BUN SERPL-MCNC: 18 MG/DL (ref 8–23)
CALCIUM SERPL-MCNC: 9.3 MG/DL (ref 8.7–10.5)
CHLORIDE SERPL-SCNC: 99 MMOL/L (ref 95–110)
CO2 SERPL-SCNC: 24 MMOL/L (ref 23–29)
CREAT SERPL-MCNC: 1 MG/DL (ref 0.5–1.4)
ERYTHROCYTE [DISTWIDTH] IN BLOOD BY AUTOMATED COUNT: 14.6 % (ref 11.5–14.5)
GFR SERPLBLD CREATININE-BSD FMLA CKD-EPI: 55 ML/MIN/1.73/M2
GLUCOSE SERPL-MCNC: 98 MG/DL (ref 70–110)
HCT VFR BLD AUTO: 24.5 % (ref 37–48.5)
HGB BLD-MCNC: 8.5 GM/DL (ref 12–16)
HYPOCHROMIA BLD QL SMEAR: NORMAL
IMM GRANULOCYTES # BLD AUTO: 0.39 K/UL (ref 0–0.04)
IMM GRANULOCYTES NFR BLD AUTO: 1.2 % (ref 0–0.5)
LYMPHOCYTES # BLD AUTO: 1.47 K/UL (ref 1–4.8)
MAGNESIUM SERPL-MCNC: 2 MG/DL (ref 1.6–2.6)
MCH RBC QN AUTO: 30.9 PG (ref 27–31)
MCHC RBC AUTO-ENTMCNC: 34.7 G/DL (ref 32–36)
MCV RBC AUTO: 89 FL (ref 82–98)
NUCLEATED RBC (/100WBC) (OHS): 0 /100 WBC
PLATELET # BLD AUTO: 416 K/UL (ref 150–450)
PLATELET BLD QL SMEAR: NORMAL
PMV BLD AUTO: 8.4 FL (ref 9.2–12.9)
POCT GLUCOSE: 93 MG/DL (ref 70–110)
POCT GLUCOSE: 96 MG/DL (ref 70–110)
POTASSIUM SERPL-SCNC: 4.3 MMOL/L (ref 3.5–5.1)
PROT SERPL-MCNC: 6.3 GM/DL (ref 6–8.4)
RBC # BLD AUTO: 2.75 M/UL (ref 4–5.4)
RELATIVE EOSINOPHIL (OHS): 0.2 %
RELATIVE LYMPHOCYTE (OHS): 4.5 % (ref 18–48)
RELATIVE MONOCYTE (OHS): 3.7 % (ref 4–15)
RELATIVE NEUTROPHIL (OHS): 90.2 % (ref 38–73)
SODIUM SERPL-SCNC: 128 MMOL/L (ref 136–145)
TOXIC GRANULES BLD QL SMEAR: PRESENT
WBC # BLD AUTO: 32.71 K/UL (ref 3.9–12.7)

## 2025-04-25 PROCEDURE — 27000221 HC OXYGEN, UP TO 24 HOURS

## 2025-04-25 PROCEDURE — 27000646 HC AEROBIKA DEVICE

## 2025-04-25 PROCEDURE — 94799 UNLISTED PULMONARY SVC/PX: CPT

## 2025-04-25 PROCEDURE — 11000001 HC ACUTE MED/SURG PRIVATE ROOM

## 2025-04-25 PROCEDURE — 99497 ADVNCD CARE PLAN 30 MIN: CPT | Mod: 25,,, | Performed by: STUDENT IN AN ORGANIZED HEALTH CARE EDUCATION/TRAINING PROGRAM

## 2025-04-25 PROCEDURE — 25000003 PHARM REV CODE 250: Performed by: HOSPITALIST

## 2025-04-25 PROCEDURE — 94761 N-INVAS EAR/PLS OXIMETRY MLT: CPT

## 2025-04-25 PROCEDURE — 99232 SBSQ HOSP IP/OBS MODERATE 35: CPT | Mod: ,,, | Performed by: INTERNAL MEDICINE

## 2025-04-25 PROCEDURE — 25000003 PHARM REV CODE 250: Performed by: SURGERY

## 2025-04-25 PROCEDURE — 36415 COLL VENOUS BLD VENIPUNCTURE: CPT | Performed by: HOSPITALIST

## 2025-04-25 PROCEDURE — 25000003 PHARM REV CODE 250: Performed by: INTERNAL MEDICINE

## 2025-04-25 PROCEDURE — 25000003 PHARM REV CODE 250: Performed by: NURSE PRACTITIONER

## 2025-04-25 PROCEDURE — 94664 DEMO&/EVAL PT USE INHALER: CPT

## 2025-04-25 PROCEDURE — 99233 SBSQ HOSP IP/OBS HIGH 50: CPT | Mod: 25,,, | Performed by: STUDENT IN AN ORGANIZED HEALTH CARE EDUCATION/TRAINING PROGRAM

## 2025-04-25 PROCEDURE — 80053 COMPREHEN METABOLIC PANEL: CPT | Performed by: HOSPITALIST

## 2025-04-25 PROCEDURE — 85025 COMPLETE CBC W/AUTO DIFF WBC: CPT | Performed by: HOSPITALIST

## 2025-04-25 PROCEDURE — A4216 STERILE WATER/SALINE, 10 ML: HCPCS | Performed by: SURGERY

## 2025-04-25 PROCEDURE — 99900035 HC TECH TIME PER 15 MIN (STAT)

## 2025-04-25 PROCEDURE — 83735 ASSAY OF MAGNESIUM: CPT | Performed by: HOSPITALIST

## 2025-04-25 RX ADMIN — SODIUM CHLORIDE, PRESERVATIVE FREE 10 ML: 5 INJECTION INTRAVENOUS at 02:04

## 2025-04-25 RX ADMIN — GABAPENTIN 400 MG: 400 CAPSULE ORAL at 09:04

## 2025-04-25 RX ADMIN — METOPROLOL TARTRATE 12.5 MG: 25 TABLET, FILM COATED ORAL at 02:04

## 2025-04-25 RX ADMIN — APIXABAN 5 MG: 2.5 TABLET, FILM COATED ORAL at 09:04

## 2025-04-25 RX ADMIN — SODIUM CHLORIDE, PRESERVATIVE FREE 10 ML: 5 INJECTION INTRAVENOUS at 09:04

## 2025-04-25 RX ADMIN — MUPIROCIN: 20 OINTMENT TOPICAL at 09:04

## 2025-04-25 RX ADMIN — ACETAMINOPHEN 1000 MG: 500 TABLET ORAL at 03:04

## 2025-04-25 RX ADMIN — METOPROLOL TARTRATE 12.5 MG: 25 TABLET, FILM COATED ORAL at 09:04

## 2025-04-25 RX ADMIN — SODIUM CHLORIDE, PRESERVATIVE FREE 10 ML: 5 INJECTION INTRAVENOUS at 06:04

## 2025-04-25 RX ADMIN — METOPROLOL TARTRATE 12.5 MG: 25 TABLET, FILM COATED ORAL at 06:04

## 2025-04-25 RX ADMIN — ONDANSETRON 4 MG: 4 TABLET, ORALLY DISINTEGRATING ORAL at 09:04

## 2025-04-25 RX ADMIN — GUAIFENESIN AND DEXTROMETHORPHAN HYDROBROMIDE 1 TABLET: 600; 30 TABLET, EXTENDED RELEASE ORAL at 09:04

## 2025-04-25 RX ADMIN — DIGOXIN 0.12 MG: 125 TABLET ORAL at 09:04

## 2025-04-25 RX ADMIN — GABAPENTIN 400 MG: 400 CAPSULE ORAL at 03:04

## 2025-04-25 NOTE — SUBJECTIVE & OBJECTIVE
Medications:  Continuous Infusions:   dilTIAZem  0-15 mg/hr Intravenous Continuous        NORepinephrine bitartrate-D5W  0-3 mcg/kg/min Intravenous Continuous   Held at 04/21/25 2330     Scheduled Meds:   apixaban  5 mg Oral BID    dextromethorphan-guaiFENesin  mg  1 tablet Oral BID    digoxin  0.125 mg Oral Daily    diltiaZEM  0.35 mg/kg Intravenous Once    gabapentin  400 mg Oral TID    metoprolol tartrate  12.5 mg Oral Q8H    mupirocin   Nasal BID    sodium chloride 0.9%  10 mL Intravenous Q8H     PRN Meds:  Current Facility-Administered Medications:     acetaminophen, 1,000 mg, Oral, Q8H PRN    albuterol-ipratropium, 3 mL, Nebulization, Q4H PRN    dextrose 50%, 12.5 g, Intravenous, PRN    dextrose 50%, 25 g, Intravenous, PRN    glucagon (human recombinant), 1 mg, Intramuscular, PRN    glucose, 16 g, Oral, PRN    glucose, 24 g, Oral, PRN    insulin aspart U-100, 0-5 Units, Subcutaneous, QID (AC + HS) PRN    melatonin, 6 mg, Oral, Nightly PRN    naloxone, 0.02 mg, Intravenous, PRN    ondansetron, 4 mg, Oral, Q8H PRN    senna-docusate, 1 tablet, Oral, BID PRN    sodium chloride 0.9%, 10 mL, Intravenous, PRN    Objective:     Vital Signs (Most Recent):  Temp: 98 °F (36.7 °C) (04/24/25 2301)  Pulse: 71 (04/25/25 0800)  Resp: 19 (04/25/25 0800)  BP: (!) 140/63 (04/25/25 0800)  SpO2: 97 % (04/25/25 0800) Vital Signs (24h Range):  Temp:  [97.9 °F (36.6 °C)-98.9 °F (37.2 °C)] 98 °F (36.7 °C)  Pulse:  [] 71  Resp:  [18-25] 19  SpO2:  [93 %-99 %] 97 %  BP: (100-140)/(53-67) 140/63     Weight: 63.5 kg (139 lb 15.9 oz)  Body mass index is 24.03 kg/m².       Physical Exam  Vitals and nursing note reviewed.   Constitutional:       Appearance: She is ill-appearing.      Comments: Tired   Eyes:      Extraocular Movements: Extraocular movements intact.   Pulmonary:      Effort: No respiratory distress.      Comments: NC in place  Neurological:      Mental Status: She is alert.      Comments: Tired, otherwise  awake+alert+conversant            Review of Symptoms        Living Arrangements:  Lives with family    Psychosocial/Cultural:   See Palliative Psychosocial Note: Yes  - lives with  Samuel. 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 Enjoys singing and being social  - uses walker to get around  **Primary  to Follow**  Palliative Care  Consult: No        Advance Care Planning   Advance Directives:   Do Not Resuscitate Status: Yes    Medical Power of :  Samuel would be next of kin surrogate medical decision maker.      Decision Making:  Patient answered questions  Goals of Care: The patient endorses that what is most important right now is to focus on spending time at home, avoiding the hospital, and comfort and QOL     Accordingly, we have decided that the best plan to meet the patient's goals includes enrolling in hospice care         Significant Labs: reviewed  CBC:   Recent Labs   Lab 04/25/25 0226   WBC 32.71*   HGB 8.5*   HCT 24.5*   MCV 89        BMP:  Recent Labs   Lab 04/25/25 0226   *   K 4.3   CL 99   CO2 24   BUN 18   CREATININE 1.0   CALCIUM 9.3   MG 2.0     LFT:  Lab Results   Component Value Date    AST 33 04/25/2025    ALKPHOS 61 04/25/2025    BILITOT 0.3 04/25/2025     Albumin:   Albumin   Date Value Ref Range Status   04/25/2025 1.4 (L) 3.5 - 5.2 g/dL Final   01/01/2023 2.6 (L) 3.5 - 5.2 g/dL Final     Protein:   Total Protein   Date Value Ref Range Status   01/01/2023 7.1 6.0 - 8.4 g/dL Final     Lactic acid:   Lab Results   Component Value Date    LACTATE 0.7 04/15/2025    LACTATE 1.2 04/14/2025       Significant Imaging: reviewed

## 2025-04-25 NOTE — TELEPHONE ENCOUNTER
----- Message from Merle Schwartz MD sent at 4/25/2025 10:46 AM CDT -----  Regarding: Cancel appt  Please cancel appt that Dr Gar requested.     Merle

## 2025-04-25 NOTE — SUBJECTIVE & OBJECTIVE
Interval History: No acute events overnight.  Today met with patient and family at bedside ( and grand-daughter) and her daughter Micheal on speakerphone.  We discussed the negative pathology from her LN biopsy and the care team's ongoing suspicion that she does have a cancer.  We discussed potential for EUS with biopsy and daughter's concern about whether or not patient is strong enough for this.  We discussed alternative which would be to pivot goals of care to focus on comfort.  Patient is very clear that she does not wish for any further invasive testing and wishes to go home.  We discussed options for home care including home palliative with home health for PT vs home hospice.  Patient states clearly she does not wish for any further PT and all are in agreement with her wishes to pursue comfort measures with hospice at home.  All questions answered and patient had no further complaints.    Objective:     Vital Signs (Most Recent):  Temp: 98 °F (36.7 °C) (04/24/25 2301)  Pulse: 71 (04/25/25 0800)  Resp: 19 (04/25/25 0800)  BP: (!) 140/63 (04/25/25 0800)  SpO2: 97 % (04/25/25 0800) Vital Signs (24h Range):  Temp:  [98 °F (36.7 °C)-98.9 °F (37.2 °C)] 98 °F (36.7 °C)  Pulse:  [] 71  Resp:  [18-25] 19  SpO2:  [93 %-99 %] 97 %  BP: (100-140)/(53-67) 140/63     Weight: 63.5 kg (139 lb 15.9 oz)  Body mass index is 24.03 kg/m².    Intake/Output Summary (Last 24 hours) at 4/25/2025 1313  Last data filed at 4/25/2025 0600  Gross per 24 hour   Intake 720 ml   Output 400 ml   Net 320 ml         Physical Exam  Constitutional:       General: She is not in acute distress.     Appearance: She is ill-appearing. She is not diaphoretic.   HENT:      Head: Atraumatic.      Mouth/Throat:      Mouth: Mucous membranes are moist.   Eyes:      Extraocular Movements: Extraocular movements intact.   Cardiovascular:      Rate and Rhythm: Normal rate and regular rhythm.      Heart sounds: Normal heart sounds. No murmur  heard.  Pulmonary:      Effort: Pulmonary effort is normal.      Breath sounds: No wheezing or rhonchi.   Abdominal:      General: Bowel sounds are normal. There is no distension.      Palpations: Abdomen is soft.      Tenderness: There is no abdominal tenderness.   Musculoskeletal:         General: No deformity. Normal range of motion.      Cervical back: Normal range of motion.      Comments: Left knee not tender, swelling around the left knee and bilateral lower extremities much improved.   Skin:     General: Skin is warm.      Comments: Left axillary incision appears c/d/i   Neurological:      Mental Status: She is alert and oriented to person, place, and time.      Comments: Significant diffuse weakness           Significant Labs: All pertinent labs within the past 24 hours have been reviewed.    Significant Imaging: I have reviewed all pertinent imaging results/findings within the past 24 hours.

## 2025-04-25 NOTE — ASSESSMENT & PLAN NOTE
-On admit Na 121 and now is 128  -Noted history of hyponatremia.    -TSH wnl.  Coristol speaks against adrenal insuffiencey.    Hypervolemic initially on exam however swelling seems serum improved on its own - as did sodium.    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    -No further labs as pursuing comfort measures

## 2025-04-25 NOTE — ASSESSMENT & PLAN NOTE
-Admitted to inpatient status  Presented with leg swelling and on admit had elevated white blood cell count and subsequently fever.  Procal negative.  -UA suggestive of possible UTI but without urinary symptoms.    -Left knee with acute on chronic pain but not significantly tender and with good range of motion.    -Earlier in stay noted URI symptoms and reports of scant hemoptysis and cough.    -CT Chest with contrast 4/17 showed a large (8.1 x 4.5 cm) subcarinal mass, possible lymphadenopathy.  Additional mediastinal, hilar, and left axillary lymphadenopathy noted. Mild/moderate sized pleural effusions with bibasilar atelectasis.  Moderate chronic interstitial/ fibrotic lung changes.  -Blood cultures negative.  Urine cultures with no predominant organism  -Pulmonology and general surgery consulted and input appreciated.    -Pulm attempted thoracentesis 4/18 but not able to obtain fluid.  -Taken by surgery to OR on 4/20 for excisional LN biopsy.  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 -- No evidence of lymphoproliferative disorder or other metastatic malignancies.   -Per discussion swith pulmonology, next step for non-diagnostic LN biopsy would be EGD with EUS.    -Discussed options for care with patient and family at bedside extensively today.  Patient is clear that she does not want any further invasive testing and wishes to go home and focus on comfort care.  Her family (daughter, grand-daughter and ) are supportive of her decision.  -Will consult hospice [facility] for informational meeting and anticipate discharge home with hospice tomorrow.  -Will discontinue telemetry and lab draws.  Transfer out of ICU today.

## 2025-04-25 NOTE — ASSESSMENT & PLAN NOTE
"- CT chest noted "Large (8.1 x 4.5 cm) subcarinal mass, possible lymphadenopathy.  Additional mediastinal, hilar, and left axillary lymphadenopathy noted. Mild/moderate sized pleural effusions with bibasilar atelectasis. Moderate chronic interstitial/ fibrotic lung changes."  - patient with occasional hemoptysis/cough, but otherwise without any major adverse symptoms at this time  - noted path stating "Benign lymph node with sinus histiocytosis. No evidence of lymphoproliferative disorder or other metastatic malignancies." "Taken together, there is no evidence of lymphoproliferative disorder or other metastatic malignancies. Biopsy of mediastinal mass is recommended." Primary team/pulmonary team made aware  - patient not wanting to pursue further diagnostic workup efforts per her discussion with primary team along with family. This is in setting of her making clear previously no desire for chemo/surgery or such regardless of diagnostic efforts. She is eager to get back home. Thereby plan for transition home with hospice pending hospice informational sessions and selection of hospice agency. Dispo/arrangement per primary team/case management.   "

## 2025-04-25 NOTE — PLAN OF CARE
Medicare Message     Important Message from Medicare regarding Discharge Appeal Rights Explained to patient/caregiver; Signed/date by patient/caregiver Explained to patient/caregiver; Signed/date by patient/caregiver Explained to patient/caregiver; Signed/date by patient/caregiver   Date IMM was signed 4/15/2025 4/21/2025 4/25/2025   Time IMM was signed [id number]

## 2025-04-25 NOTE — PLAN OF CARE
JELANI met with the patient with her granddaughter present at the bedside to discuss discharge disposition. Patient's granddaughter confirmed the patient and family are in agreement with home hospice and doesn't have an agency preference. Family is amenable to an informational with [facility]. Patient's granddaughter mentioned the patient's daughter will be the point of contact to schedule informational's. JELANI contacted Dalila with [facility] who states she will reach out to the patient's daughter and have her team run the patient's benefits. JELANI will continue to follow up.     [id number]-Dalila informed JELANI she plans to meet with the patient at 3PM.

## 2025-04-25 NOTE — PLAN OF CARE
Per Dr. Colon discussion with families the plan is to proceed with discharge with home hospice.  Pulmonary critical care team will sign off.  Please do not hesitate to reach out if we can be of further assistance.      Gino Lamb MD  Pulmonary/Critical Care Fellow

## 2025-04-25 NOTE — PROGRESS NOTES
"[facility] ([place])  Palliative Medicine  Progress Note    Patient Name: Jojo Ayoub  MRN: [id number]  Admission Date: 4/14/2025  Hospital Length of Stay: 10 days  Code Status: DNR   Attending Provider: Ramon Colon MD  Consulting Provider: Aramis Mcdowell MD  Primary Care Physician: Bahman Vincent Jr., MD  Principal Problem:Mediastinal mass    Patient information was obtained from patient, relative(s), past medical records, and primary team.      Assessment/Plan:     Oncology  * Mediastinal mass  - CT chest noted "Large (8.1 x 4.5 cm) subcarinal mass, possible lymphadenopathy.  Additional mediastinal, hilar, and left axillary lymphadenopathy noted. Mild/moderate sized pleural effusions with bibasilar atelectasis. Moderate chronic interstitial/ fibrotic lung changes."  - patient with occasional hemoptysis/cough, but otherwise without any major adverse symptoms at this time  - noted path stating "Benign lymph node with sinus histiocytosis. No evidence of lymphoproliferative disorder or other metastatic malignancies." "Taken together, there is no evidence of lymphoproliferative disorder or other metastatic malignancies. Biopsy of mediastinal mass is recommended." Primary team/pulmonary team made aware  - patient not wanting to pursue further diagnostic workup efforts per her discussion with primary team along with family. This is in setting of her making clear previously no desire for chemo/surgery or such regardless of diagnostic efforts. She is eager to get back home. Thereby plan for transition home with hospice pending hospice informational sessions and selection of hospice agency. Dispo/arrangement per primary team/case management.     Palliative Care  DNR (do not resuscitate)  - noted wishes per patient    Counseling regarding advance care planning and goals of care  4/25/2025:  - chart reviewed in depth  - discussed with primary team, pccm, case management in depth  - primary team " updated us regarding their conversation with patient,  and daughter/niece today. Patient making clear plan to not pursue further diagnostic efforts, thereby wishes aligning with home with hospice on discharge to focus on comfort/qol and being at home.   - Case management had just completed their conversation with patient and updated us accordingly regarding patients next step for arrangement of home hospice informational session.   - patient seen at bedside along with palliative RN Radha  - she is awake, alert, conversant and smiling when she saw us  - she is joined by her granddaughter Jamaica   - patient re-iterated desire for focus on comfort and getting back home, aligning with home hospice wishes. This is supported by her statements yesterday of not desiring surgery/chemo or such regardless of diagnostic outcome.   - they are going to update rest of family accordingly. Patients  at home working on arranging house for hospice equipment needed.   - encouraged them to have hospice informational session with 1-2 agencies and select accordingly.  - reminded them if ever unhappy with home hospice agency selected, they can always change.   - provided emotional support and listening ear   - revisited a bit later when son Guzman arrived and had some questions. By time of my arrival bedside he had stepped away, but [place] got Guzman on phone.   - updated him regarding conversations. Jamaica shared plans for family to all meet together tonight to ensure everyone is updated too, but that they had shared updates on group chat Guzman may not have seen yet. Acknowledged it is a lot to keep everyone updated, recommending a point person that does so.   - Guzman stated understanding of plan/next steps regarding home hospice per patients wishes  - answered questions to best of my ability.   - updated pccm   []  Samuel is default next of kin surrogate medical decision maker. Very well supported by their children.  "  [] DNR/DNI as per patient wishes  [] patient clear on next step wishes of focus on comfort/qol and being at home, aligning with home hospice. Next step plan for hospice informational sessions, as arranged by case management/primary team        I will follow-up with patient. Please contact us if you have any additional questions.    Subjective:     Chief Complaint:   Chief Complaint   Patient presents with    Leg Swelling     BL LE edema, also reporting L knee pain and hypermobility.     Hypotension     BP today 90/55. In /62. Pt denies dizziness of fatigue. AAO x4.       HPI:   Per HPI: "Ms. Jojo Ayoub is a 89 y.o. female, with PMH of T2DM, HTN, HLD, chronic constipation, OA w/ chronic left knee pain, anemia, who presented to [facility] ED on 4/15/25 due to b/l LE swelling x "a few weeks." Her daughter syayes the leg swelling was first noted by her today. She additionally notes low blood pressure readings of 90/55 at home today. She endorses left knee pain, and states she feels like her left knee "dislocate" a few nights ago. She states she had a knee replacement of her left knee over 20 years ago. She denied fall/trauma, leg pain or redness or warmth. She denied dizziness, fatigue, fever, chills, sore throat, runny nose, congestion, shortness of breath, chest pain, palpitations, abdominal pain, nausea, vomiting, diarrhea, or urinary symptoms. She was evaluated in the ED with labs showing leukocytosis of 19K, with left shift and H&H of 9.9/28.0. A metabolic panel showed sodium of 121, potassium of 5.2, glucose was 94, with CO2 of 19, and anion gap of 7. A BNP was 181 without elevation of troponin. A UA showed nitrite positive UTI with 2+ leuk esterase, with 20 WBC, and many bacteria. A CXR showed bilateral hilar prominence, and mild bibasilar densities. An x-ray of the knee had a findings that might reporesent a Pellegrinin-Stieda lesion vs. An avulsive fracture and a suprapatellar effusion. She was treated " "in the ED with 1L NS and rocephin."     Overview/Hospital Course:  "Patient is a 89 year-old woman history of hypertension, diabetes mellitus type 2, osteoarthritis of left knee status post remote left knee replacement who presents with worsening left knee pain with swelling.  Patient walks with walker at home at baseline.  She reports she recently slid to ground from a chair at home a few weeks but denies any more recent fall or any other trauma. Exam notable for significant bilateral lower extremity edema more so on the left side.  Laxity of the left knee.  Not tender to touch. Lungs clear.  Imaging concerning for possible avulsion fracture.  Orthopedic surgery service consult for evaluation and recommended Adamaris brace and physical therapy. Ultrasound of lower extremity negative for clot.  Echocardiogram does not show evidence of heart failure.  Hyponatremia on presentation which is improving without intervention. Patient with persistent elevated white count and now fever overnight.  Blood cultures ordered.  On empiric broad-spectrum antibiotics."    Palliative medicine consulted for assistance with ACP and support     Hospital Course:  No notes on file      Medications:  Continuous Infusions:   dilTIAZem  0-15 mg/hr Intravenous Continuous        NORepinephrine bitartrate-D5W  0-3 mcg/kg/min Intravenous Continuous   Held at 04/21/25 2330     Scheduled Meds:   apixaban  5 mg Oral BID    dextromethorphan-guaiFENesin  mg  1 tablet Oral BID    digoxin  0.125 mg Oral Daily    diltiaZEM  0.35 mg/kg Intravenous Once    gabapentin  400 mg Oral TID    metoprolol tartrate  12.5 mg Oral Q8H    mupirocin   Nasal BID    sodium chloride 0.9%  10 mL Intravenous Q8H     PRN Meds:  Current Facility-Administered Medications:     acetaminophen, 1,000 mg, Oral, Q8H PRN    albuterol-ipratropium, 3 mL, Nebulization, Q4H PRN    dextrose 50%, 12.5 g, Intravenous, PRN    dextrose 50%, 25 g, Intravenous, PRN    glucagon (human " recombinant), 1 mg, Intramuscular, PRN    glucose, 16 g, Oral, PRN    glucose, 24 g, Oral, PRN    insulin aspart U-100, 0-5 Units, Subcutaneous, QID (AC + HS) PRN    melatonin, 6 mg, Oral, Nightly PRN    naloxone, 0.02 mg, Intravenous, PRN    ondansetron, 4 mg, Oral, Q8H PRN    senna-docusate, 1 tablet, Oral, BID PRN    sodium chloride 0.9%, 10 mL, Intravenous, PRN    Objective:     Vital Signs (Most Recent):  Temp: 98 °F (36.7 °C) (04/24/25 2301)  Pulse: 71 (04/25/25 0800)  Resp: 19 (04/25/25 0800)  BP: (!) 140/63 (04/25/25 0800)  SpO2: 97 % (04/25/25 0800) Vital Signs (24h Range):  Temp:  [97.9 °F (36.6 °C)-98.9 °F (37.2 °C)] 98 °F (36.7 °C)  Pulse:  [] 71  Resp:  [18-25] 19  SpO2:  [93 %-99 %] 97 %  BP: (100-140)/(53-67) 140/63     Weight: 63.5 kg (139 lb 15.9 oz)  Body mass index is 24.03 kg/m².       Physical Exam  Vitals and nursing note reviewed.   Constitutional:       Appearance: She is ill-appearing.      Comments: Tired   Eyes:      Extraocular Movements: Extraocular movements intact.   Pulmonary:      Effort: No respiratory distress.      Comments: NC in place  Neurological:      Mental Status: She is alert.      Comments: Tired, otherwise awake+alert+conversant            Review of Symptoms        Living Arrangements:  Lives with family    Psychosocial/Cultural:   See Palliative Psychosocial Note: Yes  - lives with  Samuel. 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 Enjoys singing and being social  - uses walker to get around  **Primary  to Follow**  Palliative Care  Consult: No        Advance Care Planning  Advance Directives:   Do Not Resuscitate Status: Yes    Medical Power of :  Samuel would be next of kin surrogate medical decision maker.      Decision Making:  Patient answered questions  Goals of Care: The patient endorses that what is most  important right now is to focus on spending time at home, avoiding the hospital, and comfort and QOL     Accordingly, we have decided that the best plan to meet the patient's goals includes enrolling in hospice care         Significant Labs: reviewed  CBC:   Recent Labs   Lab 04/25/25 0226   WBC 32.71*   HGB 8.5*   HCT 24.5*   MCV 89        BMP:  Recent Labs   Lab 04/25/25 0226   *   K 4.3   CL 99   CO2 24   BUN 18   CREATININE 1.0   CALCIUM 9.3   MG 2.0     LFT:  Lab Results   Component Value Date    AST 33 04/25/2025    ALKPHOS 61 04/25/2025    BILITOT 0.3 04/25/2025     Albumin:   Albumin   Date Value Ref Range Status   04/25/2025 1.4 (L) 3.5 - 5.2 g/dL Final   01/01/2023 2.6 (L) 3.5 - 5.2 g/dL Final     Protein:   Total Protein   Date Value Ref Range Status   01/01/2023 7.1 6.0 - 8.4 g/dL Final     Lactic acid:   Lab Results   Component Value Date    LACTATE 0.7 04/15/2025    LACTATE 1.2 04/14/2025       Significant Imaging: reviewed    35 minutes face to face time in discussion of symptom assessment, coordination of care, exploring burden/ benefit of various approaches of treatment and discharge planning.  This also includes non-face to face time preparing to see the patient (eg, review of tests/imaging), obtaining and/or reviewing separately obtained history, documenting clinical information in the electronic or other health record, independently interpreting results and communicating results to the patient/family/caregiver, or care coordinator.     16 minutes ACP time spent: goals of care, emotional support, formulating goals and communication of prognosis      Aramis Mcdowell MD  Palliative Medicine  [facility]  Intensive [facility] ([place])

## 2025-04-25 NOTE — ASSESSMENT & PLAN NOTE
-History noted and had some leg swelling which has improved  -Doppler US of leg did not show any DVT.  -Orthopedic surgery consulted and input appreciated  -Continue brace and therapy - wound care says ok to use her brace we just need to keep the area padded to protect the skin.  -Follow-up with Dr. Mlaagon in outpatient setting after discharge  -PT/OT consulted and recommend high intensity therapy at discharge.  However, patient does not wish for any further therapy and we are pursuing comfort measures.

## 2025-04-25 NOTE — PROGRESS NOTES
"    Cardiology Progress Note    4/25/2025  9:10 AM    Subjective/Interim:      No complaints.  No reported episodes of AF overnight.     Objective:   24-hour Vitals:  Temp:  [97.9 °F (36.6 °C)-98.9 °F (37.2 °C)] 98 °F (36.7 °C)  Pulse:  [] 89  Resp:  [18-25] 20  SpO2:  [93 %-99 %] 97 %  BP: (100-136)/(53-67) 118/67    Physical Examination:  Vitals: /67 (BP Location: Right arm, Patient Position: Lying)   Pulse 89   Temp 98 °F (36.7 °C) (Oral)   Resp 20   Ht 5' 4" (1.626 m)   Wt 63.5 kg (139 lb 15.9 oz)   SpO2 97%   Breastfeeding No   BMI 24.03 kg/m²     Physical Exam  Vitals reviewed.   Constitutional:       Appearance: She is well-developed.   HENT:      Head: Atraumatic.   Eyes:      General: No scleral icterus.  Neck:      Vascular: Normal carotid pulses. No carotid bruit, hepatojugular reflux or JVD.   Cardiovascular:      Rate and Rhythm: Normal rate and regular rhythm.      Chest Wall: PMI is not displaced.      Pulses: Intact distal pulses.           Carotid pulses are 2+ on the right side and 2+ on the left side.       Radial pulses are 2+ on the right side and 2+ on the left side.        Dorsalis pedis pulses are 2+ on the right side and 2+ on the left side.      Heart sounds: Normal heart sounds, S1 normal and S2 normal. No murmur heard.     No friction rub.   Pulmonary:      Effort: Pulmonary effort is normal. No respiratory distress.      Breath sounds: Normal breath sounds. No stridor. No wheezing or rales.   Chest:      Chest wall: No tenderness.   Abdominal:      General: Bowel sounds are normal.      Palpations: Abdomen is soft.   Musculoskeletal:      Cervical back: Neck supple. No edema.   Skin:     General: Skin is warm and dry.      Nails: There is no clubbing.   Neurological:      Mental Status: She is alert and oriented to person, place, and time.   Psychiatric:         Behavior: Behavior normal.         Thought Content: Thought content normal.           Intake/Output Summary " "(Last 24 hours) at 4/25/2025 0845  Last data filed at 4/25/2025 0600  Gross per 24 hour   Intake 960 ml   Output 400 ml   Net 560 ml       Laboratory:  Trended Lab Data:  Recent Labs   Lab 04/23/25  0412 04/24/25  0244 04/25/25 0226   WBC 41.76* 29.79* 32.71*   HGB 9.2* 8.5* 8.5*   HCT 27.7* 24.8* 24.5*    424 416       Recent Labs   Lab 04/20/25  0635 04/21/25  0628 04/21/25  1907 04/22/25  0352 04/23/25  0412 04/24/25  0244 04/25/25 0226   * 124*   < > 126* 126* 127* 128*   K 4.1 3.9   < > 4.5 4.2 3.9 4.3   CL 98 94*   < > 99 99 96 99   CO2 23 23   < > 18* 20* 24 24   BUN 13 11   < > 12 15 17 18   CALCIUM 9.3 9.4   < > 9.0 9.3 9.1 9.3   MG 1.6 1.7   < > 2.2 1.9 2.0 2.0   PHOS 3.2 2.7  --  3.4  --   --   --     < > = values in this interval not displayed.       Recent Labs   Lab 04/23/25 0412 04/24/25 0244 04/25/25 0226   ALBUMIN 1.6* 1.5* 1.4*   BILITOT 0.2 0.3 0.3   AST 29 29 33   ALT 11 13 16   ALKPHOS 62 64 61       No results for input(s): "PROTIME", "PTT", "INR" in the last 168 hours.    Cardiac: No results for input(s): "TROPONINI", "CKTOTAL", "CKMB", "BNP" in the last 168 hours.    FLP:   Lab Results   Component Value Date    CHOL 150 01/21/2020    HDL 48 01/21/2020    LDLCALC 83.8 01/21/2020    TRIG 91 01/21/2020    CHOLHDL 32.0 01/21/2020     DM:   Lab Results   Component Value Date    HGBA1C 5.7 (H) 04/15/2025    HGBA1C 5.6 01/01/2023    HGBA1C 6.0 (H) 01/21/2020    GLUF 135 (H) 02/11/2008    LDLCALC 83.8 01/21/2020    CREATININE 1.0 04/25/2025     Thyroid:   Lab Results   Component Value Date    TSH 0.594 04/15/2025    FREET4 1.13 01/21/2020     Anemia:   Lab Results   Component Value Date    IRON 56 06/03/2010    TIBC 394 06/03/2010    FERRITIN 73 12/28/2011    NAZAMZXA86 884 03/03/2017    FOLATE >20.0 (A) 06/03/2010     Urinalysis:   Lab Results   Component Value Date    LABURIN  04/14/2025     Multiple organisms isolated. None in predominance. Repeat if clinically necessary.    " COLORU Yellow 04/14/2025    SPECGRAV 1.005 04/14/2025    NITRITE Positive (A) 04/14/2025    KETONESU Negative 01/02/2023    UROBILINOGEN Negative 04/14/2025     @      Other Results:  EKG (my interpretation):    TELEMETRY:  sinus     Echo:   Results for orders placed or performed during the hospital encounter of 04/14/25   Echo   Result Value Ref Range    BSA 1.66 m2    LVIDd 3.7 3.5 - 6.0 cm    LV Systolic Volume 19 mL    LV Systolic Volume Index 11.4 mL/m2    LVIDs 2.4 2.1 - 4.0 cm    LV Diastolic Volume 59 mL    LV Diastolic Volume Index 35.54 mL/m2    Left Ventricular End Systolic Volume by Teichholz Method 19.25 mL    Left Ventricular End Diastolic Volume by Teichholz Method 59.41 mL    IVS 0.6 0.6 - 1.1 cm    LVOT diameter 2.0 cm    LVOT area 3.1 cm2    FS 35.1 28 - 44 %    Left Ventricle Relative Wall Thickness 0.32 cm    PW 0.6 0.6 - 1.1 cm    LV mass 56.3 g    LV Mass Index 33.9 g/m2    MV Peak E Gerson 0.83 m/s    TDI LATERAL 0.08 m/s    TDI SEPTAL 0.06 m/s    E/E' ratio 12 m/s    MV Peak A Gerson 1.01 m/s    TR Max Gerson 2.8 m/s    E/A ratio 0.82     IVRT 57 msec    E wave deceleration time 202 msec    LV SEPTAL E/E' RATIO 13.8 m/s    LV LATERAL E/E' RATIO 10.4 m/s    PV Peak S Gerson 0.77 m/s    PV Peak D Gerson 0.41 m/s    Pulm vein S/D ratio 1.88     LA size 2.8 cm    Left Atrium Minor Axis 4.7 cm    Left Atrium Major Axis 5.1 cm    RA Major Axis 4.62 cm    AV regurgitation pressure 1/2 time 355 ms    AR Max Gerson 3.68 m/s    AV mean gradient 6 mmHg    AV peak gradient 10 mmHg    Ao peak gerson 1.6 m/s    Ao VTI 36.7 cm    Mr max gerson 4.13 m/s    MV stenosis pressure 1/2 time 58.57 ms    MV valve area p 1/2 method 3.76 cm2    Triscuspid Valve Regurgitation Peak Gradient 31 mmHg    PV PEAK VELOCITY 0.91 m/s    PV peak gradient 3 mmHg    IVC diameter 1.79 cm    Mean e' 0.07 m/s    ZLVIDS -1.45     ZLVIDD -2.29     TV resting pulmonary artery pressure 34 mmHg    RV TB RVSP 6 mmHg    Est. RA pres 3 mmHg    Narrative      Left  Ventricle: The left ventricle is normal in size. Normal wall   thickness. There is normal systolic function with a visually estimated   ejection fraction of 55 - 60%. Global longitudinal strain is normal;   19.6%.    Right Ventricle: The right ventricle is normal in size. Wall thickness   is normal. Systolic function is normal.    Aortic Valve: There is mild to moderate aortic regurgitation.    Tricuspid Valve: There is mild regurgitation.    IVC/SVC: Normal venous pressure at 3 mmHg.         Radiology:  CT Abdomen Pelvis With IV Contrast Routine Oral Contrast  Result Date: 4/19/2025  EXAMINATION: CT ABDOMEN PELVIS without IV CONTRAST CLINICAL HISTORY: Metastatic disease evaluation;LE swelling, concern for possible inguinal nodes, has mediastinal and axillary mass. full survey; TECHNIQUE: Axial images of the abdomen and pelvis were acquired without the use of intravenous contrast.  1000 cc of Omnipaque 9 oral contrast was administered.  Coronal and sagittal reconstructions were also obtained COMPARISON: CT chest from 04/17/2025. FINDINGS: Visualized portion of the thorax: Redemonstration of partial visualization of subcarinal mass, better evaluated on prior CT from 04/17/2025.  No cardiomegaly.  Moderate calcific atherosclerosis.  Trace pericardial effusion.  Visualized portions of the lungs demonstrates small bilateral pleural effusions.  Interlobular septal thickening with reticulations and fibrotic changes suggestive of interstitial lung disease. Liver: Normal in size and contour.  4.1 x 5.2 cm hypodensity near the dome of the liver consistent with a cyst.  No additional focal hepatic lesions. Gallbladder: Not well visualized possibly decompressed or absent. Bile Ducts: No evidence of dilated ducts. Pancreas: No mass or peripancreatic fat stranding. Spleen: Unremarkable. Stomach and duodenum: Unremarkable. Adrenals: Unremarkable. Kidneys/ Ureters: Normal in size and location. Bilateral renal hypodensities  consistent with cysts.  No hydronephrosis or nephrolithiasis. No ureteral dilatation. Bladder: No evidence of wall thickening. Reproductive organs: Unremarkable. Bowel/Mesentery: Small bowel is normal in caliber with no evidence of obstruction. No evidence of inflammation or wall thickening.  Colon demonstrates no focal wall thickening. Lymph nodes: No lymphadenapathy. Vasculature: No aneurysm. Mild calcific atherosclerosis. Abdominal wall:  Body wall anasarca. Bones: No acute fracture.  Postsurgical changes L4-5.  Hardware and alignment appear intact.  No suspicious osseous lesions.  Dense sclerosis of the SI joints bilaterally.     No evidence metastatic disease or lymphadenopathy in the abdomen or pelvis. Partial visualization subcarinal mediastinal mass better evaluated on recent CT of the chest from 04/17/2025. Partial visualization of bilateral small pleural effusions and interstitial lung disease better evaluated on recent CT of the chest from 04/17/2025. Electronically signed by: Balaji Goldman MD Date:    04/19/2025 Time:    14:25    US Soft Tissue Axilla, Left  Result Date: 4/19/2025  EXAMINATION: US SOFT TISSUE AXILLA, LEFT CLINICAL HISTORY: measure number and size of lymph nodes; TECHNIQUE: Ultrasound of the left axilla COMPARISON: CT chest 04/17/2025 FINDINGS: At least 4 lymph nodes are visualized, the largest of which measures 2.0 x 0.9 x 1.8 cm which is borderline caliber for pathologic enlargement.  There is some cortical thickening present and a normal reniform shape is difficult to appreciate with certainty on at least 1 of the lymph nodes.  Comparison with the CT scan shows that these are actually subpectoral lymph nodes.     Approximately 4 borderline enlarged left subpectoral lymph nodes which could be neoplastic in nature given this patient's large subcarinal mass seen on CT.. Electronically signed by: Ayden Negron Jr Date:    04/19/2025 Time:    10:19    X-Ray Chest AP Portable  Result  Date: 4/19/2025  EXAMINATION: XR CHEST AP PORTABLE CLINICAL HISTORY: air in left pleural space; TECHNIQUE: Single frontal view of the chest was performed. COMPARISON: Plain film from 04/14/2025 and CT chest from 04/17/2025. FINDINGS: Cardiac silhouette is enlarged similar to prior exam.  Prominence of the pulmonary vasculature.  Coarse interstitial lung markings with a lower lobe predominance.  Small bilateral pleural effusions.  No pneumothorax is visualized.  Advanced degenerative changes of the right shoulder.     As above. Electronically signed by: Balaji Goldman MD Date:    04/19/2025 Time:    08:36    CT Chest With Contrast  Result Date: 4/17/2025  EXAMINATION: CT CHEST WITH CONTRAST CLINICAL HISTORY: Hemoptysis; TECHNIQUE: Low dose axial images, sagittal and coronal reformations were obtained from the thoracic inlet to the lung bases following the IV administration of 75 mL of Omnipaque 350. COMPARISON: None FINDINGS: Lungs: There is prominent peripheral dependent interstitial thickening, concerning for chronic interstitial/fibrotic changes.  Honeycombing present.  Patient respiratory motion artifact limits assessment.  There is mild bibasilar atelectasis with mild/moderate pleural effusions.  The main tracheobronchial tree is clear. Mediastinum: Extensive lymphadenopathy.  Subcarinal mass measures 8.1 by 4.5 cm (series 2, image 55).  Right hilar lymphadenopathy measures 1.9 cm (series 6, image 197).  Right paratracheal lymphadenopathy noted.  Heart size within normal limits.  Trace pericardial effusion.  Thoracic aorta is normal caliber.  Moderate scattered calcific atherosclerosis. Upper abdomen (visualized portion): Large liver cyst in the dome. Chest wall/axilla.  Mild left axillary lymphadenopathy. Bones: No marrow replacement process.     Large (8.1 x 4.5 cm) subcarinal mass, possible lymphadenopathy.  Additional mediastinal, hilar, and left axillary lymphadenopathy noted. Mild/moderate sized pleural  effusions with bibasilar atelectasis. Moderate chronic interstitial/ fibrotic lung changes. This report was flagged in Epic as abnormal. Electronically signed by: Alexandre Posada MD Date:    04/17/2025 Time:    13:40    US Lower Extremity Veins Bilateral  Result Date: 4/15/2025  EXAMINATION: US LOWER EXTREMITY VEINS BILATERAL CLINICAL HISTORY: Swelling, Rule out DVT; TECHNIQUE: Duplex and color flow Doppler evaluation of the bilateral lower extremity veins was performed. COMPARISON: None FINDINGS: No evidence of clot involving the bilateral common femoral, greater saphenous, femoral, popliteal, peroneal, anterior and posterior tibial veins.  All venous structures demonstrate normal respiratory phasicity and augment adequately.  No evidence of soft tissue mass or Baker's cyst.     No evidence of lower extremity deep venous thrombosis. Electronically signed by: Rodolfo Workman MD Date:    04/15/2025 Time:    17:36    Echo  Result Date: 4/15/2025    Left Ventricle: The left ventricle is normal in size. Normal wall thickness. There is normal systolic function with a visually estimated ejection fraction of 55 - 60%. Global longitudinal strain is normal; 19.6%.   Right Ventricle: The right ventricle is normal in size. Wall thickness is normal. Systolic function is normal.   Aortic Valve: There is mild to moderate aortic regurgitation.   Tricuspid Valve: There is mild regurgitation.   IVC/SVC: Normal venous pressure at 3 mmHg.     X-Ray Chest AP Portable  Result Date: 4/14/2025  EXAMINATION: AP PORTABLE CHEST CLINICAL HISTORY: Chest Pain; TECHNIQUE: AP portable chest radiograph was submitted. COMPARISON: 01/01/2023 FINDINGS: AP portable chest radiograph demonstrates a cardiac silhouette within normal limits.  There is bilateral hilar prominence, which may be due to adenopathy, infiltrates, pulmonary vasculature, and less likely masses.  Mild bibasilar densities are seen, which may be related to atelectatic change or  infiltrates.  There is no pneumothorax or large pleural effusion.  There are calcify thoracic lymph nodes.     As above described. Electronically signed by: Cyndie Holley Date:    04/14/2025 Time:    22:03    X-Ray Knee 3 View Left  Result Date: 4/14/2025  EXAMINATION: THREE VIEWS OF THE LEFT KNEE CLINICAL HISTORY: Pain in left knee TECHNIQUE: AP, oblique, and lateral view of the left knee COMPARISON: 01/01/2023 FINDINGS: Left knee arthroplasty is present.  Three views of the left knee demonstrate a curvilinear calcification/ossification adjacent to the medial femoral condyle paralleling the femoral cortex.  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  An avulsive fracture may have a similar appearance.  Correlate with the site of pain or history of trauma.  Moderate suprapatellar effusion is present.  The bones are osteopenic.  Vascular calcifications are present.     As above described. Electronically signed by: Cyndie Holley Date:    04/14/2025 Time:    21:55    X-Ray Chest PA And Lateral  Result Date: 3/26/2025  [facility] [place] XR CHEST AP PA LATERAL 2 VW on 3/26/2025 13:35 CDT Clinical history: CHRONIC COUGH Comparison: Chest radiograph 2/13/2025, 5/21/2024. Findings: LINES: None. LUNGS: No acute airspace disease. Unchanged chronic interstitial coarsening. No pleural effusion. No pneumothorax. MEDIASTINUM: Unchanged cardiomediastinal silhouette. Unchanged mediastinal calcified lymph nodes. OSSEOUS STRUCTURES: No acute osseous abnormality. Multilevel thoracic spondylosis. UPPER ABDOMEN: No acute abnormality.  OTHER: None.    No interval change with no acute cardiopulmonary abnormality. Electronically Signed By: Ayden Reece MD, 3/26/2025 13:39 CDT        Current Medications:     Infusions:   dilTIAZem  0-15 mg/hr Intravenous Continuous        NORepinephrine bitartrate-D5W  0-3 mcg/kg/min Intravenous Continuous   Held at 04/21/25 2330        Scheduled:   apixaban  5 mg Oral BID     dextromethorphan-guaiFENesin  mg  1 tablet Oral BID    digoxin  0.125 mg Oral Daily    diltiaZEM  0.35 mg/kg Intravenous Once    gabapentin  400 mg Oral TID    metoprolol tartrate  12.5 mg Oral Q8H    mupirocin   Nasal BID    sodium chloride 0.9%  10 mL Intravenous Q8H        PRN:    Current Facility-Administered Medications:     acetaminophen, 1,000 mg, Oral, Q8H PRN    albuterol-ipratropium, 3 mL, Nebulization, Q4H PRN    dextrose 50%, 12.5 g, Intravenous, PRN    dextrose 50%, 25 g, Intravenous, PRN    glucagon (human recombinant), 1 mg, Intramuscular, PRN    glucose, 16 g, Oral, PRN    glucose, 24 g, Oral, PRN    insulin aspart U-100, 0-5 Units, Subcutaneous, QID (AC + HS) PRN    melatonin, 6 mg, Oral, Nightly PRN    naloxone, 0.02 mg, Intravenous, PRN    ondansetron, 4 mg, Oral, Q8H PRN    senna-docusate, 1 tablet, Oral, BID PRN    sodium chloride 0.9%, 10 mL, Intravenous, PRN     Assessment and Plan:     Jojo Ayoub is a 89 y.o.female with  PAF, back in sinus.  [id number] = 5 (female, > 75, HTN, DM)  -discussed with patient and family regarding her risk of embolic CVA given paroxysmal atrial fibrillation her CHADS-VASc score 5.  Recommend anticoagulation once cleared by surgery and monitor her anemia closely.  Patient, daughter and  understand the risks and benefits of oral anticoagulation and opted to take anticoagulation for CVA prophylaxis.  Start Eliquis 5mg BID once cleared by Dr. Johnson.  Discussed with Dr Colon.  -4/23:  remains in sinus.  Eliquis started.  Continue metoprolol.  -4/24:  AF RVR last night.  Received digoxin 0.25 mg IV x2.  In sinus rhythm this morning.  Metoprolol increased to 12.5 mg q.8 hours.  Will add digoxin 0.125 mg daily.  Discussed with Dr Colon.  -4/25:  EKG today showed AF with controlled HR.  Continue with current metoprolol, digoxin and eliquis.            Raulito Lares MD        Disclaimer: This document was created using voice recognition software  (M*Modal Fluency Direct). Although it may be edited, this document may contain errors related to incorrect recognition of the spoken word. Please call the physician if clarification is needed.

## 2025-04-25 NOTE — ASSESSMENT & PLAN NOTE
-History noted and had some leg swelling which has improved  -Doppler US of leg did not show any DVT.  -Orthopedic surgery consulted and input appreciated  -Continue brace and therapy - wound care says ok to use her brace we just need to keep the area padded to protect the skin.  -Follow-up with Dr. Malagon in outpatient setting after discharge  -PT/OT consulted and recommend high intensity therapy at discharge.  However, patient does not wish for any further therapy and we are pursuing comfort measures.

## 2025-04-25 NOTE — CARE UPDATE
04/24/25 1900   Patient Assessment/Suction   Level of Consciousness (AVPU) alert   Respiratory Effort Normal;Unlabored   Expansion/Accessory Muscles/Retractions no retractions;no use of accessory muscles;expansion symmetric   All Lung Fields Breath Sounds coarse   Rhythm/Pattern, Respiratory unlabored;pattern regular;depth regular;no shortness of breath reported   Skin Integrity   $ Wound Care Tech Time 15 min   Area Observed Nares   Skin Appearance without discoloration   PRE-TX-O2   Device (Oxygen Therapy) nasal cannula   $ Is the patient on Low Flow Oxygen? Yes   Flow (L/min) (Oxygen Therapy) 2   SpO2 98 %   Pulse Oximetry Type Continuous   $ Pulse Oximetry - Multiple Charge Pulse Oximetry - Multiple   Pulse 77   Resp (!) 28   /63   Incentive Spirometer   $ Incentive Spirometer Charges proper technique demonstrated   Incentive Spirometer Predicted Level (mL) 760   Administration (IS) proper technique demonstrated   Number of Repetitions (IS) 3   Level Incentive Spirometer (mL) 500   Patient Tolerance (IS) fair;no adverse signs/symptoms present

## 2025-04-25 NOTE — ASSESSMENT & PLAN NOTE
-Consulted palliative care to initiate ACP discussions and input appreciated  -Patient has elected for DNR status.  -Family awaits meeting with heme/onc to discuss diagnostic efforts and suspicion of lymphoma.  Patient and family have stated she would not want any surgery or chemotherapy - but perhaps immunotherapy or radiation may be helpful and well tolerated.  -I agree with palliative care that spending kaila time in inpatient rehab may be wasted time that they could be spending together.  Difficult situation  -Today met with patient, her , her grand-daughter and her daughter Micheal and discussed options moving forward.  Ms. Ayoub is very clear that she does not wish to undergo any further invasive testing or physical therapy.  We discussed that her goals appear consistent with a transition to comfort focussed care and home hospice.  She was in agreement and her family was supportive of her decision.   consulted to arrange meeting with hospice agency.  I spent 25 minutes ACP time today.

## 2025-04-25 NOTE — ASSESSMENT & PLAN NOTE
4/25/2025:  - chart reviewed in depth  - discussed with primary team, pccm, case management in depth  - primary team updated us regarding their conversation with patient,  and daughter/niece today. Patient making clear plan to not pursue further diagnostic efforts, thereby wishes aligning with home with hospice on discharge to focus on comfort/qol and being at home.   - Case management had just completed their conversation with patient and updated us accordingly regarding patients next step for arrangement of home hospice informational session.   - patient seen at bedside along with palliative RN Radha  - she is awake, alert, conversant and smiling when she saw us  - she is joined by her granddaughter [place]   - patient re-iterated desire for focus on comfort and getting back home, aligning with home hospice wishes. This is supported by her statements yesterday of not desiring surgery/chemo or such regardless of diagnostic outcome.   - they are going to update rest of family accordingly. Patients  at home working on arranging house for hospice equipment needed.   - encouraged them to have hospice informational session with 1-2 agencies and select accordingly.  - reminded them if ever unhappy with home hospice agency selected, they can always change.   - provided emotional support and listening ear   - revisited a bit later when son Guzman arrived and had some questions. By time of my arrival bedside he had stepped away, but [place] got Guzman on phone.   - updated him regarding conversations. Jamaica shared plans for family to all meet together tonight to ensure everyone is updated too, but that they had shared updates on group chat Guzman may not have seen yet. Acknowledged it is a lot to keep everyone updated, recommending a point person that does so.   - Guzman stated understanding of plan/next steps regarding home hospice per patients wishes  - answered questions to best of my ability.   - updated pccm    []  Samuel is default next of kin surrogate medical decision maker. Very well supported by their children.   [] DNR/DNI as per patient wishes  [] patient clear on next step wishes of focus on comfort/qol and being at home, aligning with home hospice. Next step plan for hospice informational sessions, as arranged by case management/primary team

## 2025-04-25 NOTE — EICU
[facility] Night Rounds Checklist  24H Vital Sign Range:  Temp:  [97.9 °F (36.6 °C)-98.9 °F (37.2 °C)]   Pulse:  []   Resp:  [15-25]   BP: (100-136)/(53-81)   SpO2:  [94 %-99 %]     Video rounds

## 2025-04-25 NOTE — PT/OT/SLP DISCHARGE
Physical Therapy Discharge Summary    Name: Jojo Ayoub  MRN: [id number]   Principal Problem: Mediastinal mass     Patient Discharged from acute Physical Therapy on 25.  Please refer to prior PT noted date on 25 for functional status.     Assessment:     Patient with some improvement in mobility since initial evaluation 25 but now with newly dx/suspected medical conditions limiting her rehab potential at this time. MD AGEE PT orders with focus on comfort/DC to home with hospice.     Objective:     GOALS:   Multidisciplinary Problems       Physical Therapy Goals          Problem: Physical Therapy    Goal Priority Disciplines Outcome Interventions   Physical Therapy Goal     PT, PT/OT Progressing    Description: Goals to be met by: 25    Patient will increase functional independence with mobility by performin. Supine<>sit with supervision and without use of HB features.   2. Sit<>stand with CGA with RW.  3. Transfer bed<>chair with Carmen and LRAD.  4. Gait x 25 feet with Carmen with RW.  5. Ascend/descend 5 step(s) with least restrictive assistive device and Carmen.                        Reasons for Discontinuation of Therapy Services  MD AGEE of therapy orders.       Plan:     Patient Discharged to: Palliative Care/Hospice.      2025

## 2025-04-25 NOTE — ASSESSMENT & PLAN NOTE
-Noted history of pAFIB  -Had several episodes of short live self converting afib rvr earlier in stay  -Overnight 4/21-4/22 again developed afib rvr resistent to metoprolol so transferred to ICU.  Similar nocturnal occurrences overnight 4/22-4/23 and again 4/23-4/24.  -Echo 4/14/25 showed normal EF and no severe valvular lesions  -Discussed with general surgery and ok from their end and started eliquis 5mg bid on 4/22.  -This morning again in afib, but rate controlled  -Continue metoprolol and digoxin  -Continue eliquis

## 2025-04-25 NOTE — PROGRESS NOTES
"[facility] Medicine  Progress Note    Patient Name: Jojo Ayoub  MRN: [id number]  Patient Class: IP- Inpatient   Admission Date: 4/14/2025  Length of Stay: 10 days  Attending Physician: Ramon Colon MD  Primary Care Provider: Bahman Vincent Jr., MD        Subjective     Principal Problem:Mediastinal mass        HPI:  Per Jo Danielle, PA-C:    "Ms. Jojo Ayoub is a 89 y.o. female, with PMH of T2DM, HTN, HLD, chronic constipation, OA w/ chronic left knee pain, anemia, who presented to [facility] ED on 4/15/25 due to b/l LE swelling x "a few weeks." Her daughter syayes the leg swelling was first noted by her today. She additionally notes low blood pressure readings of 90/55 at home today. She endorses left knee pain, and states she feels like her left knee "dislocate" a few nights ago. She states she had a knee replacement of her left knee over 20 years ago. She denied fall/trauma, leg pain or redness or warmth. She denied dizziness, fatigue, fever, chills, sore throat, runny nose, congestion, shortness of breath, chest pain, palpitations, abdominal pain, nausea, vomiting, diarrhea, or urinary symptoms. She was evaluated in the ED with labs showing leukocytosis of 19K, with left shift and H&H of 9.9/28.0. A metabolic panel showed sodium of 121, potassium of 5.2, glucose was 94, with CO2 of 19, and anion gap of 7. A BNP was 181 without elevation of troponin. A UA showed nitrite positive UTI with 2+ leuk esterase, with 20 WBC, and many bacteria. A CXR showed bilateral hilar prominence, and mild bibasilar densities. An x-ray of the knee had a findings that might reporesent a Pellegrinin-Stieda lesion vs. An avulsive fracture and a suprapatellar effusion. She was treated in the ED with 1L NS and rocephin."    Overview/Hospital Course:  Patient is a 89 year-old woman history of hypertension, diabetes mellitus type 2, osteoarthritis of left knee status post remote left knee " replacement who presents with worsening left knee pain with swelling.  Patient walks with walker at home at baseline.  She reports she recently slid to ground from a chair at home a few weeks but denies any more recent fall or any other trauma.     Exam notable for significant bilateral lower extremity edema more so on the left side.  Laxity of the left knee.  Not tender to touch. Lungs clear.  Imaging concerning for possible avulsion fracture.  Orthopedic surgery service consult for evaluation and recommended Adamaris brace and physical therapy.    Ultrasound of lower extremity negative for clot.  Echocardiogram does not show evidence of heart failure.  Hyponatremia on presentation which is improving without intervention.    Patient with persistent elevated white count and now fever overnight.  Blood cultures ordered.  On empiric broad-spectrum antibiotics.    Interval History: No acute events overnight.  Today met with patient and family at bedside ( and grand-daughter) and her daughter Micheal on speakerphone.  We discussed the negative pathology from her LN biopsy and the care team's ongoing suspicion that she does have a cancer.  We discussed potential for EUS with biopsy and daughter's concern about whether or not patient is strong enough for this.  We discussed alternative which would be to pivot goals of care to focus on comfort.  Patient is very clear that she does not wish for any further invasive testing and wishes to go home.  We discussed options for home care including home palliative with home health for PT vs home hospice.  Patient states clearly she does not wish for any further PT and all are in agreement with her wishes to pursue comfort measures with hospice at home.  All questions answered and patient had no further complaints.    Objective:     Vital Signs (Most Recent):  Temp: 98 °F (36.7 °C) (04/24/25 2301)  Pulse: 71 (04/25/25 0800)  Resp: 19 (04/25/25 0800)  BP: (!) 140/63 (04/25/25  0800)  SpO2: 97 % (04/25/25 0800) Vital Signs (24h Range):  Temp:  [98 °F (36.7 °C)-98.9 °F (37.2 °C)] 98 °F (36.7 °C)  Pulse:  [] 71  Resp:  [18-25] 19  SpO2:  [93 %-99 %] 97 %  BP: (100-140)/(53-67) 140/63     Weight: 63.5 kg (139 lb 15.9 oz)  Body mass index is 24.03 kg/m².    Intake/Output Summary (Last 24 hours) at 4/25/2025 1313  Last data filed at 4/25/2025 0600  Gross per 24 hour   Intake 720 ml   Output 400 ml   Net 320 ml         Physical Exam  Constitutional:       General: She is not in acute distress.     Appearance: She is ill-appearing. She is not diaphoretic.   HENT:      Head: Atraumatic.      Mouth/Throat:      Mouth: Mucous membranes are moist.   Eyes:      Extraocular Movements: Extraocular movements intact.   Cardiovascular:      Rate and Rhythm: Normal rate and regular rhythm.      Heart sounds: Normal heart sounds. No murmur heard.  Pulmonary:      Effort: Pulmonary effort is normal.      Breath sounds: No wheezing or rhonchi.   Abdominal:      General: Bowel sounds are normal. There is no distension.      Palpations: Abdomen is soft.      Tenderness: There is no abdominal tenderness.   Musculoskeletal:         General: No deformity. Normal range of motion.      Cervical back: Normal range of motion.      Comments: Left knee not tender, swelling around the left knee and bilateral lower extremities much improved.   Skin:     General: Skin is warm.      Comments: Left axillary incision appears c/d/i   Neurological:      Mental Status: She is alert and oriented to person, place, and time.      Comments: Significant diffuse weakness           Significant Labs: All pertinent labs within the past 24 hours have been reviewed.    Significant Imaging: I have reviewed all pertinent imaging results/findings within the past 24 hours.        Assessment & Plan  Mediastinal mass  SIRS (systemic inflammatory response syndrome)  Hemoptysis  -Admitted to inpatient status  Presented with leg swelling and  on admit had elevated white blood cell count and subsequently fever.  Procal negative.  -UA suggestive of possible UTI but without urinary symptoms.    -Left knee with acute on chronic pain but not significantly tender and with good range of motion.    -Earlier in stay noted URI symptoms and reports of scant hemoptysis and cough.    -CT Chest with contrast 4/17 showed a large (8.1 x 4.5 cm) subcarinal mass, possible lymphadenopathy.  Additional mediastinal, hilar, and left axillary lymphadenopathy noted. Mild/moderate sized pleural effusions with bibasilar atelectasis.  Moderate chronic interstitial/ fibrotic lung changes.  -Blood cultures negative.  Urine cultures with no predominant organism  -Pulmonology and general surgery consulted and input appreciated.    -Pulm attempted thoracentesis 4/18 but not able to obtain fluid.  -Taken by surgery to OR on 4/20 for excisional LN biopsy.  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 -- No evidence of lymphoproliferative disorder or other metastatic malignancies.   -Per discussion swith pulmonology, next step for non-diagnostic LN biopsy would be EGD with EUS.    -Discussed options for care with patient and family at bedside extensively today.  Patient is clear that she does not want any further invasive testing and wishes to go home and focus on comfort care.  Her family (daughter, grand-daughter and ) are supportive of her decision.  -Will consult hospice [employer] for informational meeting and anticipate discharge home with hospice tomorrow.  -Will discontinue telemetry and lab draws.  Transfer out of ICU today.  Paroxysmal atrial fibrillation  Atrial fibrillation with RVR  -Noted history of pAFIB  -Had several episodes of short live self converting afib rvr earlier in stay  -Overnight 4/21-4/22 again developed afib rvr resistent to metoprolol so transferred to ICU.  Similar nocturnal occurrences overnight 4/22-4/23 and again  4/23-4/24.  -Echo 4/14/25 showed normal EF and no severe valvular lesions  -Discussed with general surgery and ok from their end and started eliquis 5mg bid on 4/22.  -This morning again in afib, but rate controlled  -Continue metoprolol and digoxin  -Continue eliquis  Hyponatremia  -On admit Na 121 and now is 128  -Noted history of hyponatremia.    -TSH wnl.  Coristol speaks against adrenal insuffiencey.    Hypervolemic initially on exam however swelling seems serum improved on its own - as did sodium.    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    -No further labs as pursuing comfort measures  Acute on Chronic pain of left knee  Lower extremity edema  Debility  -History noted and had some leg swelling which has improved  -Doppler US of leg did not show any DVT.  -Orthopedic surgery consulted and input appreciated  -Continue brace and therapy - wound care says ok to use her brace we just need to keep the area padded to protect the skin.  -Follow-up with Dr. Malagon in outpatient setting after discharge  -PT/OT consulted and recommend high intensity therapy at discharge.  However, patient does not wish for any further therapy and we are pursuing comfort measures.  Type 2 diabetes mellitus, without long-term current use of insulin  -A1c 5.7 now  -Home regimen includes metformin - held during hospitalization  -Blood sugars are reasonably controlled  -Continue SSI ac/hs  Hyperlipidemia  -Continue statin   Essential hypertension  -BP normal to mildly elevated after having been transiently hypotensive at times  -Adjusted metoprolol dosing for better rate control as above.  Moderate protein-calorie malnutrition  -Nutrition consulted. Most recent weight and BMI monitored-   -Measurements:  Wt Readings from Last 1 Encounters:   04/22/25 63.5 kg (139 lb 15.9 oz)   Body mass index is 24.03 kg/m².  -Patient has been screened and assessed by  RD.  -Interventions/Recommendations (treatment strategy):  - Boost Glucose Control 1x daily - Continue consistent carbohydrate diet - Encourage intake of meals - Monitor weight/labs - RD to monitor and follow up  Counseling regarding advance care planning and goals of care  DNR (do not resuscitate)  -Consulted palliative care to initiate ACP discussions and input appreciated  -Patient has elected for DNR status.  -Family awaits meeting with heme/onc to discuss diagnostic efforts and suspicion of lymphoma.  Patient and family have stated she would not want any surgery or chemotherapy - but perhaps immunotherapy or radiation may be helpful and well tolerated.  -I agree with palliative care that spending kaila time in inpatient rehab may be wasted time that they could be spending together.  Difficult situation  -Today met with patient, her , her grand-daughter and her daughter Micheal and discussed options moving forward.  Ms. Ayoub is very clear that she does not wish to undergo any further invasive testing or physical therapy.  We discussed that her goals appear consistent with a transition to comfort focussed care and home hospice.  She was in agreement and her family was supportive of her decision.   consulted to arrange meeting with hospice agency.  I spent 25 minutes ACP time today.  VTE Risk Mitigation (From admission, onward)           Ordered     apixaban tablet 5 mg  2 times daily         04/22/25 1511     IP VTE HIGH RISK PATIENT  Once         04/17/25 1115     Place LIANNE hose  Until discontinued         04/17/25 1115     Place sequential compression device  Until discontinued         04/17/25 1115     Reason for No Pharmacological VTE Prophylaxis  Once        Question:  Reasons:  Answer:  Active Bleeding    04/17/25 1115                    Discharge Planning   FAY: 4/25/2025     Code Status: DNR   Medical Readiness for Discharge Date:   Discharge Plan A: Rehab (Pending referrral with  [facility])   Discharge Delays: None known at this time                    Ramon Colon MD  Department of Hospital Medicine   [facility] ([place])

## 2025-04-25 NOTE — ASSESSMENT & PLAN NOTE
-Admitted to inpatient status  Presented with leg swelling and on admit had elevated white blood cell count and subsequently fever.  Procal negative.  -UA suggestive of possible UTI but without urinary symptoms.    -Left knee with acute on chronic pain but not significantly tender and with good range of motion.    -Earlier in stay noted URI symptoms and reports of scant hemoptysis and cough.    -CT Chest with contrast 4/17 showed a large (8.1 x 4.5 cm) subcarinal mass, possible lymphadenopathy.  Additional mediastinal, hilar, and left axillary lymphadenopathy noted. Mild/moderate sized pleural effusions with bibasilar atelectasis.  Moderate chronic interstitial/ fibrotic lung changes.  -Blood cultures negative.  Urine cultures with no predominant organism  -Pulmonology and general surgery consulted and input appreciated.    -Pulm attempted thoracentesis 4/18 but not able to obtain fluid.  -Taken by surgery to OR on 4/20 for excisional LN biopsy.  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 -- No evidence of lymphoproliferative disorder or other metastatic malignancies.   -Per discussion swith pulmonology, next step for non-diagnostic LN biopsy would be EGD with EUS.    -Discussed options for care with patient and family at bedside extensively today.  Patient is clear that she does not want any further invasive testing and wishes to go home and focus on comfort care.  Her family (daughter, grand-daughter and ) are supportive of her decision.  -Will consult hospice [facility] for informational meeting and anticipate discharge home with hospice tomorrow.  -Will discontinue telemetry and lab draws.  Transfer out of ICU today.

## 2025-04-25 NOTE — ASSESSMENT & PLAN NOTE
-Admitted to inpatient status  Presented with leg swelling and on admit had elevated white blood cell count and subsequently fever.  Procal negative.  -UA suggestive of possible UTI but without urinary symptoms.    -Left knee with acute on chronic pain but not significantly tender and with good range of motion.    -Earlier in stay noted URI symptoms and reports of scant hemoptysis and cough.    -CT Chest with contrast 4/17 showed a large (8.1 x 4.5 cm) subcarinal mass, possible lymphadenopathy.  Additional mediastinal, hilar, and left axillary lymphadenopathy noted. Mild/moderate sized pleural effusions with bibasilar atelectasis.  Moderate chronic interstitial/ fibrotic lung changes.  -Blood cultures negative.  Urine cultures with no predominant organism  -Pulmonology and general surgery consulted and input appreciated.    -Pulm attempted thoracentesis 4/18 but not able to obtain fluid.  -Taken by surgery to OR on 4/20 for excisional LN biopsy.  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 -- No evidence of lymphoproliferative disorder or other metastatic malignancies.   -Per discussion swith pulmonology, next step for non-diagnostic LN biopsy would be EGD with EUS.    -Discussed options for care with patient and family at bedside extensively today.  Patient is clear that she does not want any further invasive testing and wishes to go home and focus on comfort care.  Her family (daughter, grand-daughter and ) are supportive of her decision.  -Will consult hospice [facility] for informational meeting and anticipate discharge home with hospice tomorrow.  -Will discontinue telemetry and lab draws.  Transfer out of ICU today.

## 2025-04-26 VITALS
OXYGEN SATURATION: 91 % | SYSTOLIC BLOOD PRESSURE: 118 MMHG | DIASTOLIC BLOOD PRESSURE: 65 MMHG | BODY MASS INDEX: 23.9 KG/M2 | RESPIRATION RATE: 7 BRPM | HEIGHT: 64 IN | WEIGHT: 140 LBS | HEART RATE: 111 BPM | TEMPERATURE: 98 F

## 2025-04-26 LAB
ABSOLUTE EOSINOPHIL (OHS): 0.11 K/UL
ABSOLUTE MONOCYTE (OHS): 1.13 K/UL (ref 0.3–1)
ABSOLUTE NEUTROPHIL COUNT (OHS): 26.2 K/UL (ref 1.8–7.7)
ALBUMIN SERPL BCP-MCNC: 1.5 G/DL (ref 3.5–5.2)
ALP SERPL-CCNC: 76 UNIT/L (ref 40–150)
ALT SERPL W/O P-5'-P-CCNC: 16 UNIT/L (ref 10–44)
ANION GAP (OHS): 7 MMOL/L (ref 8–16)
AST SERPL-CCNC: 36 UNIT/L (ref 11–45)
BASOPHILS # BLD AUTO: 0.09 K/UL
BASOPHILS NFR BLD AUTO: 0.3 %
BILIRUB SERPL-MCNC: 0.2 MG/DL (ref 0.1–1)
BUN SERPL-MCNC: 18 MG/DL (ref 8–23)
CALCIUM SERPL-MCNC: 10 MG/DL (ref 8.7–10.5)
CHLORIDE SERPL-SCNC: 99 MMOL/L (ref 95–110)
CO2 SERPL-SCNC: 20 MMOL/L (ref 23–29)
CREAT SERPL-MCNC: 0.8 MG/DL (ref 0.5–1.4)
ERYTHROCYTE [DISTWIDTH] IN BLOOD BY AUTOMATED COUNT: 15 % (ref 11.5–14.5)
GFR SERPLBLD CREATININE-BSD FMLA CKD-EPI: >60 ML/MIN/1.73/M2
GLUCOSE SERPL-MCNC: 91 MG/DL (ref 70–110)
HCT VFR BLD AUTO: 28.1 % (ref 37–48.5)
HGB BLD-MCNC: 9.3 GM/DL (ref 12–16)
IMM GRANULOCYTES # BLD AUTO: 0.24 K/UL (ref 0–0.04)
IMM GRANULOCYTES NFR BLD AUTO: 0.8 % (ref 0–0.5)
LYMPHOCYTES # BLD AUTO: 1.53 K/UL (ref 1–4.8)
MAGNESIUM SERPL-MCNC: 1.9 MG/DL (ref 1.6–2.6)
MCH RBC QN AUTO: 30.1 PG (ref 27–31)
MCHC RBC AUTO-ENTMCNC: 33.1 G/DL (ref 32–36)
MCV RBC AUTO: 91 FL (ref 82–98)
NUCLEATED RBC (/100WBC) (OHS): 0 /100 WBC
PLATELET # BLD AUTO: 390 K/UL (ref 150–450)
PMV BLD AUTO: 8.9 FL (ref 9.2–12.9)
POTASSIUM SERPL-SCNC: 5 MMOL/L (ref 3.5–5.1)
PROT SERPL-MCNC: 6.8 GM/DL (ref 6–8.4)
RBC # BLD AUTO: 3.09 M/UL (ref 4–5.4)
RELATIVE EOSINOPHIL (OHS): 0.4 %
RELATIVE LYMPHOCYTE (OHS): 5.2 % (ref 18–48)
RELATIVE MONOCYTE (OHS): 3.9 % (ref 4–15)
RELATIVE NEUTROPHIL (OHS): 89.4 % (ref 38–73)
SODIUM SERPL-SCNC: 126 MMOL/L (ref 136–145)
WBC # BLD AUTO: 29.3 K/UL (ref 3.9–12.7)

## 2025-04-26 PROCEDURE — 25000003 PHARM REV CODE 250: Performed by: HOSPITALIST

## 2025-04-26 PROCEDURE — 25000003 PHARM REV CODE 250: Performed by: NURSE PRACTITIONER

## 2025-04-26 PROCEDURE — 25000003 PHARM REV CODE 250: Performed by: INTERNAL MEDICINE

## 2025-04-26 PROCEDURE — 84155 ASSAY OF PROTEIN SERUM: CPT | Performed by: HOSPITALIST

## 2025-04-26 PROCEDURE — 85025 COMPLETE CBC W/AUTO DIFF WBC: CPT | Performed by: HOSPITALIST

## 2025-04-26 PROCEDURE — 99232 SBSQ HOSP IP/OBS MODERATE 35: CPT | Mod: ,,, | Performed by: INTERNAL MEDICINE

## 2025-04-26 PROCEDURE — A4216 STERILE WATER/SALINE, 10 ML: HCPCS | Performed by: SURGERY

## 2025-04-26 PROCEDURE — 36415 COLL VENOUS BLD VENIPUNCTURE: CPT | Performed by: HOSPITALIST

## 2025-04-26 PROCEDURE — 83735 ASSAY OF MAGNESIUM: CPT | Performed by: HOSPITALIST

## 2025-04-26 PROCEDURE — 25000003 PHARM REV CODE 250: Performed by: SURGERY

## 2025-04-26 RX ORDER — METOPROLOL TARTRATE 25 MG/1
12.5 TABLET, FILM COATED ORAL EVERY 8 HOURS
Qty: 45 TABLET | Refills: 0 | Status: SHIPPED | OUTPATIENT
Start: 2025-04-26 | End: 2025-05-26

## 2025-04-26 RX ORDER — ONDANSETRON 4 MG/1
4 TABLET, ORALLY DISINTEGRATING ORAL EVERY 6 HOURS PRN
Qty: 30 TABLET | Refills: 0 | Status: SHIPPED | OUTPATIENT
Start: 2025-04-26

## 2025-04-26 RX ORDER — GABAPENTIN 400 MG/1
400 CAPSULE ORAL 3 TIMES DAILY
Qty: 90 CAPSULE | Refills: 0 | Status: SHIPPED | OUTPATIENT
Start: 2025-04-26 | End: 2026-04-26

## 2025-04-26 RX ORDER — DIGOXIN 125 MCG
0.12 TABLET ORAL DAILY
Qty: 30 TABLET | Refills: 0 | Status: SHIPPED | OUTPATIENT
Start: 2025-04-27 | End: 2026-04-27

## 2025-04-26 RX ADMIN — GABAPENTIN 400 MG: 400 CAPSULE ORAL at 08:04

## 2025-04-26 RX ADMIN — MUPIROCIN: 20 OINTMENT TOPICAL at 08:04

## 2025-04-26 RX ADMIN — DIGOXIN 0.12 MG: 125 TABLET ORAL at 08:04

## 2025-04-26 RX ADMIN — GUAIFENESIN AND DEXTROMETHORPHAN HYDROBROMIDE 1 TABLET: 600; 30 TABLET, EXTENDED RELEASE ORAL at 08:04

## 2025-04-26 RX ADMIN — METOPROLOL TARTRATE 12.5 MG: 25 TABLET, FILM COATED ORAL at 01:04

## 2025-04-26 RX ADMIN — SODIUM CHLORIDE, PRESERVATIVE FREE 10 ML: 5 INJECTION INTRAVENOUS at 05:04

## 2025-04-26 RX ADMIN — METOPROLOL TARTRATE 12.5 MG: 25 TABLET, FILM COATED ORAL at 05:04

## 2025-04-26 RX ADMIN — SODIUM CHLORIDE, PRESERVATIVE FREE 10 ML: 5 INJECTION INTRAVENOUS at 01:04

## 2025-04-26 RX ADMIN — APIXABAN 5 MG: 2.5 TABLET, FILM COATED ORAL at 08:04

## 2025-04-26 NOTE — PROGRESS NOTES
"    Cardiology Progress Note    4/26/2025  9:56 AM    Subjective/Interim:      No complaints.  Sinus rhythm.     Objective:   24-hour Vitals:  Temp:  [98 °F (36.7 °C)-98.8 °F (37.1 °C)] 98.7 °F (37.1 °C)  Pulse:  [57-84] 69  Resp:  [16-28] 28  SpO2:  [95 %-100 %] 95 %  BP: ()/(55-71) 144/69    Physical Examination:  Vitals: BP (!) 144/69 (BP Location: Right arm, Patient Position: Lying)   Pulse 69   Temp 98.7 °F (37.1 °C) (Oral)   Resp (!) 28   Ht 5' 4" (1.626 m)   Wt 63.5 kg (139 lb 15.9 oz)   SpO2 95%   Breastfeeding No   BMI 24.03 kg/m²     Physical Exam  Vitals reviewed.   Constitutional:       Appearance: She is well-developed.   HENT:      Head: Atraumatic.   Eyes:      General: No scleral icterus.  Neck:      Vascular: Normal carotid pulses. No carotid bruit, hepatojugular reflux or JVD.   Cardiovascular:      Rate and Rhythm: Normal rate and regular rhythm.      Chest Wall: PMI is not displaced.      Pulses: Intact distal pulses.           Carotid pulses are 2+ on the right side and 2+ on the left side.       Radial pulses are 2+ on the right side and 2+ on the left side.        Dorsalis pedis pulses are 2+ on the right side and 2+ on the left side.      Heart sounds: Normal heart sounds, S1 normal and S2 normal. No murmur heard.     No friction rub.   Pulmonary:      Effort: Pulmonary effort is normal. No respiratory distress.      Breath sounds: Normal breath sounds. No stridor. No wheezing or rales.   Chest:      Chest wall: No tenderness.   Abdominal:      General: Bowel sounds are normal.      Palpations: Abdomen is soft.   Musculoskeletal:      Cervical back: Neck supple. No edema.   Skin:     General: Skin is warm and dry.      Nails: There is no clubbing.   Neurological:      Mental Status: She is alert and oriented to person, place, and time.   Psychiatric:         Behavior: Behavior normal.         Thought Content: Thought content normal.         No intake or output data in the 24 " "hours ending 04/26/25 0957      Laboratory:  Trended Lab Data:  Recent Labs   Lab 04/24/25  0244 04/25/25 0226 04/26/25  0330   WBC 29.79* 32.71* 29.30*   HGB 8.5* 8.5* 9.3*   HCT 24.8* 24.5* 28.1*    416 390       Recent Labs   Lab 04/20/25  0635 04/21/25  0628 04/21/25  1907 04/22/25  0352 04/23/25  0412 04/24/25  0244 04/25/25  0226 04/26/25  0330   * 124*   < > 126*   < > 127* 128* 126*   K 4.1 3.9   < > 4.5   < > 3.9 4.3 5.0   CL 98 94*   < > 99   < > 96 99 99   CO2 23 23   < > 18*   < > 24 24 20*   BUN 13 11   < > 12   < > 17 18 18   CALCIUM 9.3 9.4   < > 9.0   < > 9.1 9.3 10.0   MG 1.6 1.7   < > 2.2   < > 2.0 2.0 1.9   PHOS 3.2 2.7  --  3.4  --   --   --   --     < > = values in this interval not displayed.       Recent Labs   Lab 04/24/25 0244 04/25/25 0226 04/26/25  0330   ALBUMIN 1.5* 1.4* 1.5*   BILITOT 0.3 0.3 0.2   AST 29 33 36   ALT 13 16 16   ALKPHOS 64 61 76       No results for input(s): "PROTIME", "PTT", "INR" in the last 168 hours.    Cardiac: No results for input(s): "TROPONINI", "CKTOTAL", "CKMB", "BNP" in the last 168 hours.    FLP:   Lab Results   Component Value Date    CHOL 150 01/21/2020    HDL 48 01/21/2020    LDLCALC 83.8 01/21/2020    TRIG 91 01/21/2020    CHOLHDL 32.0 01/21/2020     DM:   Lab Results   Component Value Date    HGBA1C 5.7 (H) 04/15/2025    HGBA1C 5.6 01/01/2023    HGBA1C 6.0 (H) 01/21/2020    GLUF 135 (H) 02/11/2008    LDLCALC 83.8 01/21/2020    CREATININE 0.8 04/26/2025     Thyroid:   Lab Results   Component Value Date    TSH 0.594 04/15/2025    FREET4 1.13 01/21/2020     Anemia:   Lab Results   Component Value Date    IRON 56 06/03/2010    TIBC 394 06/03/2010    FERRITIN 73 12/28/2011    GCOAHYBU68 884 03/03/2017    FOLATE >20.0 (A) 06/03/2010     Urinalysis:   Lab Results   Component Value Date    LABURIN  04/14/2025     Multiple organisms isolated. None in predominance. Repeat if clinically necessary.    COLORU Yellow 04/14/2025    SPECGRAV 1.005 " 04/14/2025    NITRITE Positive (A) 04/14/2025    KETONESU Negative 01/02/2023    UROBILINOGEN Negative 04/14/2025     @      Other Results:  EKG (my interpretation):    TELEMETRY:  sinus     Echo:   Results for orders placed or performed during the hospital encounter of 04/14/25   Echo   Result Value Ref Range    BSA 1.66 m2    LVIDd 3.7 3.5 - 6.0 cm    LV Systolic Volume 19 mL    LV Systolic Volume Index 11.4 mL/m2    LVIDs 2.4 2.1 - 4.0 cm    LV Diastolic Volume 59 mL    LV Diastolic Volume Index 35.54 mL/m2    Left Ventricular End Systolic Volume by Teichholz Method 19.25 mL    Left Ventricular End Diastolic Volume by Teichholz Method 59.41 mL    IVS 0.6 0.6 - 1.1 cm    LVOT diameter 2.0 cm    LVOT area 3.1 cm2    FS 35.1 28 - 44 %    Left Ventricle Relative Wall Thickness 0.32 cm    PW 0.6 0.6 - 1.1 cm    LV mass 56.3 g    LV Mass Index 33.9 g/m2    MV Peak E Gerson 0.83 m/s    TDI LATERAL 0.08 m/s    TDI SEPTAL 0.06 m/s    E/E' ratio 12 m/s    MV Peak A Gerson 1.01 m/s    TR Max Gerson 2.8 m/s    E/A ratio 0.82     IVRT 57 msec    E wave deceleration time 202 msec    LV SEPTAL E/E' RATIO 13.8 m/s    LV LATERAL E/E' RATIO 10.4 m/s    PV Peak S Gerson 0.77 m/s    PV Peak D Gerson 0.41 m/s    Pulm vein S/D ratio 1.88     LA size 2.8 cm    Left Atrium Minor Axis 4.7 cm    Left Atrium Major Axis 5.1 cm    RA Major Axis 4.62 cm    AV regurgitation pressure 1/2 time 355 ms    AR Max Gersno 3.68 m/s    AV mean gradient 6 mmHg    AV peak gradient 10 mmHg    Ao peak gerson 1.6 m/s    Ao VTI 36.7 cm    Mr max gerson 4.13 m/s    MV stenosis pressure 1/2 time 58.57 ms    MV valve area p 1/2 method 3.76 cm2    Triscuspid Valve Regurgitation Peak Gradient 31 mmHg    PV PEAK VELOCITY 0.91 m/s    PV peak gradient 3 mmHg    IVC diameter 1.79 cm    Mean e' 0.07 m/s    ZLVIDS -1.45     ZLVIDD -2.29     TV resting pulmonary artery pressure 34 mmHg    RV TB RVSP 6 mmHg    Est. RA pres 3 mmHg    Narrative      Left Ventricle: The left ventricle is normal in  size. Normal wall   thickness. There is normal systolic function with a visually estimated   ejection fraction of 55 - 60%. Global longitudinal strain is normal;   19.6%.    Right Ventricle: The right ventricle is normal in size. Wall thickness   is normal. Systolic function is normal.    Aortic Valve: There is mild to moderate aortic regurgitation.    Tricuspid Valve: There is mild regurgitation.    IVC/SVC: Normal venous pressure at 3 mmHg.         Radiology:  X-Ray Chest AP Portable  Result Date: 4/25/2025  EXAMINATION: XR CHEST AP PORTABLE CLINICAL HISTORY: Cough/congestion; TECHNIQUE: Single frontal view of the chest was performed. COMPARISON: 04/18/2025 FINDINGS: Enlarged cardiac silhouette.There is moderate right lower lung opacification, increased.  There is bilateral blunting of the costophrenic angles/pleural effusions, more prominent on the left, similar to prior.  There is increased perihilar lung opacities with indistinct pulmonary vasculature.  No pneumothorax.     CHF/pulmonary edema pattern with increased right lower lung opacification, possible atelectasis and/or infiltrate. Electronically signed by: Alexandre Posada MD Date:    04/25/2025 Time:    08:46    CT Abdomen Pelvis With IV Contrast Routine Oral Contrast  Result Date: 4/19/2025  EXAMINATION: CT ABDOMEN PELVIS without IV CONTRAST CLINICAL HISTORY: Metastatic disease evaluation;LE swelling, concern for possible inguinal nodes, has mediastinal and axillary mass. full survey; TECHNIQUE: Axial images of the abdomen and pelvis were acquired without the use of intravenous contrast.  1000 cc of Omnipaque 9 oral contrast was administered.  Coronal and sagittal reconstructions were also obtained COMPARISON: CT chest from 04/17/2025. FINDINGS: Visualized portion of the thorax: Redemonstration of partial visualization of subcarinal mass, better evaluated on prior CT from 04/17/2025.  No cardiomegaly.  Moderate calcific atherosclerosis.  Trace pericardial  effusion.  Visualized portions of the lungs demonstrates small bilateral pleural effusions.  Interlobular septal thickening with reticulations and fibrotic changes suggestive of interstitial lung disease. Liver: Normal in size and contour.  4.1 x 5.2 cm hypodensity near the dome of the liver consistent with a cyst.  No additional focal hepatic lesions. Gallbladder: Not well visualized possibly decompressed or absent. Bile Ducts: No evidence of dilated ducts. Pancreas: No mass or peripancreatic fat stranding. Spleen: Unremarkable. Stomach and duodenum: Unremarkable. Adrenals: Unremarkable. Kidneys/ Ureters: Normal in size and location. Bilateral renal hypodensities consistent with cysts.  No hydronephrosis or nephrolithiasis. No ureteral dilatation. Bladder: No evidence of wall thickening. Reproductive organs: Unremarkable. Bowel/Mesentery: Small bowel is normal in caliber with no evidence of obstruction. No evidence of inflammation or wall thickening.  Colon demonstrates no focal wall thickening. Lymph nodes: No lymphadenapathy. Vasculature: No aneurysm. Mild calcific atherosclerosis. Abdominal wall:  Body wall anasarca. Bones: No acute fracture.  Postsurgical changes L4-5.  Hardware and alignment appear intact.  No suspicious osseous lesions.  Dense sclerosis of the SI joints bilaterally.     No evidence metastatic disease or lymphadenopathy in the abdomen or pelvis. Partial visualization subcarinal mediastinal mass better evaluated on recent CT of the chest from 04/17/2025. Partial visualization of bilateral small pleural effusions and interstitial lung disease better evaluated on recent CT of the chest from 04/17/2025. Electronically signed by: Balaji Goldman MD Date:    04/19/2025 Time:    14:25    US Soft Tissue Axilla, Left  Result Date: 4/19/2025  EXAMINATION: US SOFT TISSUE AXILLA, LEFT CLINICAL HISTORY: measure number and size of lymph nodes; TECHNIQUE: Ultrasound of the left axilla COMPARISON: CT chest  04/17/2025 FINDINGS: At least 4 lymph nodes are visualized, the largest of which measures 2.0 x 0.9 x 1.8 cm which is borderline caliber for pathologic enlargement.  There is some cortical thickening present and a normal reniform shape is difficult to appreciate with certainty on at least 1 of the lymph nodes.  Comparison with the CT scan shows that these are actually subpectoral lymph nodes.     Approximately 4 borderline enlarged left subpectoral lymph nodes which could be neoplastic in nature given this patient's large subcarinal mass seen on CT.. Electronically signed by: Ayden Negron Jr Date:    04/19/2025 Time:    10:19    X-Ray Chest AP Portable  Result Date: 4/19/2025  EXAMINATION: XR CHEST AP PORTABLE CLINICAL HISTORY: air in left pleural space; TECHNIQUE: Single frontal view of the chest was performed. COMPARISON: Plain film from 04/14/2025 and CT chest from 04/17/2025. FINDINGS: Cardiac silhouette is enlarged similar to prior exam.  Prominence of the pulmonary vasculature.  Coarse interstitial lung markings with a lower lobe predominance.  Small bilateral pleural effusions.  No pneumothorax is visualized.  Advanced degenerative changes of the right shoulder.     As above. Electronically signed by: Balaji Goldman MD Date:    04/19/2025 Time:    08:36    CT Chest With Contrast  Result Date: 4/17/2025  EXAMINATION: CT CHEST WITH CONTRAST CLINICAL HISTORY: Hemoptysis; TECHNIQUE: Low dose axial images, sagittal and coronal reformations were obtained from the thoracic inlet to the lung bases following the IV administration of 75 mL of Omnipaque 350. COMPARISON: None FINDINGS: Lungs: There is prominent peripheral dependent interstitial thickening, concerning for chronic interstitial/fibrotic changes.  Honeycombing present.  Patient respiratory motion artifact limits assessment.  There is mild bibasilar atelectasis with mild/moderate pleural effusions.  The main tracheobronchial tree is clear. Mediastinum:  Extensive lymphadenopathy.  Subcarinal mass measures 8.1 by 4.5 cm (series 2, image 55).  Right hilar lymphadenopathy measures 1.9 cm (series 6, image 197).  Right paratracheal lymphadenopathy noted.  Heart size within normal limits.  Trace pericardial effusion.  Thoracic aorta is normal caliber.  Moderate scattered calcific atherosclerosis. Upper abdomen (visualized portion): Large liver cyst in the dome. Chest wall/axilla.  Mild left axillary lymphadenopathy. Bones: No marrow replacement process.     Large (8.1 x 4.5 cm) subcarinal mass, possible lymphadenopathy.  Additional mediastinal, hilar, and left axillary lymphadenopathy noted. Mild/moderate sized pleural effusions with bibasilar atelectasis. Moderate chronic interstitial/ fibrotic lung changes. This report was flagged in Epic as abnormal. Electronically signed by: Alexandre Posada MD Date:    04/17/2025 Time:    13:40    US Lower Extremity Veins Bilateral  Result Date: 4/15/2025  EXAMINATION: US LOWER EXTREMITY VEINS BILATERAL CLINICAL HISTORY: Swelling, Rule out DVT; TECHNIQUE: Duplex and color flow Doppler evaluation of the bilateral lower extremity veins was performed. COMPARISON: None FINDINGS: No evidence of clot involving the bilateral common femoral, greater saphenous, femoral, popliteal, peroneal, anterior and posterior tibial veins.  All venous structures demonstrate normal respiratory phasicity and augment adequately.  No evidence of soft tissue mass or Baker's cyst.     No evidence of lower extremity deep venous thrombosis. Electronically signed by: Rodolfo Workman MD Date:    04/15/2025 Time:    17:36    Echo  Result Date: 4/15/2025    Left Ventricle: The left ventricle is normal in size. Normal wall thickness. There is normal systolic function with a visually estimated ejection fraction of 55 - 60%. Global longitudinal strain is normal; 19.6%.   Right Ventricle: The right ventricle is normal in size. Wall thickness is normal. Systolic function is  normal.   Aortic Valve: There is mild to moderate aortic regurgitation.   Tricuspid Valve: There is mild regurgitation.   IVC/SVC: Normal venous pressure at 3 mmHg.     X-Ray Chest AP Portable  Result Date: 4/14/2025  EXAMINATION: AP PORTABLE CHEST CLINICAL HISTORY: Chest Pain; TECHNIQUE: AP portable chest radiograph was submitted. COMPARISON: 01/01/2023 FINDINGS: AP portable chest radiograph demonstrates a cardiac silhouette within normal limits.  There is bilateral hilar prominence, which may be due to adenopathy, infiltrates, pulmonary vasculature, and less likely masses.  Mild bibasilar densities are seen, which may be related to atelectatic change or infiltrates.  There is no pneumothorax or large pleural effusion.  There are calcify thoracic lymph nodes.     As above described. Electronically signed by: Cyndie Holley Date:    04/14/2025 Time:    22:03    X-Ray Knee 3 View Left  Result Date: 4/14/2025  EXAMINATION: THREE VIEWS OF THE LEFT KNEE CLINICAL HISTORY: Pain in left knee TECHNIQUE: AP, oblique, and lateral view of the left knee COMPARISON: 01/01/2023 FINDINGS: Left knee arthroplasty is present.  Three views of the left knee demonstrate a curvilinear calcification/ossification adjacent to the medial femoral condyle paralleling the femoral cortex.  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  An avulsive fracture may have a similar appearance.  Correlate with the site of pain or history of trauma.  Moderate suprapatellar effusion is present.  The bones are osteopenic.  Vascular calcifications are present.     As above described. Electronically signed by: Cyndie Holley Date:    04/14/2025 Time:    21:55        Current Medications:     Infusions:         Scheduled:   apixaban  5 mg Oral BID    dextromethorphan-guaiFENesin  mg  1 tablet Oral BID    digoxin  0.125 mg Oral Daily    diltiaZEM  0.35 mg/kg Intravenous Once    gabapentin  400 mg Oral TID    metoprolol tartrate  12.5 mg Oral Q8H     sodium chloride 0.9%  10 mL Intravenous Q8H        PRN:    Current Facility-Administered Medications:     acetaminophen, 1,000 mg, Oral, Q8H PRN    albuterol-ipratropium, 3 mL, Nebulization, Q4H PRN    melatonin, 6 mg, Oral, Nightly PRN    naloxone, 0.02 mg, Intravenous, PRN    ondansetron, 4 mg, Oral, Q8H PRN    senna-docusate, 1 tablet, Oral, BID PRN    sodium chloride 0.9%, 10 mL, Intravenous, PRN     Assessment and Plan:     Jojo Ayoub is a 89 y.o.female with  PAF, back in sinus.  [id number] = 5 (female, > 75, HTN, DM)  -discussed with patient and family regarding her risk of embolic CVA given paroxysmal atrial fibrillation her CHADS-VASc score 5.  Recommend anticoagulation once cleared by surgery and monitor her anemia closely.  Patient, daughter and  understand the risks and benefits of oral anticoagulation and opted to take anticoagulation for CVA prophylaxis.  Start Eliquis 5mg BID once cleared by Dr. Johnson.  Discussed with Dr Colon.  -4/23:  remains in sinus.  Eliquis started.  Continue metoprolol.  -4/24:  AF RVR last night.  Received digoxin 0.25 mg IV x2.  In sinus rhythm this morning.  Metoprolol increased to 12.5 mg q.8 hours.  Will add digoxin 0.125 mg daily.  Discussed with Dr Colon.  -4/25:  EKG today showed AF with controlled HR.  Continue with current metoprolol, digoxin and eliquis.  -4/26:  in sinus rhythm.  Continue current meds.            Raulito Lares MD        Disclaimer: This document was created using voice recognition software ([employer]). Although it may be edited, this document may contain errors related to incorrect recognition of the spoken word. Please call the physician if clarification is needed.

## 2025-04-26 NOTE — PLAN OF CARE
Problem: PHYSICAL THERAPY ADULT  Goal: Performs mobility at highest level of function for planned discharge setting.  See evaluation for individualized goals.  Description: Treatment/Interventions: Functional transfer training, LE strengthening/ROM, Elevations, Therapeutic exercise, Endurance training, Patient/family training, Equipment eval/education, Bed mobility, Gait training, Compensatory technique education, Spoke to nursing, OT  Equipment Recommended:  (pt already has rollator)       See flowsheet documentation for full assessment, interventions and recommendations.  Outcome: Progressing  Note: Prognosis: Good  Problem List: Decreased strength, Decreased endurance, Impaired balance, Decreased mobility, Pain  Assessment: Pt seen for PT treatment session this date, consisting of ther ex focused on strengthening and gt training on level surfaces to improve pt safety in household environment. Since previous session, pt has made good progress in terms of increased distance ambulated. Pt tolerated 3 sets of walking 5' forward and backward with RW and min A x1, followed by seated rest breaks between sets. Pt tolerated all seated exercises but required verbal cue to stay on task. Pt demonstrated fatigue during the final exercises of the session. Pt request to get back into bed at conclusion of session and required min A x 1 to transfer from chair to bed, and required supervision to transfer from sit to supine with verbal cues. Pertinent barriers during this session include pain and SOB. Current goals and POC established on IE remain appropriate, pt continues to have rehab potential and is making good progress towards STGs. Pt prognosis for achieving goals is good, pending pt progress with hospitalization/medical status improvements, and indicated by ability to follow directions, previous response to intervention, and responsive to cues/strategies. Pt limited d/t fear of falling. Pt continues to be functioning below  Case Management Final Discharge Note    Discharge Disposition: Home with Hospice ([place])    New DME ordered / company name: none    Relevant SDOH / Transition of Care Barriers:  none    Person available to provide assistance at home when needed and their contact information: ruiz Dimas (Daughter)  [phone]    Scheduled followup appointment: N/A    Referrals placed: none    Transportation: Patient will transport via ambulance.        Patient and family educated on discharge services and updated on DC plan. Bedside RN notified, patient clear to discharge from Case Management Perspective.       [facility] ([place])  Discharge Final Note    Primary Care Provider: Bahman Vincent Jr., MD    Expected Discharge Date: 4/26/2025    Final Discharge Note (most recent)       Final Note - 04/26/25 1236          Final Note    Assessment Type Final Discharge Note     Anticipated Discharge Disposition Hospice/Home     Hospital Resources/Appts/Education Provided Provided patient/caregiver with written discharge plan information        Post-Acute Status    Post-Acute Authorization Hospice     Hospice Status Set-up Complete/Auth obtained     Discharge Delays None known at this time                     Important Message from Medicare  Important Message from Medicare regarding Discharge Appeal Rights: Explained to patient/caregiver, Signed/date by patient/caregiver     Date IMM was signed: 04/25/25  Time IMM was signed: [id number]         baseline level, and remains limited 2* factors listed above. PT will continue to see pt during current hospitalization in order to address the deficits listed above and provide interventions consistent w/ POC in effort to achieve STGs. PT recommends level 2, moderate resource intensity upon discharge.  Barriers to Discharge: None     Rehab Resource Intensity Level, PT: II (Moderate Resource Intensity)    See flowsheet documentation for full assessment.

## 2025-04-26 NOTE — EICU
Intervention Initiated From:  [facility] / [facility]    Mc intervened regarding:  Rounding (Video assessment)  VICU Night Rounds Checklist  24H Vital Sign Range:  Temp:  [98 °F (36.7 °C)-98.8 °F (37.1 °C)]   Pulse:  [57-89]   Resp:  [18-22]   BP: ()/(55-69)   SpO2:  [93 %-100 %]     Video rounds

## 2025-04-26 NOTE — ASSESSMENT & PLAN NOTE
-Noted history of pAFIB  -Had several episodes of short live self converting afib rvr earlier in stay  -Overnight 4/21-4/22 again developed afib rvr resistent to metoprolol so transferred to ICU.  Similar nocturnal occurrences overnight 4/22-4/23 and again 4/23-4/24.  -Echo 4/14/25 showed normal EF and no severe valvular lesions  -Discussed with general surgery and ok from their end and started eliquis 5mg bid on 4/22.  -This morning again in afib, but rate controlled  -Continue metoprolol and digoxin  -Continue eliquis - noted prior small hemoptysis, but this has not been occurring so feel eliquis is safe for now.

## 2025-04-26 NOTE — DISCHARGE INSTRUCTIONS
Take all medications as prescribed.  Eat a diet based on comfort feeds per hospice protocols  Follow up with your hospice providers as needed  [facility].  We are honored that you entrusted us with your healthcare needs. Your satisfaction is very important to us and we hope you have been very pleased with your experience at [facility]. After your discharge you may receive a survey asking you to rate your hospital experience. We encourage you to take the time to complete the survey as your feedback allows us to identify areas for improvement as well as recognize our staff.   We hope that you have received the very best care possible during your hospitalization at [facility], as your satisfaction is our top priority.

## 2025-04-26 NOTE — ASSESSMENT & PLAN NOTE
-On admit Na 121 and now is 126  -Noted history of hyponatremia.    -TSH wnl.  Coristol speaks against adrenal insuffiencey.    Hypervolemic initially on exam however swelling seems serum improved on its own - as did sodium.    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    -No further labs as pursuing comfort measures

## 2025-04-26 NOTE — ASSESSMENT & PLAN NOTE
-A1c 5.7 now  -Home regimen includes metformin - held during hospitalization  -Blood sugars are reasonably controlled without insulin

## 2025-04-26 NOTE — ASSESSMENT & PLAN NOTE
-Admitted to inpatient status  Presented with leg swelling and on admit had elevated white blood cell count and subsequently fever.  Procal negative.  -UA suggestive of possible UTI but without urinary symptoms.    -Left knee with acute on chronic pain but not significantly tender and with good range of motion.    -Earlier in stay noted URI symptoms and reports of scant hemoptysis and cough.    -CT Chest with contrast 4/17 showed a large (8.1 x 4.5 cm) subcarinal mass, possible lymphadenopathy.  Additional mediastinal, hilar, and left axillary lymphadenopathy noted. Mild/moderate sized pleural effusions with bibasilar atelectasis.  Moderate chronic interstitial/ fibrotic lung changes.  -Blood cultures negative.  Urine cultures with no predominant organism  -Pulmonology and general surgery consulted and input appreciated.    -Pulm attempted thoracentesis 4/18 but not able to obtain fluid.  -Taken by surgery to OR on 4/20 for excisional LN biopsy.  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 -- No evidence of lymphoproliferative disorder or other metastatic malignancies.   -Per discussion swith pulmonology, next step for non-diagnostic LN biopsy would be EGD with EUS.    -Discussed options for care with patient and family at bedside extensively today.  Patient is clear that she does not want any further invasive testing and wishes to go home and focus on comfort care.  Her family (daughter, grand-daughter and ) are supportive of her decision.  -Consulted hospice and they have elected home hospice with [facility].  Patient was seen and examined.  She is comfortable and eager to go home.  Will discharge to home hospice today.

## 2025-04-26 NOTE — PLAN OF CARE
[facility]  Department of Hospital Medicine  [address] [place] [address]  [phone] after hours  [phone] fax    HOSPICE  ORDERS    04/26/2025    Admit to Hospice:  Home Service     Diagnoses:   Active Hospital Problems    Diagnosis  POA    *Mediastinal mass [J98.59]  Yes    DNR (do not resuscitate) [Z66]  No     Chronic    Moderate protein-calorie malnutrition [E44.0]  Yes    Counseling regarding advance care planning and goals of care [Z71.89]  Not Applicable    Atrial fibrillation with RVR [I48.91]  No    Debility [R53.81]  Yes    Paroxysmal atrial fibrillation [I48.0]  No    Lower extremity edema [R60.0]  Yes    Fever [R50.9]  Yes    Pleural effusion [J90]  Yes    Acute pain of left knee [M25.562]  Yes    Hemoptysis [R04.2]  No    Hyponatremia [E87.1]  Yes    SIRS (systemic inflammatory response syndrome) [R65.10]  Yes    Acute on Chronic pain of left knee [M25.562, G89.29]  Yes    Essential hypertension [I10]  Yes    Hyperlipidemia [E78.5]  Yes    Type 2 diabetes mellitus, without long-term current use of insulin [E11.9]  Yes      Resolved Hospital Problems   No resolved problems to display.       Hospice Qualifying Diagnoses:        Patient has a life expectancy < 6 months due to:  Primary Hospice Diagnosis: Suspected lymphoma - mediastinal mass  Comorbid Conditions Contributing to Decline: moderate protein-calorie malnutrition, paroxysmal atrial fibrillation, type 2 diabetes mellitus, hyperlipidemia, hypertension    Vital Signs: Routine per Hospice Protocol.    Code Status: DNR    Allergies: Review of patient's allergies indicates:  No Known Allergies    Diet: Comfort feeds per hospice protocol    Activities: As tolerated    Goals of Care Treatment Preferences:  Code Status: DNR          What is most important right now is to focus on spending time at home, avoiding the hospital, comfort and QOL .  Accordingly, we have decided that the best plan to meet  the patient's goals includes enrolling in hospice care.      Nursing: Per Hospice Routine.   Routine Skin for Bedridden Patients: Apply moisture barrier cream to all skin folds   and wet areas in perineal area daily and after baths and all bowel movements.    Oxygen: 1L by nasal canula    Other Miscellaneous Care:   Wound Care:    Sacrum: cleanse with Vashe and apply triad paste to affected area. Cover with foam dressing. Change daily. If patient is incontinent, triad paste can be applied and left open to air.    Left knee- multiple DTI injury from knee brace. Clean areas of concern with Vashe wound cleanser, pat dry and apply mepilex border dressings over DTI wounds. ( 2 large square Mepliex lined up next to each other should cover the entire knee)    Medications:     Medication List        START taking these medications      apixaban 5 mg Tab  Commonly known as: ELIQUIS  Take 1 tablet (5 mg total) by mouth 2 (two) times daily.     digoxin 125 mcg tablet  Commonly known as: LANOXIN  Take 1 tablet (0.125 mg total) by mouth once daily.  Start taking on: April 27, 2025     gabapentin 400 MG capsule  Commonly known as: NEURONTIN  Take 1 capsule (400 mg total) by mouth 3 (three) times daily.  Replaces: gabapentin 800 MG tablet     metoprolol tartrate 25 MG tablet  Commonly known as: LOPRESSOR  Take 0.5 tablets (12.5 mg total) by mouth every 8 (eight) hours.     ondansetron 4 MG Tbdl  Commonly known as: ZOFRAN-ODT  Take 1 tablet (4 mg total) by mouth every 6 (six) hours as needed (nausea).            CONTINUE taking these medications      acetaminophen 500 MG tablet  Commonly known as: TYLENOL  Take 2 tablets (1,000 mg total) by mouth every 8 (eight) hours as needed for Pain.     ONETOUCH ULTRA BLUE TEST STRIP Strp  Generic drug: blood sugar diagnostic  TEST ONCE DAILY     ONETOUCH ULTRASOFT LANCETS lancets  Generic drug: lancets  USE TO TEST BLOOD SUGAR DAILY            STOP taking these medications      amLODIPine 5 MG  tablet  Commonly known as: NORVASC     atorvastatin 10 MG tablet  Commonly known as: LIPITOR     [employer] ASPIRIN 81 MG Chew  Generic drug: aspirin     carvediloL 25 MG tablet  Commonly known as: COREG     gabapentin 800 MG tablet  Commonly known as: NEURONTIN  Replaced by: gabapentin 400 MG capsule     GEMTESA 75 mg Tab  Generic drug: vibegron     metFORMIN 500 MG ER 24hr tablet  Commonly known as: GLUCOPHAGE-XR     omeprazole 20 MG capsule  Commonly known as: PRILOSEC     PAXLOVID 300 mg (150 mg x 2)-100 mg copackaged tablets (EUA)  Generic drug: nirmatrelvir-ritonavir                DIABETES CARE:   Nurse to perform and educate diabetic management with blood glucose monitoring:      Fingerstick blood sugar a.m. and p.m.    Report CBG < 60 or > 350 to physician.         Insulin Sliding Scale         Glucose  Novolog Insulin Subcutaneous        0 - 60   Orange juice or glucose tablet      No insulin   201-250  2 units   251-300  4 units   301-350  6 units   351-400  8 units   >400   10 units then call physician      Future Orders:  Hospice Medical Director may dictate new orders for comfortable care measures & sign death certificate.        _________________________________  Ramon Colon MD  04/26/2025

## 2025-04-26 NOTE — PLAN OF CARE
Problem: Adult Inpatient Plan of Care  Goal: Plan of Care Review  Outcome: Adequate for Care Transition  Goal: Patient-Specific Goal (Individualized)  Outcome: Adequate for Care Transition  Goal: Absence of Hospital-Acquired Illness or Injury  Outcome: Adequate for Care Transition  Goal: Optimal Comfort and Wellbeing  Outcome: Adequate for Care Transition  Goal: Readiness for Transition of Care  Outcome: Adequate for Care Transition     Problem: Diabetes Comorbidity  Goal: Blood Glucose Level Within Targeted Range  Outcome: Adequate for Care Transition     Problem: Wound  Goal: Optimal Coping  Outcome: Adequate for Care Transition  Goal: Optimal Functional Ability  Outcome: Adequate for Care Transition  Goal: Absence of Infection Signs and Symptoms  Outcome: Adequate for Care Transition  Goal: Improved Oral Intake  Outcome: Adequate for Care Transition  Goal: Optimal Pain Control and Function  Outcome: Adequate for Care Transition  Goal: Skin Health and Integrity  Outcome: Adequate for Care Transition  Goal: Optimal Wound Healing  Outcome: Adequate for Care Transition     Problem: Skin Injury Risk Increased  Goal: Skin Health and Integrity  Outcome: Adequate for Care Transition     Problem: Fall Injury Risk  Goal: Absence of Fall and Fall-Related Injury  Outcome: Adequate for Care Transition     Problem: UTI (Urinary Tract Infection)  Goal: Improved Infection Symptoms  Outcome: Adequate for Care Transition     Problem: Pain Acute  Goal: Optimal Pain Control and Function  Outcome: Adequate for Care Transition     Problem: Sepsis/Septic Shock  Goal: Optimal Coping  Outcome: Adequate for Care Transition  Goal: Absence of Bleeding  Outcome: Adequate for Care Transition  Goal: Absence of Infection Signs and Symptoms  Outcome: Adequate for Care Transition  Goal: Optimal Nutrition Intake  Outcome: Adequate for Care Transition     Problem: Comorbidity Management  Goal: Blood Pressure in Desired Range  Outcome: Adequate  for Care Transition  Goal: Maintenance of Osteoarthritis Symptom Control  Outcome: Adequate for Care Transition     Problem: Coping Ineffective  Goal: Effective Coping  Outcome: Adequate for Care Transition      Pt to be discharged home today on hospice. Care plan progress adequate for discharge.

## 2025-04-26 NOTE — EICU
Intervention Initiated From:  [facility] / EICU    Mc intervened regarding:  Rounding (Video assessment)    Comments: Virtual ICU Quality Rounds    Admit Date: 4/14/2025  Hospital Day: 11    ICU Day: 4d 13h    24H Vital Sign Range:  Temp:  [98 °F (36.7 °C)-98.8 °F (37.1 °C)]   Pulse:  [57-84]   Resp:  [16-28]   BP: ()/(55-71)   SpO2:  [95 %-100 %]     VICU Surveillance Screening

## 2025-04-26 NOTE — DISCHARGE SUMMARY
"[facility], [place], operated by [facility] Medicine  Discharge Summary      Patient Name: Jojo Ayoub  MRN: [id number]  OSITO: [id number]  Patient Class: IP- Inpatient  Admission Date: 4/14/2025  Hospital Length of Stay: 11 days  Discharge Date and Time: No discharge date for patient encounter.  Attending Physician: Ramon Colon MD   Discharging Provider: Ramon Colon MD  Primary Care Provider: Bahman Vincent Jr., MD    Primary Care Team: Networked reference to record PCT     HPI:   Per CLARE RobertsC:    "Ms. Jojo Ayoub is a 89 y.o. female, with PMH of T2DM, HTN, HLD, chronic constipation, OA w/ chronic left knee pain, anemia, who presented to [facility] ED on 4/15/25 due to b/l LE swelling x "a few weeks." Her daughter syayes the leg swelling was first noted by her today. She additionally notes low blood pressure readings of 90/55 at home today. She endorses left knee pain, and states she feels like her left knee "dislocate" a few nights ago. She states she had a knee replacement of her left knee over 20 years ago. She denied fall/trauma, leg pain or redness or warmth. She denied dizziness, fatigue, fever, chills, sore throat, runny nose, congestion, shortness of breath, chest pain, palpitations, abdominal pain, nausea, vomiting, diarrhea, or urinary symptoms. She was evaluated in the ED with labs showing leukocytosis of 19K, with left shift and H&H of 9.9/28.0. A metabolic panel showed sodium of 121, potassium of 5.2, glucose was 94, with CO2 of 19, and anion gap of 7. A BNP was 181 without elevation of troponin. A UA showed nitrite positive UTI with 2+ leuk esterase, with 20 WBC, and many bacteria. A CXR showed bilateral hilar prominence, and mild bibasilar densities. An x-ray of the knee had a findings that might reporesent a Pellegrinin-Stieda lesion vs. An avulsive fracture and a suprapatellar effusion. She was treated in the ED with 1L NS and rocephin."    Procedure(s) " (LRB):  EXCISION, LYMPH NODE (Left)      Hospital Course:   Patient is a 89 year-old woman history of hypertension, diabetes mellitus type 2, osteoarthritis of left knee status post remote left knee replacement who presents with worsening left knee pain with swelling.  Patient walks with walker at home at baseline.  She reports she recently slid to ground from a chair at home a few weeks but denies any more recent fall or any other trauma.     Exam notable for significant bilateral lower extremity edema more so on the left side.  Laxity of the left knee.  Not tender to touch. Lungs clear.  Imaging concerning for possible avulsion fracture.  Orthopedic surgery service consult for evaluation and recommended Adamaris brace and physical therapy.    Ultrasound of lower extremity negative for clot.  Echocardiogram does not show evidence of heart failure.  Hyponatremia on presentation which is improving without intervention.    Patient with persistent elevated white count and now fever overnight.  Blood cultures ordered.  On empiric broad-spectrum antibiotics.     Goals of Care Treatment Preferences:  Code Status: DNR          What is most important right now is to focus on spending time at home, avoiding the hospital, comfort and QOL .  Accordingly, we have decided that the best plan to meet the patient's goals includes enrolling in hospice care.      SDOH Screening:  The patient was screened for utility difficulties, food insecurity, transport difficulties, housing insecurity, and interpersonal safety and there were no concerns identified this admission.     Consults:   Consults (From admission, onward)          Status Ordering Provider     Inpatient consult to Registered Dietitian/Nutritionist  Once        Provider:  (Not yet assigned)    Completed MARIELY YADAV     Inpatient consult to Palliative Care  Once        Provider:  (Not yet assigned)    Completed MARIELY YADAV     Inpatient consult to Cardiology  Once         Provider:  Raulito Lares MD    Completed MARIELY YADAV     Inpatient consult to General Surgery  Once        Provider:  Mariely Johnson MD    Completed YANNI JULIO.     Inpatient consult to Pulmonary Critical Care  Once        Provider:  Dyana Singh MD    Completed YANNI JULIO     Inpatient consult to Orthopedic Surgery  Once        Provider:  Parveen Malagon MD    Completed SHONDA OLIVEROS            Assessment & Plan  Mediastinal mass  SIRS (systemic inflammatory response syndrome)  Hemoptysis  -Admitted to inpatient status  Presented with leg swelling and on admit had elevated white blood cell count and subsequently fever.  Procal negative.  -UA suggestive of possible UTI but without urinary symptoms.    -Left knee with acute on chronic pain but not significantly tender and with good range of motion.    -Earlier in stay noted URI symptoms and reports of scant hemoptysis and cough.    -CT Chest with contrast 4/17 showed a large (8.1 x 4.5 cm) subcarinal mass, possible lymphadenopathy.  Additional mediastinal, hilar, and left axillary lymphadenopathy noted. Mild/moderate sized pleural effusions with bibasilar atelectasis.  Moderate chronic interstitial/ fibrotic lung changes.  -Blood cultures negative.  Urine cultures with no predominant organism  -Pulmonology and general surgery consulted and input appreciated.    -Pulm attempted thoracentesis 4/18 but not able to obtain fluid.  -Taken by surgery to OR on 4/20 for excisional LN biopsy.  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 -- No evidence of lymphoproliferative disorder or other metastatic malignancies.   -Per discussion swith pulmonology, next step for non-diagnostic LN biopsy would be EGD with EUS.    -Discussed options for care with patient and family at bedside extensively today.  Patient is clear that she does not want any further invasive testing and wishes to go home and focus on comfort  care.  Her family (daughter, grand-daughter and ) are supportive of her decision.  -Consulted hospice and they have elected home hospice with [facility].  Patient was seen and examined.  She is comfortable and eager to go home.  Will discharge to home hospice today.  Paroxysmal atrial fibrillation  Atrial fibrillation with RVR  -Noted history of pAFIB  -Had several episodes of short live self converting afib rvr earlier in stay  -Overnight 4/21-4/22 again developed afib rvr resistent to metoprolol so transferred to ICU.  Similar nocturnal occurrences overnight 4/22-4/23 and again 4/23-4/24.  -Echo 4/14/25 showed normal EF and no severe valvular lesions  -Discussed with general surgery and ok from their end and started eliquis 5mg bid on 4/22.  -This morning again in afib, but rate controlled  -Continue metoprolol and digoxin  -Continue eliquis - noted prior small hemoptysis, but this has not been occurring so feel eliquis is safe for now.  Hyponatremia  -On admit Na 121 and now is 126  -Noted history of hyponatremia.    -TSH wnl.  Coristol speaks against adrenal insuffiencey.    Hypervolemic initially on exam however swelling seems serum improved on its own - as did sodium.    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    -No further labs as pursuing comfort measures  Acute on Chronic pain of left knee  Lower extremity edema  Debility  -History noted and had some leg swelling which has improved  -Doppler US of leg did not show any DVT.  -Orthopedic surgery consulted and input appreciated  -Continue brace and therapy - wound care says ok to use her brace we just need to keep the area padded to protect the skin.  -Follow-up with Dr. Malagon in outpatient setting after discharge  -PT/OT consulted and recommend high intensity therapy at discharge.  However, patient does not wish for any further therapy and we are pursuing comfort measures.  Type 2 diabetes  mellitus, without long-term current use of insulin  -A1c 5.7 now  -Home regimen includes metformin - held during hospitalization  -Blood sugars are reasonably controlled without insulin  Hyperlipidemia  -Continue statin   Essential hypertension  -BP normal to mildly elevated after having been transiently hypotensive at times  -Adjusted metoprolol dosing for better rate control as above.  Moderate protein-calorie malnutrition  -Nutrition consulted. Most recent weight and BMI monitored-   -Measurements:  Wt Readings from Last 1 Encounters:   04/22/25 63.5 kg (139 lb 15.9 oz)   Body mass index is 24.03 kg/m².  -Patient has been screened and assessed by RD.  -Interventions/Recommendations (treatment strategy):  - Boost Glucose Control 1x daily - Continue consistent carbohydrate diet - Encourage intake of meals - Monitor weight/labs - RD to monitor and follow up  Counseling regarding advance care planning and goals of care  DNR (do not resuscitate)  -Consulted palliative care to initiate ACP discussions and input appreciated  -Patient has elected for DNR status.  -Family awaits meeting with heme/onc to discuss diagnostic efforts and suspicion of lymphoma.  Patient and family have stated she would not want any surgery or chemotherapy - but perhaps immunotherapy or radiation may be helpful and well tolerated.  -I agree with palliative care that spending kaila time in inpatient rehab may be wasted time that they could be spending together.  Difficult situation  -Today met with patient, her , her grand-daughter and her daughter Micheal and discussed options moving forward.  Ms. Ayoub is very clear that she does not wish to undergo any further invasive testing or physical therapy.  We discussed that her goals appear consistent with a transition to comfort focussed care and home hospice.  She was in agreement and her family was supportive of her decision.   consulted to arrange meeting with hospice  agency.  I spent 25 minutes ACP time today.  Final Active Diagnoses:    Diagnosis Date Noted POA    PRINCIPAL PROBLEM:  Mediastinal mass [J98.59] 04/18/2025 Yes    DNR (do not resuscitate) [Z66] 04/24/2025 No     Chronic    Moderate protein-calorie malnutrition [E44.0] 04/23/2025 Yes    Counseling regarding advance care planning and goals of care [Z71.89] 04/23/2025 Not Applicable    Atrial fibrillation with RVR [I48.91] 04/22/2025 No    Debility [R53.81] 04/22/2025 Yes    Paroxysmal atrial fibrillation [I48.0] 04/21/2025 No    Lower extremity edema [R60.0] 04/18/2025 Yes    Fever [R50.9] 04/18/2025 Yes    Pleural effusion [J90] 04/18/2025 Yes    Acute pain of left knee [M25.562] 04/18/2025 Yes    Hemoptysis [R04.2] 04/17/2025 No    Hyponatremia [E87.1] 04/16/2025 Yes    SIRS (systemic inflammatory response syndrome) [R65.10] 01/01/2023 Yes    Acute on Chronic pain of left knee [M25.562, G89.29] 03/04/2022 Yes    Essential hypertension [I10] 02/28/2019 Yes    Hyperlipidemia [E78.5]  Yes    Type 2 diabetes mellitus, without long-term current use of insulin [E11.9] 02/01/2012 Yes      Problems Resolved During this Admission:       Discharged Condition: stable    Disposition: Hospice/Home    Follow Up:    Patient Instructions:      Diet Cardiac     Diet Adult Regular     Notify your health care provider if you experience any of the following:  increased confusion or weakness     Notify your health care provider if you experience any of the following:  persistent dizziness, light-headedness, or visual disturbances     Notify your health care provider if you experience any of the following:  worsening rash     Notify your health care provider if you experience any of the following:  severe persistent headache     Notify your health care provider if you experience any of the following:  difficulty breathing or increased cough     Notify your health care provider if you experience any of the following:  severe uncontrolled  "pain     Notify your health care provider if you experience any of the following:  persistent nausea and vomiting or diarrhea     Notify your health care provider if you experience any of the following:  temperature >100.4     Activity as tolerated       Significant Diagnostic Studies: Labs: BMP:   Recent Labs   Lab 04/25/25 0226 04/26/25  0330   * 126*   K 4.3 5.0   CL 99 99   CO2 24 20*   BUN 18 18   CREATININE 1.0 0.8   CALCIUM 9.3 10.0   MG 2.0 1.9   , CMP   Recent Labs   Lab 04/25/25 0226 04/26/25  0330   * 126*   K 4.3 5.0   CL 99 99   CO2 24 20*   BUN 18 18   CREATININE 1.0 0.8   CALCIUM 9.3 10.0   ALBUMIN 1.4* 1.5*   BILITOT 0.3 0.2   ALKPHOS 61 76   AST 33 36   ALT 16 16   ANIONGAP 5* 7*   , CBC   Recent Labs   Lab 04/25/25 0226 04/26/25  0330   WBC 32.71* 29.30*   HGB 8.5* 9.3*   HCT 24.5* 28.1*    390   , INR   Lab Results   Component Value Date    INR 2.7 (A) 08/21/2012    INR 2.2 (A) 08/16/2012    INR 1.7 (A) 08/13/2012   , Lipid Panel   Lab Results   Component Value Date    CHOL 150 01/21/2020    HDL 48 01/21/2020    LDLCALC 83.8 01/21/2020    TRIG 91 01/21/2020    CHOLHDL 32.0 01/21/2020   , Troponin No results for input(s): "TROPONINI" in the last 168 hours., and A1C:   Recent Labs   Lab 04/15/25  0831   HGBA1C 5.7*       Pending Diagnostic Studies:       Procedure Component Value Units Date/Time    Cytology, Fluid/Wash/Brush [[id number]] Updated: 04/25/25 0727    Order Status: Sent Lab Status: In process     Specimen: Body Fluid from Pleural, Left     IgG 1, 2, 3, and 4 [[id number]] Collected: 04/23/25 0950    Order Status: Sent Lab Status: In process Updated: 04/23/25 1014    Specimen: Blood            Medications:  Reconciled Home Medications:      Medication List        START taking these medications      apixaban 5 mg Tab  Commonly known as: ELIQUIS  Take 1 tablet (5 mg total) by mouth 2 (two) times daily.     digoxin 125 mcg tablet  Commonly known as: LANOXIN  Take 1 " tablet (0.125 mg total) by mouth once daily.  Start taking on: April 27, 2025     gabapentin 400 MG capsule  Commonly known as: NEURONTIN  Take 1 capsule (400 mg total) by mouth 3 (three) times daily.  Replaces: gabapentin 800 MG tablet     metoprolol tartrate 25 MG tablet  Commonly known as: LOPRESSOR  Take 0.5 tablets (12.5 mg total) by mouth every 8 (eight) hours.     ondansetron 4 MG Tbdl  Commonly known as: ZOFRAN-ODT  Take 1 tablet (4 mg total) by mouth every 6 (six) hours as needed (nausea).            CONTINUE taking these medications      acetaminophen 500 MG tablet  Commonly known as: TYLENOL  Take 2 tablets (1,000 mg total) by mouth every 8 (eight) hours as needed for Pain.     ONETOUCH ULTRA BLUE TEST STRIP Strp  Generic drug: blood sugar diagnostic  TEST ONCE DAILY     ONETOUCH ULTRASOFT LANCETS lancets  Generic drug: lancets  USE TO TEST BLOOD SUGAR DAILY            STOP taking these medications      amLODIPine 5 MG tablet  Commonly known as: NORVASC     atorvastatin 10 MG tablet  Commonly known as: LIPITOR     [employer] ASPIRIN 81 MG Chew  Generic drug: aspirin     carvediloL 25 MG tablet  Commonly known as: COREG     gabapentin 800 MG tablet  Commonly known as: NEURONTIN  Replaced by: gabapentin 400 MG capsule     GEMTESA 75 mg Tab  Generic drug: vibegron     metFORMIN 500 MG ER 24hr tablet  Commonly known as: GLUCOPHAGE-XR     omeprazole 20 MG capsule  Commonly known as: PRILOSEC     PAXLOVID 300 mg (150 mg x 2)-100 mg copackaged tablets (EUA)  Generic drug: nirmatrelvir-ritonavir              Indwelling Lines/Drains at time of discharge:   Lines/Drains/Airways       Drain  Duration             Female External Urinary Catheter w/ Suction 04/15/25 0123 11 days                    Time spent on the discharge of patient: 35 minutes         Ramon Colon MD  Department of Hospital Medicine  [facility] ([place])

## 2025-04-28 LAB
BACTERIA SPEC CULT: NORMAL
W IMMUNOGLOBULIN G SUBCLASS 1: ABNORMAL MG/DL
W IMMUNOGLOBULIN G SUBCLASS 2: 375 MG/DL
W IMMUNOGLOBULIN G SUBCLASS 3: 112 MG/DL
W IMMUNOGLOBULIN G SUBCLASS 4: 20 MG/DL

## 2025-06-16 NOTE — ASSESSMENT & PLAN NOTE
-History noted and had some leg swelling which has improved  -Doppler US of leg did not show any DVT.  -Orthopedic surgery consulted and input appreciated  -Continue brace and therapy  -Follow-up with Dr. Malagon in outpatient setting after discharge  -PT/OT consulted and recommend high intensity therapy at discharge.   Cardiac rehab ordered and benefits reviewed with patient. Referral provided to the patient at discharge to an early outpatient cardiac rehab phase II program. Necessary patient information communicated to the CR program. The 1st appointment has either been made or the patient has been provided with contact information to make this appointment.

## (undated) DEVICE — SUT VICRYL 4-0 18 P-3

## (undated) DEVICE — ELECTRODE REM PLYHSV RETURN 9

## (undated) DEVICE — SUT VICRYL PLUS 3-0 SH 18IN

## (undated) DEVICE — DRESSING LEUKOPLAST FLEX 1X3IN

## (undated) DEVICE — APPLICATOR CHLORAPREP ORN 26ML

## (undated) DEVICE — TOWEL OR DISP STRL BLUE 4/PK

## (undated) DEVICE — GOWN ORBIS LVL 4 XXL 49IN

## (undated) DEVICE — SOL IRR SOD CHL .9% POUR

## (undated) DEVICE — Device

## (undated) DEVICE — ELECTRODE BLD EXT INSUL 1

## (undated) DEVICE — GLOVE SENSICARE PI SURG 7.5

## (undated) DEVICE — SYR B-D DISP CONTROL 10CC100/C

## (undated) DEVICE — NDL HYPO REG 25G X 1 1/2

## (undated) DEVICE — CONTAINER SPEC OR STRL 4.5OZ

## (undated) DEVICE — SOL POVIDONE SCRUB IODINE 4 OZ